# Patient Record
Sex: MALE | Race: WHITE | Employment: OTHER | URBAN - METROPOLITAN AREA
[De-identification: names, ages, dates, MRNs, and addresses within clinical notes are randomized per-mention and may not be internally consistent; named-entity substitution may affect disease eponyms.]

---

## 2017-01-01 ENCOUNTER — HOSPITAL ENCOUNTER (OUTPATIENT)
Dept: OTHER | Age: 77
Discharge: HOME | End: 2017-01-31
Attending: INTERNAL MEDICINE | Admitting: INTERNAL MEDICINE

## 2017-01-09 ENCOUNTER — ANTI-COAG VISIT (OUTPATIENT)
Dept: PHARMACY | Age: 77
End: 2017-01-09

## 2017-01-09 DIAGNOSIS — I48.20 CHRONIC ATRIAL FIBRILLATION (HCC): ICD-10-CM

## 2017-01-09 LAB — INR BLD: 2.4

## 2017-01-25 ENCOUNTER — OFFICE VISIT (OUTPATIENT)
Dept: FAMILY MEDICINE CLINIC | Age: 77
End: 2017-01-25

## 2017-01-25 VITALS
SYSTOLIC BLOOD PRESSURE: 140 MMHG | DIASTOLIC BLOOD PRESSURE: 70 MMHG | OXYGEN SATURATION: 96 % | WEIGHT: 155.8 LBS | BODY MASS INDEX: 23.01 KG/M2 | HEART RATE: 77 BPM

## 2017-01-25 DIAGNOSIS — M10.031 ACUTE IDIOPATHIC GOUT OF RIGHT WRIST: Primary | ICD-10-CM

## 2017-01-25 PROCEDURE — 99212 OFFICE O/P EST SF 10 MIN: CPT | Performed by: FAMILY MEDICINE

## 2017-01-25 PROCEDURE — 1036F TOBACCO NON-USER: CPT | Performed by: FAMILY MEDICINE

## 2017-01-25 PROCEDURE — G8427 DOCREV CUR MEDS BY ELIG CLIN: HCPCS | Performed by: FAMILY MEDICINE

## 2017-01-25 PROCEDURE — 1123F ACP DISCUSS/DSCN MKR DOCD: CPT | Performed by: FAMILY MEDICINE

## 2017-01-25 PROCEDURE — G8484 FLU IMMUNIZE NO ADMIN: HCPCS | Performed by: FAMILY MEDICINE

## 2017-01-25 PROCEDURE — 4040F PNEUMOC VAC/ADMIN/RCVD: CPT | Performed by: FAMILY MEDICINE

## 2017-01-25 PROCEDURE — G8420 CALC BMI NORM PARAMETERS: HCPCS | Performed by: FAMILY MEDICINE

## 2017-01-25 PROCEDURE — 3017F COLORECTAL CA SCREEN DOC REV: CPT | Performed by: FAMILY MEDICINE

## 2017-01-25 RX ORDER — PREDNISONE 20 MG/1
TABLET ORAL
Qty: 16 TABLET | Refills: 0 | Status: ON HOLD | OUTPATIENT
Start: 2017-01-25 | End: 2017-04-27 | Stop reason: HOSPADM

## 2017-01-25 RX ORDER — OXYCODONE HYDROCHLORIDE 5 MG/1
5 TABLET ORAL EVERY 6 HOURS PRN
Qty: 20 TABLET | Refills: 0 | Status: SHIPPED | OUTPATIENT
Start: 2017-01-25 | End: 2017-05-31 | Stop reason: SDUPTHER

## 2017-02-03 RX ORDER — DILTIAZEM HYDROCHLORIDE 120 MG/1
CAPSULE, COATED, EXTENDED RELEASE ORAL
Qty: 180 CAPSULE | Refills: 1 | Status: SHIPPED | OUTPATIENT
Start: 2017-02-03 | End: 2017-05-01

## 2017-02-09 ENCOUNTER — ANTI-COAG VISIT (OUTPATIENT)
Dept: PHARMACY | Age: 77
End: 2017-02-09

## 2017-02-09 DIAGNOSIS — I48.20 CHRONIC ATRIAL FIBRILLATION (HCC): ICD-10-CM

## 2017-02-09 LAB
INR BLD: 3.6
PROTIME: 43.6 SECONDS

## 2017-03-09 ENCOUNTER — ANTI-COAG VISIT (OUTPATIENT)
Dept: PHARMACY | Age: 77
End: 2017-03-09

## 2017-03-09 DIAGNOSIS — I48.20 CHRONIC ATRIAL FIBRILLATION (HCC): ICD-10-CM

## 2017-03-09 LAB — INR BLD: 2.1

## 2017-03-21 RX ORDER — DIGOXIN 250 MCG
TABLET ORAL
Qty: 90 TABLET | Refills: 3 | Status: SHIPPED | OUTPATIENT
Start: 2017-03-21 | End: 2017-07-03 | Stop reason: SDUPTHER

## 2017-04-06 ENCOUNTER — ANTI-COAG VISIT (OUTPATIENT)
Dept: PHARMACY | Age: 77
End: 2017-04-06

## 2017-04-06 DIAGNOSIS — I48.20 CHRONIC ATRIAL FIBRILLATION (HCC): ICD-10-CM

## 2017-04-06 LAB
INR BLD: 3
PROTIME: 35.6 SECONDS

## 2017-04-23 PROBLEM — J18.9 PNEUMONIA: Status: ACTIVE | Noted: 2017-04-23

## 2017-04-23 PROBLEM — R91.1 LUNG NODULE: Status: ACTIVE | Noted: 2017-04-23

## 2017-04-28 ENCOUNTER — CARE COORDINATION (OUTPATIENT)
Dept: FAMILY MEDICINE CLINIC | Age: 77
End: 2017-04-28

## 2017-04-28 ENCOUNTER — TELEPHONE (OUTPATIENT)
Dept: OTHER | Age: 77
End: 2017-04-28

## 2017-05-01 ENCOUNTER — OFFICE VISIT (OUTPATIENT)
Dept: FAMILY MEDICINE CLINIC | Age: 77
End: 2017-05-01

## 2017-05-01 VITALS
OXYGEN SATURATION: 93 % | DIASTOLIC BLOOD PRESSURE: 54 MMHG | SYSTOLIC BLOOD PRESSURE: 106 MMHG | BODY MASS INDEX: 23.11 KG/M2 | WEIGHT: 152 LBS | HEART RATE: 115 BPM

## 2017-05-01 DIAGNOSIS — R91.1 LUNG NODULE: ICD-10-CM

## 2017-05-01 DIAGNOSIS — J18.9 PNEUMONIA OF RIGHT MIDDLE LOBE DUE TO INFECTIOUS ORGANISM: Primary | ICD-10-CM

## 2017-05-01 DIAGNOSIS — I48.20 CHRONIC ATRIAL FIBRILLATION (HCC): ICD-10-CM

## 2017-05-01 DIAGNOSIS — I65.23 BILATERAL CAROTID ARTERY STENOSIS: ICD-10-CM

## 2017-05-01 DIAGNOSIS — M10.031 ACUTE IDIOPATHIC GOUT OF RIGHT WRIST: ICD-10-CM

## 2017-05-01 DIAGNOSIS — I70.0 ATHEROSCLEROSIS OF ABDOMINAL AORTA (HCC): ICD-10-CM

## 2017-05-01 PROBLEM — I65.22 STENOSIS OF LEFT CAROTID ARTERY: Status: ACTIVE | Noted: 2017-05-01

## 2017-05-01 RX ORDER — OXYCODONE HYDROCHLORIDE 5 MG/1
5 TABLET ORAL EVERY 6 HOURS PRN
Qty: 15 TABLET | Refills: 0 | Status: SHIPPED | OUTPATIENT
Start: 2017-05-01 | End: 2017-09-07 | Stop reason: CLARIF

## 2017-05-01 RX ORDER — PREDNISONE 20 MG/1
40 TABLET ORAL DAILY
Qty: 14 TABLET | Refills: 0 | Status: SHIPPED | OUTPATIENT
Start: 2017-05-01 | End: 2017-05-08

## 2017-05-04 ENCOUNTER — ANTI-COAG VISIT (OUTPATIENT)
Dept: PHARMACY | Age: 77
End: 2017-05-04

## 2017-05-04 ENCOUNTER — OFFICE VISIT (OUTPATIENT)
Dept: CARDIOLOGY CLINIC | Age: 77
End: 2017-05-04

## 2017-05-04 VITALS
DIASTOLIC BLOOD PRESSURE: 64 MMHG | WEIGHT: 155 LBS | BODY MASS INDEX: 23.49 KG/M2 | HEIGHT: 68 IN | HEART RATE: 80 BPM | SYSTOLIC BLOOD PRESSURE: 138 MMHG

## 2017-05-04 DIAGNOSIS — I35.1 NONRHEUMATIC AORTIC VALVE INSUFFICIENCY: Chronic | ICD-10-CM

## 2017-05-04 DIAGNOSIS — I70.0 ATHEROSCLEROSIS OF ABDOMINAL AORTA (HCC): ICD-10-CM

## 2017-05-04 DIAGNOSIS — J18.9 PNEUMONIA OF RIGHT MIDDLE LOBE DUE TO INFECTIOUS ORGANISM: Primary | ICD-10-CM

## 2017-05-04 DIAGNOSIS — I48.20 CHRONIC ATRIAL FIBRILLATION (HCC): ICD-10-CM

## 2017-05-04 DIAGNOSIS — I65.23 BILATERAL CAROTID ARTERY STENOSIS: ICD-10-CM

## 2017-05-04 DIAGNOSIS — R91.1 LUNG NODULE: ICD-10-CM

## 2017-05-04 DIAGNOSIS — E78.00 PURE HYPERCHOLESTEROLEMIA: Chronic | ICD-10-CM

## 2017-05-04 DIAGNOSIS — I10 ESSENTIAL HYPERTENSION, BENIGN: Chronic | ICD-10-CM

## 2017-05-04 LAB — INR BLD: 2.4

## 2017-05-04 PROCEDURE — 99214 OFFICE O/P EST MOD 30 MIN: CPT | Performed by: NURSE PRACTITIONER

## 2017-05-04 PROCEDURE — 1123F ACP DISCUSS/DSCN MKR DOCD: CPT | Performed by: NURSE PRACTITIONER

## 2017-05-04 PROCEDURE — G8420 CALC BMI NORM PARAMETERS: HCPCS | Performed by: NURSE PRACTITIONER

## 2017-05-04 PROCEDURE — G8427 DOCREV CUR MEDS BY ELIG CLIN: HCPCS | Performed by: NURSE PRACTITIONER

## 2017-05-04 PROCEDURE — G8598 ASA/ANTIPLAT THER USED: HCPCS | Performed by: NURSE PRACTITIONER

## 2017-05-04 PROCEDURE — 1036F TOBACCO NON-USER: CPT | Performed by: NURSE PRACTITIONER

## 2017-05-04 PROCEDURE — 4040F PNEUMOC VAC/ADMIN/RCVD: CPT | Performed by: NURSE PRACTITIONER

## 2017-05-04 PROCEDURE — 1111F DSCHRG MED/CURRENT MED MERGE: CPT | Performed by: NURSE PRACTITIONER

## 2017-05-04 PROCEDURE — 3017F COLORECTAL CA SCREEN DOC REV: CPT | Performed by: NURSE PRACTITIONER

## 2017-05-05 ENCOUNTER — HOSPITAL ENCOUNTER (OUTPATIENT)
Dept: OTHER | Age: 77
Discharge: HOME | End: 2017-05-05
Attending: NURSE PRACTITIONER | Admitting: NURSE PRACTITIONER

## 2017-05-05 DIAGNOSIS — J18.9 PNEUMONIA OF RIGHT MIDDLE LOBE DUE TO INFECTIOUS ORGANISM: ICD-10-CM

## 2017-05-08 ENCOUNTER — TELEPHONE (OUTPATIENT)
Dept: CARDIOLOGY CLINIC | Age: 77
End: 2017-05-08

## 2017-05-15 RX ORDER — TRIAMCINOLONE ACETONIDE 1 MG/G
CREAM TOPICAL
Qty: 240 G | Refills: 3 | Status: SHIPPED | OUTPATIENT
Start: 2017-05-15 | End: 2018-05-30

## 2017-05-16 ENCOUNTER — OFFICE VISIT (OUTPATIENT)
Dept: VASCULAR SURGERY | Age: 77
End: 2017-05-16

## 2017-05-16 VITALS
BODY MASS INDEX: 22.88 KG/M2 | WEIGHT: 151 LBS | DIASTOLIC BLOOD PRESSURE: 76 MMHG | SYSTOLIC BLOOD PRESSURE: 132 MMHG | HEIGHT: 68 IN

## 2017-05-16 DIAGNOSIS — I65.23 CAROTID ATHEROSCLEROSIS, BILATERAL: ICD-10-CM

## 2017-05-16 DIAGNOSIS — I70.0 AORTIC ATHEROSCLEROSIS (HCC): Primary | ICD-10-CM

## 2017-05-16 PROCEDURE — 1036F TOBACCO NON-USER: CPT | Performed by: SURGERY

## 2017-05-16 PROCEDURE — 99203 OFFICE O/P NEW LOW 30 MIN: CPT | Performed by: SURGERY

## 2017-05-16 PROCEDURE — G8419 CALC BMI OUT NRM PARAM NOF/U: HCPCS | Performed by: SURGERY

## 2017-05-16 PROCEDURE — G8427 DOCREV CUR MEDS BY ELIG CLIN: HCPCS | Performed by: SURGERY

## 2017-05-16 PROCEDURE — 1111F DSCHRG MED/CURRENT MED MERGE: CPT | Performed by: SURGERY

## 2017-05-16 PROCEDURE — 4040F PNEUMOC VAC/ADMIN/RCVD: CPT | Performed by: SURGERY

## 2017-05-16 PROCEDURE — G8598 ASA/ANTIPLAT THER USED: HCPCS | Performed by: SURGERY

## 2017-05-16 PROCEDURE — 1123F ACP DISCUSS/DSCN MKR DOCD: CPT | Performed by: SURGERY

## 2017-05-16 PROCEDURE — 3017F COLORECTAL CA SCREEN DOC REV: CPT | Performed by: SURGERY

## 2017-05-18 ENCOUNTER — ANTI-COAG VISIT (OUTPATIENT)
Dept: PHARMACY | Age: 77
End: 2017-05-18

## 2017-05-18 ENCOUNTER — TELEPHONE (OUTPATIENT)
Dept: FAMILY MEDICINE CLINIC | Age: 77
End: 2017-05-18

## 2017-05-18 DIAGNOSIS — I48.20 CHRONIC ATRIAL FIBRILLATION (HCC): ICD-10-CM

## 2017-05-18 LAB — INR BLD: 2.9

## 2017-05-26 PROCEDURE — 99496 TRANSJ CARE MGMT HIGH F2F 7D: CPT | Performed by: FAMILY MEDICINE

## 2017-05-31 ENCOUNTER — OFFICE VISIT (OUTPATIENT)
Dept: FAMILY MEDICINE CLINIC | Age: 77
End: 2017-05-31

## 2017-05-31 VITALS
DIASTOLIC BLOOD PRESSURE: 68 MMHG | SYSTOLIC BLOOD PRESSURE: 144 MMHG | BODY MASS INDEX: 23.42 KG/M2 | HEART RATE: 106 BPM | WEIGHT: 154 LBS | OXYGEN SATURATION: 98 %

## 2017-05-31 DIAGNOSIS — I50.42 CHRONIC COMBINED SYSTOLIC AND DIASTOLIC CHF (CONGESTIVE HEART FAILURE) (HCC): Primary | ICD-10-CM

## 2017-05-31 DIAGNOSIS — I10 ESSENTIAL HYPERTENSION, BENIGN: Chronic | ICD-10-CM

## 2017-05-31 DIAGNOSIS — R73.02 IGT (IMPAIRED GLUCOSE TOLERANCE): ICD-10-CM

## 2017-05-31 PROBLEM — J18.9 PNEUMONIA OF RIGHT MIDDLE LOBE DUE TO INFECTIOUS ORGANISM: Status: RESOLVED | Noted: 2017-04-23 | Resolved: 2017-05-31

## 2017-05-31 PROCEDURE — 99213 OFFICE O/P EST LOW 20 MIN: CPT | Performed by: FAMILY MEDICINE

## 2017-05-31 PROCEDURE — 4040F PNEUMOC VAC/ADMIN/RCVD: CPT | Performed by: FAMILY MEDICINE

## 2017-05-31 PROCEDURE — G8420 CALC BMI NORM PARAMETERS: HCPCS | Performed by: FAMILY MEDICINE

## 2017-05-31 PROCEDURE — G8598 ASA/ANTIPLAT THER USED: HCPCS | Performed by: FAMILY MEDICINE

## 2017-05-31 PROCEDURE — 1123F ACP DISCUSS/DSCN MKR DOCD: CPT | Performed by: FAMILY MEDICINE

## 2017-05-31 PROCEDURE — G8427 DOCREV CUR MEDS BY ELIG CLIN: HCPCS | Performed by: FAMILY MEDICINE

## 2017-05-31 PROCEDURE — 1036F TOBACCO NON-USER: CPT | Performed by: FAMILY MEDICINE

## 2017-05-31 PROCEDURE — 3017F COLORECTAL CA SCREEN DOC REV: CPT | Performed by: FAMILY MEDICINE

## 2017-05-31 RX ORDER — CARVEDILOL 6.25 MG/1
6.25 TABLET ORAL 2 TIMES DAILY WITH MEALS
Qty: 90 TABLET | Refills: 3 | Status: SHIPPED | OUTPATIENT
Start: 2017-05-31 | End: 2017-12-11 | Stop reason: SDUPTHER

## 2017-06-08 ENCOUNTER — OFFICE VISIT (OUTPATIENT)
Dept: CARDIOLOGY CLINIC | Age: 77
End: 2017-06-08

## 2017-06-08 VITALS
HEART RATE: 74 BPM | DIASTOLIC BLOOD PRESSURE: 62 MMHG | BODY MASS INDEX: 22.66 KG/M2 | OXYGEN SATURATION: 94 % | WEIGHT: 153 LBS | HEIGHT: 69 IN | SYSTOLIC BLOOD PRESSURE: 130 MMHG

## 2017-06-08 DIAGNOSIS — I70.0 ATHEROSCLEROSIS OF ABDOMINAL AORTA (HCC): ICD-10-CM

## 2017-06-08 DIAGNOSIS — I35.1 NONRHEUMATIC AORTIC VALVE INSUFFICIENCY: Primary | Chronic | ICD-10-CM

## 2017-06-08 DIAGNOSIS — E78.00 PURE HYPERCHOLESTEROLEMIA: Chronic | ICD-10-CM

## 2017-06-08 DIAGNOSIS — I48.20 CHRONIC ATRIAL FIBRILLATION (HCC): ICD-10-CM

## 2017-06-08 DIAGNOSIS — I10 ESSENTIAL HYPERTENSION, BENIGN: Chronic | ICD-10-CM

## 2017-06-08 DIAGNOSIS — I65.23 BILATERAL CAROTID ARTERY STENOSIS: ICD-10-CM

## 2017-06-08 DIAGNOSIS — R91.1 LUNG NODULE: ICD-10-CM

## 2017-06-08 PROCEDURE — G8427 DOCREV CUR MEDS BY ELIG CLIN: HCPCS | Performed by: NURSE PRACTITIONER

## 2017-06-08 PROCEDURE — 1036F TOBACCO NON-USER: CPT | Performed by: NURSE PRACTITIONER

## 2017-06-08 PROCEDURE — G8419 CALC BMI OUT NRM PARAM NOF/U: HCPCS | Performed by: NURSE PRACTITIONER

## 2017-06-08 PROCEDURE — 3017F COLORECTAL CA SCREEN DOC REV: CPT | Performed by: NURSE PRACTITIONER

## 2017-06-08 PROCEDURE — 1123F ACP DISCUSS/DSCN MKR DOCD: CPT | Performed by: NURSE PRACTITIONER

## 2017-06-08 PROCEDURE — G8598 ASA/ANTIPLAT THER USED: HCPCS | Performed by: NURSE PRACTITIONER

## 2017-06-08 PROCEDURE — 99214 OFFICE O/P EST MOD 30 MIN: CPT | Performed by: NURSE PRACTITIONER

## 2017-06-08 PROCEDURE — 4040F PNEUMOC VAC/ADMIN/RCVD: CPT | Performed by: NURSE PRACTITIONER

## 2017-06-15 ENCOUNTER — ANTI-COAG VISIT (OUTPATIENT)
Dept: PHARMACY | Age: 77
End: 2017-06-15

## 2017-06-15 DIAGNOSIS — I48.20 CHRONIC ATRIAL FIBRILLATION (HCC): ICD-10-CM

## 2017-06-15 LAB
INR BLD: 2.1
PROTIME: 25.5 SECONDS

## 2017-07-03 RX ORDER — DIGOXIN 250 MCG
TABLET ORAL
Qty: 90 TABLET | Refills: 3 | Status: SHIPPED | OUTPATIENT
Start: 2017-07-03 | End: 2018-10-01 | Stop reason: SDUPTHER

## 2017-07-06 ENCOUNTER — ANTI-COAG VISIT (OUTPATIENT)
Dept: PHARMACY | Age: 77
End: 2017-07-06

## 2017-07-06 DIAGNOSIS — I48.20 CHRONIC ATRIAL FIBRILLATION (HCC): ICD-10-CM

## 2017-07-06 LAB — INR BLD: 2.5

## 2017-08-02 RX ORDER — WARFARIN SODIUM 4 MG
TABLET ORAL
Qty: 90 TABLET | Refills: 0 | Status: SHIPPED | OUTPATIENT
Start: 2017-08-02 | End: 2017-10-16 | Stop reason: SDUPTHER

## 2017-08-07 ENCOUNTER — ANTI-COAG VISIT (OUTPATIENT)
Dept: PHARMACY | Age: 77
End: 2017-08-07

## 2017-08-07 DIAGNOSIS — I48.20 CHRONIC ATRIAL FIBRILLATION (HCC): ICD-10-CM

## 2017-08-07 LAB
INR BLD: 3.3
PROTIME: 40.1 SECONDS

## 2017-09-07 ENCOUNTER — OFFICE VISIT (OUTPATIENT)
Dept: CARDIOLOGY CLINIC | Age: 77
End: 2017-09-07

## 2017-09-07 ENCOUNTER — ANTI-COAG VISIT (OUTPATIENT)
Dept: PHARMACY | Age: 77
End: 2017-09-07

## 2017-09-07 VITALS
SYSTOLIC BLOOD PRESSURE: 128 MMHG | BODY MASS INDEX: 23.11 KG/M2 | HEIGHT: 69 IN | DIASTOLIC BLOOD PRESSURE: 74 MMHG | OXYGEN SATURATION: 98 % | WEIGHT: 156 LBS | HEART RATE: 54 BPM

## 2017-09-07 DIAGNOSIS — I10 ESSENTIAL HYPERTENSION, BENIGN: Primary | Chronic | ICD-10-CM

## 2017-09-07 DIAGNOSIS — I48.20 CHRONIC ATRIAL FIBRILLATION (HCC): ICD-10-CM

## 2017-09-07 DIAGNOSIS — I65.23 BILATERAL CAROTID ARTERY STENOSIS: ICD-10-CM

## 2017-09-07 DIAGNOSIS — I70.0 ATHEROSCLEROSIS OF ABDOMINAL AORTA (HCC): ICD-10-CM

## 2017-09-07 DIAGNOSIS — E78.00 PURE HYPERCHOLESTEROLEMIA: Chronic | ICD-10-CM

## 2017-09-07 DIAGNOSIS — I35.1 NONRHEUMATIC AORTIC VALVE INSUFFICIENCY: Chronic | ICD-10-CM

## 2017-09-07 LAB
INR BLD: 3.3
PROTIME: 39.2 SECONDS

## 2017-09-07 PROCEDURE — G8598 ASA/ANTIPLAT THER USED: HCPCS | Performed by: NURSE PRACTITIONER

## 2017-09-07 PROCEDURE — G8420 CALC BMI NORM PARAMETERS: HCPCS | Performed by: NURSE PRACTITIONER

## 2017-09-07 PROCEDURE — 1036F TOBACCO NON-USER: CPT | Performed by: NURSE PRACTITIONER

## 2017-09-07 PROCEDURE — 4040F PNEUMOC VAC/ADMIN/RCVD: CPT | Performed by: NURSE PRACTITIONER

## 2017-09-07 PROCEDURE — 1123F ACP DISCUSS/DSCN MKR DOCD: CPT | Performed by: NURSE PRACTITIONER

## 2017-09-07 PROCEDURE — 99213 OFFICE O/P EST LOW 20 MIN: CPT | Performed by: NURSE PRACTITIONER

## 2017-09-07 PROCEDURE — G8427 DOCREV CUR MEDS BY ELIG CLIN: HCPCS | Performed by: NURSE PRACTITIONER

## 2017-09-07 PROCEDURE — 3017F COLORECTAL CA SCREEN DOC REV: CPT | Performed by: NURSE PRACTITIONER

## 2017-10-05 ENCOUNTER — ANTI-COAG VISIT (OUTPATIENT)
Dept: PHARMACY | Age: 77
End: 2017-10-05

## 2017-10-05 DIAGNOSIS — I48.20 CHRONIC ATRIAL FIBRILLATION (HCC): ICD-10-CM

## 2017-10-05 LAB
INR BLD: 2.6
PROTIME: 30.9 SECONDS

## 2017-10-05 NOTE — PROGRESS NOTES
Mr. Jose Daniel Garcia is a 78 y.o. y/o male with history of Mitral Heart Valve about 1997 by Laurent Funes at [facility]   He presents today for anticoagulation monitoring and adjustment. Patient verifies current dosing regimen as listed above. Patient denies s/s bleeding/bruising/swelling/SOB. No blood in urine or stool. No dietary changes. No changes in medication/OTC agents/Herbals. No Procedures scheduled in the future at this time. Patient states he has not had any alcohol in 30 years. He uses brand name Coumadin.  RACHELL. Drinks 1 Ensure daily. Encouraged to maintain consistency. Denies missed doses. INR 2.6 again today  Continue same weekly dose of 6mg (1&1/2 tablets) on Sun & Thurs and 4mg (1 tablet) all other days. Recheck in 4 weeks. Consider resuming 6 week appointment if therapeutic at next visit. Referring cardiologist is Dr. Cong Benoit.    INR (no units)   Date Value   10/05/2017 2.6   09/07/2017 3.3   08/07/2017 3.3   07/06/2017 2.5

## 2017-10-05 NOTE — MR AVS SNAPSHOT
Atrial fibrillation    IGT (impaired glucose tolerance)    Chronic kidney disease, stage III (moderate)    Bilateral carotid artery stenosis    Atherosclerosis of abdominal aorta (HCC)    Meningioma (HCC)    Chronic combined systolic and diastolic CHF (congestive heart failure) (HCC)    Lung nodule    Hypertrophy of prostate without urinary obstruction and other lower urinary tract symptoms (LUTS)    Peptic ulcer    Gout    Generalized osteoarthrosis, involving multiple sites    Heart valve replaced by other means    Hyperlipidemia other unspecified. (Chronic)    Aortic regurgitation (Chronic)      Immunizations as of 10/5/2017     Name Date    Influenza Virus Vaccine 9/28/2012    Influenza, High Dose 9/22/2016, 10/5/2015, 10/9/2014, 9/17/2013    Pneumococcal 13-valent Conjugate (Txbovgv55) 11/25/2014    Pneumococcal Polysaccharide (Ieyanczvj63) 2/5/2003, 1/1/1997    Td 1/1/2001      Preventive Care        Date Due    Colonoscopy 9/2/1992    Tetanus Combination Vaccine (1 - Tdap) 1/2/2001    Zoster Vaccine 9/2/2002    Pneumococcal Vaccines (two) for all adults aged 72 and over (2 of 2 - PPSV23) 11/25/2015    Yearly Flu Vaccine (1) 9/1/2017    Cholesterol Screening 6/3/2021            [employer] Signup           [employer] allows you to send messages to your doctor, view your test results, renew your prescriptions, schedule appointments, view visit notes, and more. How Do I Sign Up? 1. In your Internet browser, go to [email or web address]  2. Click on the Sign Up Now link in the Sign In box. You will see the New Member Sign Up page. 3. Enter your [employer] Access Code exactly as it appears below. You will not need to use this code after youve completed the sign-up process. If you do not sign up before the expiration date, you must request a new code. [employer] Access Code: [email or web address]  Expires: 10/6/2017  9:27 AM    4.  Enter your Social Security Number ([id number]) and Date of Birth

## 2017-10-06 ENCOUNTER — NURSE ONLY (OUTPATIENT)
Dept: FAMILY MEDICINE CLINIC | Age: 77
End: 2017-10-06

## 2017-10-06 DIAGNOSIS — Z23 NEED FOR INFLUENZA VACCINATION: Primary | ICD-10-CM

## 2017-10-06 PROCEDURE — 99999 PR OFFICE/OUTPT VISIT,PROCEDURE ONLY: CPT | Performed by: FAMILY MEDICINE

## 2017-10-06 PROCEDURE — G0008 ADMIN INFLUENZA VIRUS VAC: HCPCS | Performed by: FAMILY MEDICINE

## 2017-10-06 PROCEDURE — 90662 IIV NO PRSV INCREASED AG IM: CPT | Performed by: FAMILY MEDICINE

## 2017-10-06 NOTE — PROGRESS NOTES
Vaccine Information Sheet, \"Influenza - Inactivated\"  given to Elen Cottrell, or parent/legal guardian of  Elen Cottrell and verbalized understanding. Patient responses:    Have you ever had a reaction to a flu vaccine? No  Are you able to eat eggs without adverse effects? Yes  Do you have any current illness? No  Have you ever had Guillian Terry Syndrome? No    Flu vaccine given per order. Please see immunization tab.

## 2017-10-16 RX ORDER — WARFARIN SODIUM 4 MG
TABLET ORAL
Qty: 90 TABLET | Refills: 0 | Status: SHIPPED | OUTPATIENT
Start: 2017-10-16 | End: 2017-12-29 | Stop reason: SDUPTHER

## 2017-11-01 ENCOUNTER — HOSPITAL ENCOUNTER (OUTPATIENT)
Dept: OTHER | Age: 77
Discharge: HOME | End: 2017-11-30
Attending: INTERNAL MEDICINE | Admitting: INTERNAL MEDICINE

## 2017-11-02 ENCOUNTER — TELEPHONE (OUTPATIENT)
Dept: PHARMACY | Age: 77
End: 2017-11-02

## 2017-11-06 ENCOUNTER — ANTI-COAG VISIT (OUTPATIENT)
Dept: PHARMACY | Age: 77
End: 2017-11-06

## 2017-11-06 DIAGNOSIS — I48.20 CHRONIC ATRIAL FIBRILLATION (HCC): ICD-10-CM

## 2017-11-06 LAB
INR BLD: 3.1
PROTIME: 37.2 SECONDS

## 2017-11-06 NOTE — PROGRESS NOTES
Mr. Angelo Holbrook is a 78 y.o. y/o male with history of Mitral Heart Valve about 1997 by Naty Shea at [facility]   He presents today for anticoagulation monitoring and adjustment. Patient verifies current dosing regimen as listed above. Patient denies s/s bleeding/bruising/swelling/SOB. No blood in urine or stool. No dietary changes. No changes in medication/OTC agents/Herbals. No Procedures scheduled in the future at this time. Patient states he has not had any alcohol in 30 years. He uses brand name Coumadin.  RACHELL. Reduced Ensure to about 3 times a weeky. Encouraged to maintain consistency. Denies missed doses. INR 3.1 today  Continue same weekly dose of 6mg (1&1/2 tablets) on Sun & Thurs and 4mg (1 tablet) all other days. Encouraged to maintain a consistency of vegetables/salads. Recheck INR in 6 weeks. Referring cardiologist is Dr. Taryn Gillette.    INR (no units)   Date Value   11/06/2017 3.1   10/05/2017 2.6   09/07/2017 3.3   08/07/2017 3.3

## 2017-11-14 ENCOUNTER — HOSPITAL ENCOUNTER (OUTPATIENT)
Dept: VASCULAR LAB | Age: 77
Discharge: HOME | End: 2017-11-14
Attending: SURGERY | Admitting: SURGERY

## 2017-11-14 DIAGNOSIS — I65.23 CAROTID ATHEROSCLEROSIS, BILATERAL: ICD-10-CM

## 2017-11-14 DIAGNOSIS — I65.23 OCCLUSION AND STENOSIS OF BILATERAL CAROTID ARTERIES: ICD-10-CM

## 2017-11-30 ENCOUNTER — OFFICE VISIT (OUTPATIENT)
Dept: FAMILY MEDICINE CLINIC | Age: 77
End: 2017-11-30

## 2017-11-30 VITALS
OXYGEN SATURATION: 98 % | HEART RATE: 53 BPM | BODY MASS INDEX: 23.04 KG/M2 | DIASTOLIC BLOOD PRESSURE: 70 MMHG | WEIGHT: 156 LBS | SYSTOLIC BLOOD PRESSURE: 130 MMHG

## 2017-11-30 DIAGNOSIS — I48.20 CHRONIC ATRIAL FIBRILLATION (HCC): ICD-10-CM

## 2017-11-30 DIAGNOSIS — I65.23 BILATERAL CAROTID ARTERY STENOSIS: ICD-10-CM

## 2017-11-30 DIAGNOSIS — I10 ESSENTIAL HYPERTENSION, BENIGN: Primary | Chronic | ICD-10-CM

## 2017-11-30 DIAGNOSIS — I50.42 CHRONIC COMBINED SYSTOLIC AND DIASTOLIC CHF (CONGESTIVE HEART FAILURE) (HCC): ICD-10-CM

## 2017-11-30 DIAGNOSIS — Z23 NEED FOR VACCINATION: ICD-10-CM

## 2017-11-30 PROCEDURE — G0009 ADMIN PNEUMOCOCCAL VACCINE: HCPCS | Performed by: FAMILY MEDICINE

## 2017-11-30 PROCEDURE — G8427 DOCREV CUR MEDS BY ELIG CLIN: HCPCS | Performed by: FAMILY MEDICINE

## 2017-11-30 PROCEDURE — 1123F ACP DISCUSS/DSCN MKR DOCD: CPT | Performed by: FAMILY MEDICINE

## 2017-11-30 PROCEDURE — 4040F PNEUMOC VAC/ADMIN/RCVD: CPT | Performed by: FAMILY MEDICINE

## 2017-11-30 PROCEDURE — 90732 PPSV23 VACC 2 YRS+ SUBQ/IM: CPT | Performed by: FAMILY MEDICINE

## 2017-11-30 PROCEDURE — 1036F TOBACCO NON-USER: CPT | Performed by: FAMILY MEDICINE

## 2017-11-30 PROCEDURE — 99213 OFFICE O/P EST LOW 20 MIN: CPT | Performed by: FAMILY MEDICINE

## 2017-11-30 PROCEDURE — G8598 ASA/ANTIPLAT THER USED: HCPCS | Performed by: FAMILY MEDICINE

## 2017-11-30 PROCEDURE — G8420 CALC BMI NORM PARAMETERS: HCPCS | Performed by: FAMILY MEDICINE

## 2017-11-30 PROCEDURE — 3017F COLORECTAL CA SCREEN DOC REV: CPT | Performed by: FAMILY MEDICINE

## 2017-11-30 PROCEDURE — G8484 FLU IMMUNIZE NO ADMIN: HCPCS | Performed by: FAMILY MEDICINE

## 2017-11-30 ASSESSMENT — PATIENT HEALTH QUESTIONNAIRE - PHQ9
1. LITTLE INTEREST OR PLEASURE IN DOING THINGS: 0
2. FEELING DOWN, DEPRESSED OR HOPELESS: 0
SUM OF ALL RESPONSES TO PHQ QUESTIONS 1-9: 0
SUM OF ALL RESPONSES TO PHQ9 QUESTIONS 1 & 2: 0

## 2017-11-30 NOTE — PROGRESS NOTES
Subjective:      Patient ID: Anthony Lim is a 78 y.o. male. HPI patient presents today for his 6 month check up. Patient is up to date on his flu vaccine. Patient states he had a carotid doppler by Dr. Peg Montero earlier this month and has never heard about his results. Patient is here today to follow up for their chronic conditions. Feeling well, taking all meds reg and no SE. Breathing has been good. No CP, no SOB, no palpitations. States no c/o, feels well. Review of Systems    Objective:   Physical Exam  Body mass index is 23.04 kg/m². Vitals:    11/30/17 1005   BP: 130/70   Site: Left Arm   Position: Sitting   Cuff Size: Large Adult   Pulse: 53   SpO2: 98%   Weight: 156 lb (70.8 kg)     Wt Readings from Last 3 Encounters:   11/30/17 156 lb (70.8 kg)   09/07/17 156 lb (70.8 kg)   06/08/17 153 lb (69.4 kg)       GENERAL:Alert and oriented x 4 NAD, normal appearing weight, well hydrated, well developed. NECK:supple and non tender without mass, no thyromegaly or thyroid nodules, no cervical lymphadenopathy  LUNG:clear to auscultation bilaterally with normal respiratory effort  CV: Irreg irreg with 2/6 WILLIAN  EXTREMETY: no loss of hair, no edema, normal pedal pulses bilaterally    PHQ score: PHQ-9 Total Score: 0 (11/30/2017 10:07 AM)      Assessment:      Radha Ladd was seen today for 6 month follow-up. Diagnoses and all orders for this visit:    Essential hypertension, benign  -Stable, continue current medications. Gave copies of lab order to get done  RTO 6 months    Bilateral carotid artery stenosis  Reviewed results with pt  Recheck 6 months    Chronic atrial fibrillation (HCC)  -Stable, continue current medications. Chronic combined systolic and diastolic CHF (congestive heart failure) ([facility] [address]  -     DIGOXIN LEVEL; Future  Stable, no sx  -Stable, continue current medications.     Need for vaccination  -     Pneumococcal polysaccharide vaccine 23-valent greater than or equal to 3yo subcutaneous/IM

## 2017-12-01 ENCOUNTER — HOSPITAL ENCOUNTER (OUTPATIENT)
Dept: OTHER | Age: 77
Discharge: HOME | End: 2017-12-31
Attending: INTERNAL MEDICINE | Admitting: INTERNAL MEDICINE

## 2017-12-08 ENCOUNTER — TELEPHONE (OUTPATIENT)
Dept: VASCULAR SURGERY | Age: 77
End: 2017-12-08

## 2017-12-08 DIAGNOSIS — I65.23 CAROTID ATHEROSCLEROSIS, BILATERAL: Primary | ICD-10-CM

## 2017-12-08 NOTE — TELEPHONE ENCOUNTER
Left message with patient's wife that the results of his carotid duplex scan were stable and unchanged and that we will repeat the study again in 6 months. She voiced understanding and will relay message to patient.     Electronically signed by John Ta CNP on 12/8/2017 at 12:31 PM

## 2017-12-12 RX ORDER — CARVEDILOL 6.25 MG/1
TABLET ORAL
Qty: 90 TABLET | Refills: 3 | Status: SHIPPED | OUTPATIENT
Start: 2017-12-12 | End: 2018-02-01 | Stop reason: SDUPTHER

## 2017-12-12 RX ORDER — DILTIAZEM HYDROCHLORIDE 120 MG/1
CAPSULE, COATED, EXTENDED RELEASE ORAL
Qty: 180 CAPSULE | Refills: 3 | Status: SHIPPED | OUTPATIENT
Start: 2017-12-12 | End: 2018-05-30

## 2017-12-18 ENCOUNTER — ANTI-COAG VISIT (OUTPATIENT)
Dept: PHARMACY | Age: 77
End: 2017-12-18

## 2017-12-18 DIAGNOSIS — I48.20 CHRONIC ATRIAL FIBRILLATION (HCC): ICD-10-CM

## 2017-12-18 LAB
INR BLD: 4.4
PROTIME: 53.1 SECONDS

## 2017-12-29 RX ORDER — WARFARIN SODIUM 4 MG
TABLET ORAL
Qty: 90 TABLET | Refills: 0 | Status: SHIPPED | OUTPATIENT
Start: 2017-12-29 | End: 2018-09-04 | Stop reason: SDUPTHER

## 2018-01-01 ENCOUNTER — HOSPITAL ENCOUNTER (OUTPATIENT)
Dept: OTHER | Age: 78
Discharge: HOME | End: 2018-01-31
Attending: INTERNAL MEDICINE | Admitting: INTERNAL MEDICINE

## 2018-01-08 RX ORDER — ATORVASTATIN CALCIUM 40 MG/1
TABLET, FILM COATED ORAL
Qty: 90 TABLET | Refills: 3 | Status: SHIPPED | OUTPATIENT
Start: 2018-01-08 | End: 2018-12-22 | Stop reason: SDUPTHER

## 2018-01-15 ENCOUNTER — TELEPHONE (OUTPATIENT)
Dept: PHARMACY | Age: 78
End: 2018-01-15

## 2018-01-22 ENCOUNTER — ANTI-COAG VISIT (OUTPATIENT)
Dept: PHARMACY | Age: 78
End: 2018-01-22

## 2018-01-22 DIAGNOSIS — I48.20 CHRONIC ATRIAL FIBRILLATION (HCC): ICD-10-CM

## 2018-01-22 LAB
INR BLD: 4.8
PROTIME: 57.2 SECONDS

## 2018-01-22 NOTE — PROGRESS NOTES
Mr. Maeve Eastman is a 78 y.o. y/o male with history of Heart Valve Replacement about 1997 by Senora Homans at [facility] who presents today for anticoagulation monitoring and adjustment. Pertinent PMH:    Patient Reported Findings:  Yes     No  [x]   []       Patient verifies current dosing regimen as listed  []   [x]       S/S bleeding/bruising/swelling/SOB  []   [x]       Blood in urine or stool  []   [x]       Procedures scheduled in the future at this time  []   [x]       Missed Dose  []   [x]       Extra Dose  [x]   []       Change in medications- began taking Centrum silver MVI daily. Does contain vit k. Has not been taking MVI but will start again   [x]   []       Change in health/diet/appetite- drinks ensure 3x/week- states not drinking ensure at all  []   [x]       Change in alcohol use does not drink  []   [x]       Change in activity  []   [x]       Hospital admission  []   [x]       Emergency department visit  []   [x]       Other complaints    Clinical Outcomes:  Yes     No  []   [x]       Major bleeding event  []   [x]       Thromboembolic event    Duration of warfarin Therapy: indefinite  INR Range:  2.5-3.5    INR 4.8 today d/t no vegetables or ensure. Patient states that he would rather leave the dose alone, but explained that since so far from therapeutic range, will need to dec weekly dose. Hold dose today then decrease weekly dose to 6mg (1&1/2 tablets) on Sun and 4mg (1 tablet) all other days. (6.3% dec)  Will need to assess vit k and ensure intake at next appt   Recheck INR in 3 weeks, 2/12  Return to 6 week appt when appropriate.     Referring cardiologist is Dr. Lexie Kruger.    INR (no units)   Date Value   01/22/2018 4.8   12/18/2017 4.4   11/06/2017 3.1   10/05/2017 2.6

## 2018-02-01 ENCOUNTER — TELEPHONE (OUTPATIENT)
Dept: FAMILY MEDICINE CLINIC | Age: 78
End: 2018-02-01

## 2018-02-01 ENCOUNTER — HOSPITAL ENCOUNTER (OUTPATIENT)
Dept: OTHER | Age: 78
Discharge: HOME | End: 2018-02-28
Attending: INTERNAL MEDICINE | Admitting: INTERNAL MEDICINE

## 2018-02-01 RX ORDER — CARVEDILOL 6.25 MG/1
TABLET ORAL
Qty: 90 TABLET | Refills: 3 | Status: SHIPPED | OUTPATIENT
Start: 2018-02-01 | End: 2018-08-03 | Stop reason: SDUPTHER

## 2018-02-01 NOTE — TELEPHONE ENCOUNTER
Left message for patient to call back. There is no documentation of a change or discontinuation. This can be re-sent to the pharmacy if this is what they are needing.

## 2018-02-01 NOTE — TELEPHONE ENCOUNTER
Per pt's spouse - she called the pharmacy (mail order) to get the following refilled:    carvedilol (COREG) 6.25 MG tablet 90 tablet 3 12/12/2017     Sig: TAKE 1 TABLET TWICE DAILY  WITH MEALS      And was advised that it was discontinued by the doctor    Please call and advise if this is correct as she is not aware of a substitute that was prescribed for it.

## 2018-02-06 RX ORDER — ALLOPURINOL 300 MG/1
TABLET ORAL
Qty: 90 TABLET | Refills: 1 | Status: SHIPPED | OUTPATIENT
Start: 2018-02-06 | End: 2018-08-03 | Stop reason: SDUPTHER

## 2018-02-12 ENCOUNTER — ANTI-COAG VISIT (OUTPATIENT)
Dept: PHARMACY | Age: 78
End: 2018-02-12

## 2018-02-12 DIAGNOSIS — I48.20 CHRONIC ATRIAL FIBRILLATION (HCC): ICD-10-CM

## 2018-02-12 LAB
INR BLD: 3.4
PROTIME: 40.7 SECONDS

## 2018-02-16 RX ORDER — LISINOPRIL 20 MG/1
TABLET ORAL
Qty: 90 TABLET | Refills: 3 | Status: SHIPPED | OUTPATIENT
Start: 2018-02-16 | End: 2019-02-16 | Stop reason: SDUPTHER

## 2018-03-01 ENCOUNTER — HOSPITAL ENCOUNTER (OUTPATIENT)
Dept: OTHER | Age: 78
Discharge: HOME | End: 2018-03-31
Attending: INTERNAL MEDICINE | Admitting: INTERNAL MEDICINE

## 2018-03-01 RX ORDER — FUROSEMIDE 40 MG/1
TABLET ORAL
Qty: 135 TABLET | Refills: 3 | Status: SHIPPED | OUTPATIENT
Start: 2018-03-01 | End: 2019-04-16 | Stop reason: SDUPTHER

## 2018-03-06 ENCOUNTER — OFFICE VISIT (OUTPATIENT)
Dept: CARDIOLOGY CLINIC | Age: 78
End: 2018-03-06

## 2018-03-06 VITALS
HEART RATE: 60 BPM | WEIGHT: 155 LBS | DIASTOLIC BLOOD PRESSURE: 72 MMHG | OXYGEN SATURATION: 97 % | BODY MASS INDEX: 22.89 KG/M2 | SYSTOLIC BLOOD PRESSURE: 138 MMHG

## 2018-03-06 DIAGNOSIS — I35.1 NONRHEUMATIC AORTIC VALVE INSUFFICIENCY: Chronic | ICD-10-CM

## 2018-03-06 DIAGNOSIS — I65.23 BILATERAL CAROTID ARTERY STENOSIS: ICD-10-CM

## 2018-03-06 DIAGNOSIS — E78.00 PURE HYPERCHOLESTEROLEMIA: Chronic | ICD-10-CM

## 2018-03-06 DIAGNOSIS — I48.20 CHRONIC ATRIAL FIBRILLATION (HCC): ICD-10-CM

## 2018-03-06 DIAGNOSIS — I10 ESSENTIAL HYPERTENSION, BENIGN: Primary | Chronic | ICD-10-CM

## 2018-03-06 PROCEDURE — 4040F PNEUMOC VAC/ADMIN/RCVD: CPT | Performed by: NURSE PRACTITIONER

## 2018-03-06 PROCEDURE — G8484 FLU IMMUNIZE NO ADMIN: HCPCS | Performed by: NURSE PRACTITIONER

## 2018-03-06 PROCEDURE — 1123F ACP DISCUSS/DSCN MKR DOCD: CPT | Performed by: NURSE PRACTITIONER

## 2018-03-06 PROCEDURE — G8598 ASA/ANTIPLAT THER USED: HCPCS | Performed by: NURSE PRACTITIONER

## 2018-03-06 PROCEDURE — 1036F TOBACCO NON-USER: CPT | Performed by: NURSE PRACTITIONER

## 2018-03-06 PROCEDURE — G8427 DOCREV CUR MEDS BY ELIG CLIN: HCPCS | Performed by: NURSE PRACTITIONER

## 2018-03-06 PROCEDURE — 99214 OFFICE O/P EST MOD 30 MIN: CPT | Performed by: NURSE PRACTITIONER

## 2018-03-06 PROCEDURE — G8420 CALC BMI NORM PARAMETERS: HCPCS | Performed by: NURSE PRACTITIONER

## 2018-03-06 PROCEDURE — 3017F COLORECTAL CA SCREEN DOC REV: CPT | Performed by: NURSE PRACTITIONER

## 2018-03-06 NOTE — PROGRESS NOTES
chronic abdominal aortic dissection with moderate   atherosclerotic vascular disease.  No aneurysm or rupture. 3. Scattered hepatic subcentimeter hypodensities and 13 mm indeterminate   hypodensity in the right hepatic lobe.  These likely represent benign cysts   or hemangiomas. Carotid ultrasound: 4/2017  Summary       Imtiaz Dura is 16-49% stenosis of the right internal carotid artery.    There is 50-79% stenosis of the left internal carotid artery.    Vertebral flow are antegrade bilaterally.         CT scan: chest 4/2017    Right middle lobe and right lower lobe pneumonia.       Mild right lower lobe pulmonary edema with trace right pleural effusion.       Indeterminate 7 mm left upper lobe pulmonary nodule.  Follow-up chest CT   recommended as below per Fleischner society guidelines.  Chest CT recommended   in 6-12 months.       Ascending aortic aneurysm measuring 4.3 cm. Assessment/Plan:    1. Heart valve replaced by other means/[facility] AVR: Stable. ECHO: 4/17 EF 40-45%, well seated aortic valve, mod MR/  Mild TR   ECHO 6/28/16: EF 35%, well seated aortic valve, mod MR/TR. ECHO  9/9/15> EF 35% (down from previous), well-seated aortic valve, mod. MR/TR. Echo 6/2013> stable aortic valve function. EF 45-50%. Echo 2012> EF 50-55%, bi-atrial enlargement, mild MR, normal functioning st tito aortic valve with trace AI, moderate TR with RVSP 35 mmHg. 2. Atrial fibrillation: HR irregular & controlled. On Coumadin with INRs managed through [facility]   3. Essential hypertension, benign: controlled on medications. B/P today:138/72  Mild cLVH by ECHO. 4. Hyperlipidemia: On Lipitor 40 mg daily . 6/16 Total: 121, HDL: 40, Tri: 116, LDL: 58. Followed per PCP. 5.  Bilateral carotid artery stenosis   There is 16-49% stenosis of the right internal carotid artery.    There is 50-79% stenosis of the left internal carotid artery. Followed per Carlos Chin MD     6.  Atherosclerosis of abdominal aorta ([address]  Cat scan: 4/17: Ascending aortic aneurysm measuring 4.3 cm. Followed per- Kelsey Camp MD  Plan: Risk factor reduction - pt on statin, bp, coumadin     7. Lung nodule  - CT Chest WO Contrast; Future  Recheck CT in 3 months   Followed per PCP     PLAN:    1. Continue current medication regimen. 2. Labs followed per PCP.   3. Follow up in six months    I appreciate the opportunity of cooperating in the care of this individual.    Navid Braun CNP

## 2018-03-12 ENCOUNTER — ANTI-COAG VISIT (OUTPATIENT)
Dept: PHARMACY | Age: 78
End: 2018-03-12

## 2018-03-12 LAB
INR BLD: 2.8
PROTIME: 33.8 SECONDS

## 2018-03-19 RX ORDER — CELECOXIB 200 MG/1
CAPSULE ORAL
Qty: 90 CAPSULE | Refills: 3 | Status: SHIPPED | OUTPATIENT
Start: 2018-03-19 | End: 2019-03-22 | Stop reason: SDUPTHER

## 2018-03-30 RX ORDER — WARFARIN SODIUM 4 MG
TABLET ORAL
Qty: 90 TABLET | Refills: 0 | Status: SHIPPED | OUTPATIENT
Start: 2018-03-30 | End: 2018-05-30

## 2018-03-30 RX ORDER — DIGOXIN 250 MCG
TABLET ORAL
Qty: 90 TABLET | Refills: 3 | Status: SHIPPED | OUTPATIENT
Start: 2018-03-30 | End: 2018-05-30

## 2018-04-01 ENCOUNTER — HOSPITAL ENCOUNTER (OUTPATIENT)
Dept: OTHER | Age: 78
Discharge: HOME | End: 2018-04-30
Attending: INTERNAL MEDICINE | Admitting: INTERNAL MEDICINE

## 2018-04-30 ENCOUNTER — ANTI-COAG VISIT (OUTPATIENT)
Dept: PHARMACY | Age: 78
End: 2018-04-30

## 2018-04-30 LAB
INR BLD: 4.2
PROTIME: 50.2 SECONDS

## 2018-05-01 ENCOUNTER — HOSPITAL ENCOUNTER (OUTPATIENT)
Dept: OTHER | Age: 78
Discharge: HOME | End: 2018-05-31
Attending: INTERNAL MEDICINE | Admitting: INTERNAL MEDICINE

## 2018-05-07 ENCOUNTER — TELEPHONE (OUTPATIENT)
Dept: PHARMACY | Facility: CLINIC | Age: 78
End: 2018-05-07

## 2018-05-29 ENCOUNTER — TELEPHONE (OUTPATIENT)
Dept: FAMILY MEDICINE CLINIC | Age: 78
End: 2018-05-29

## 2018-05-30 ENCOUNTER — OFFICE VISIT (OUTPATIENT)
Dept: FAMILY MEDICINE CLINIC | Age: 78
End: 2018-05-30

## 2018-05-30 VITALS
HEART RATE: 69 BPM | WEIGHT: 153 LBS | SYSTOLIC BLOOD PRESSURE: 144 MMHG | BODY MASS INDEX: 22.59 KG/M2 | OXYGEN SATURATION: 98 % | DIASTOLIC BLOOD PRESSURE: 68 MMHG

## 2018-05-30 DIAGNOSIS — I50.42 CHRONIC COMBINED SYSTOLIC AND DIASTOLIC CHF (CONGESTIVE HEART FAILURE) (HCC): ICD-10-CM

## 2018-05-30 DIAGNOSIS — M10.031 ACUTE IDIOPATHIC GOUT OF RIGHT WRIST: ICD-10-CM

## 2018-05-30 DIAGNOSIS — D32.9 MENINGIOMA (HCC): ICD-10-CM

## 2018-05-30 DIAGNOSIS — I48.21 PERMANENT ATRIAL FIBRILLATION (HCC): ICD-10-CM

## 2018-05-30 DIAGNOSIS — R73.02 IGT (IMPAIRED GLUCOSE TOLERANCE): ICD-10-CM

## 2018-05-30 DIAGNOSIS — I10 ESSENTIAL HYPERTENSION, BENIGN: Primary | Chronic | ICD-10-CM

## 2018-05-30 DIAGNOSIS — R42 DIZZINESS: ICD-10-CM

## 2018-05-30 DIAGNOSIS — R26.89 BALANCE PROBLEMS: ICD-10-CM

## 2018-05-30 DIAGNOSIS — K59.00 CONSTIPATION, UNSPECIFIED CONSTIPATION TYPE: ICD-10-CM

## 2018-05-30 PROCEDURE — 1036F TOBACCO NON-USER: CPT | Performed by: FAMILY MEDICINE

## 2018-05-30 PROCEDURE — G8598 ASA/ANTIPLAT THER USED: HCPCS | Performed by: FAMILY MEDICINE

## 2018-05-30 PROCEDURE — 99214 OFFICE O/P EST MOD 30 MIN: CPT | Performed by: FAMILY MEDICINE

## 2018-05-30 PROCEDURE — 3017F COLORECTAL CA SCREEN DOC REV: CPT | Performed by: FAMILY MEDICINE

## 2018-05-30 PROCEDURE — 1123F ACP DISCUSS/DSCN MKR DOCD: CPT | Performed by: FAMILY MEDICINE

## 2018-05-30 PROCEDURE — G8427 DOCREV CUR MEDS BY ELIG CLIN: HCPCS | Performed by: FAMILY MEDICINE

## 2018-05-30 PROCEDURE — 4040F PNEUMOC VAC/ADMIN/RCVD: CPT | Performed by: FAMILY MEDICINE

## 2018-05-30 PROCEDURE — G8420 CALC BMI NORM PARAMETERS: HCPCS | Performed by: FAMILY MEDICINE

## 2018-05-30 RX ORDER — POLYETHYLENE GLYCOL 3350 17 G/17G
17 POWDER, FOR SOLUTION ORAL DAILY
Qty: 850 G | Refills: 3 | Status: SHIPPED | OUTPATIENT
Start: 2018-05-30 | End: 2019-03-11 | Stop reason: SDUPTHER

## 2018-06-01 ENCOUNTER — HOSPITAL ENCOUNTER (OUTPATIENT)
Dept: OTHER | Age: 78
Discharge: HOME | End: 2018-06-30
Attending: INTERNAL MEDICINE | Admitting: INTERNAL MEDICINE

## 2018-06-04 ENCOUNTER — ANTI-COAG VISIT (OUTPATIENT)
Dept: PHARMACY | Age: 78
End: 2018-06-04

## 2018-06-04 DIAGNOSIS — I48.21 PERMANENT ATRIAL FIBRILLATION (HCC): ICD-10-CM

## 2018-06-04 LAB
INR BLD: 5
PROTIME: 59.8 SECONDS

## 2018-06-05 ENCOUNTER — HOSPITAL ENCOUNTER (OUTPATIENT)
Dept: MRI IMAGING | Age: 78
Discharge: HOME | End: 2018-06-05
Attending: FAMILY MEDICINE | Admitting: FAMILY MEDICINE

## 2018-06-05 DIAGNOSIS — D32.9 MENINGIOMA (HCC): ICD-10-CM

## 2018-06-05 DIAGNOSIS — R42 DIZZINESS AND GIDDINESS: ICD-10-CM

## 2018-06-05 DIAGNOSIS — R42 DIZZINESS: ICD-10-CM

## 2018-06-15 ENCOUNTER — ANTI-COAG VISIT (OUTPATIENT)
Dept: PHARMACY | Age: 78
End: 2018-06-15

## 2018-06-15 ENCOUNTER — OFFICE VISIT (OUTPATIENT)
Dept: VASCULAR SURGERY | Age: 78
End: 2018-06-15

## 2018-06-15 ENCOUNTER — HOSPITAL ENCOUNTER (OUTPATIENT)
Dept: VASCULAR LAB | Age: 78
Discharge: HOME | End: 2018-06-15
Attending: SURGERY | Admitting: SURGERY

## 2018-06-15 VITALS
BODY MASS INDEX: 22.81 KG/M2 | DIASTOLIC BLOOD PRESSURE: 70 MMHG | HEIGHT: 69 IN | SYSTOLIC BLOOD PRESSURE: 130 MMHG | WEIGHT: 154 LBS

## 2018-06-15 DIAGNOSIS — I48.21 PERMANENT ATRIAL FIBRILLATION (HCC): ICD-10-CM

## 2018-06-15 DIAGNOSIS — I65.23 OCCLUSION AND STENOSIS OF BILATERAL CAROTID ARTERIES: ICD-10-CM

## 2018-06-15 DIAGNOSIS — I65.23 CAROTID ATHEROSCLEROSIS, BILATERAL: ICD-10-CM

## 2018-06-15 DIAGNOSIS — I65.23 CAROTID ATHEROSCLEROSIS, BILATERAL: Primary | ICD-10-CM

## 2018-06-15 LAB
INR BLD: 2.5
PROTIME: 30.2 SECONDS

## 2018-06-15 PROCEDURE — G8420 CALC BMI NORM PARAMETERS: HCPCS | Performed by: SURGERY

## 2018-06-15 PROCEDURE — G8427 DOCREV CUR MEDS BY ELIG CLIN: HCPCS | Performed by: SURGERY

## 2018-06-15 PROCEDURE — 4040F PNEUMOC VAC/ADMIN/RCVD: CPT | Performed by: SURGERY

## 2018-06-15 PROCEDURE — 1123F ACP DISCUSS/DSCN MKR DOCD: CPT | Performed by: SURGERY

## 2018-06-15 PROCEDURE — 99213 OFFICE O/P EST LOW 20 MIN: CPT | Performed by: SURGERY

## 2018-06-15 PROCEDURE — 1036F TOBACCO NON-USER: CPT | Performed by: SURGERY

## 2018-06-15 PROCEDURE — G8598 ASA/ANTIPLAT THER USED: HCPCS | Performed by: SURGERY

## 2018-06-15 PROCEDURE — 3017F COLORECTAL CA SCREEN DOC REV: CPT | Performed by: SURGERY

## 2018-06-22 ENCOUNTER — TELEPHONE (OUTPATIENT)
Dept: FAMILY MEDICINE CLINIC | Age: 78
End: 2018-06-22

## 2018-06-22 DIAGNOSIS — R42 DIZZINESS: Primary | ICD-10-CM

## 2018-06-29 ENCOUNTER — TELEPHONE (OUTPATIENT)
Dept: FAMILY MEDICINE CLINIC | Age: 78
End: 2018-06-29

## 2018-06-29 ENCOUNTER — OFFICE VISIT (OUTPATIENT)
Dept: ENT CLINIC | Age: 78
End: 2018-06-29

## 2018-06-29 VITALS — SYSTOLIC BLOOD PRESSURE: 130 MMHG | DIASTOLIC BLOOD PRESSURE: 80 MMHG

## 2018-06-29 DIAGNOSIS — H81.09 LABYRINTHINE VERTIGO, UNSPECIFIED LATERALITY: Primary | ICD-10-CM

## 2018-06-29 PROCEDURE — G8427 DOCREV CUR MEDS BY ELIG CLIN: HCPCS | Performed by: OTOLARYNGOLOGY

## 2018-06-29 PROCEDURE — 99204 OFFICE O/P NEW MOD 45 MIN: CPT | Performed by: OTOLARYNGOLOGY

## 2018-06-29 PROCEDURE — 92585 PR AUDITORY EVOKED POTENTIAL: CPT | Performed by: OTOLARYNGOLOGY

## 2018-06-29 PROCEDURE — 1123F ACP DISCUSS/DSCN MKR DOCD: CPT | Performed by: OTOLARYNGOLOGY

## 2018-06-29 PROCEDURE — G8598 ASA/ANTIPLAT THER USED: HCPCS | Performed by: OTOLARYNGOLOGY

## 2018-06-29 PROCEDURE — 3017F COLORECTAL CA SCREEN DOC REV: CPT | Performed by: OTOLARYNGOLOGY

## 2018-06-29 PROCEDURE — 1036F TOBACCO NON-USER: CPT | Performed by: OTOLARYNGOLOGY

## 2018-06-29 PROCEDURE — 4040F PNEUMOC VAC/ADMIN/RCVD: CPT | Performed by: OTOLARYNGOLOGY

## 2018-06-29 PROCEDURE — G8420 CALC BMI NORM PARAMETERS: HCPCS | Performed by: OTOLARYNGOLOGY

## 2018-06-29 ASSESSMENT — ENCOUNTER SYMPTOMS
EYES NEGATIVE: 1
RESPIRATORY NEGATIVE: 1
ALLERGIC/IMMUNOLOGIC NEGATIVE: 1

## 2018-07-01 ENCOUNTER — HOSPITAL ENCOUNTER (OUTPATIENT)
Dept: OTHER | Age: 78
Discharge: HOME | End: 2018-07-31
Attending: INTERNAL MEDICINE | Admitting: INTERNAL MEDICINE

## 2018-07-02 RX ORDER — WARFARIN SODIUM 4 MG
TABLET ORAL
Qty: 90 TABLET | Refills: 3 | Status: SHIPPED | OUTPATIENT
Start: 2018-07-02 | End: 2020-01-01 | Stop reason: SDUPTHER

## 2018-07-06 ENCOUNTER — ANTI-COAG VISIT (OUTPATIENT)
Dept: PHARMACY | Age: 78
End: 2018-07-06

## 2018-07-06 DIAGNOSIS — I48.21 PERMANENT ATRIAL FIBRILLATION (HCC): ICD-10-CM

## 2018-07-06 LAB
INR BLD: 2.6
PROTIME: 31 SECONDS

## 2018-07-06 NOTE — PROGRESS NOTES
Mr. Winifred Guaman is a 78 y.o. y/o male with history of Heart Valve Replacement about 1997 by Anya Langley at [facility]   He presents today for anticoagulation monitoring and adjustment. Pertinent AFib, PMH: HTN, CHF, Chronic Kidney disease(moderate)    Patient Reported Findings:  Yes     No  [x]   []       Patient verifies current dosing regimen as listed  []   [x]       S/S bleeding/bruising/swelling/SOB  []   [x]       Blood in urine or stool  []   [x]       Procedures scheduled in the future at this time  []   [x]       Missed Dose  []   [x]       Extra Dose  []   [x]       Change in medications  [x]   []       Change in health/diet/appetite- started drinking Ensure again (drinking one/day). []   [x]       Change in alcohol use does not drink  []   [x]       Change in activity  []   [x]       Hospital admission  []   [x]       Emergency department visit  []   [x]       Other complaints    Clinical Outcomes:  Yes     No  []   [x]       Major bleeding event  []   [x]       Thromboembolic event  Patient states that he gets brand Coumadin from mail order. States \"warfarin just doesn't work for [employer] Duration of warfarin Therapy: indefinite  INR Range:  2.5-3.5    INR 2.6 today   Continue same dose of 4mg daily. Encouraged to maintain a consistency of vegetables/salads. Recheck INR in 4 weeks, 8/3     Referring cardiologist is Dr. Annemarie Manning.    INR (no units)   Date Value   07/06/2018 2.6   06/15/2018 2.5   06/04/2018 5   04/30/2018 4.2

## 2018-08-01 ENCOUNTER — HOSPITAL ENCOUNTER (OUTPATIENT)
Dept: OTHER | Age: 78
Discharge: HOME | End: 2018-08-31
Attending: INTERNAL MEDICINE | Admitting: INTERNAL MEDICINE

## 2018-08-03 ENCOUNTER — ANTI-COAG VISIT (OUTPATIENT)
Dept: PHARMACY | Age: 78
End: 2018-08-03

## 2018-08-03 DIAGNOSIS — I48.21 PERMANENT ATRIAL FIBRILLATION (HCC): ICD-10-CM

## 2018-08-03 LAB
INR BLD: 3.1
PROTIME: 37.3 SECONDS

## 2018-08-03 RX ORDER — CARVEDILOL 6.25 MG/1
TABLET ORAL
Qty: 90 TABLET | Refills: 3 | Status: SHIPPED | OUTPATIENT
Start: 2018-08-03 | End: 2019-02-04 | Stop reason: SDUPTHER

## 2018-08-03 RX ORDER — ALLOPURINOL 300 MG/1
TABLET ORAL
Qty: 90 TABLET | Refills: 1 | Status: SHIPPED | OUTPATIENT
Start: 2018-08-03 | End: 2019-02-04 | Stop reason: SDUPTHER

## 2018-08-03 NOTE — PROGRESS NOTES
Mr. Esquivel Client is a 78 y.o. y/o male with history of Heart Valve Replacement about 1997 by Judi Avelar at [facility]   He presents today for anticoagulation monitoring and adjustment. Pertinent AFib, PMH: HTN, CHF, Chronic Kidney disease(moderate)    Patient Reported Findings:  Yes     No  [x]   []       Patient verifies current dosing regimen as listed  []   [x]       S/S bleeding/bruising/swelling/SOB  []   [x]       Blood in urine or stool  []   [x]       Procedures scheduled in the future at this time  []   [x]       Missed Dose  []   [x]       Extra Dose  []   [x]       Change in medications  []   [x]       Change in health/diet/appetite- continues drinking Ensure (drinking one/day). []   [x]       Change in alcohol use does not drink  []   [x]       Change in activity  []   [x]       Hospital admission  []   [x]       Emergency department visit  []   [x]       Other complaints    Clinical Outcomes:  Yes     No  []   [x]       Major bleeding event  []   [x]       Thromboembolic event  Patient states that he gets brand Coumadin from mail order. States \"warfarin just doesn't work for [employer] Duration of warfarin Therapy: indefinite  INR Range:  2.5-3.5    INR 3.1 today   Continue same dose of 4mg daily. Encouraged to maintain a consistency of vegetables/salads. Recheck INR in 4 weeks     Referring cardiologist is Dr. Kristy Mullins.    INR (no units)   Date Value   08/03/2018 3.1   07/06/2018 2.6   06/15/2018 2.5   06/04/2018 5

## 2018-08-24 RX ORDER — TRIAMCINOLONE ACETONIDE 1 MG/G
CREAM TOPICAL
Qty: 240 G | Refills: 3 | Status: SHIPPED | OUTPATIENT
Start: 2018-08-24 | End: 2020-01-01 | Stop reason: ALTCHOICE

## 2018-08-24 NOTE — TELEPHONE ENCOUNTER
triamcinolone (KENALOG) 0.1 % cream (Discontinued) 240 g 3 5/15/2017 5/30/2018    Sig: APPLY TOPICALLY TWICE DAILY      [facility] in chart

## 2018-08-31 ENCOUNTER — ANTI-COAG VISIT (OUTPATIENT)
Dept: PHARMACY | Age: 78
End: 2018-08-31

## 2018-08-31 DIAGNOSIS — I48.21 PERMANENT ATRIAL FIBRILLATION (HCC): ICD-10-CM

## 2018-08-31 LAB
INR BLD: 4
PROTIME: 48.3 SECONDS

## 2018-08-31 NOTE — PROGRESS NOTES
Mr. Shannan Shah is a 78 y.o. y/o male with history of Heart Valve Replacement about 1997 by Ly Morejon at [facility]   He presents today for anticoagulation monitoring and adjustment. Pertinent AFib, PMH: HTN, CHF, Chronic Kidney disease(moderate)    Patient Reported Findings:  Yes     No  [x]   []       Patient verifies current dosing regimen as listed  []   [x]       S/S bleeding/bruising/swelling/SOB  []   [x]       Blood in urine or stool  []   [x]       Procedures scheduled in the future at this time  []   [x]       Missed Dose  []   [x]       Extra Dose  []   [x]       Change in medications  [x]   []       Change in health/diet/appetite- continues drinking Ensure (drinking one/day). Has been light to no vegetables recently. Will  Normally have veggies 5 out 7 days  []   [x]       Change in alcohol use does not drink  []   [x]       Change in activity  []   [x]       Hospital admission  []   [x]       Emergency department visit  []   [x]       Other complaints    Clinical Outcomes:  Yes     No  []   [x]       Major bleeding event  []   [x]       Thromboembolic event  Patient states that he gets brand Coumadin from mail order. States \"warfarin just doesn't work for [employer] Duration of warfarin Therapy: indefinite  INR Range:  2.5-3.5    INR 4.0 today   Continue same dose of 4mg daily. Encouraged to maintain a consistency of vegetables/salads. Recheck INR in 4 weeks      Referring cardiologist is Dr. Constance Cardoso.    INR (no units)   Date Value   08/31/2018 4   08/03/2018 3.1   07/06/2018 2.6   06/15/2018 2.5

## 2018-09-01 ENCOUNTER — HOSPITAL ENCOUNTER (OUTPATIENT)
Dept: OTHER | Age: 78
Discharge: HOME | End: 2018-09-01
Attending: INTERNAL MEDICINE | Admitting: INTERNAL MEDICINE

## 2018-09-03 NOTE — PROGRESS NOTES
Reviewed in HPI  · Respiratory: No cough or wheezing, no sputum production. No hematemesis. · Gastrointestinal: No abdominal pain, appetite loss, blood in stools. No change in bowel or bladder habits. · Genitourinary: No dysuria, trouble voiding, or hematuria. · Musculoskeletal:  No gait disturbance, weakness or joint complaints. · Integumentary: No rash or pruritis. · Neurological: No headache, diplopia, change in muscle strength, numbness or tingling. No change in gait, balance, coordination, mood, affect, memory, mentation, behavior. · Psychiatric: No anxiety, no depression. · Endocrine: No malaise, fatigue or temperature intolerance. No excessive thirst, fluid intake, or urination. No tremor. · Hematologic/Lymphatic: No abnormal bruising or bleeding, blood clots or swollen lymph nodes. · Allergic/Immunologic: No nasal congestion or hives. Physical Examination:    Vitals:    09/04/18 0956   BP: 126/70   Pulse: 53   SpO2: 97%            Constitutional and General Appearance:   Respiratory:  · Normal excursion and expansion without use of accessory muscles  · Resp Auscultation: Normal breath sounds without dullness  Cardiovascular:  · The apical impulses not displaced  · Normal prosthetic heart sounds, irregular heart rhythm. No murmur  · Cervical veins are not engorged  · The carotid upstroke is normal in amplitude and contour without delay or bruit  · Peripheral pulses are symmetrical and full  · There is no clubbing, cyanosis of the extremities. · No edema  · Femoral Arteries: 2+ and equal  · Pedal Pulses: 2+ and equal   Abdomen:  · No masses or tenderness  · Liver/Spleen: No Abnormalities Noted  Neurological/Psychiatric:  · Alert and oriented in all spheres  · Moves all extremities well  · Exhibits normal gait balance and coordination  · No abnormalities of mood, affect, memory, mentation, or behavior are noted  ·   CT scan: abdomen 4/17  No acute abnormality in the abdomen or pelvis.    2.

## 2018-09-04 ENCOUNTER — OFFICE VISIT (OUTPATIENT)
Dept: CARDIOLOGY CLINIC | Age: 78
End: 2018-09-04

## 2018-09-04 ENCOUNTER — NURSE ONLY (OUTPATIENT)
Dept: FAMILY MEDICINE CLINIC | Age: 78
End: 2018-09-04

## 2018-09-04 VITALS
SYSTOLIC BLOOD PRESSURE: 126 MMHG | WEIGHT: 154.8 LBS | HEART RATE: 53 BPM | BODY MASS INDEX: 22.93 KG/M2 | HEIGHT: 69 IN | DIASTOLIC BLOOD PRESSURE: 70 MMHG | OXYGEN SATURATION: 97 %

## 2018-09-04 DIAGNOSIS — E78.2 MIXED HYPERLIPIDEMIA: Chronic | ICD-10-CM

## 2018-09-04 DIAGNOSIS — Z23 NEED FOR PROPHYLACTIC VACCINATION AND INOCULATION AGAINST INFLUENZA: Primary | ICD-10-CM

## 2018-09-04 DIAGNOSIS — I48.20 CHRONIC ATRIAL FIBRILLATION (HCC): ICD-10-CM

## 2018-09-04 DIAGNOSIS — I65.23 BILATERAL CAROTID ARTERY STENOSIS: ICD-10-CM

## 2018-09-04 DIAGNOSIS — I35.1 NONRHEUMATIC AORTIC VALVE INSUFFICIENCY: Chronic | ICD-10-CM

## 2018-09-04 DIAGNOSIS — I10 ESSENTIAL HYPERTENSION, BENIGN: Primary | Chronic | ICD-10-CM

## 2018-09-04 PROCEDURE — 1101F PT FALLS ASSESS-DOCD LE1/YR: CPT | Performed by: NURSE PRACTITIONER

## 2018-09-04 PROCEDURE — G8598 ASA/ANTIPLAT THER USED: HCPCS | Performed by: NURSE PRACTITIONER

## 2018-09-04 PROCEDURE — G8427 DOCREV CUR MEDS BY ELIG CLIN: HCPCS | Performed by: NURSE PRACTITIONER

## 2018-09-04 PROCEDURE — G0008 ADMIN INFLUENZA VIRUS VAC: HCPCS | Performed by: FAMILY MEDICINE

## 2018-09-04 PROCEDURE — G8420 CALC BMI NORM PARAMETERS: HCPCS | Performed by: NURSE PRACTITIONER

## 2018-09-04 PROCEDURE — 90662 IIV NO PRSV INCREASED AG IM: CPT | Performed by: FAMILY MEDICINE

## 2018-09-04 PROCEDURE — 1036F TOBACCO NON-USER: CPT | Performed by: NURSE PRACTITIONER

## 2018-09-04 PROCEDURE — 1123F ACP DISCUSS/DSCN MKR DOCD: CPT | Performed by: NURSE PRACTITIONER

## 2018-09-04 PROCEDURE — 99214 OFFICE O/P EST MOD 30 MIN: CPT | Performed by: NURSE PRACTITIONER

## 2018-09-04 PROCEDURE — 4040F PNEUMOC VAC/ADMIN/RCVD: CPT | Performed by: NURSE PRACTITIONER

## 2018-10-01 ENCOUNTER — ANTI-COAG VISIT (OUTPATIENT)
Dept: PHARMACY | Age: 78
End: 2018-10-01
Payer: MEDICARE

## 2018-10-01 DIAGNOSIS — I48.20 CHRONIC ATRIAL FIBRILLATION (HCC): ICD-10-CM

## 2018-10-01 LAB — INTERNATIONAL NORMALIZATION RATIO, POC: 5

## 2018-10-01 PROCEDURE — 85610 PROTHROMBIN TIME: CPT

## 2018-10-01 PROCEDURE — 99212 OFFICE O/P EST SF 10 MIN: CPT

## 2018-10-01 RX ORDER — DIGOXIN 250 MCG
TABLET ORAL
Qty: 90 TABLET | Refills: 1 | Status: SHIPPED | OUTPATIENT
Start: 2018-10-01 | End: 2019-07-25 | Stop reason: DRUGHIGH

## 2018-10-01 NOTE — TELEPHONE ENCOUNTER
Pt's wife is calling about PA for digoxin (LANOXIN) 250 MCG tablet     Wife states pt is getting low on medication        Chitra Camp, [address] [facility] [phone] - f [phone]  [facility] [address]  Phone: [phone] Fax: [phone]

## 2018-10-02 NOTE — TELEPHONE ENCOUNTER
Received PA for Digoxin 250MCG OR TABS Key: [id number] - PA Case ID: [id number]. Medication is APPROVED, until 10/01/2019. Please advise patient. Thank you.

## 2018-10-15 ENCOUNTER — ANTI-COAG VISIT (OUTPATIENT)
Dept: PHARMACY | Age: 78
End: 2018-10-15
Payer: MEDICARE

## 2018-10-15 DIAGNOSIS — I48.20 CHRONIC ATRIAL FIBRILLATION (HCC): ICD-10-CM

## 2018-10-15 LAB — INTERNATIONAL NORMALIZATION RATIO, POC: 1.5

## 2018-10-15 PROCEDURE — 99212 OFFICE O/P EST SF 10 MIN: CPT

## 2018-10-15 PROCEDURE — 85610 PROTHROMBIN TIME: CPT

## 2018-10-29 ENCOUNTER — ANTI-COAG VISIT (OUTPATIENT)
Dept: PHARMACY | Age: 78
End: 2018-10-29
Payer: MEDICARE

## 2018-10-29 DIAGNOSIS — I48.20 CHRONIC ATRIAL FIBRILLATION (HCC): ICD-10-CM

## 2018-10-29 LAB — INTERNATIONAL NORMALIZATION RATIO, POC: 2.8

## 2018-10-29 PROCEDURE — 99211 OFF/OP EST MAY X REQ PHY/QHP: CPT

## 2018-10-29 PROCEDURE — 85610 PROTHROMBIN TIME: CPT

## 2018-11-19 ENCOUNTER — ANTI-COAG VISIT (OUTPATIENT)
Dept: PHARMACY | Age: 78
End: 2018-11-19
Payer: MEDICARE

## 2018-11-19 DIAGNOSIS — I48.20 CHRONIC ATRIAL FIBRILLATION (HCC): ICD-10-CM

## 2018-11-19 LAB — INTERNATIONAL NORMALIZATION RATIO, POC: 2.8

## 2018-11-19 PROCEDURE — 99211 OFF/OP EST MAY X REQ PHY/QHP: CPT

## 2018-11-19 PROCEDURE — 85610 PROTHROMBIN TIME: CPT

## 2018-11-28 RX ORDER — DILTIAZEM HYDROCHLORIDE 120 MG/1
CAPSULE, COATED, EXTENDED RELEASE ORAL
Qty: 180 CAPSULE | Refills: 3 | Status: SHIPPED | OUTPATIENT
Start: 2018-11-28 | End: 2018-11-30

## 2018-11-30 ENCOUNTER — OFFICE VISIT (OUTPATIENT)
Dept: FAMILY MEDICINE CLINIC | Age: 78
End: 2018-11-30
Payer: MEDICARE

## 2018-11-30 VITALS
DIASTOLIC BLOOD PRESSURE: 64 MMHG | WEIGHT: 158 LBS | SYSTOLIC BLOOD PRESSURE: 142 MMHG | BODY MASS INDEX: 23.33 KG/M2 | HEART RATE: 57 BPM | OXYGEN SATURATION: 98 %

## 2018-11-30 DIAGNOSIS — I70.0 ATHEROSCLEROSIS OF ABDOMINAL AORTA (HCC): ICD-10-CM

## 2018-11-30 DIAGNOSIS — I50.42 CHRONIC COMBINED SYSTOLIC AND DIASTOLIC CHF (CONGESTIVE HEART FAILURE) (HCC): ICD-10-CM

## 2018-11-30 DIAGNOSIS — M10.031 ACUTE IDIOPATHIC GOUT OF RIGHT WRIST: ICD-10-CM

## 2018-11-30 DIAGNOSIS — N18.30 CHRONIC KIDNEY DISEASE, STAGE III (MODERATE) (HCC): ICD-10-CM

## 2018-11-30 DIAGNOSIS — I10 ESSENTIAL HYPERTENSION, BENIGN: Primary | Chronic | ICD-10-CM

## 2018-11-30 DIAGNOSIS — R73.02 IGT (IMPAIRED GLUCOSE TOLERANCE): ICD-10-CM

## 2018-11-30 PROCEDURE — G8598 ASA/ANTIPLAT THER USED: HCPCS | Performed by: FAMILY MEDICINE

## 2018-11-30 PROCEDURE — 1123F ACP DISCUSS/DSCN MKR DOCD: CPT | Performed by: FAMILY MEDICINE

## 2018-11-30 PROCEDURE — 99213 OFFICE O/P EST LOW 20 MIN: CPT | Performed by: FAMILY MEDICINE

## 2018-11-30 PROCEDURE — G8420 CALC BMI NORM PARAMETERS: HCPCS | Performed by: FAMILY MEDICINE

## 2018-11-30 PROCEDURE — G8427 DOCREV CUR MEDS BY ELIG CLIN: HCPCS | Performed by: FAMILY MEDICINE

## 2018-11-30 PROCEDURE — G8482 FLU IMMUNIZE ORDER/ADMIN: HCPCS | Performed by: FAMILY MEDICINE

## 2018-11-30 PROCEDURE — 1101F PT FALLS ASSESS-DOCD LE1/YR: CPT | Performed by: FAMILY MEDICINE

## 2018-11-30 PROCEDURE — 4040F PNEUMOC VAC/ADMIN/RCVD: CPT | Performed by: FAMILY MEDICINE

## 2018-11-30 PROCEDURE — 1036F TOBACCO NON-USER: CPT | Performed by: FAMILY MEDICINE

## 2018-11-30 ASSESSMENT — PATIENT HEALTH QUESTIONNAIRE - PHQ9
2. FEELING DOWN, DEPRESSED OR HOPELESS: 0
SUM OF ALL RESPONSES TO PHQ QUESTIONS 1-9: 0
SUM OF ALL RESPONSES TO PHQ9 QUESTIONS 1 & 2: 0
1. LITTLE INTEREST OR PLEASURE IN DOING THINGS: 0
SUM OF ALL RESPONSES TO PHQ QUESTIONS 1-9: 0

## 2018-12-17 ENCOUNTER — ANTI-COAG VISIT (OUTPATIENT)
Dept: PHARMACY | Age: 78
End: 2018-12-17
Payer: MEDICARE

## 2018-12-17 DIAGNOSIS — I48.20 CHRONIC ATRIAL FIBRILLATION (HCC): ICD-10-CM

## 2018-12-17 LAB — INTERNATIONAL NORMALIZATION RATIO, POC: 2.3

## 2018-12-17 PROCEDURE — 99211 OFF/OP EST MAY X REQ PHY/QHP: CPT

## 2018-12-17 PROCEDURE — 85610 PROTHROMBIN TIME: CPT

## 2018-12-17 NOTE — PROGRESS NOTES
Mr. Saeid Liu is a 70 y.o. y/o male with history of Heart Valve Replacement about 1997 by Teresa Braun at [facility]   He presents today for anticoagulation monitoring and adjustment. Pertinent AFib, PMH: HTN, CHF, Chronic Kidney disease(moderate)    Patient Reported Findings:  Yes     No  [x]   []       Patient verifies current dosing regimen as listed  []   [x]       S/S bleeding/bruising/swelling/SOB  []   [x]       Blood in urine or stool  []   [x]       Procedures scheduled in the future at this time  []   [x]       Missed Dose  []   [x]       Extra Dose  []   [x]       Change in medications continues taking MVI --> no change in INR. []   [x]       Change in health/diet/appetite- Overall his appetite is poor but this is not a change. He states that he continues to drinking ensure every day . His weight has maintained at 153-154lbs. []   [x]       Change in alcohol use does not drink  []   [x]       Change in activity  []   [x]       Hospital admission  []   [x]       Emergency department visit  []   [x]       Other complaints    Clinical Outcomes:  Yes     No  []   [x]       Major bleeding event  []   [x]       Thromboembolic event  Patient states that he gets brand Coumadin from mail order. States \"warfarin just doesn't work for [employer] Duration of warfarin Therapy: indefinite  INR Range:  2.5-3.5    INR 2.3 today   Take 6mg(1 1/2 tablets) today only then increase weekly dose to 6mg on Fridays and 4mg all other days. (7.1% increase  Encouraged to maintain a consistency of vegetables/salads. Recheck INR in 2 1/2 weeks on 1/4/19     Referring cardiologist is Dr. Herber Mays.    INR (no units)   Date Value   12/17/2018 2.3   11/19/2018 2.8   10/29/2018 2.8   10/15/2018 1.5   08/31/2018 4   08/03/2018 3.1   07/06/2018 2.6   06/15/2018 2.5

## 2018-12-18 ENCOUNTER — OFFICE VISIT (OUTPATIENT)
Dept: VASCULAR SURGERY | Age: 78
End: 2018-12-18
Payer: MEDICARE

## 2018-12-18 ENCOUNTER — HOSPITAL ENCOUNTER (OUTPATIENT)
Dept: VASCULAR LAB | Age: 78
Discharge: HOME | End: 2018-12-18
Payer: MEDICARE

## 2018-12-18 VITALS
DIASTOLIC BLOOD PRESSURE: 66 MMHG | WEIGHT: 158 LBS | SYSTOLIC BLOOD PRESSURE: 126 MMHG | HEIGHT: 69 IN | BODY MASS INDEX: 23.4 KG/M2

## 2018-12-18 DIAGNOSIS — I65.23 CAROTID ATHEROSCLEROSIS, BILATERAL: ICD-10-CM

## 2018-12-18 DIAGNOSIS — I65.23 CAROTID ATHEROSCLEROSIS, BILATERAL: Primary | ICD-10-CM

## 2018-12-18 PROCEDURE — G8427 DOCREV CUR MEDS BY ELIG CLIN: HCPCS | Performed by: SURGERY

## 2018-12-18 PROCEDURE — 4040F PNEUMOC VAC/ADMIN/RCVD: CPT | Performed by: SURGERY

## 2018-12-18 PROCEDURE — 99213 OFFICE O/P EST LOW 20 MIN: CPT | Performed by: SURGERY

## 2018-12-18 PROCEDURE — G8482 FLU IMMUNIZE ORDER/ADMIN: HCPCS | Performed by: SURGERY

## 2018-12-18 PROCEDURE — 1036F TOBACCO NON-USER: CPT | Performed by: SURGERY

## 2018-12-18 PROCEDURE — 1123F ACP DISCUSS/DSCN MKR DOCD: CPT | Performed by: SURGERY

## 2018-12-18 PROCEDURE — G8598 ASA/ANTIPLAT THER USED: HCPCS | Performed by: SURGERY

## 2018-12-18 PROCEDURE — G8420 CALC BMI NORM PARAMETERS: HCPCS | Performed by: SURGERY

## 2018-12-18 PROCEDURE — 93880 EXTRACRANIAL BILAT STUDY: CPT

## 2018-12-18 PROCEDURE — 1101F PT FALLS ASSESS-DOCD LE1/YR: CPT | Performed by: SURGERY

## 2018-12-24 RX ORDER — ATORVASTATIN CALCIUM 40 MG/1
TABLET, FILM COATED ORAL
Qty: 90 TABLET | Refills: 3 | Status: SHIPPED | OUTPATIENT
Start: 2018-12-24 | End: 2019-12-09 | Stop reason: SDUPTHER

## 2018-12-30 LAB
BUN / CREAT RATIO: ABNORMAL (CALC) (ref 6–22)
BUN BLDV-MCNC: 16 MG/DL (ref 7–25)
CALCIUM SERPL-MCNC: 8.3 MG/DL (ref 8.6–10.3)
CHLORIDE BLD-SCNC: 102 MMOL/L (ref 98–110)
CO2: 30 MMOL/L (ref 20–32)
CREAT SERPL-MCNC: 1.16 MG/DL (ref 0.7–1.18)
GFR AFRICAN AMERICAN: 71 ML/MIN/1.73M2
GFR SERPL CREATININE-BSD FRML MDRD: 61 ML/MIN/1.73M2
GLUCOSE BLD-MCNC: 152 MG/DL (ref 65–139)
POTASSIUM SERPL-SCNC: 4.1 MMOL/L (ref 3.5–5.3)
SODIUM BLD-SCNC: 138 MMOL/L (ref 135–146)

## 2019-01-04 ENCOUNTER — ANTI-COAG VISIT (OUTPATIENT)
Dept: PHARMACY | Age: 79
End: 2019-01-04
Payer: MEDICARE

## 2019-01-04 DIAGNOSIS — I48.20 CHRONIC ATRIAL FIBRILLATION (HCC): ICD-10-CM

## 2019-01-04 LAB — INTERNATIONAL NORMALIZATION RATIO, POC: 3.5

## 2019-01-04 PROCEDURE — 85610 PROTHROMBIN TIME: CPT

## 2019-01-04 PROCEDURE — 99211 OFF/OP EST MAY X REQ PHY/QHP: CPT

## 2019-01-25 ENCOUNTER — ANTI-COAG VISIT (OUTPATIENT)
Dept: PHARMACY | Age: 79
End: 2019-01-25
Payer: MEDICARE

## 2019-01-25 DIAGNOSIS — I48.20 CHRONIC ATRIAL FIBRILLATION (HCC): ICD-10-CM

## 2019-01-25 LAB — INTERNATIONAL NORMALIZATION RATIO, POC: 3

## 2019-01-25 PROCEDURE — 85610 PROTHROMBIN TIME: CPT

## 2019-01-25 PROCEDURE — 99211 OFF/OP EST MAY X REQ PHY/QHP: CPT

## 2019-02-04 RX ORDER — CARVEDILOL 6.25 MG/1
TABLET ORAL
Qty: 90 TABLET | Refills: 3 | Status: SHIPPED | OUTPATIENT
Start: 2019-02-04 | End: 2019-08-13 | Stop reason: SDUPTHER

## 2019-02-04 RX ORDER — ALLOPURINOL 300 MG/1
TABLET ORAL
Qty: 90 TABLET | Refills: 1 | Status: SHIPPED | OUTPATIENT
Start: 2019-02-04 | End: 2019-08-05 | Stop reason: SDUPTHER

## 2019-02-19 RX ORDER — LISINOPRIL 20 MG/1
TABLET ORAL
Qty: 90 TABLET | Refills: 3 | Status: SHIPPED | OUTPATIENT
Start: 2019-02-19 | End: 2020-01-01

## 2019-02-22 ENCOUNTER — ANTI-COAG VISIT (OUTPATIENT)
Dept: PHARMACY | Age: 79
End: 2019-02-22
Payer: MEDICARE

## 2019-02-22 DIAGNOSIS — I48.20 CHRONIC ATRIAL FIBRILLATION (HCC): ICD-10-CM

## 2019-02-22 LAB — INTERNATIONAL NORMALIZATION RATIO, POC: 3.3

## 2019-02-22 PROCEDURE — 85610 PROTHROMBIN TIME: CPT

## 2019-02-22 PROCEDURE — 99211 OFF/OP EST MAY X REQ PHY/QHP: CPT

## 2019-03-11 RX ORDER — POLYETHYLENE GLYCOL 3350 17 G/17G
17 POWDER, FOR SOLUTION ORAL DAILY
Qty: 850 G | Refills: 3 | Status: SHIPPED | OUTPATIENT
Start: 2019-03-11 | End: 2019-04-10

## 2019-03-11 RX ORDER — POLYETHYLENE GLYCOL 3350 17 G/17G
17 POWDER, FOR SOLUTION ORAL DAILY
Qty: 850 G | Refills: 3 | Status: CANCELLED | OUTPATIENT
Start: 2019-03-11 | End: 2019-04-10

## 2019-03-12 ENCOUNTER — OFFICE VISIT (OUTPATIENT)
Dept: CARDIOLOGY CLINIC | Age: 79
End: 2019-03-12
Payer: MEDICARE

## 2019-03-12 VITALS
DIASTOLIC BLOOD PRESSURE: 64 MMHG | SYSTOLIC BLOOD PRESSURE: 138 MMHG | OXYGEN SATURATION: 97 % | BODY MASS INDEX: 22.81 KG/M2 | HEART RATE: 51 BPM | WEIGHT: 154 LBS | HEIGHT: 69 IN

## 2019-03-12 DIAGNOSIS — I35.1 AORTIC VALVE INSUFFICIENCY, ETIOLOGY OF CARDIAC VALVE DISEASE UNSPECIFIED: Primary | Chronic | ICD-10-CM

## 2019-03-12 DIAGNOSIS — I70.0 ATHEROSCLEROSIS OF ABDOMINAL AORTA (HCC): ICD-10-CM

## 2019-03-12 DIAGNOSIS — I65.23 BILATERAL CAROTID ARTERY STENOSIS: ICD-10-CM

## 2019-03-12 DIAGNOSIS — E78.2 MIXED HYPERLIPIDEMIA: Chronic | ICD-10-CM

## 2019-03-12 DIAGNOSIS — I48.20 CHRONIC ATRIAL FIBRILLATION (HCC): ICD-10-CM

## 2019-03-12 PROCEDURE — G8427 DOCREV CUR MEDS BY ELIG CLIN: HCPCS | Performed by: NURSE PRACTITIONER

## 2019-03-12 PROCEDURE — 4040F PNEUMOC VAC/ADMIN/RCVD: CPT | Performed by: NURSE PRACTITIONER

## 2019-03-12 PROCEDURE — G8482 FLU IMMUNIZE ORDER/ADMIN: HCPCS | Performed by: NURSE PRACTITIONER

## 2019-03-12 PROCEDURE — 1036F TOBACCO NON-USER: CPT | Performed by: NURSE PRACTITIONER

## 2019-03-12 PROCEDURE — 1101F PT FALLS ASSESS-DOCD LE1/YR: CPT | Performed by: NURSE PRACTITIONER

## 2019-03-12 PROCEDURE — 1123F ACP DISCUSS/DSCN MKR DOCD: CPT | Performed by: NURSE PRACTITIONER

## 2019-03-12 PROCEDURE — G8598 ASA/ANTIPLAT THER USED: HCPCS | Performed by: NURSE PRACTITIONER

## 2019-03-12 PROCEDURE — G8420 CALC BMI NORM PARAMETERS: HCPCS | Performed by: NURSE PRACTITIONER

## 2019-03-12 PROCEDURE — 99214 OFFICE O/P EST MOD 30 MIN: CPT | Performed by: NURSE PRACTITIONER

## 2019-03-22 ENCOUNTER — APPOINTMENT (OUTPATIENT)
Dept: PHARMACY | Age: 79
End: 2019-03-22
Payer: MEDICARE

## 2019-03-22 RX ORDER — CELECOXIB 200 MG/1
CAPSULE ORAL
Qty: 90 CAPSULE | Refills: 3 | Status: SHIPPED | OUTPATIENT
Start: 2019-03-22 | End: 2020-01-01

## 2019-03-26 ENCOUNTER — ANTI-COAG VISIT (OUTPATIENT)
Dept: PHARMACY | Age: 79
End: 2019-03-26
Payer: MEDICARE

## 2019-03-26 DIAGNOSIS — I48.20 CHRONIC ATRIAL FIBRILLATION (HCC): ICD-10-CM

## 2019-03-26 LAB — INTERNATIONAL NORMALIZATION RATIO, POC: 3.7

## 2019-03-26 PROCEDURE — 99211 OFF/OP EST MAY X REQ PHY/QHP: CPT

## 2019-03-26 PROCEDURE — 85610 PROTHROMBIN TIME: CPT

## 2019-04-09 RX ORDER — DIGOXIN 250 MCG
TABLET ORAL
Qty: 90 TABLET | Refills: 3 | Status: SHIPPED | OUTPATIENT
Start: 2019-04-09 | End: 2019-06-07

## 2019-04-17 RX ORDER — FUROSEMIDE 40 MG/1
TABLET ORAL
Qty: 135 TABLET | Refills: 3 | Status: SHIPPED | OUTPATIENT
Start: 2019-04-17 | End: 2020-01-01

## 2019-04-23 ENCOUNTER — APPOINTMENT (OUTPATIENT)
Dept: PHARMACY | Age: 79
End: 2019-04-23
Payer: MEDICARE

## 2019-04-23 ENCOUNTER — TELEPHONE (OUTPATIENT)
Dept: PHARMACY | Age: 79
End: 2019-04-23

## 2019-04-24 ENCOUNTER — ANTI-COAG VISIT (OUTPATIENT)
Dept: PHARMACY | Age: 79
End: 2019-04-24
Payer: MEDICARE

## 2019-04-24 DIAGNOSIS — I48.20 CHRONIC ATRIAL FIBRILLATION (HCC): ICD-10-CM

## 2019-04-24 LAB — INTERNATIONAL NORMALIZATION RATIO, POC: 5.7

## 2019-04-24 PROCEDURE — 99212 OFFICE O/P EST SF 10 MIN: CPT

## 2019-04-24 PROCEDURE — 85610 PROTHROMBIN TIME: CPT

## 2019-04-24 NOTE — PROGRESS NOTES
something higher in vitamin K tonight as well. \"  Recheck INR in 10 days on 5/3     Referring cardiologist is Dr. Junior Colbert.    INR (no units)   Date Value   04/24/2019 5.7   03/26/2019 3.7   02/22/2019 3.3   01/25/2019 3   08/31/2018 4   08/03/2018 3.1   07/06/2018 2.6   06/15/2018 2.5

## 2019-05-06 ENCOUNTER — ANTI-COAG VISIT (OUTPATIENT)
Dept: PHARMACY | Age: 79
End: 2019-05-06
Payer: MEDICARE

## 2019-05-06 DIAGNOSIS — I48.20 CHRONIC ATRIAL FIBRILLATION (HCC): ICD-10-CM

## 2019-05-06 LAB — INTERNATIONAL NORMALIZATION RATIO, POC: 3.1

## 2019-05-06 PROCEDURE — 99211 OFF/OP EST MAY X REQ PHY/QHP: CPT

## 2019-05-06 PROCEDURE — 85610 PROTHROMBIN TIME: CPT

## 2019-05-06 NOTE — PROGRESS NOTES
Mr. Kathryn Gramajo is a 70 y.o. y/o male with history of Heart Valve Replacement about 1997 by Valdemar Stringer at [facility]   He presents today for anticoagulation monitoring and adjustment. Pertinent AFib, PMH: HTN, CHF, Chronic Kidney disease(moderate)    Patient Reported Findings:  Yes     No  [x]   []       Patient verifies current dosing regimen as listed  []   [x]       S/S bleeding/bruising/swelling/SOB  []   [x]       Blood in urine or stool  []   [x]       Procedures scheduled in the future at this time  []   [x]       Missed Dose  []   [x]       Extra Dose  []   [x]       Change in medications continues taking MVI  []   [x]       Change in health/diet/appetite- Overall his appetite is poor but this is not a change. He states that he continues to drink Ensure every day . His weight has maintained at 153-154lbs. []   [x]       Change in alcohol use does not drink  []   [x]       Change in activity  []   [x]       Hospital admission  []   [x]       Emergency department visit  []   [x]       Other complaints    Clinical Outcomes:  Yes     No  []   [x]       Major bleeding event  []   [x]       Thromboembolic event  Patient states that he gets brand Coumadin from mail order. States \"warfarin just doesn't work for [employer] Duration of warfarin Therapy: indefinite  INR Range:  2.5-3.5    INR 3.1 today   Continue weekly dose of 2 mg on Mondays only and 4 mg all other days  Encouraged to maintain a consistency of vegetables/salads. Recheck INR in 2 weeks, 5/20     Referring cardiologist is Dr. Nicanor Valverde.    INR (no units)   Date Value   05/06/2019 3.1   04/24/2019 5.7   03/26/2019 3.7   02/22/2019 3.3   08/31/2018 4   08/03/2018 3.1   07/06/2018 2.6   06/15/2018 2.5

## 2019-05-20 ENCOUNTER — ANTI-COAG VISIT (OUTPATIENT)
Dept: PHARMACY | Age: 79
End: 2019-05-20
Payer: MEDICARE

## 2019-05-20 DIAGNOSIS — I48.20 CHRONIC ATRIAL FIBRILLATION (HCC): ICD-10-CM

## 2019-05-20 LAB — INTERNATIONAL NORMALIZATION RATIO, POC: 4.1

## 2019-05-20 PROCEDURE — 85610 PROTHROMBIN TIME: CPT

## 2019-05-20 PROCEDURE — 99212 OFFICE O/P EST SF 10 MIN: CPT

## 2019-05-20 NOTE — PROGRESS NOTES
Mr. Brenna Haile is a 70 y.o. y/o male with history of Heart Valve Replacement about 1997 by Aimee Newman at [facility]   He presents today for anticoagulation monitoring and adjustment. Pertinent AFib, PMH: HTN, CHF, Chronic Kidney disease(moderate)    Patient Reported Findings:  Yes     No  [x]   []       Patient verifies current dosing regimen as listed  []   [x]       S/S bleeding/bruising/swelling/SOB  []   [x]       Blood in urine or stool  []   [x]       Procedures scheduled in the future at this time  [x]   []       Missed Dose 5/19  []   [x]       Extra Dose  []   [x]       Change in medications continues taking MVI  []   [x]       Change in health/diet/appetite- Overall his appetite is poor but this is not a change. He states that he continues to drink Ensure every day . His weight has maintained at 153-154lbs.---> He did not have Ensure yesterday   []   [x]       Change in alcohol use does not drink  []   [x]       Change in activity  []   [x]       Hospital admission  []   [x]       Emergency department visit  []   [x]       Other complaints    Clinical Outcomes:  Yes     No  []   [x]       Major bleeding event  []   [x]       Thromboembolic event  Patient states that he gets brand Coumadin from mail order. States \"warfarin just doesn't work for [employer] Duration of warfarin Therapy: indefinite  INR Range:  2.5-3.5    INR 4.1 today   Since he missed a dose and poor appetite will adjust dose slightly. Decrease weekly dose to 2 mg on Mon & Fri and 4 mg all other days( 7.7% decrease)  Discussed current appetite and the importance of consistency with vitamin K sources such as Ensure and vegetables. Encouraged to maintain a consistency of vegetables/salads. Recheck INR in 2 weeks on 6/3     Referring cardiologist is Dr. Ann Norris.    INR (no units)   Date Value   05/20/2019 4.1   05/06/2019 3.1   04/24/2019 5.7   03/26/2019 3.7   08/31/2018 4   08/03/2018 3.1   07/06/2018 2.6   06/15/2018 2.5

## 2019-06-03 ENCOUNTER — ANTI-COAG VISIT (OUTPATIENT)
Dept: PHARMACY | Age: 79
End: 2019-06-03
Payer: MEDICARE

## 2019-06-03 DIAGNOSIS — I48.20 CHRONIC ATRIAL FIBRILLATION (HCC): ICD-10-CM

## 2019-06-03 LAB — INTERNATIONAL NORMALIZATION RATIO, POC: 3.7

## 2019-06-03 PROCEDURE — 99211 OFF/OP EST MAY X REQ PHY/QHP: CPT

## 2019-06-03 PROCEDURE — 85610 PROTHROMBIN TIME: CPT

## 2019-06-03 NOTE — PROGRESS NOTES
Mr. Ok Marie is a 70 y.o. y/o male with history of Heart Valve Replacement, aortic valve, about 1997 by Eli Luevano at [facility]   He presents today for anticoagulation monitoring and adjustment. Pertinent AFib, PMH: HTN, CHF, Chronic Kidney disease(moderate)    Patient Reported Findings:  Yes     No  [x]   []       Patient verifies current dosing regimen as listed  []   [x]       S/S bleeding/bruising/swelling/SOB  []   [x]       Blood in urine or stool  []   [x]       Procedures scheduled in the future at this time  []   [x]       Missed Dose   []   [x]       Extra Dose  []   [x]       Change in medications   []   [x]       Change in health/diet/appetite- Overall his appetite is poor, he states that he \"hasn't been eating much of anything lately\". He states that he continues to drink Ensure every day. His weight has dropped to 151 from 153-154lbs. []   [x]       Change in alcohol use does not drink  []   [x]       Change in activity  []   [x]       Hospital admission  []   [x]       Emergency department visit  []   [x]       Other complaints    Clinical Outcomes:  Yes     No  []   [x]       Major bleeding event  []   [x]       Thromboembolic event  Patient states that he gets brand Coumadin from mail order. States \"warfarin just doesn't work for [employer] Duration of warfarin Therapy: indefinite  INR Range:  2.5-3.5    INR 3.7 today   Will adjust dose slightly to correspond to the reduced appetite. Hold dose today only then decrease weekly dose to 2 mg on Mon, Wed & Fri and 4 mg all other days. (8.3% decrease)  Encouraged to maintain a consistency of vegetables/salads. Recheck INR in 2 weeks on 6/17     Referring cardiologist is Dr. Edwina Fields.    INR (no units)   Date Value   06/03/2019 3.7   05/20/2019 4.1   05/06/2019 3.1   04/24/2019 5.7   08/31/2018 4   08/03/2018 3.1   07/06/2018 2.6   06/15/2018 2.5

## 2019-06-07 ENCOUNTER — OFFICE VISIT (OUTPATIENT)
Dept: FAMILY MEDICINE CLINIC | Age: 79
End: 2019-06-07
Payer: MEDICARE

## 2019-06-07 VITALS
OXYGEN SATURATION: 96 % | SYSTOLIC BLOOD PRESSURE: 138 MMHG | HEART RATE: 77 BPM | DIASTOLIC BLOOD PRESSURE: 64 MMHG | BODY MASS INDEX: 22.59 KG/M2 | WEIGHT: 153 LBS

## 2019-06-07 DIAGNOSIS — M10.031 ACUTE IDIOPATHIC GOUT OF RIGHT WRIST: ICD-10-CM

## 2019-06-07 DIAGNOSIS — I10 ESSENTIAL HYPERTENSION, BENIGN: Primary | ICD-10-CM

## 2019-06-07 DIAGNOSIS — I48.20 CHRONIC ATRIAL FIBRILLATION (HCC): ICD-10-CM

## 2019-06-07 DIAGNOSIS — I50.42 CHRONIC COMBINED SYSTOLIC AND DIASTOLIC CHF (CONGESTIVE HEART FAILURE) (HCC): ICD-10-CM

## 2019-06-07 PROCEDURE — 99214 OFFICE O/P EST MOD 30 MIN: CPT | Performed by: FAMILY MEDICINE

## 2019-06-07 PROCEDURE — 4040F PNEUMOC VAC/ADMIN/RCVD: CPT | Performed by: FAMILY MEDICINE

## 2019-06-07 PROCEDURE — G8598 ASA/ANTIPLAT THER USED: HCPCS | Performed by: FAMILY MEDICINE

## 2019-06-07 PROCEDURE — G8427 DOCREV CUR MEDS BY ELIG CLIN: HCPCS | Performed by: FAMILY MEDICINE

## 2019-06-07 PROCEDURE — 1123F ACP DISCUSS/DSCN MKR DOCD: CPT | Performed by: FAMILY MEDICINE

## 2019-06-07 PROCEDURE — 1036F TOBACCO NON-USER: CPT | Performed by: FAMILY MEDICINE

## 2019-06-07 PROCEDURE — G8420 CALC BMI NORM PARAMETERS: HCPCS | Performed by: FAMILY MEDICINE

## 2019-06-07 ASSESSMENT — PATIENT HEALTH QUESTIONNAIRE - PHQ9
SUM OF ALL RESPONSES TO PHQ QUESTIONS 1-9: 0
2. FEELING DOWN, DEPRESSED OR HOPELESS: 0
1. LITTLE INTEREST OR PLEASURE IN DOING THINGS: 0
SUM OF ALL RESPONSES TO PHQ9 QUESTIONS 1 & 2: 0
SUM OF ALL RESPONSES TO PHQ QUESTIONS 1-9: 0

## 2019-06-07 NOTE — PROGRESS NOTES
([facility] [address]  -Stable, continue current medications. Acute idiopathic gout of right wrist  Stable, no sx  -Stable, continue current medications. Chronic combined systolic and diastolic CHF (congestive heart failure) (HCC)  No sx  -Stable, continue current medications. F/u with cards as scheduled          Return in about 6 months (around 12/7/2019) for HTN - 15 minutes.          Scribe attestation: Christo OCONNELL, am scribing for and in the presence of Amy Pride MD. Electronically signed by ANISH Squires on 6/7/19 at 3:06 PM      Note per ANISH Squires and Scribe with corrections and edits per Amy Pride MD.  I agree with entirety of note and was present and performed history and physical.  I also confirm that the note above accurately reflects all work, treatment, procedures, and medical decision making performed by Amy martinez MD

## 2019-06-17 ENCOUNTER — TELEPHONE (OUTPATIENT)
Dept: VASCULAR SURGERY | Age: 79
End: 2019-06-17

## 2019-06-17 ENCOUNTER — ANTI-COAG VISIT (OUTPATIENT)
Dept: PHARMACY | Age: 79
End: 2019-06-17
Payer: MEDICARE

## 2019-06-17 DIAGNOSIS — I48.20 CHRONIC ATRIAL FIBRILLATION (HCC): ICD-10-CM

## 2019-06-17 LAB — INTERNATIONAL NORMALIZATION RATIO, POC: 2.5

## 2019-06-17 PROCEDURE — 85610 PROTHROMBIN TIME: CPT

## 2019-06-17 PROCEDURE — 99211 OFF/OP EST MAY X REQ PHY/QHP: CPT

## 2019-06-17 NOTE — PROGRESS NOTES
Mr. Dinh Fay is a 70 y.o. y/o male with history of Heart Valve Replacement, aortic valve, about 1997 by Felisha Solitario at [facility]   He presents today for anticoagulation monitoring and adjustment. Pertinent AFib, PMH: HTN, CHF, Chronic Kidney disease(moderate)    Patient Reported Findings:  Yes     No  [x]   []       Patient verifies current dosing regimen as listed  []   [x]       S/S bleeding/bruising/swelling/SOB  []   [x]       Blood in urine or stool  []   [x]       Procedures scheduled in the future at this time  []   [x]       Missed Dose   []   [x]       Extra Dose  []   [x]       Change in medications   []   [x]       Change in health/diet/appetite- Overall his appetite is poor, he states that he \"hasn't been eating much of anything lately\". He states that he continues to drink Ensure every day. []   [x]       Change in alcohol use does not drink  []   [x]       Change in activity  []   [x]       Hospital admission  []   [x]       Emergency department visit  []   [x]       Other complaints    Clinical Outcomes:  Yes     No  []   [x]       Major bleeding event  []   [x]       Thromboembolic event  Patient states that he gets brand Coumadin from mail order. States \"warfarin just doesn't work for [employer] Duration of warfarin Therapy: indefinite  INR Range:  2.5-3.5    INR 2.5 today   Continue weekly dose of 2 mg on Mon, Wed & Fri and 4 mg all other days  Encouraged to maintain a consistency of vegetables/salads. Recheck INR in 3 weeks on 7/8     Referring cardiologist is Dr. Patricia Owens.    INR (no units)   Date Value   06/17/2019 2.5   06/03/2019 3.7   05/20/2019 4.1   05/06/2019 3.1   08/31/2018 4   08/03/2018 3.1   07/06/2018 2.6   06/15/2018 2.5

## 2019-06-28 RX ORDER — WARFARIN SODIUM 4 MG
TABLET ORAL
Qty: 90 TABLET | Refills: 1 | Status: SHIPPED | OUTPATIENT
Start: 2019-06-28 | End: 2019-09-13 | Stop reason: SDUPTHER

## 2019-07-08 ENCOUNTER — TELEPHONE (OUTPATIENT)
Dept: PHARMACY | Age: 79
End: 2019-07-08

## 2019-07-15 ENCOUNTER — ANTI-COAG VISIT (OUTPATIENT)
Dept: PHARMACY | Age: 79
End: 2019-07-15
Payer: MEDICARE

## 2019-07-15 DIAGNOSIS — I48.91 ATRIAL FIBRILLATION, UNSPECIFIED TYPE (HCC): ICD-10-CM

## 2019-07-15 LAB — INTERNATIONAL NORMALIZATION RATIO, POC: 1.9

## 2019-07-15 PROCEDURE — 85610 PROTHROMBIN TIME: CPT

## 2019-07-15 PROCEDURE — 99211 OFF/OP EST MAY X REQ PHY/QHP: CPT

## 2019-07-15 NOTE — PROGRESS NOTES
Mr. Paola Pearson is a 70 y.o. y/o male with history of Heart Valve Replacement, aortic valve, about 1997 by Johnnie Goldsmith at [facility]   He presents today for anticoagulation monitoring and adjustment. Pertinent AFib, PMH: HTN, CHF, Chronic Kidney disease(moderate)    Patient Reported Findings:  Yes     No  [x]   []       Patient verifies current dosing regimen as listed  []   [x]       S/S bleeding/bruising/swelling/SOB  []   [x]       Blood in urine or stool  []   [x]       Procedures scheduled in the future at this time  []   [x]       Missed Dose -patient thinks he missed a dose but not sure what day. Not over the weekend, thinks Thursday or Friday. []   [x]       Extra Dose  []   [x]       Change in medications   []   [x]       Change in health/diet/appetite- Overall his appetite is poor, he states that he \"hasn't been eating much of anything lately\". He states that he continues to drink Ensure every day. Has not been eating much/missed a few ensures. []   [x]       Change in alcohol use does not drink  []   [x]       Change in activity  []   [x]       Hospital admission  []   [x]       Emergency department visit  []   [x]       Other complaints    Clinical Outcomes:  Yes     No  []   [x]       Major bleeding event  []   [x]       Thromboembolic event  Patient states that he gets brand Coumadin from mail order. States \"warfarin just doesn't work for [employer] Duration of warfarin Therapy: indefinite  INR Range:  2.5-3.5    INR 1.9 today   Patient thinks he missed a dose Thursday. Or took a half instead of a whole. He is not sure but he says \"anything is possible, could be both\". He says no changes in meds/diet. Will continue with dose and if low at next visit will need to return to previous dose from June (although INR was all over the place with that dose, mostly elevated).    Take 6mg tonight then continue weekly dose of 2 mg on Mon, Wed & Fri and 4 mg all other days  Encouraged to maintain a

## 2019-07-25 DIAGNOSIS — I50.42 CHRONIC COMBINED SYSTOLIC AND DIASTOLIC CHF (CONGESTIVE HEART FAILURE) (HCC): ICD-10-CM

## 2019-07-25 DIAGNOSIS — N28.9 ABNORMAL KIDNEY FUNCTION: Primary | ICD-10-CM

## 2019-07-25 LAB
A/G RATIO: 0.8 (ref 1–2)
ALBUMIN SERPL-MCNC: 7.5 G/DL (ref 6.4–8.2)
ALBUMIN SERUM: 3.4 G/DL (ref 3.4–5)
ALP BLD-CCNC: 135 U/L (ref 45–117)
ALT SERPL-CCNC: 22 U/L (ref 12–78)
ANION GAP SERPL CALCULATED.3IONS-SCNC: 6 MMOL/L (ref 7–16)
AST SERPL-CCNC: 20 U/L (ref 15–37)
BILIRUBIN: 0.9 MG/DL (ref 0.2–1)
BUN BLDV-MCNC: 17 MG/DL (ref 7–18)
CALCIUM SERPL-MCNC: 8.5 MG/DL (ref 8.5–10.1)
CHLORIDE BLD-SCNC: 99 MMOL/L (ref 98–107)
CHOLESTEROL, STONE: 91 MG/DL
CO2: 32 MMOL/L (ref 21–32)
CREATININE + EGFR PANEL: 1.4 MG/DL (ref 0.6–1.3)
DIGOXIN LEVEL: 2.5 NG/ML (ref 0.9–2)
GFR AFRICAN AMERICAN: 60 ML/MIN/1.73M2
GFR NON-AFRICAN AMERICAN: 49 ML/MIN/1.73M2
GLOBULIN: 4.1 G/DL (ref 2.6–4.2)
GLUCOSE: 93 MG/DL (ref 74–106)
HBA1C MFR BLD: 6.3 % (ref 4–6)
HDLC SERPL-MCNC: 45 MG/DL (ref 40–60)
LDL CHOLESTEROL CALCULATED: 29 MG/DL (ref 0–99)
MEAN PLASMA GLUCOSE: 134 MG/DL (ref 68–126)
POTASSIUM SERPL-SCNC: 4.4 MMOL/L (ref 3.5–5.1)
SODIUM BLD-SCNC: 137 MMOL/L (ref 136–145)
TRIGL SERPL-MCNC: 84 MG/DL (ref 0–149)
URIC ACID, SERUM: 4.7 MG/DL (ref 3.5–7.2)
VLDLC SERPL CALC-MCNC: 17 MG/DL (ref 0–40)

## 2019-07-25 RX ORDER — DIGOXIN 125 MCG
125 TABLET ORAL DAILY
Qty: 90 TABLET | Refills: 1 | Status: SHIPPED | OUTPATIENT
Start: 2019-07-25 | End: 2019-12-04 | Stop reason: ALTCHOICE

## 2019-07-25 NOTE — TELEPHONE ENCOUNTER
----- Message from Lang Jeans, MD sent at 7/25/2019  4:29 PM EDT -----  High  Have him hold digoxin for 3 days and then start 125mcg daily  Recheck level in 2 weeks  Also would ask him to discuss with cardiology next time they see him if he still needs to take this medication

## 2019-08-05 RX ORDER — ALLOPURINOL 300 MG/1
TABLET ORAL
Qty: 90 TABLET | Refills: 3 | Status: SHIPPED | OUTPATIENT
Start: 2019-08-05 | End: 2019-08-27 | Stop reason: DRUGHIGH

## 2019-08-08 ENCOUNTER — ANTI-COAG VISIT (OUTPATIENT)
Dept: PHARMACY | Age: 79
End: 2019-08-08
Payer: MEDICARE

## 2019-08-08 DIAGNOSIS — I48.91 ATRIAL FIBRILLATION, UNSPECIFIED TYPE (HCC): ICD-10-CM

## 2019-08-08 LAB — INTERNATIONAL NORMALIZATION RATIO, POC: 1.9

## 2019-08-08 PROCEDURE — 99211 OFF/OP EST MAY X REQ PHY/QHP: CPT

## 2019-08-08 PROCEDURE — 85610 PROTHROMBIN TIME: CPT

## 2019-08-13 RX ORDER — CARVEDILOL 6.25 MG/1
TABLET ORAL
Qty: 180 TABLET | Refills: 3 | Status: SHIPPED | OUTPATIENT
Start: 2019-08-13 | End: 2020-01-01

## 2019-08-26 LAB
ANION GAP SERPL CALCULATED.3IONS-SCNC: 4 MMOL/L (ref 7–16)
BUN BLDV-MCNC: 27 MG/DL (ref 7–18)
CALCIUM SERPL-MCNC: 8.4 MG/DL (ref 8.5–10.1)
CHLORIDE BLD-SCNC: 102 MMOL/L (ref 98–107)
CO2: 33 MMOL/L (ref 21–32)
CREATININE + EGFR PANEL: 1.4 MG/DL (ref 0.6–1.3)
GFR AFRICAN AMERICAN: 60 ML/MIN/1.73M2
GFR NON-AFRICAN AMERICAN: 49 ML/MIN/1.73M2
GLUCOSE: 104 MG/DL (ref 74–106)
POTASSIUM SERPL-SCNC: 4.7 MMOL/L (ref 3.5–5.1)
SODIUM BLD-SCNC: 139 MMOL/L (ref 136–145)

## 2019-08-27 DIAGNOSIS — N18.30 CHRONIC KIDNEY DISEASE, STAGE III (MODERATE) (HCC): Primary | ICD-10-CM

## 2019-08-27 RX ORDER — ALLOPURINOL 100 MG/1
TABLET ORAL
Qty: 90 TABLET | Refills: 0 | Status: SHIPPED | OUTPATIENT
Start: 2019-08-27 | End: 2019-11-29 | Stop reason: SDUPTHER

## 2019-08-27 NOTE — TELEPHONE ENCOUNTER
Renal stable but still little more stressed than was  Decrease allopurinol to 100mg daily - will need new script

## 2019-08-29 ENCOUNTER — ANTI-COAG VISIT (OUTPATIENT)
Dept: PHARMACY | Age: 79
End: 2019-08-29
Payer: MEDICARE

## 2019-08-29 DIAGNOSIS — I48.91 ATRIAL FIBRILLATION, UNSPECIFIED TYPE (HCC): ICD-10-CM

## 2019-08-29 LAB — INTERNATIONAL NORMALIZATION RATIO, POC: 2.2

## 2019-08-29 PROCEDURE — 99211 OFF/OP EST MAY X REQ PHY/QHP: CPT

## 2019-08-29 PROCEDURE — 85610 PROTHROMBIN TIME: CPT

## 2019-08-29 NOTE — PROGRESS NOTES
Mr. James Ace is a 70 y.o. y/o male with history of Heart Valve Replacement, aortic valve, about 1997 by Aris Lawler at [facility]   He presents today for anticoagulation monitoring and adjustment. Pertinent AFib, PMH: HTN, CHF, Chronic Kidney disease(moderate)    Patient Reported Findings:  Yes     No  [x]   []       Patient verifies current dosing regimen as listed  []   [x]       S/S bleeding/bruising/swelling/SOB  []   [x]       Blood in urine or stool  []   [x]       Procedures scheduled in the future at this time  []   [x]       Missed Dose -denies  []   [x]       Extra Dose  []   [x]       Change in medications -denies, says they may be decreasing his allopurinol dose to 100 mg daily instead of 200 mg daily but he hasn't done that yet   []   [x]       Change in health/diet/appetite- Overall his appetite is poor, he states that he \"hasn't been eating much of anything lately\". He states that he continues to drink Ensure every day. Has not been eating much/missed a few ensures. -->missed on Ensure but otherwise the same --->says he has not been eating much greens   []   [x]       Change in alcohol use does not drink  []   [x]       Change in activity  []   [x]       Hospital admission  []   [x]       Emergency department visit  []   [x]       Other complaints    Clinical Outcomes:  Yes     No  []   [x]       Major bleeding event  []   [x]       Thromboembolic event  Patient states that he gets brand Coumadin from mail order. States \"warfarin just doesn't work for [employer] Duration of warfarin Therapy: indefinite  INR Range:  2.5-3.5    INR 2.2 today   Patient denies missed doses, did the dose increase, denies changes in meds/diet- says that he has not been eating greens, states that his allopurinol dose is going to decrease in the next few weeks but he doesn't know when- will boost and increase again since.   Take 6mg tonight then increase dose to 2 mg on Mon and 4 mg all other days (8.3%)  Encouraged

## 2019-09-04 LAB — DIGOXIN LEVEL: 2.3 NG/ML (ref 0.9–2)

## 2019-09-06 ENCOUNTER — TELEPHONE (OUTPATIENT)
Dept: INTERNAL MEDICINE CLINIC | Age: 79
End: 2019-09-06

## 2019-09-09 ENCOUNTER — TELEPHONE (OUTPATIENT)
Dept: CARDIOLOGY CLINIC | Age: 79
End: 2019-09-09

## 2019-09-13 ENCOUNTER — ANTI-COAG VISIT (OUTPATIENT)
Dept: PHARMACY | Age: 79
End: 2019-09-13
Payer: MEDICARE

## 2019-09-13 ENCOUNTER — OFFICE VISIT (OUTPATIENT)
Dept: CARDIOLOGY CLINIC | Age: 79
End: 2019-09-13
Payer: MEDICARE

## 2019-09-13 VITALS
SYSTOLIC BLOOD PRESSURE: 132 MMHG | HEART RATE: 57 BPM | HEIGHT: 68 IN | WEIGHT: 146 LBS | BODY MASS INDEX: 22.13 KG/M2 | DIASTOLIC BLOOD PRESSURE: 60 MMHG | OXYGEN SATURATION: 98 %

## 2019-09-13 DIAGNOSIS — I35.1 NONRHEUMATIC AORTIC VALVE INSUFFICIENCY: ICD-10-CM

## 2019-09-13 DIAGNOSIS — I10 ESSENTIAL HYPERTENSION, BENIGN: ICD-10-CM

## 2019-09-13 DIAGNOSIS — I48.20 CHRONIC ATRIAL FIBRILLATION (HCC): Primary | ICD-10-CM

## 2019-09-13 DIAGNOSIS — I48.91 ATRIAL FIBRILLATION, UNSPECIFIED TYPE (HCC): ICD-10-CM

## 2019-09-13 DIAGNOSIS — E78.2 MIXED HYPERLIPIDEMIA: ICD-10-CM

## 2019-09-13 LAB — INTERNATIONAL NORMALIZATION RATIO, POC: 3.3

## 2019-09-13 PROCEDURE — 99211 OFF/OP EST MAY X REQ PHY/QHP: CPT

## 2019-09-13 PROCEDURE — 99214 OFFICE O/P EST MOD 30 MIN: CPT | Performed by: NURSE PRACTITIONER

## 2019-09-13 PROCEDURE — 1123F ACP DISCUSS/DSCN MKR DOCD: CPT | Performed by: NURSE PRACTITIONER

## 2019-09-13 PROCEDURE — 1036F TOBACCO NON-USER: CPT | Performed by: NURSE PRACTITIONER

## 2019-09-13 PROCEDURE — 93000 ELECTROCARDIOGRAM COMPLETE: CPT | Performed by: NURSE PRACTITIONER

## 2019-09-13 PROCEDURE — G8427 DOCREV CUR MEDS BY ELIG CLIN: HCPCS | Performed by: NURSE PRACTITIONER

## 2019-09-13 PROCEDURE — 85610 PROTHROMBIN TIME: CPT

## 2019-09-13 PROCEDURE — G8420 CALC BMI NORM PARAMETERS: HCPCS | Performed by: NURSE PRACTITIONER

## 2019-09-13 PROCEDURE — G8598 ASA/ANTIPLAT THER USED: HCPCS | Performed by: NURSE PRACTITIONER

## 2019-09-13 PROCEDURE — 4040F PNEUMOC VAC/ADMIN/RCVD: CPT | Performed by: NURSE PRACTITIONER

## 2019-09-13 RX ORDER — RANITIDINE 150 MG/1
75 TABLET ORAL NIGHTLY
COMMUNITY
End: 2020-01-01 | Stop reason: ALTCHOICE

## 2019-09-13 NOTE — PROGRESS NOTES
[facility]     Outpatient Follow Up Note    Lucy Alcantara is 70 y.o. male who presents today with a history of AS s/p AVR [place], HTN, hyperlipidemia and AF s/p DCCV '09. His other hx includes: carotid stenosis and ascending aortic aneurysm followed by Dr. Amado Bond / HPI:  Follow Up secondary to AS    Subjective:   he reports significant chest pain. There is no SOB/GALICIA. The patient denies orthopnea/PND. The patient does not have swelling. The patients weight is stable . The patient is not experiencing palpitations or dizziness. He describes his heart beat as being like an engine, hit and miss. These symptoms show no change since the last OV. With regard to medication therapy the patient has been compliant with prescribed regimen. They have tolerated therapy to date.      Past Medical History:   Diagnosis Date    Arrhythmia     Hyperlipidemia     Hypertension     Valvular disease      Social History:    Social History     Tobacco Use   Smoking Status Former Smoker    Packs/day: 1.00    Years: 25.00    Pack years: 25.00    Types: Cigarettes    Last attempt to quit: 1993    Years since quittin.7   Smokeless Tobacco Former User    Types: Akdemia Stamp Quit date: 1982   Tobacco Comment    quit 25 yrs ago     Current Medications:  Current Outpatient Medications   Medication Sig Dispense Refill    ranitidine (ZANTAC) 150 MG tablet Take 75 mg by mouth nightly      Nutritional Supplements (ENSURE PLUS PO) Take 1 Can by mouth daily      allopurinol (ZYLOPRIM) 100 MG tablet TAKE 1 TABLET DAILY 90 tablet 0    carvedilol (COREG) 6.25 MG tablet TAKE 1 TABLET TWICE DAILY  WITH MEALS 180 tablet 3    digoxin (LANOXIN) 125 MCG tablet Take 1 tablet by mouth daily 90 tablet 1    furosemide (LASIX) 40 MG tablet TAKE 1 AND 1/2 TABLETS     DAILY 135 tablet 3    lisinopril (PRINIVIL;ZESTRIL) 20 MG tablet TAKE 1 TABLET DAILY 90 tablet 3    atorvastatin (LIPITOR) 40 MG tablet TAKE

## 2019-09-26 ENCOUNTER — ANTI-COAG VISIT (OUTPATIENT)
Dept: PHARMACY | Age: 79
End: 2019-09-26
Payer: MEDICARE

## 2019-09-26 DIAGNOSIS — I48.91 ATRIAL FIBRILLATION, UNSPECIFIED TYPE (HCC): ICD-10-CM

## 2019-09-26 LAB — INTERNATIONAL NORMALIZATION RATIO, POC: 2.9

## 2019-09-26 PROCEDURE — 85610 PROTHROMBIN TIME: CPT

## 2019-09-26 PROCEDURE — 99211 OFF/OP EST MAY X REQ PHY/QHP: CPT

## 2019-09-26 NOTE — PROGRESS NOTES
Mr. Racquel Hawkins is a 70 y.o. y/o male with history of Heart Valve Replacement, aortic valve, about 1997 by Dutch Haywood at [facility]   He presents today for anticoagulation monitoring and adjustment. Pertinent AFib, PMH: HTN, CHF, Chronic Kidney disease(moderate)    Patient Reported Findings:  Yes     No  [x]   []       Patient verifies current dosing regimen as listed- follows AVS  []   [x]       S/S bleeding/bruising/swelling/SOB  []   [x]       Blood in urine or stool  []   [x]       Procedures scheduled in the future at this time  []   [x]       Missed Dose   []   [x]       Extra Dose  []   [x]       Change in medications   []   [x]       Change in health/diet/appetite- Overall his appetite is poor, he states that he \"hasn't been eating much of anything lately\". He states that he continues to drink Ensure every day. Has not been eating much/missed a few ensures. -->missed on Ensure but otherwise the same --->says he has not been eating much greens ---> a few not a lot   []   [x]       Change in alcohol use does not drink  []   [x]       Change in activity  []   [x]       Hospital admission  []   [x]       Emergency department visit  []   [x]       Other complaints    Clinical Outcomes:  Yes     No  []   [x]       Major bleeding event  []   [x]       Thromboembolic event  Patient states that he gets brand Coumadin from mail order. States \"warfarin just doesn't work for [employer] Duration of warfarin Therapy: indefinite  INR Range:  2.5-3.5    INR 2.9 today   Continue taking dose of 2 mg on Mon and 4 mg all other days   Encouraged to maintain a consistency of vegetables/salads. Recheck INR in 4 weeks on 10/24    Referring cardiologist is Dr. Melva Aparicio.    INR (no units)   Date Value   09/26/2019 2.9   09/13/2019 3.3   08/29/2019 2.2   08/08/2019 1.9   08/31/2018 4   08/03/2018 3.1   07/06/2018 2.6   06/15/2018 2.5

## 2019-10-10 ENCOUNTER — NURSE ONLY (OUTPATIENT)
Dept: FAMILY MEDICINE CLINIC | Age: 79
End: 2019-10-10
Payer: MEDICARE

## 2019-10-10 DIAGNOSIS — Z23 NEED FOR INFLUENZA VACCINATION: Primary | ICD-10-CM

## 2019-10-10 PROCEDURE — G0008 ADMIN INFLUENZA VIRUS VAC: HCPCS | Performed by: FAMILY MEDICINE

## 2019-10-10 PROCEDURE — 90653 IIV ADJUVANT VACCINE IM: CPT | Performed by: FAMILY MEDICINE

## 2019-10-24 ENCOUNTER — TELEPHONE (OUTPATIENT)
Dept: PHARMACY | Age: 79
End: 2019-10-24

## 2019-10-25 ENCOUNTER — OFFICE VISIT (OUTPATIENT)
Dept: FAMILY MEDICINE CLINIC | Age: 79
End: 2019-10-25
Payer: MEDICARE

## 2019-10-25 VITALS
TEMPERATURE: 97.6 F | SYSTOLIC BLOOD PRESSURE: 116 MMHG | OXYGEN SATURATION: 96 % | HEART RATE: 80 BPM | WEIGHT: 155 LBS | BODY MASS INDEX: 23.57 KG/M2 | DIASTOLIC BLOOD PRESSURE: 58 MMHG

## 2019-10-25 DIAGNOSIS — R10.9 BILATERAL FLANK PAIN: Primary | ICD-10-CM

## 2019-10-25 DIAGNOSIS — R82.71 BACTERIURIA: ICD-10-CM

## 2019-10-25 DIAGNOSIS — R31.29 MICROSCOPIC HEMATURIA: ICD-10-CM

## 2019-10-25 LAB
BACTERIA URINE, POC: ABNORMAL
BILIRUBIN URINE: 1 MG/DL
BLOOD, URINE: POSITIVE
CASTS URINE, POC: 0
CLARITY: CLEAR
COLOR: YELLOW
CRYSTALS URINE, POC: ABNORMAL
EPI CELLS URINE, POC: 0
GLUCOSE URINE: NEGATIVE
KETONES, URINE: NEGATIVE
LEUKOCYTE EST, POC: ABNORMAL
NITRITE, URINE: NEGATIVE
PH UA: 6.5 (ref 4.5–8)
PROTEIN UA: POSITIVE
RBC URINE, POC: ABNORMAL
SPECIFIC GRAVITY UA: 1.01 (ref 1–1.03)
UROBILINOGEN, URINE: NORMAL
WBC URINE, POC: 0
YEAST URINE, POC: 0

## 2019-10-25 PROCEDURE — G8427 DOCREV CUR MEDS BY ELIG CLIN: HCPCS | Performed by: FAMILY MEDICINE

## 2019-10-25 PROCEDURE — G8598 ASA/ANTIPLAT THER USED: HCPCS | Performed by: FAMILY MEDICINE

## 2019-10-25 PROCEDURE — 4040F PNEUMOC VAC/ADMIN/RCVD: CPT | Performed by: FAMILY MEDICINE

## 2019-10-25 PROCEDURE — 1123F ACP DISCUSS/DSCN MKR DOCD: CPT | Performed by: FAMILY MEDICINE

## 2019-10-25 PROCEDURE — G8420 CALC BMI NORM PARAMETERS: HCPCS | Performed by: FAMILY MEDICINE

## 2019-10-25 PROCEDURE — 1036F TOBACCO NON-USER: CPT | Performed by: FAMILY MEDICINE

## 2019-10-25 PROCEDURE — G8482 FLU IMMUNIZE ORDER/ADMIN: HCPCS | Performed by: FAMILY MEDICINE

## 2019-10-25 PROCEDURE — 81000 URINALYSIS NONAUTO W/SCOPE: CPT | Performed by: FAMILY MEDICINE

## 2019-10-25 PROCEDURE — 99213 OFFICE O/P EST LOW 20 MIN: CPT | Performed by: FAMILY MEDICINE

## 2019-10-27 LAB — URINE CULTURE, ROUTINE: NORMAL

## 2019-10-28 ENCOUNTER — ANTI-COAG VISIT (OUTPATIENT)
Dept: PHARMACY | Age: 79
End: 2019-10-28
Payer: MEDICARE

## 2019-10-28 ENCOUNTER — TELEPHONE (OUTPATIENT)
Dept: FAMILY MEDICINE CLINIC | Age: 79
End: 2019-10-28

## 2019-10-28 DIAGNOSIS — I48.91 ATRIAL FIBRILLATION, UNSPECIFIED TYPE (HCC): ICD-10-CM

## 2019-10-28 LAB — INTERNATIONAL NORMALIZATION RATIO, POC: 2.7

## 2019-10-28 PROCEDURE — 99211 OFF/OP EST MAY X REQ PHY/QHP: CPT

## 2019-10-28 PROCEDURE — 85610 PROTHROMBIN TIME: CPT

## 2019-10-29 DIAGNOSIS — R31.9 HEMATURIA, UNSPECIFIED TYPE: Primary | ICD-10-CM

## 2019-11-05 DIAGNOSIS — N28.1 BILATERAL RENAL CYSTS: Primary | ICD-10-CM

## 2019-11-11 ENCOUNTER — TELEPHONE (OUTPATIENT)
Dept: RHEUMATOLOGY | Age: 79
End: 2019-11-11

## 2019-11-30 RX ORDER — ALLOPURINOL 100 MG/1
TABLET ORAL
Qty: 90 TABLET | Refills: 0 | Status: SHIPPED | OUTPATIENT
Start: 2019-11-30 | End: 2020-01-01

## 2019-12-04 ENCOUNTER — ANTI-COAG VISIT (OUTPATIENT)
Dept: PHARMACY | Age: 79
End: 2019-12-04
Payer: MEDICARE

## 2019-12-04 DIAGNOSIS — Z95.2 HEART VALVE REPLACED: Primary | ICD-10-CM

## 2019-12-04 DIAGNOSIS — I48.91 ATRIAL FIBRILLATION, UNSPECIFIED TYPE (HCC): ICD-10-CM

## 2019-12-04 LAB — INTERNATIONAL NORMALIZATION RATIO, POC: 2.5

## 2019-12-04 PROCEDURE — 85610 PROTHROMBIN TIME: CPT

## 2019-12-04 PROCEDURE — 99211 OFF/OP EST MAY X REQ PHY/QHP: CPT

## 2019-12-05 RX ORDER — DILTIAZEM HYDROCHLORIDE 120 MG/1
CAPSULE, COATED, EXTENDED RELEASE ORAL
Qty: 180 CAPSULE | Refills: 0 | Status: SHIPPED | OUTPATIENT
Start: 2019-12-05 | End: 2019-12-09 | Stop reason: CLARIF

## 2019-12-09 ENCOUNTER — OFFICE VISIT (OUTPATIENT)
Dept: FAMILY MEDICINE CLINIC | Age: 79
End: 2019-12-09
Payer: MEDICARE

## 2019-12-09 VITALS
DIASTOLIC BLOOD PRESSURE: 80 MMHG | HEART RATE: 97 BPM | WEIGHT: 154 LBS | OXYGEN SATURATION: 98 % | SYSTOLIC BLOOD PRESSURE: 132 MMHG | BODY MASS INDEX: 23.42 KG/M2

## 2019-12-09 DIAGNOSIS — I48.20 CHRONIC ATRIAL FIBRILLATION (HCC): ICD-10-CM

## 2019-12-09 DIAGNOSIS — I10 ESSENTIAL HYPERTENSION, BENIGN: Primary | ICD-10-CM

## 2019-12-09 DIAGNOSIS — N28.1 RENAL CYST: ICD-10-CM

## 2019-12-09 DIAGNOSIS — R73.02 IGT (IMPAIRED GLUCOSE TOLERANCE): ICD-10-CM

## 2019-12-09 DIAGNOSIS — R31.9 HEMATURIA, UNSPECIFIED TYPE: ICD-10-CM

## 2019-12-09 DIAGNOSIS — N18.30 CHRONIC KIDNEY DISEASE, STAGE III (MODERATE) (HCC): ICD-10-CM

## 2019-12-09 DIAGNOSIS — D32.9 MENINGIOMA (HCC): ICD-10-CM

## 2019-12-09 LAB
BACTERIA URINE, POC: 0
BILIRUBIN URINE: 0 MG/DL
BLOOD, URINE: POSITIVE
CASTS URINE, POC: 0
CLARITY: CLEAR
COLOR: YELLOW
CRYSTALS URINE, POC: 0
EPI CELLS URINE, POC: 0
GLUCOSE URINE: NEGATIVE
KETONES, URINE: NEGATIVE
LEUKOCYTE EST, POC: NEGATIVE
NITRITE, URINE: NEGATIVE
PH UA: 7.5 (ref 4.5–8)
PROTEIN UA: NEGATIVE
RBC URINE, POC: 0
SPECIFIC GRAVITY UA: 1.01 (ref 1–1.03)
UROBILINOGEN, URINE: NORMAL
WBC URINE, POC: 0
YEAST URINE, POC: 0

## 2019-12-09 PROCEDURE — G8427 DOCREV CUR MEDS BY ELIG CLIN: HCPCS | Performed by: FAMILY MEDICINE

## 2019-12-09 PROCEDURE — 1123F ACP DISCUSS/DSCN MKR DOCD: CPT | Performed by: FAMILY MEDICINE

## 2019-12-09 PROCEDURE — 81000 URINALYSIS NONAUTO W/SCOPE: CPT | Performed by: FAMILY MEDICINE

## 2019-12-09 PROCEDURE — 99214 OFFICE O/P EST MOD 30 MIN: CPT | Performed by: FAMILY MEDICINE

## 2019-12-09 PROCEDURE — G8598 ASA/ANTIPLAT THER USED: HCPCS | Performed by: FAMILY MEDICINE

## 2019-12-09 PROCEDURE — 4040F PNEUMOC VAC/ADMIN/RCVD: CPT | Performed by: FAMILY MEDICINE

## 2019-12-09 PROCEDURE — G8482 FLU IMMUNIZE ORDER/ADMIN: HCPCS | Performed by: FAMILY MEDICINE

## 2019-12-09 PROCEDURE — 1036F TOBACCO NON-USER: CPT | Performed by: FAMILY MEDICINE

## 2019-12-09 PROCEDURE — G8420 CALC BMI NORM PARAMETERS: HCPCS | Performed by: FAMILY MEDICINE

## 2019-12-09 RX ORDER — ATORVASTATIN CALCIUM 40 MG/1
TABLET, FILM COATED ORAL
Qty: 90 TABLET | Refills: 3 | Status: SHIPPED | OUTPATIENT
Start: 2019-12-09 | End: 2020-01-01

## 2020-01-01 ENCOUNTER — TELEPHONE (OUTPATIENT)
Dept: PHARMACY | Age: 80
End: 2020-01-01

## 2020-01-01 ENCOUNTER — TELEPHONE (OUTPATIENT)
Dept: CARDIOLOGY CLINIC | Age: 80
End: 2020-01-01

## 2020-01-01 ENCOUNTER — HOSPITAL ENCOUNTER (OUTPATIENT)
Dept: WOUND CARE | Age: 80
Discharge: HOME | End: 2020-09-25
Payer: MEDICARE

## 2020-01-01 ENCOUNTER — HOSPITAL ENCOUNTER (OUTPATIENT)
Dept: WOUND CARE | Age: 80
Discharge: HOME | End: 2020-08-13
Payer: MEDICARE

## 2020-01-01 ENCOUNTER — TELEPHONE (OUTPATIENT)
Dept: OTHER | Age: 80
End: 2020-01-01

## 2020-01-01 ENCOUNTER — TELEPHONE (OUTPATIENT)
Dept: FAMILY MEDICINE CLINIC | Age: 80
End: 2020-01-01

## 2020-01-01 ENCOUNTER — ANTI-COAG VISIT (OUTPATIENT)
Dept: PHARMACY | Age: 80
End: 2020-01-01
Payer: MEDICARE

## 2020-01-01 ENCOUNTER — HOSPITAL ENCOUNTER (OUTPATIENT)
Dept: GENERAL RADIOLOGY | Age: 80
Discharge: HOME | End: 2020-02-10
Payer: MEDICARE

## 2020-01-01 ENCOUNTER — CARE COORDINATION (OUTPATIENT)
Dept: CASE MANAGEMENT | Age: 80
End: 2020-01-01

## 2020-01-01 ENCOUNTER — HOSPITAL ENCOUNTER (OUTPATIENT)
Age: 80
Discharge: HOME | End: 2020-10-22
Payer: MEDICARE

## 2020-01-01 ENCOUNTER — HOSPITAL ENCOUNTER (OUTPATIENT)
Age: 80
Discharge: HOME | End: 2020-08-20
Payer: MEDICARE

## 2020-01-01 ENCOUNTER — OFFICE VISIT (OUTPATIENT)
Dept: CARDIOLOGY CLINIC | Age: 80
End: 2020-01-01
Payer: MEDICARE

## 2020-01-01 ENCOUNTER — OFFICE VISIT (OUTPATIENT)
Dept: VASCULAR SURGERY | Age: 80
End: 2020-01-01
Payer: COMMERCIAL

## 2020-01-01 ENCOUNTER — PROCEDURE VISIT (OUTPATIENT)
Dept: CARDIOLOGY CLINIC | Age: 80
End: 2020-01-01
Payer: MEDICARE

## 2020-01-01 ENCOUNTER — APPOINTMENT (OUTPATIENT)
Dept: PHARMACY | Age: 80
End: 2020-01-01
Payer: MEDICARE

## 2020-01-01 ENCOUNTER — HOSPITAL ENCOUNTER (OUTPATIENT)
Age: 80
Discharge: HOME | End: 2020-12-02
Payer: MEDICARE

## 2020-01-01 ENCOUNTER — OFFICE VISIT (OUTPATIENT)
Dept: FAMILY MEDICINE CLINIC | Age: 80
End: 2020-01-01
Payer: MEDICARE

## 2020-01-01 ENCOUNTER — HOSPITAL ENCOUNTER (OUTPATIENT)
Age: 80
Discharge: HOME | End: 2020-08-11
Payer: MEDICARE

## 2020-01-01 ENCOUNTER — HOSPITAL ENCOUNTER (OUTPATIENT)
Dept: WOUND CARE | Age: 80
Discharge: HOME | End: 2020-10-22
Payer: MEDICARE

## 2020-01-01 ENCOUNTER — HOSPITAL ENCOUNTER (OUTPATIENT)
Dept: WOUND CARE | Age: 80
Discharge: HOME | End: 2020-10-28
Payer: MEDICARE

## 2020-01-01 ENCOUNTER — HOSPITAL ENCOUNTER (OUTPATIENT)
Dept: WOUND CARE | Age: 80
Discharge: HOME | End: 2020-09-09
Payer: MEDICARE

## 2020-01-01 ENCOUNTER — HOSPITAL ENCOUNTER (OUTPATIENT)
Dept: WOUND CARE | Age: 80
Discharge: HOME | End: 2020-09-30
Payer: MEDICARE

## 2020-01-01 ENCOUNTER — HOSPITAL ENCOUNTER (OUTPATIENT)
Dept: WOUND CARE | Age: 80
Discharge: HOME | End: 2020-11-24
Payer: MEDICARE

## 2020-01-01 ENCOUNTER — HOSPITAL ENCOUNTER (OUTPATIENT)
Age: 80
Discharge: HOME | End: 2020-07-15
Payer: MEDICARE

## 2020-01-01 ENCOUNTER — APPOINTMENT (OUTPATIENT)
Dept: GENERAL RADIOLOGY | Age: 80
DRG: 308 | End: 2020-01-01
Payer: MEDICARE

## 2020-01-01 ENCOUNTER — HOSPITAL ENCOUNTER (OUTPATIENT)
Dept: WOUND CARE | Age: 80
Discharge: HOME | End: 2020-12-07
Payer: MEDICARE

## 2020-01-01 ENCOUNTER — HOSPITAL ENCOUNTER (OUTPATIENT)
Age: 80
Discharge: HOME | End: 2020-11-05
Payer: MEDICARE

## 2020-01-01 ENCOUNTER — HOSPITAL ENCOUNTER (OUTPATIENT)
Dept: WOUND CARE | Age: 80
Discharge: HOME | End: 2020-08-20
Payer: MEDICARE

## 2020-01-01 ENCOUNTER — HOSPITAL ENCOUNTER (OUTPATIENT)
Dept: WOUND CARE | Age: 80
Discharge: HOME | End: 2020-09-02
Payer: MEDICARE

## 2020-01-01 ENCOUNTER — HOSPITAL ENCOUNTER (OUTPATIENT)
Dept: WOUND CARE | Age: 80
Discharge: HOME | End: 2020-11-16
Payer: MEDICARE

## 2020-01-01 ENCOUNTER — HOSPITAL ENCOUNTER (OUTPATIENT)
Dept: WOUND CARE | Age: 80
Discharge: HOME | End: 2020-11-23
Payer: MEDICARE

## 2020-01-01 ENCOUNTER — HOSPITAL ENCOUNTER (OUTPATIENT)
Dept: WOUND CARE | Age: 80
Discharge: HOME | End: 2020-12-28
Payer: MEDICARE

## 2020-01-01 ENCOUNTER — HOSPITAL ENCOUNTER (OUTPATIENT)
Dept: WOUND CARE | Age: 80
Discharge: HOME | End: 2020-08-26
Payer: MEDICARE

## 2020-01-01 ENCOUNTER — HOSPITAL ENCOUNTER (OUTPATIENT)
Dept: WOUND CARE | Age: 80
Discharge: HOME | End: 2020-11-05
Payer: MEDICARE

## 2020-01-01 ENCOUNTER — HOSPITAL ENCOUNTER (OUTPATIENT)
Dept: WOUND CARE | Age: 80
Discharge: HOME | End: 2020-12-03
Payer: MEDICARE

## 2020-01-01 ENCOUNTER — HOSPITAL ENCOUNTER (OUTPATIENT)
Dept: WOUND CARE | Age: 80
Discharge: HOME | End: 2020-11-10

## 2020-01-01 ENCOUNTER — HOSPITAL ENCOUNTER (OUTPATIENT)
Age: 80
Discharge: HOME | End: 2020-02-10
Payer: MEDICARE

## 2020-01-01 ENCOUNTER — HOSPITAL ENCOUNTER (OUTPATIENT)
Dept: VASCULAR LAB | Age: 80
Discharge: HOME | End: 2020-11-16
Payer: MEDICARE

## 2020-01-01 ENCOUNTER — HOSPITAL ENCOUNTER (INPATIENT)
Age: 80
LOS: 7 days | Discharge: HOME | DRG: 308 | End: 2020-08-05
Attending: EMERGENCY MEDICINE | Admitting: INTERNAL MEDICINE
Payer: MEDICARE

## 2020-01-01 ENCOUNTER — HOSPITAL ENCOUNTER (OUTPATIENT)
Dept: WOUND CARE | Age: 80
Discharge: HOME | End: 2020-12-21
Payer: MEDICARE

## 2020-01-01 ENCOUNTER — HOSPITAL ENCOUNTER (OUTPATIENT)
Dept: WOUND CARE | Age: 80
Discharge: HOME | End: 2020-10-08
Payer: MEDICARE

## 2020-01-01 ENCOUNTER — HOSPITAL ENCOUNTER (OUTPATIENT)
Dept: WOUND CARE | Age: 80
Discharge: HOME | End: 2020-10-14
Payer: MEDICARE

## 2020-01-01 ENCOUNTER — HOSPITAL ENCOUNTER (OUTPATIENT)
Dept: WOUND CARE | Age: 80
Discharge: HOME | End: 2020-11-09
Payer: MEDICARE

## 2020-01-01 VITALS
SYSTOLIC BLOOD PRESSURE: 100 MMHG | DIASTOLIC BLOOD PRESSURE: 46 MMHG | HEART RATE: 83 BPM | OXYGEN SATURATION: 97 % | HEIGHT: 70 IN | BODY MASS INDEX: 24.05 KG/M2 | WEIGHT: 168 LBS

## 2020-01-01 VITALS — TEMPERATURE: 97.5 F

## 2020-01-01 VITALS — SYSTOLIC BLOOD PRESSURE: 103 MMHG | HEART RATE: 93 BPM | RESPIRATION RATE: 16 BRPM | DIASTOLIC BLOOD PRESSURE: 62 MMHG

## 2020-01-01 VITALS
SYSTOLIC BLOOD PRESSURE: 124 MMHG | OXYGEN SATURATION: 98 % | HEART RATE: 86 BPM | HEIGHT: 68 IN | WEIGHT: 159 LBS | TEMPERATURE: 97.9 F | BODY MASS INDEX: 24.1 KG/M2 | RESPIRATION RATE: 16 BRPM | DIASTOLIC BLOOD PRESSURE: 62 MMHG

## 2020-01-01 VITALS
OXYGEN SATURATION: 94 % | SYSTOLIC BLOOD PRESSURE: 140 MMHG | WEIGHT: 155 LBS | HEART RATE: 84 BPM | TEMPERATURE: 97 F | BODY MASS INDEX: 23.57 KG/M2 | DIASTOLIC BLOOD PRESSURE: 82 MMHG

## 2020-01-01 VITALS
DIASTOLIC BLOOD PRESSURE: 60 MMHG | OXYGEN SATURATION: 95 % | SYSTOLIC BLOOD PRESSURE: 120 MMHG | TEMPERATURE: 98.8 F | BODY MASS INDEX: 23.83 KG/M2 | HEART RATE: 78 BPM | WEIGHT: 161.4 LBS

## 2020-01-01 VITALS — RESPIRATION RATE: 16 BRPM | SYSTOLIC BLOOD PRESSURE: 107 MMHG | HEART RATE: 99 BPM | DIASTOLIC BLOOD PRESSURE: 66 MMHG

## 2020-01-01 VITALS
HEART RATE: 104 BPM | BODY MASS INDEX: 23.78 KG/M2 | SYSTOLIC BLOOD PRESSURE: 109 MMHG | DIASTOLIC BLOOD PRESSURE: 60 MMHG | TEMPERATURE: 97.3 F | WEIGHT: 161 LBS

## 2020-01-01 VITALS
TEMPERATURE: 97.9 F | RESPIRATION RATE: 16 BRPM | HEART RATE: 88 BPM | DIASTOLIC BLOOD PRESSURE: 66 MMHG | SYSTOLIC BLOOD PRESSURE: 111 MMHG

## 2020-01-01 VITALS
HEART RATE: 86 BPM | SYSTOLIC BLOOD PRESSURE: 119 MMHG | BODY MASS INDEX: 24.25 KG/M2 | TEMPERATURE: 97.2 F | WEIGHT: 169 LBS | DIASTOLIC BLOOD PRESSURE: 70 MMHG

## 2020-01-01 VITALS
SYSTOLIC BLOOD PRESSURE: 117 MMHG | DIASTOLIC BLOOD PRESSURE: 70 MMHG | TEMPERATURE: 97.6 F | HEART RATE: 97 BPM | RESPIRATION RATE: 16 BRPM

## 2020-01-01 VITALS
SYSTOLIC BLOOD PRESSURE: 124 MMHG | SYSTOLIC BLOOD PRESSURE: 133 MMHG | DIASTOLIC BLOOD PRESSURE: 81 MMHG | BODY MASS INDEX: 24.14 KG/M2 | HEART RATE: 87 BPM | HEIGHT: 69 IN | RESPIRATION RATE: 18 BRPM | OXYGEN SATURATION: 97 % | TEMPERATURE: 97 F | HEART RATE: 94 BPM | WEIGHT: 163 LBS | TEMPERATURE: 97.3 F | WEIGHT: 163 LBS | DIASTOLIC BLOOD PRESSURE: 72 MMHG | BODY MASS INDEX: 24.07 KG/M2

## 2020-01-01 VITALS
WEIGHT: 164.5 LBS | HEART RATE: 88 BPM | SYSTOLIC BLOOD PRESSURE: 98 MMHG | BODY MASS INDEX: 23.03 KG/M2 | OXYGEN SATURATION: 100 % | DIASTOLIC BLOOD PRESSURE: 64 MMHG | HEIGHT: 71 IN

## 2020-01-01 VITALS
HEART RATE: 111 BPM | OXYGEN SATURATION: 96 % | TEMPERATURE: 97.9 F | HEIGHT: 69 IN | DIASTOLIC BLOOD PRESSURE: 81 MMHG | WEIGHT: 156.4 LBS | SYSTOLIC BLOOD PRESSURE: 123 MMHG | BODY MASS INDEX: 23.16 KG/M2 | RESPIRATION RATE: 16 BRPM

## 2020-01-01 VITALS
WEIGHT: 163 LBS | DIASTOLIC BLOOD PRESSURE: 84 MMHG | SYSTOLIC BLOOD PRESSURE: 137 MMHG | BODY MASS INDEX: 23.39 KG/M2 | HEART RATE: 94 BPM | TEMPERATURE: 98.6 F

## 2020-01-01 VITALS
DIASTOLIC BLOOD PRESSURE: 80 MMHG | WEIGHT: 169 LBS | SYSTOLIC BLOOD PRESSURE: 140 MMHG | BODY MASS INDEX: 24.2 KG/M2 | TEMPERATURE: 97.4 F | HEIGHT: 70 IN

## 2020-01-01 VITALS
SYSTOLIC BLOOD PRESSURE: 128 MMHG | RESPIRATION RATE: 20 BRPM | HEART RATE: 80 BPM | HEIGHT: 69 IN | WEIGHT: 165 LBS | OXYGEN SATURATION: 99 % | DIASTOLIC BLOOD PRESSURE: 66 MMHG | BODY MASS INDEX: 24.44 KG/M2

## 2020-01-01 VITALS
HEART RATE: 100 BPM | BODY MASS INDEX: 23.39 KG/M2 | WEIGHT: 163 LBS | DIASTOLIC BLOOD PRESSURE: 81 MMHG | TEMPERATURE: 98.9 F | SYSTOLIC BLOOD PRESSURE: 126 MMHG

## 2020-01-01 VITALS
SYSTOLIC BLOOD PRESSURE: 103 MMHG | RESPIRATION RATE: 16 BRPM | RESPIRATION RATE: 18 BRPM | DIASTOLIC BLOOD PRESSURE: 65 MMHG | DIASTOLIC BLOOD PRESSURE: 74 MMHG | TEMPERATURE: 97.3 F | HEART RATE: 86 BPM | TEMPERATURE: 97 F | HEART RATE: 89 BPM | SYSTOLIC BLOOD PRESSURE: 114 MMHG

## 2020-01-01 VITALS
HEART RATE: 101 BPM | TEMPERATURE: 97.5 F | RESPIRATION RATE: 16 BRPM | DIASTOLIC BLOOD PRESSURE: 65 MMHG | SYSTOLIC BLOOD PRESSURE: 102 MMHG

## 2020-01-01 VITALS
HEART RATE: 95 BPM | TEMPERATURE: 97.5 F | DIASTOLIC BLOOD PRESSURE: 83 MMHG | SYSTOLIC BLOOD PRESSURE: 127 MMHG | RESPIRATION RATE: 16 BRPM

## 2020-01-01 VITALS
SYSTOLIC BLOOD PRESSURE: 118 MMHG | RESPIRATION RATE: 16 BRPM | HEART RATE: 97 BPM | DIASTOLIC BLOOD PRESSURE: 73 MMHG | TEMPERATURE: 97.1 F

## 2020-01-01 VITALS
DIASTOLIC BLOOD PRESSURE: 56 MMHG | HEART RATE: 84 BPM | HEIGHT: 69 IN | SYSTOLIC BLOOD PRESSURE: 110 MMHG | OXYGEN SATURATION: 96 % | WEIGHT: 165 LBS | BODY MASS INDEX: 24.44 KG/M2

## 2020-01-01 VITALS
DIASTOLIC BLOOD PRESSURE: 66 MMHG | BODY MASS INDEX: 23.78 KG/M2 | SYSTOLIC BLOOD PRESSURE: 109 MMHG | WEIGHT: 161 LBS | TEMPERATURE: 99.2 F | HEART RATE: 91 BPM

## 2020-01-01 VITALS
HEIGHT: 70 IN | SYSTOLIC BLOOD PRESSURE: 112 MMHG | HEART RATE: 88 BPM | BODY MASS INDEX: 23.05 KG/M2 | OXYGEN SATURATION: 95 % | WEIGHT: 161 LBS | DIASTOLIC BLOOD PRESSURE: 64 MMHG

## 2020-01-01 VITALS — TEMPERATURE: 97.3 F

## 2020-01-01 VITALS — DIASTOLIC BLOOD PRESSURE: 68 MMHG | RESPIRATION RATE: 16 BRPM | HEART RATE: 93 BPM | SYSTOLIC BLOOD PRESSURE: 115 MMHG

## 2020-01-01 VITALS — TEMPERATURE: 97.1 F | TEMPERATURE: 97.3 F

## 2020-01-01 VITALS — TEMPERATURE: 98 F

## 2020-01-01 VITALS — TEMPERATURE: 97.8 F

## 2020-01-01 VITALS — TEMPERATURE: 97.1 F

## 2020-01-01 VITALS — TEMPERATURE: 97.7 F

## 2020-01-01 LAB
ALBUMIN SERPL-MCNC: 3.3 G/DL (ref 3.4–5)
ALBUMIN SERPL-MCNC: 3.4 G/DL (ref 3.4–5)
ALBUMIN SERPL-MCNC: 3.4 G/DL (ref 3.4–5)
ALBUMIN SERPL-MCNC: 3.5 G/DL (ref 3.4–5)
ALBUMIN SERPL-MCNC: 3.6 G/DL (ref 3.4–5)
ALP BLD-CCNC: 97 U/L (ref 40–129)
ALT SERPL-CCNC: 31 U/L (ref 10–40)
ANCA IFA: NORMAL
ANION GAP SERPL CALCULATED.3IONS-SCNC: 10 MMOL/L (ref 3–16)
ANION GAP SERPL CALCULATED.3IONS-SCNC: 11 MMOL/L (ref 3–16)
ANION GAP SERPL CALCULATED.3IONS-SCNC: 12 MMOL/L (ref 3–16)
ANION GAP SERPL CALCULATED.3IONS-SCNC: 12 MMOL/L (ref 3–16)
ANION GAP SERPL CALCULATED.3IONS-SCNC: 13 MMOL/L (ref 3–16)
ANION GAP SERPL CALCULATED.3IONS-SCNC: 14 MMOL/L (ref 3–16)
ANION GAP SERPL CALCULATED.3IONS-SCNC: 14 MMOL/L (ref 3–16)
ANION GAP SERPL CALCULATED.3IONS-SCNC: 16 MMOL/L (ref 3–16)
ANION GAP SERPL CALCULATED.3IONS-SCNC: 16 MMOL/L (ref 3–16)
ANION GAP SERPL CALCULATED.3IONS-SCNC: 3 MMOL/L (ref 7–16)
ANION GAP SERPL CALCULATED.3IONS-SCNC: 6 MMOL/L (ref 3–16)
ANION GAP SERPL CALCULATED.3IONS-SCNC: 8 MMOL/L (ref 3–16)
ANION GAP SERPL CALCULATED.3IONS-SCNC: 9 MMOL/L (ref 3–16)
ANION GAP SERPL CALCULATED.3IONS-SCNC: 9 MMOL/L (ref 3–16)
ANTI-NUCLEAR ANTIBODY (ANA): NEGATIVE
AST SERPL-CCNC: 33 U/L (ref 15–37)
BACTERIA: ABNORMAL /HPF
BASOPHILS ABSOLUTE: 0 K/UL (ref 0–0.2)
BASOPHILS ABSOLUTE: 0.1 K/UL (ref 0–0.2)
BASOPHILS RELATIVE PERCENT: 0.1 %
BASOPHILS RELATIVE PERCENT: 0.2 %
BASOPHILS RELATIVE PERCENT: 0.3 %
BASOPHILS RELATIVE PERCENT: 0.4 %
BASOPHILS RELATIVE PERCENT: 0.5 %
BASOPHILS RELATIVE PERCENT: 0.5 %
BASOPHILS RELATIVE PERCENT: 0.6 %
BASOPHILS RELATIVE PERCENT: 0.7 %
BILIRUB SERPL-MCNC: 1.6 MG/DL (ref 0–1)
BILIRUBIN DIRECT: 0.6 MG/DL (ref 0–0.3)
BILIRUBIN URINE: NEGATIVE
BILIRUBIN, INDIRECT: 1 MG/DL (ref 0–1)
BLOOD CULTURE, ROUTINE: NORMAL
BLOOD, URINE: ABNORMAL
BLOOD, URINE: ABNORMAL
BLOOD, URINE: NEGATIVE
BUN BLDV-MCNC: 21 MG/DL (ref 7–20)
BUN BLDV-MCNC: 23 MG/DL (ref 7–18)
BUN BLDV-MCNC: 28 MG/DL (ref 7–20)
BUN BLDV-MCNC: 31 MG/DL (ref 7–20)
BUN BLDV-MCNC: 36 MG/DL (ref 7–20)
BUN BLDV-MCNC: 42 MG/DL (ref 7–20)
BUN BLDV-MCNC: 45 MG/DL (ref 7–20)
BUN BLDV-MCNC: 60 MG/DL (ref 7–20)
BUN BLDV-MCNC: 63 MG/DL (ref 7–20)
BUN BLDV-MCNC: 68 MG/DL (ref 7–20)
BUN BLDV-MCNC: 69 MG/DL (ref 7–20)
BUN BLDV-MCNC: 83 MG/DL (ref 7–20)
BUN BLDV-MCNC: 86 MG/DL (ref 7–20)
BUN BLDV-MCNC: 88 MG/DL (ref 7–20)
BUN BLDV-MCNC: 91 MG/DL (ref 7–20)
BUN BLDV-MCNC: 91 MG/DL (ref 7–20)
C3 COMPLEMENT: 108 MG/DL (ref 90–180)
C4 COMPLEMENT: 23.1 MG/DL (ref 10–40)
CALCIUM SERPL-MCNC: 8.4 MG/DL (ref 8.3–10.6)
CALCIUM SERPL-MCNC: 8.5 MG/DL (ref 8.3–10.6)
CALCIUM SERPL-MCNC: 8.5 MG/DL (ref 8.3–10.6)
CALCIUM SERPL-MCNC: 8.7 MG/DL (ref 8.3–10.6)
CALCIUM SERPL-MCNC: 8.8 MG/DL (ref 8.3–10.6)
CALCIUM SERPL-MCNC: 8.8 MG/DL (ref 8.3–10.6)
CALCIUM SERPL-MCNC: 8.9 MG/DL (ref 8.5–10.1)
CALCIUM SERPL-MCNC: 9.1 MG/DL (ref 8.3–10.6)
CALCIUM SERPL-MCNC: 9.1 MG/DL (ref 8.3–10.6)
CALCIUM SERPL-MCNC: 9.2 MG/DL (ref 8.3–10.6)
CALCIUM SERPL-MCNC: 9.3 MG/DL (ref 8.3–10.6)
CALCIUM SERPL-MCNC: 9.4 MG/DL (ref 8.3–10.6)
CHLORIDE BLD-SCNC: 100 MMOL/L (ref 99–110)
CHLORIDE BLD-SCNC: 100 MMOL/L (ref 99–110)
CHLORIDE BLD-SCNC: 101 MMOL/L (ref 99–110)
CHLORIDE BLD-SCNC: 102 MMOL/L (ref 99–110)
CHLORIDE BLD-SCNC: 103 MMOL/L (ref 98–107)
CHLORIDE BLD-SCNC: 103 MMOL/L (ref 99–110)
CHLORIDE BLD-SCNC: 104 MMOL/L (ref 99–110)
CHLORIDE BLD-SCNC: 104 MMOL/L (ref 99–110)
CHLORIDE BLD-SCNC: 94 MMOL/L (ref 99–110)
CHLORIDE BLD-SCNC: 94 MMOL/L (ref 99–110)
CHLORIDE BLD-SCNC: 96 MMOL/L (ref 99–110)
CHLORIDE BLD-SCNC: 97 MMOL/L (ref 99–110)
CHLORIDE BLD-SCNC: 97 MMOL/L (ref 99–110)
CHLORIDE BLD-SCNC: 98 MMOL/L (ref 99–110)
CHLORIDE BLD-SCNC: 98 MMOL/L (ref 99–110)
CHLORIDE BLD-SCNC: 99 MMOL/L (ref 99–110)
CLARITY: ABNORMAL
CLARITY: CLEAR
CLARITY: CLEAR
CO2: 22 MMOL/L (ref 21–32)
CO2: 22 MMOL/L (ref 21–32)
CO2: 23 MMOL/L (ref 21–32)
CO2: 25 MMOL/L (ref 21–32)
CO2: 26 MMOL/L (ref 21–32)
CO2: 26 MMOL/L (ref 21–32)
CO2: 28 MMOL/L (ref 21–32)
CO2: 31 MMOL/L (ref 21–32)
CO2: 34 MMOL/L (ref 21–32)
CO2: 35 MMOL/L (ref 21–32)
CO2: 36 MMOL/L (ref 21–32)
COLOR: YELLOW
CREAT SERPL-MCNC: 1.6 MG/DL (ref 0.8–1.3)
CREAT SERPL-MCNC: 1.6 MG/DL (ref 0.8–1.3)
CREAT SERPL-MCNC: 1.7 MG/DL (ref 0.8–1.3)
CREAT SERPL-MCNC: 2 MG/DL (ref 0.8–1.3)
CREAT SERPL-MCNC: 2.1 MG/DL (ref 0.8–1.3)
CREAT SERPL-MCNC: 2.2 MG/DL (ref 0.8–1.3)
CREAT SERPL-MCNC: 2.2 MG/DL (ref 0.8–1.3)
CREAT SERPL-MCNC: 2.4 MG/DL (ref 0.8–1.3)
CREAT SERPL-MCNC: 2.4 MG/DL (ref 0.8–1.3)
CREAT SERPL-MCNC: 2.5 MG/DL (ref 0.8–1.3)
CREAT SERPL-MCNC: 2.8 MG/DL (ref 0.8–1.3)
CREAT SERPL-MCNC: 2.9 MG/DL (ref 0.8–1.3)
CREAT SERPL-MCNC: 3.1 MG/DL (ref 0.8–1.3)
CREAT SERPL-MCNC: 3.4 MG/DL (ref 0.8–1.3)
CREAT SERPL-MCNC: 3.6 MG/DL (ref 0.8–1.3)
CREATININE + EGFR PANEL: 1.8 MG/DL (ref 0.6–1.3)
CREATININE URINE: 46.7 MG/DL (ref 39–259)
CREATININE URINE: 74.1 MG/DL (ref 39–259)
EKG ATRIAL RATE: 141 BPM
EKG DIAGNOSIS: NORMAL
EKG Q-T INTERVAL: 320 MS
EKG QRS DURATION: 100 MS
EKG QTC CALCULATION (BAZETT): 481 MS
EKG R AXIS: 42 DEGREES
EKG T AXIS: 163 DEGREES
EKG VENTRICULAR RATE: 136 BPM
EOSINOPHILS ABSOLUTE: 0 K/UL (ref 0–0.6)
EOSINOPHILS ABSOLUTE: 0.1 K/UL (ref 0–0.6)
EOSINOPHILS ABSOLUTE: 0.1 K/UL (ref 0–0.6)
EOSINOPHILS RELATIVE PERCENT: 0 %
EOSINOPHILS RELATIVE PERCENT: 0 %
EOSINOPHILS RELATIVE PERCENT: 0.1 %
EOSINOPHILS RELATIVE PERCENT: 0.4 %
EOSINOPHILS RELATIVE PERCENT: 0.5 %
EOSINOPHILS RELATIVE PERCENT: 0.8 %
EOSINOPHILS RELATIVE PERCENT: 0.9 %
EOSINOPHILS RELATIVE PERCENT: 1 %
EPITHELIAL CELLS, UA: 0 /HPF (ref 0–5)
EPITHELIAL CELLS, UA: 0 /HPF (ref 0–5)
EPITHELIAL CELLS, UA: ABNORMAL /HPF (ref 0–5)
FERRITIN: 67.1 NG/ML (ref 30–400)
FINE CASTS, UA: ABNORMAL /LPF (ref 0–2)
GFR AFRICAN AMERICAN: 20
GFR AFRICAN AMERICAN: 21
GFR AFRICAN AMERICAN: 24
GFR AFRICAN AMERICAN: 26
GFR AFRICAN AMERICAN: 27
GFR AFRICAN AMERICAN: 30
GFR AFRICAN AMERICAN: 32
GFR AFRICAN AMERICAN: 32
GFR AFRICAN AMERICAN: 35
GFR AFRICAN AMERICAN: 35
GFR AFRICAN AMERICAN: 37
GFR AFRICAN AMERICAN: 39
GFR AFRICAN AMERICAN: 45 ML/MIN/1.73M2
GFR AFRICAN AMERICAN: 47
GFR AFRICAN AMERICAN: 51
GFR AFRICAN AMERICAN: 51
GFR NON-AFRICAN AMERICAN: 16
GFR NON-AFRICAN AMERICAN: 18
GFR NON-AFRICAN AMERICAN: 20
GFR NON-AFRICAN AMERICAN: 21
GFR NON-AFRICAN AMERICAN: 22
GFR NON-AFRICAN AMERICAN: 25
GFR NON-AFRICAN AMERICAN: 26
GFR NON-AFRICAN AMERICAN: 26
GFR NON-AFRICAN AMERICAN: 29
GFR NON-AFRICAN AMERICAN: 29
GFR NON-AFRICAN AMERICAN: 31
GFR NON-AFRICAN AMERICAN: 32
GFR NON-AFRICAN AMERICAN: 37 ML/MIN/1.73M2
GFR NON-AFRICAN AMERICAN: 39
GFR NON-AFRICAN AMERICAN: 42
GFR NON-AFRICAN AMERICAN: 42
GLUCOSE BLD-MCNC: 100 MG/DL (ref 70–99)
GLUCOSE BLD-MCNC: 100 MG/DL (ref 70–99)
GLUCOSE BLD-MCNC: 110 MG/DL (ref 70–99)
GLUCOSE BLD-MCNC: 113 MG/DL (ref 70–99)
GLUCOSE BLD-MCNC: 114 MG/DL (ref 70–99)
GLUCOSE BLD-MCNC: 119 MG/DL (ref 70–99)
GLUCOSE BLD-MCNC: 124 MG/DL (ref 70–99)
GLUCOSE BLD-MCNC: 129 MG/DL (ref 70–99)
GLUCOSE BLD-MCNC: 129 MG/DL (ref 70–99)
GLUCOSE BLD-MCNC: 133 MG/DL (ref 70–99)
GLUCOSE BLD-MCNC: 135 MG/DL (ref 70–99)
GLUCOSE BLD-MCNC: 136 MG/DL (ref 70–99)
GLUCOSE BLD-MCNC: 137 MG/DL (ref 70–99)
GLUCOSE BLD-MCNC: 153 MG/DL (ref 70–99)
GLUCOSE BLD-MCNC: 90 MG/DL (ref 70–99)
GLUCOSE URINE: NEGATIVE MG/DL
GLUCOSE: 148 MG/DL (ref 74–106)
HCT VFR BLD CALC: 26.5 % (ref 40.5–52.5)
HCT VFR BLD CALC: 27.9 % (ref 40.5–52.5)
HCT VFR BLD CALC: 27.9 % (ref 40.5–52.5)
HCT VFR BLD CALC: 29 % (ref 40.5–52.5)
HCT VFR BLD CALC: 29.6 % (ref 40.5–52.5)
HCT VFR BLD CALC: 29.9 % (ref 40.5–52.5)
HCT VFR BLD CALC: 29.9 % (ref 40.5–52.5)
HCT VFR BLD CALC: 30.1 % (ref 40.5–52.5)
HCT VFR BLD CALC: 30.7 % (ref 40.5–52.5)
HCT VFR BLD CALC: 31.3 % (ref 40.5–52.5)
HCT VFR BLD CALC: 31.4 % (ref 40.5–52.5)
HEMOGLOBIN: 10.2 G/DL (ref 13.5–17.5)
HEMOGLOBIN: 10.4 G/DL (ref 13.5–17.5)
HEMOGLOBIN: 8.6 G/DL (ref 13.5–17.5)
HEMOGLOBIN: 9.1 G/DL (ref 13.5–17.5)
HEMOGLOBIN: 9.2 G/DL (ref 13.5–17.5)
HEMOGLOBIN: 9.5 G/DL (ref 13.5–17.5)
HEMOGLOBIN: 9.6 G/DL (ref 13.5–17.5)
HEMOGLOBIN: 9.7 G/DL (ref 13.5–17.5)
HEMOGLOBIN: 9.9 G/DL (ref 13.5–17.5)
HYALINE CASTS: 0 /LPF (ref 0–8)
HYALINE CASTS: 1 /LPF (ref 0–8)
HYALINE CASTS: ABNORMAL /LPF (ref 0–2)
INR BLD: 1.66 (ref 0.86–1.14)
INR BLD: 1.79 (ref 0.86–1.14)
INR BLD: 1.98 (ref 0.86–1.14)
INR BLD: 2.12 (ref 0.86–1.14)
INR BLD: 2.76 (ref 0.86–1.14)
INR BLD: 3.3
INR BLD: 3.45 (ref 0.86–1.14)
INR BLD: 3.45 (ref 0.86–1.14)
INR BLD: 3.84 (ref 0.86–1.14)
INTERNATIONAL NORMALIZATION RATIO, POC: 2
INTERNATIONAL NORMALIZATION RATIO, POC: 2
INTERNATIONAL NORMALIZATION RATIO, POC: 2.1
INTERNATIONAL NORMALIZATION RATIO, POC: 2.4
INTERNATIONAL NORMALIZATION RATIO, POC: 2.7
INTERNATIONAL NORMALIZATION RATIO, POC: 2.8
INTERNATIONAL NORMALIZATION RATIO, POC: 2.9
INTERNATIONAL NORMALIZATION RATIO, POC: 3.1
INTERNATIONAL NORMALIZATION RATIO, POC: 3.3
INTERNATIONAL NORMALIZATION RATIO, POC: 3.6
INTERNATIONAL NORMALIZATION RATIO, POC: 4
INTERNATIONAL NORMALIZATION RATIO, POC: 4.3
IRON SATURATION: 7 % (ref 20–50)
IRON: 21 UG/DL (ref 59–158)
KETONES, URINE: NEGATIVE MG/DL
LEUKOCYTE ESTERASE, URINE: NEGATIVE
LV EF: 30 %
LVEF MODALITY: NORMAL
LYMPHOCYTES ABSOLUTE: 1 K/UL (ref 1–5.1)
LYMPHOCYTES ABSOLUTE: 1.1 K/UL (ref 1–5.1)
LYMPHOCYTES ABSOLUTE: 1.2 K/UL (ref 1–5.1)
LYMPHOCYTES RELATIVE PERCENT: 11.7 %
LYMPHOCYTES RELATIVE PERCENT: 14.6 %
LYMPHOCYTES RELATIVE PERCENT: 14.9 %
LYMPHOCYTES RELATIVE PERCENT: 15 %
LYMPHOCYTES RELATIVE PERCENT: 16 %
LYMPHOCYTES RELATIVE PERCENT: 16.6 %
LYMPHOCYTES RELATIVE PERCENT: 5.7 %
LYMPHOCYTES RELATIVE PERCENT: 5.9 %
MCH RBC QN AUTO: 29.5 PG (ref 26–34)
MCH RBC QN AUTO: 30 PG (ref 26–34)
MCH RBC QN AUTO: 30.9 PG (ref 26–34)
MCH RBC QN AUTO: 31 PG (ref 26–34)
MCH RBC QN AUTO: 31.1 PG (ref 26–34)
MCH RBC QN AUTO: 31.3 PG (ref 26–34)
MCH RBC QN AUTO: 31.5 PG (ref 26–34)
MCH RBC QN AUTO: 32 PG (ref 26–34)
MCHC RBC AUTO-ENTMCNC: 32.4 G/DL (ref 31–36)
MCHC RBC AUTO-ENTMCNC: 32.4 G/DL (ref 31–36)
MCHC RBC AUTO-ENTMCNC: 32.5 G/DL (ref 31–36)
MCHC RBC AUTO-ENTMCNC: 32.7 G/DL (ref 31–36)
MCHC RBC AUTO-ENTMCNC: 32.8 G/DL (ref 31–36)
MCHC RBC AUTO-ENTMCNC: 33 G/DL (ref 31–36)
MCHC RBC AUTO-ENTMCNC: 33 G/DL (ref 31–36)
MCHC RBC AUTO-ENTMCNC: 33.2 G/DL (ref 31–36)
MCV RBC AUTO: 91.1 FL (ref 80–100)
MCV RBC AUTO: 92.3 FL (ref 80–100)
MCV RBC AUTO: 94.1 FL (ref 80–100)
MCV RBC AUTO: 94.8 FL (ref 80–100)
MCV RBC AUTO: 95.1 FL (ref 80–100)
MCV RBC AUTO: 95.1 FL (ref 80–100)
MCV RBC AUTO: 95.3 FL (ref 80–100)
MCV RBC AUTO: 95.6 FL (ref 80–100)
MCV RBC AUTO: 95.6 FL (ref 80–100)
MCV RBC AUTO: 95.7 FL (ref 80–100)
MCV RBC AUTO: 96.4 FL (ref 80–100)
MICROSCOPIC EXAMINATION: YES
MONOCYTES ABSOLUTE: 0.6 K/UL (ref 0–1.3)
MONOCYTES ABSOLUTE: 0.6 K/UL (ref 0–1.3)
MONOCYTES ABSOLUTE: 0.7 K/UL (ref 0–1.3)
MONOCYTES ABSOLUTE: 0.8 K/UL (ref 0–1.3)
MONOCYTES ABSOLUTE: 0.8 K/UL (ref 0–1.3)
MONOCYTES ABSOLUTE: 0.9 K/UL (ref 0–1.3)
MONOCYTES ABSOLUTE: 1.2 K/UL (ref 0–1.3)
MONOCYTES ABSOLUTE: 1.2 K/UL (ref 0–1.3)
MONOCYTES RELATIVE PERCENT: 10.5 %
MONOCYTES RELATIVE PERCENT: 12 %
MONOCYTES RELATIVE PERCENT: 6.4 %
MONOCYTES RELATIVE PERCENT: 6.5 %
MONOCYTES RELATIVE PERCENT: 7.2 %
MONOCYTES RELATIVE PERCENT: 7.6 %
MONOCYTES RELATIVE PERCENT: 9.5 %
MONOCYTES RELATIVE PERCENT: 9.6 %
NEUTROPHILS ABSOLUTE: 15.9 K/UL (ref 1.7–7.7)
NEUTROPHILS ABSOLUTE: 16.7 K/UL (ref 1.7–7.7)
NEUTROPHILS ABSOLUTE: 4.8 K/UL (ref 1.7–7.7)
NEUTROPHILS ABSOLUTE: 5 K/UL (ref 1.7–7.7)
NEUTROPHILS ABSOLUTE: 5.4 K/UL (ref 1.7–7.7)
NEUTROPHILS ABSOLUTE: 5.8 K/UL (ref 1.7–7.7)
NEUTROPHILS ABSOLUTE: 6.2 K/UL (ref 1.7–7.7)
NEUTROPHILS ABSOLUTE: 8.4 K/UL (ref 1.7–7.7)
NEUTROPHILS RELATIVE PERCENT: 70.6 %
NEUTROPHILS RELATIVE PERCENT: 73 %
NEUTROPHILS RELATIVE PERCENT: 73.2 %
NEUTROPHILS RELATIVE PERCENT: 74.5 %
NEUTROPHILS RELATIVE PERCENT: 76.7 %
NEUTROPHILS RELATIVE PERCENT: 80.1 %
NEUTROPHILS RELATIVE PERCENT: 87.4 %
NEUTROPHILS RELATIVE PERCENT: 87.8 %
NITRITE, URINE: NEGATIVE
PARATHYROID HORMONE INTACT: 110.3 PG/ML (ref 14–72)
PARATHYROID HORMONE INTACT: 85.3 PG/ML (ref 14–72)
PDW BLD-RTO: 16.1 % (ref 12.4–15.4)
PDW BLD-RTO: 16.2 % (ref 12.4–15.4)
PDW BLD-RTO: 16.2 % (ref 12.4–15.4)
PDW BLD-RTO: 16.3 % (ref 12.4–15.4)
PDW BLD-RTO: 16.4 % (ref 12.4–15.4)
PDW BLD-RTO: 16.6 % (ref 12.4–15.4)
PDW BLD-RTO: 16.7 % (ref 12.4–15.4)
PDW BLD-RTO: 16.7 % (ref 12.4–15.4)
PDW BLD-RTO: 17.8 % (ref 12.4–15.4)
PDW BLD-RTO: 18.5 % (ref 12.4–15.4)
PDW BLD-RTO: 18.9 % (ref 12.4–15.4)
PH UA: 5 (ref 5–8)
PH UA: 7 (ref 5–8)
PH UA: 7 (ref 5–8)
PHOSPHORUS: 2.9 MG/DL (ref 2.5–4.9)
PHOSPHORUS: 3.2 MG/DL (ref 2.5–4.9)
PHOSPHORUS: 3.2 MG/DL (ref 2.5–4.9)
PHOSPHORUS: 4.5 MG/DL (ref 2.5–4.9)
PLATELET # BLD: 116 K/UL (ref 135–450)
PLATELET # BLD: 125 K/UL (ref 135–450)
PLATELET # BLD: 126 K/UL (ref 135–450)
PLATELET # BLD: 128 K/UL (ref 135–450)
PLATELET # BLD: 132 K/UL (ref 135–450)
PLATELET # BLD: 138 K/UL (ref 135–450)
PLATELET # BLD: 141 K/UL (ref 135–450)
PLATELET # BLD: 150 K/UL (ref 135–450)
PLATELET # BLD: 157 K/UL (ref 135–450)
PLATELET # BLD: 220 K/UL (ref 135–450)
PLATELET # BLD: 264 K/UL (ref 135–450)
PMV BLD AUTO: 8.2 FL (ref 5–10.5)
PMV BLD AUTO: 8.6 FL (ref 5–10.5)
PMV BLD AUTO: 8.8 FL (ref 5–10.5)
PMV BLD AUTO: 9 FL (ref 5–10.5)
PMV BLD AUTO: 9.1 FL (ref 5–10.5)
PMV BLD AUTO: 9.2 FL (ref 5–10.5)
PMV BLD AUTO: 9.3 FL (ref 5–10.5)
PMV BLD AUTO: 9.3 FL (ref 5–10.5)
PMV BLD AUTO: 9.5 FL (ref 5–10.5)
PMV BLD AUTO: 9.6 FL (ref 5–10.5)
PMV BLD AUTO: 9.6 FL (ref 5–10.5)
POTASSIUM REFLEX MAGNESIUM: 3.9 MMOL/L (ref 3.5–5.1)
POTASSIUM REFLEX MAGNESIUM: 4.2 MMOL/L (ref 3.5–5.1)
POTASSIUM REFLEX MAGNESIUM: 4.3 MMOL/L (ref 3.5–5.1)
POTASSIUM REFLEX MAGNESIUM: 4.4 MMOL/L (ref 3.5–5.1)
POTASSIUM SERPL-SCNC: 3.2 MMOL/L (ref 3.5–5.1)
POTASSIUM SERPL-SCNC: 3.6 MMOL/L (ref 3.5–5.1)
POTASSIUM SERPL-SCNC: 3.6 MMOL/L (ref 3.5–5.1)
POTASSIUM SERPL-SCNC: 3.9 MMOL/L (ref 3.5–5.1)
POTASSIUM SERPL-SCNC: 4.2 MMOL/L (ref 3.5–5.1)
POTASSIUM SERPL-SCNC: 4.3 MMOL/L (ref 3.5–5.1)
POTASSIUM SERPL-SCNC: 4.5 MMOL/L (ref 3.5–5.1)
POTASSIUM SERPL-SCNC: 5 MMOL/L (ref 3.5–5.1)
POTASSIUM SERPL-SCNC: 5.1 MMOL/L (ref 3.5–5.1)
PRO-BNP: 1350 PG/ML (ref 0–449)
PRO-BNP: 2814 PG/ML (ref 0–449)
PRO-BNP: 4049 PG/ML (ref 0–449)
PRO-BNP: 4777 PG/ML (ref 0–449)
PRO-BNP: 5130 PG/ML (ref 0–449)
PRO-BNP: 8820 PG/ML (ref 0–449)
PROTEIN PROTEIN: 17 MG/DL
PROTEIN PROTEIN: 24 MG/DL
PROTEIN UA: 30 MG/DL
PROTEIN UA: ABNORMAL MG/DL
PROTEIN UA: ABNORMAL MG/DL
PROTHROMBIN TIME: 19.4 SEC (ref 10–13.2)
PROTHROMBIN TIME: 20.9 SEC (ref 10–13.2)
PROTHROMBIN TIME: 23.1 SEC (ref 10–13.2)
PROTHROMBIN TIME: 24.8 SEC (ref 10–13.2)
PROTHROMBIN TIME: 32.4 SEC (ref 10–13.2)
PROTHROMBIN TIME: 40.5 SEC (ref 10–13.2)
PROTHROMBIN TIME: 40.5 SEC (ref 10–13.2)
PROTHROMBIN TIME: 45.2 SEC (ref 10–13.2)
RBC # BLD: 2.91 M/UL (ref 4.2–5.9)
RBC # BLD: 2.93 M/UL (ref 4.2–5.9)
RBC # BLD: 2.96 M/UL (ref 4.2–5.9)
RBC # BLD: 3.03 M/UL (ref 4.2–5.9)
RBC # BLD: 3.13 M/UL (ref 4.2–5.9)
RBC # BLD: 3.14 M/UL (ref 4.2–5.9)
RBC # BLD: 3.18 M/UL (ref 4.2–5.9)
RBC # BLD: 3.2 M/UL (ref 4.2–5.9)
RBC # BLD: 3.21 M/UL (ref 4.2–5.9)
RBC # BLD: 3.25 M/UL (ref 4.2–5.9)
RBC # BLD: 3.29 M/UL (ref 4.2–5.9)
RBC UA: 1 /HPF (ref 0–4)
RBC UA: 6 /HPF (ref 0–4)
RBC UA: ABNORMAL /HPF (ref 0–4)
SODIUM BLD-SCNC: 132 MMOL/L (ref 136–145)
SODIUM BLD-SCNC: 133 MMOL/L (ref 136–145)
SODIUM BLD-SCNC: 134 MMOL/L (ref 136–145)
SODIUM BLD-SCNC: 136 MMOL/L (ref 136–145)
SODIUM BLD-SCNC: 136 MMOL/L (ref 136–145)
SODIUM BLD-SCNC: 139 MMOL/L (ref 136–145)
SODIUM BLD-SCNC: 140 MMOL/L (ref 136–145)
SODIUM BLD-SCNC: 140 MMOL/L (ref 136–145)
SODIUM BLD-SCNC: 142 MMOL/L (ref 136–145)
SODIUM BLD-SCNC: 142 MMOL/L (ref 136–145)
SPECIFIC GRAVITY UA: 1.01 (ref 1–1.03)
TOTAL CK: 86 U/L (ref 39–308)
TOTAL IRON BINDING CAPACITY: 304 UG/DL (ref 260–445)
TOTAL PROTEIN: 6.5 G/DL (ref 6.4–8.2)
TROPONIN: 0.02 NG/ML
TROPONIN: 0.03 NG/ML
TSH REFLEX: 2.19 UIU/ML (ref 0.27–4.2)
URINE REFLEX TO CULTURE: ABNORMAL
URINE TYPE: ABNORMAL
UROBILINOGEN, URINE: 0.2 E.U./DL
UROBILINOGEN, URINE: 1 E.U./DL
UROBILINOGEN, URINE: 1 E.U./DL
VITAMIN D 25-HYDROXY: 22.2 NG/ML
WBC # BLD: 10.4 K/UL (ref 4–11)
WBC # BLD: 18.2 K/UL (ref 4–11)
WBC # BLD: 19 K/UL (ref 4–11)
WBC # BLD: 5.4 K/UL (ref 4–11)
WBC # BLD: 5.9 K/UL (ref 4–11)
WBC # BLD: 6 K/UL (ref 4–11)
WBC # BLD: 6.5 K/UL (ref 4–11)
WBC # BLD: 7.1 K/UL (ref 4–11)
WBC # BLD: 7.3 K/UL (ref 4–11)
WBC # BLD: 7.7 K/UL (ref 4–11)
WBC # BLD: 8.1 K/UL (ref 4–11)
WBC UA: 1 /HPF (ref 0–5)
WBC UA: 3 /HPF (ref 0–5)
WBC UA: ABNORMAL /HPF (ref 0–5)

## 2020-01-01 PROCEDURE — 29580 STRAPPING UNNA BOOT: CPT

## 2020-01-01 PROCEDURE — 2580000003 HC RX 258: Performed by: EMERGENCY MEDICINE

## 2020-01-01 PROCEDURE — 6360000002 HC RX W HCPCS: Performed by: INTERNAL MEDICINE

## 2020-01-01 PROCEDURE — 97530 THERAPEUTIC ACTIVITIES: CPT

## 2020-01-01 PROCEDURE — 84484 ASSAY OF TROPONIN QUANT: CPT

## 2020-01-01 PROCEDURE — 82550 ASSAY OF CK (CPK): CPT

## 2020-01-01 PROCEDURE — 83540 ASSAY OF IRON: CPT

## 2020-01-01 PROCEDURE — 99212 OFFICE O/P EST SF 10 MIN: CPT | Performed by: INTERNAL MEDICINE

## 2020-01-01 PROCEDURE — 85610 PROTHROMBIN TIME: CPT

## 2020-01-01 PROCEDURE — 97597 DBRDMT OPN WND 1ST 20 CM/<: CPT

## 2020-01-01 PROCEDURE — 2060000000 HC ICU INTERMEDIATE R&B

## 2020-01-01 PROCEDURE — 99214 OFFICE O/P EST MOD 30 MIN: CPT | Performed by: INTERNAL MEDICINE

## 2020-01-01 PROCEDURE — 2500000003 HC RX 250 WO HCPCS: Performed by: EMERGENCY MEDICINE

## 2020-01-01 PROCEDURE — 6370000000 HC RX 637 (ALT 250 FOR IP): Performed by: INTERNAL MEDICINE

## 2020-01-01 PROCEDURE — 1036F TOBACCO NON-USER: CPT | Performed by: NURSE PRACTITIONER

## 2020-01-01 PROCEDURE — 2580000003 HC RX 258: Performed by: INTERNAL MEDICINE

## 2020-01-01 PROCEDURE — 71046 X-RAY EXAM CHEST 2 VIEWS: CPT

## 2020-01-01 PROCEDURE — 36415 COLL VENOUS BLD VENIPUNCTURE: CPT

## 2020-01-01 PROCEDURE — 85025 COMPLETE CBC W/AUTO DIFF WBC: CPT

## 2020-01-01 PROCEDURE — 1123F ACP DISCUSS/DSCN MKR DOCD: CPT | Performed by: SURGERY

## 2020-01-01 PROCEDURE — 80048 BASIC METABOLIC PNL TOTAL CA: CPT

## 2020-01-01 PROCEDURE — 99233 SBSQ HOSP IP/OBS HIGH 50: CPT | Performed by: NURSE PRACTITIONER

## 2020-01-01 PROCEDURE — 99232 SBSQ HOSP IP/OBS MODERATE 35: CPT | Performed by: NURSE PRACTITIONER

## 2020-01-01 PROCEDURE — 83880 ASSAY OF NATRIURETIC PEPTIDE: CPT

## 2020-01-01 PROCEDURE — G8427 DOCREV CUR MEDS BY ELIG CLIN: HCPCS | Performed by: NURSE PRACTITIONER

## 2020-01-01 PROCEDURE — G8427 DOCREV CUR MEDS BY ELIG CLIN: HCPCS | Performed by: INTERNAL MEDICINE

## 2020-01-01 PROCEDURE — 97161 PT EVAL LOW COMPLEX 20 MIN: CPT

## 2020-01-01 PROCEDURE — 1123F ACP DISCUSS/DSCN MKR DOCD: CPT | Performed by: INTERNAL MEDICINE

## 2020-01-01 PROCEDURE — 99212 OFFICE O/P EST SF 10 MIN: CPT

## 2020-01-01 PROCEDURE — 11042 DBRDMT SUBQ TIS 1ST 20SQCM/<: CPT

## 2020-01-01 PROCEDURE — 6370000000 HC RX 637 (ALT 250 FOR IP): Performed by: HOSPITALIST

## 2020-01-01 PROCEDURE — 6370000000 HC RX 637 (ALT 250 FOR IP): Performed by: NURSE PRACTITIONER

## 2020-01-01 PROCEDURE — 84443 ASSAY THYROID STIM HORMONE: CPT

## 2020-01-01 PROCEDURE — 93306 TTE W/DOPPLER COMPLETE: CPT | Performed by: INTERNAL MEDICINE

## 2020-01-01 PROCEDURE — 1036F TOBACCO NON-USER: CPT | Performed by: INTERNAL MEDICINE

## 2020-01-01 PROCEDURE — 4040F PNEUMOC VAC/ADMIN/RCVD: CPT | Performed by: FAMILY MEDICINE

## 2020-01-01 PROCEDURE — 11042 DBRDMT SUBQ TIS 1ST 20SQCM/<: CPT | Performed by: INTERNAL MEDICINE

## 2020-01-01 PROCEDURE — G8427 DOCREV CUR MEDS BY ELIG CLIN: HCPCS | Performed by: FAMILY MEDICINE

## 2020-01-01 PROCEDURE — 99213 OFFICE O/P EST LOW 20 MIN: CPT | Performed by: SURGERY

## 2020-01-01 PROCEDURE — 4004F PT TOBACCO SCREEN RCVD TLK: CPT | Performed by: INTERNAL MEDICINE

## 2020-01-01 PROCEDURE — 93970 EXTREMITY STUDY: CPT

## 2020-01-01 PROCEDURE — 6370000000 HC RX 637 (ALT 250 FOR IP): Performed by: EMERGENCY MEDICINE

## 2020-01-01 PROCEDURE — 99214 OFFICE O/P EST MOD 30 MIN: CPT | Performed by: SURGERY

## 2020-01-01 PROCEDURE — G8420 CALC BMI NORM PARAMETERS: HCPCS | Performed by: INTERNAL MEDICINE

## 2020-01-01 PROCEDURE — 99214 OFFICE O/P EST MOD 30 MIN: CPT | Performed by: NURSE PRACTITIONER

## 2020-01-01 PROCEDURE — 11045 DBRDMT SUBQ TISS EACH ADDL: CPT | Performed by: SURGERY

## 2020-01-01 PROCEDURE — 4040F PNEUMOC VAC/ADMIN/RCVD: CPT | Performed by: INTERNAL MEDICINE

## 2020-01-01 PROCEDURE — 86038 ANTINUCLEAR ANTIBODIES: CPT

## 2020-01-01 PROCEDURE — G8510 SCR DEP NEG, NO PLAN REQD: HCPCS | Performed by: FAMILY MEDICINE

## 2020-01-01 PROCEDURE — 99232 SBSQ HOSP IP/OBS MODERATE 35: CPT | Performed by: INTERNAL MEDICINE

## 2020-01-01 PROCEDURE — 99223 1ST HOSP IP/OBS HIGH 75: CPT | Performed by: INTERNAL MEDICINE

## 2020-01-01 PROCEDURE — 93005 ELECTROCARDIOGRAM TRACING: CPT | Performed by: EMERGENCY MEDICINE

## 2020-01-01 PROCEDURE — G8427 DOCREV CUR MEDS BY ELIG CLIN: HCPCS | Performed by: SURGERY

## 2020-01-01 PROCEDURE — 83550 IRON BINDING TEST: CPT

## 2020-01-01 PROCEDURE — 11045 DBRDMT SUBQ TISS EACH ADDL: CPT

## 2020-01-01 PROCEDURE — 85027 COMPLETE CBC AUTOMATED: CPT

## 2020-01-01 PROCEDURE — 11042 DBRDMT SUBQ TIS 1ST 20SQCM/<: CPT | Performed by: SURGERY

## 2020-01-01 PROCEDURE — 4004F PT TOBACCO SCREEN RCVD TLK: CPT | Performed by: SURGERY

## 2020-01-01 PROCEDURE — G8420 CALC BMI NORM PARAMETERS: HCPCS | Performed by: NURSE PRACTITIONER

## 2020-01-01 PROCEDURE — 99211 OFF/OP EST MAY X REQ PHY/QHP: CPT

## 2020-01-01 PROCEDURE — 99285 EMERGENCY DEPT VISIT HI MDM: CPT

## 2020-01-01 PROCEDURE — 83970 ASSAY OF PARATHORMONE: CPT

## 2020-01-01 PROCEDURE — 99213 OFFICE O/P EST LOW 20 MIN: CPT

## 2020-01-01 PROCEDURE — 82570 ASSAY OF URINE CREATININE: CPT

## 2020-01-01 PROCEDURE — 1111F DSCHRG MED/CURRENT MED MERGE: CPT | Performed by: NURSE PRACTITIONER

## 2020-01-01 PROCEDURE — 4040F PNEUMOC VAC/ADMIN/RCVD: CPT | Performed by: NURSE PRACTITIONER

## 2020-01-01 PROCEDURE — G8420 CALC BMI NORM PARAMETERS: HCPCS | Performed by: FAMILY MEDICINE

## 2020-01-01 PROCEDURE — 99496 TRANSJ CARE MGMT HIGH F2F 7D: CPT | Performed by: NURSE PRACTITIONER

## 2020-01-01 PROCEDURE — 97116 GAIT TRAINING THERAPY: CPT

## 2020-01-01 PROCEDURE — G8482 FLU IMMUNIZE ORDER/ADMIN: HCPCS | Performed by: FAMILY MEDICINE

## 2020-01-01 PROCEDURE — 82728 ASSAY OF FERRITIN: CPT

## 2020-01-01 PROCEDURE — 99203 OFFICE O/P NEW LOW 30 MIN: CPT | Performed by: INTERNAL MEDICINE

## 2020-01-01 PROCEDURE — 1123F ACP DISCUSS/DSCN MKR DOCD: CPT | Performed by: FAMILY MEDICINE

## 2020-01-01 PROCEDURE — G8484 FLU IMMUNIZE NO ADMIN: HCPCS | Performed by: INTERNAL MEDICINE

## 2020-01-01 PROCEDURE — 93010 ELECTROCARDIOGRAM REPORT: CPT | Performed by: INTERNAL MEDICINE

## 2020-01-01 PROCEDURE — 81001 URINALYSIS AUTO W/SCOPE: CPT

## 2020-01-01 PROCEDURE — 80076 HEPATIC FUNCTION PANEL: CPT

## 2020-01-01 PROCEDURE — 86160 COMPLEMENT ANTIGEN: CPT

## 2020-01-01 PROCEDURE — 6360000002 HC RX W HCPCS: Performed by: NURSE PRACTITIONER

## 2020-01-01 PROCEDURE — 99214 OFFICE O/P EST MOD 30 MIN: CPT | Performed by: FAMILY MEDICINE

## 2020-01-01 PROCEDURE — 97165 OT EVAL LOW COMPLEX 30 MIN: CPT

## 2020-01-01 PROCEDURE — 86255 FLUORESCENT ANTIBODY SCREEN: CPT

## 2020-01-01 PROCEDURE — 96374 THER/PROPH/DIAG INJ IV PUSH: CPT

## 2020-01-01 PROCEDURE — 2580000003 HC RX 258

## 2020-01-01 PROCEDURE — 6370000000 HC RX 637 (ALT 250 FOR IP): Performed by: PODIATRIST

## 2020-01-01 PROCEDURE — 2500000003 HC RX 250 WO HCPCS: Performed by: NURSE PRACTITIONER

## 2020-01-01 PROCEDURE — 84156 ASSAY OF PROTEIN URINE: CPT

## 2020-01-01 PROCEDURE — 1036F TOBACCO NON-USER: CPT | Performed by: FAMILY MEDICINE

## 2020-01-01 PROCEDURE — G8484 FLU IMMUNIZE NO ADMIN: HCPCS | Performed by: SURGERY

## 2020-01-01 PROCEDURE — 99233 SBSQ HOSP IP/OBS HIGH 50: CPT | Performed by: INTERNAL MEDICINE

## 2020-01-01 PROCEDURE — 1111F DSCHRG MED/CURRENT MED MERGE: CPT | Performed by: FAMILY MEDICINE

## 2020-01-01 PROCEDURE — 99213 OFFICE O/P EST LOW 20 MIN: CPT | Performed by: FAMILY MEDICINE

## 2020-01-01 PROCEDURE — 1123F ACP DISCUSS/DSCN MKR DOCD: CPT | Performed by: NURSE PRACTITIONER

## 2020-01-01 PROCEDURE — 80069 RENAL FUNCTION PANEL: CPT

## 2020-01-01 PROCEDURE — 87040 BLOOD CULTURE FOR BACTERIA: CPT

## 2020-01-01 PROCEDURE — 1111F DSCHRG MED/CURRENT MED MERGE: CPT | Performed by: INTERNAL MEDICINE

## 2020-01-01 PROCEDURE — 99212 OFFICE O/P EST SF 10 MIN: CPT | Performed by: SURGERY

## 2020-01-01 PROCEDURE — G8420 CALC BMI NORM PARAMETERS: HCPCS | Performed by: SURGERY

## 2020-01-01 PROCEDURE — 99215 OFFICE O/P EST HI 40 MIN: CPT | Performed by: NURSE PRACTITIONER

## 2020-01-01 PROCEDURE — 4040F PNEUMOC VAC/ADMIN/RCVD: CPT | Performed by: SURGERY

## 2020-01-01 PROCEDURE — 71045 X-RAY EXAM CHEST 1 VIEW: CPT

## 2020-01-01 PROCEDURE — 82306 VITAMIN D 25 HYDROXY: CPT

## 2020-01-01 PROCEDURE — 97110 THERAPEUTIC EXERCISES: CPT

## 2020-01-01 RX ORDER — BETAMETHASONE DIPROPIONATE 0.05 %
OINTMENT (GRAM) TOPICAL ONCE
Status: CANCELLED | OUTPATIENT
Start: 2020-01-01

## 2020-01-01 RX ORDER — LIDOCAINE HYDROCHLORIDE 20 MG/ML
JELLY TOPICAL ONCE
Status: CANCELLED | OUTPATIENT
Start: 2020-01-01

## 2020-01-01 RX ORDER — LIDOCAINE HYDROCHLORIDE 40 MG/ML
SOLUTION TOPICAL ONCE
Status: CANCELLED | OUTPATIENT
Start: 2020-01-01 | End: 2020-01-01

## 2020-01-01 RX ORDER — LIDOCAINE 40 MG/G
CREAM TOPICAL ONCE
Status: CANCELLED | OUTPATIENT
Start: 2020-01-01

## 2020-01-01 RX ORDER — GENTAMICIN SULFATE 1 MG/G
OINTMENT TOPICAL ONCE
Status: CANCELLED | OUTPATIENT
Start: 2020-01-01

## 2020-01-01 RX ORDER — LIDOCAINE 50 MG/G
OINTMENT TOPICAL ONCE
Status: CANCELLED | OUTPATIENT
Start: 2020-01-01

## 2020-01-01 RX ORDER — BACITRACIN ZINC AND POLYMYXIN B SULFATE 500; 1000 [USP'U]/G; [USP'U]/G
OINTMENT TOPICAL ONCE
Status: CANCELLED | OUTPATIENT
Start: 2020-01-01

## 2020-01-01 RX ORDER — WARFARIN SODIUM 2 MG/1
2 TABLET ORAL
Status: DISCONTINUED | OUTPATIENT
Start: 2020-01-01 | End: 2020-01-01

## 2020-01-01 RX ORDER — LIDOCAINE HYDROCHLORIDE 40 MG/ML
SOLUTION TOPICAL ONCE
Status: DISCONTINUED | OUTPATIENT
Start: 2020-01-01 | End: 2020-01-01 | Stop reason: HOSPADM

## 2020-01-01 RX ORDER — BACITRACIN, NEOMYCIN, POLYMYXIN B 400; 3.5; 5 [USP'U]/G; MG/G; [USP'U]/G
OINTMENT TOPICAL ONCE
Status: CANCELLED | OUTPATIENT
Start: 2020-01-01

## 2020-01-01 RX ORDER — CLOBETASOL PROPIONATE 0.5 MG/G
OINTMENT TOPICAL ONCE
Status: CANCELLED | OUTPATIENT
Start: 2020-01-01

## 2020-01-01 RX ORDER — LIDOCAINE 40 MG/G
CREAM TOPICAL ONCE
Status: DISCONTINUED | OUTPATIENT
Start: 2020-01-01 | End: 2020-01-01 | Stop reason: HOSPADM

## 2020-01-01 RX ORDER — TORSEMIDE 20 MG/1
TABLET ORAL
Qty: 180 TABLET | OUTPATIENT
Start: 2020-01-01

## 2020-01-01 RX ORDER — LIDOCAINE HYDROCHLORIDE 40 MG/ML
SOLUTION TOPICAL ONCE
Status: CANCELLED | OUTPATIENT
Start: 2020-01-01

## 2020-01-01 RX ORDER — DILTIAZEM HYDROCHLORIDE 120 MG/1
CAPSULE, COATED, EXTENDED RELEASE ORAL
Qty: 180 CAPSULE | Refills: 1 | Status: SHIPPED | OUTPATIENT
Start: 2020-01-01 | End: 2020-01-01 | Stop reason: ALTCHOICE

## 2020-01-01 RX ORDER — TORSEMIDE 20 MG/1
40 TABLET ORAL 2 TIMES DAILY
Qty: 120 TABLET | Refills: 3 | Status: SHIPPED | OUTPATIENT
Start: 2020-01-01 | End: 2020-01-01

## 2020-01-01 RX ORDER — WARFARIN SODIUM 2 MG/1
4 TABLET ORAL
Status: DISCONTINUED | OUTPATIENT
Start: 2020-01-01 | End: 2020-01-01

## 2020-01-01 RX ORDER — TORSEMIDE 20 MG/1
TABLET ORAL
Qty: 360 TABLET | Refills: 2 | Status: ON HOLD | OUTPATIENT
Start: 2020-01-01 | End: 2020-01-01 | Stop reason: HOSPADM

## 2020-01-01 RX ORDER — CELECOXIB 200 MG/1
CAPSULE ORAL
Qty: 90 CAPSULE | Refills: 3 | Status: SHIPPED | OUTPATIENT
Start: 2020-01-01 | End: 2020-01-01 | Stop reason: ALTCHOICE

## 2020-01-01 RX ORDER — PROMETHAZINE HYDROCHLORIDE 25 MG/1
12.5 TABLET ORAL EVERY 6 HOURS PRN
Status: DISCONTINUED | OUTPATIENT
Start: 2020-01-01 | End: 2020-01-01 | Stop reason: HOSPADM

## 2020-01-01 RX ORDER — ALLOPURINOL 100 MG/1
TABLET ORAL
Qty: 90 TABLET | Refills: 3 | Status: SHIPPED | OUTPATIENT
Start: 2020-01-01 | End: 2020-01-01 | Stop reason: SDUPTHER

## 2020-01-01 RX ORDER — DILTIAZEM HYDROCHLORIDE 5 MG/ML
10 INJECTION INTRAVENOUS ONCE
Status: COMPLETED | OUTPATIENT
Start: 2020-01-01 | End: 2020-01-01

## 2020-01-01 RX ORDER — POLYETHYLENE GLYCOL 3350 17 G/17G
17 POWDER, FOR SOLUTION ORAL DAILY PRN
Status: DISCONTINUED | OUTPATIENT
Start: 2020-01-01 | End: 2020-01-01 | Stop reason: HOSPADM

## 2020-01-01 RX ORDER — GINSENG 100 MG
CAPSULE ORAL ONCE
Status: CANCELLED | OUTPATIENT
Start: 2020-01-01

## 2020-01-01 RX ORDER — AMIODARONE HYDROCHLORIDE 400 MG/1
400 TABLET ORAL 3 TIMES DAILY
Qty: 90 TABLET | Refills: 1 | Status: SHIPPED | OUTPATIENT
Start: 2020-01-01 | End: 2020-01-01

## 2020-01-01 RX ORDER — WARFARIN SODIUM 4 MG
4 TABLET ORAL DAILY
Qty: 90 TABLET | Refills: 0 | Status: SHIPPED | OUTPATIENT
Start: 2020-01-01 | End: 2020-01-01

## 2020-01-01 RX ORDER — CLOBETASOL PROPIONATE 0.5 MG/G
OINTMENT TOPICAL ONCE
Status: CANCELLED | OUTPATIENT
Start: 2020-01-01 | End: 2020-01-01

## 2020-01-01 RX ORDER — AMIODARONE HYDROCHLORIDE 200 MG/1
400 TABLET ORAL 3 TIMES DAILY
Status: DISCONTINUED | OUTPATIENT
Start: 2020-01-01 | End: 2020-01-01 | Stop reason: HOSPADM

## 2020-01-01 RX ORDER — ATORVASTATIN CALCIUM 40 MG/1
40 TABLET, FILM COATED ORAL DAILY
Qty: 90 TABLET | Refills: 3 | Status: SHIPPED | OUTPATIENT
Start: 2020-01-01

## 2020-01-01 RX ORDER — WARFARIN SODIUM 4 MG/1
1-2 TABLET ORAL SEE ADMIN INSTRUCTIONS
COMMUNITY

## 2020-01-01 RX ORDER — CARVEDILOL 12.5 MG/1
TABLET ORAL
Qty: 180 TABLET | Refills: 1 | Status: SHIPPED | OUTPATIENT
Start: 2020-01-01 | End: 2020-01-01 | Stop reason: SDUPTHER

## 2020-01-01 RX ORDER — FUROSEMIDE 40 MG/1
60 TABLET ORAL 2 TIMES DAILY
Qty: 180 TABLET | Refills: 3 | Status: SHIPPED | OUTPATIENT
Start: 2020-01-01 | End: 2020-01-01 | Stop reason: ALTCHOICE

## 2020-01-01 RX ORDER — LIDOCAINE 40 MG/G
CREAM TOPICAL PRN
Status: DISCONTINUED | OUTPATIENT
Start: 2020-01-01 | End: 2020-01-01 | Stop reason: HOSPADM

## 2020-01-01 RX ORDER — AMIODARONE HYDROCHLORIDE 200 MG/1
200 TABLET ORAL DAILY
Qty: 90 TABLET | Refills: 3 | Status: SHIPPED | OUTPATIENT
Start: 2020-01-01

## 2020-01-01 RX ORDER — BETAMETHASONE DIPROPIONATE 0.5 MG/G
CREAM TOPICAL ONCE
Status: DISCONTINUED | OUTPATIENT
Start: 2020-01-01 | End: 2020-01-01 | Stop reason: HOSPADM

## 2020-01-01 RX ORDER — CARVEDILOL 25 MG/1
25 TABLET ORAL 2 TIMES DAILY
Qty: 180 TABLET | Refills: 1 | Status: SHIPPED | OUTPATIENT
Start: 2020-01-01

## 2020-01-01 RX ORDER — CARVEDILOL 6.25 MG/1
6.25 TABLET ORAL 2 TIMES DAILY WITH MEALS
Status: DISCONTINUED | OUTPATIENT
Start: 2020-01-01 | End: 2020-01-01

## 2020-01-01 RX ORDER — ALLOPURINOL 100 MG/1
TABLET ORAL
Qty: 90 TABLET | Refills: 3 | Status: SHIPPED | OUTPATIENT
Start: 2020-01-01

## 2020-01-01 RX ORDER — BACITRACIN ZINC AND POLYMYXIN B SULFATE 500; 1000 [USP'U]/G; [USP'U]/G
OINTMENT TOPICAL ONCE
Status: CANCELLED | OUTPATIENT
Start: 2020-01-01 | End: 2020-01-01

## 2020-01-01 RX ORDER — GENTAMICIN SULFATE 1 MG/G
OINTMENT TOPICAL ONCE
Status: CANCELLED | OUTPATIENT
Start: 2020-01-01 | End: 2020-01-01

## 2020-01-01 RX ORDER — TORSEMIDE 20 MG/1
20 TABLET ORAL DAILY
Qty: 30 TABLET | Refills: 3 | Status: SHIPPED | OUTPATIENT
Start: 2020-01-01 | End: 2020-01-01

## 2020-01-01 RX ORDER — SODIUM CHLORIDE 0.9 % (FLUSH) 0.9 %
10 SYRINGE (ML) INJECTION PRN
Status: DISCONTINUED | OUTPATIENT
Start: 2020-01-01 | End: 2020-01-01 | Stop reason: HOSPADM

## 2020-01-01 RX ORDER — BACITRACIN ZINC AND POLYMYXIN B SULFATE 500; 1000 [USP'U]/G; [USP'U]/G
OINTMENT TOPICAL ONCE
Status: DISCONTINUED | OUTPATIENT
Start: 2020-01-01 | End: 2020-01-01 | Stop reason: HOSPADM

## 2020-01-01 RX ORDER — FUROSEMIDE 40 MG/1
40 TABLET ORAL 2 TIMES DAILY
Qty: 180 TABLET | Refills: 3 | Status: SHIPPED | OUTPATIENT
Start: 2020-01-01 | End: 2020-01-01

## 2020-01-01 RX ORDER — ATORVASTATIN CALCIUM 40 MG/1
40 TABLET, FILM COATED ORAL DAILY
Status: DISCONTINUED | OUTPATIENT
Start: 2020-01-01 | End: 2020-01-01 | Stop reason: HOSPADM

## 2020-01-01 RX ORDER — LIDOCAINE HYDROCHLORIDE 20 MG/ML
JELLY TOPICAL ONCE
Status: CANCELLED | OUTPATIENT
Start: 2020-01-01 | End: 2020-01-01

## 2020-01-01 RX ORDER — BACITRACIN, NEOMYCIN, POLYMYXIN B 400; 3.5; 5 [USP'U]/G; MG/G; [USP'U]/G
OINTMENT TOPICAL ONCE
Status: CANCELLED | OUTPATIENT
Start: 2020-01-01 | End: 2020-01-01

## 2020-01-01 RX ORDER — ACETAMINOPHEN 325 MG/1
650 TABLET ORAL ONCE
Status: COMPLETED | OUTPATIENT
Start: 2020-01-01 | End: 2020-01-01

## 2020-01-01 RX ORDER — CARVEDILOL 6.25 MG/1
6.25 TABLET ORAL ONCE
Status: COMPLETED | OUTPATIENT
Start: 2020-01-01 | End: 2020-01-01

## 2020-01-01 RX ORDER — AMIODARONE HYDROCHLORIDE 200 MG/1
200 TABLET ORAL 3 TIMES DAILY
Status: DISCONTINUED | OUTPATIENT
Start: 2020-01-01 | End: 2020-01-01

## 2020-01-01 RX ORDER — CARVEDILOL 12.5 MG/1
TABLET ORAL
Qty: 180 TABLET | Refills: 3 | Status: SHIPPED | OUTPATIENT
Start: 2020-01-01 | End: 2020-01-01

## 2020-01-01 RX ORDER — DILTIAZEM HYDROCHLORIDE 120 MG/1
CAPSULE, COATED, EXTENDED RELEASE ORAL
Qty: 20 CAPSULE | Refills: 0 | Status: SHIPPED | OUTPATIENT
Start: 2020-01-01 | End: 2020-01-01

## 2020-01-01 RX ORDER — ALLOPURINOL 300 MG/1
TABLET ORAL
Qty: 90 TABLET | Refills: 3 | Status: SHIPPED | OUTPATIENT
Start: 2020-01-01 | End: 2020-01-01 | Stop reason: DRUGHIGH

## 2020-01-01 RX ORDER — FUROSEMIDE 10 MG/ML
40 INJECTION INTRAMUSCULAR; INTRAVENOUS 2 TIMES DAILY
Status: DISCONTINUED | OUTPATIENT
Start: 2020-01-01 | End: 2020-01-01

## 2020-01-01 RX ORDER — LIDOCAINE 50 MG/G
OINTMENT TOPICAL ONCE
Status: CANCELLED | OUTPATIENT
Start: 2020-01-01 | End: 2020-01-01

## 2020-01-01 RX ORDER — GINSENG 100 MG
CAPSULE ORAL ONCE
Status: CANCELLED | OUTPATIENT
Start: 2020-01-01 | End: 2020-01-01

## 2020-01-01 RX ORDER — LANOLIN ALCOHOL/MO/W.PET/CERES
6 CREAM (GRAM) TOPICAL NIGHTLY PRN
Status: DISCONTINUED | OUTPATIENT
Start: 2020-01-01 | End: 2020-01-01 | Stop reason: HOSPADM

## 2020-01-01 RX ORDER — TORSEMIDE 20 MG/1
20 TABLET ORAL DAILY
Status: DISCONTINUED | OUTPATIENT
Start: 2020-01-01 | End: 2020-01-01 | Stop reason: HOSPADM

## 2020-01-01 RX ORDER — SODIUM CHLORIDE 9 MG/ML
INJECTION, SOLUTION INTRAVENOUS
Status: COMPLETED
Start: 2020-01-01 | End: 2020-01-01

## 2020-01-01 RX ORDER — BETAMETHASONE DIPROPIONATE 0.05 %
OINTMENT (GRAM) TOPICAL ONCE
Status: CANCELLED | OUTPATIENT
Start: 2020-01-01 | End: 2020-01-01

## 2020-01-01 RX ORDER — ATORVASTATIN CALCIUM 40 MG/1
TABLET, FILM COATED ORAL
Qty: 90 TABLET | Refills: 3 | Status: SHIPPED | OUTPATIENT
Start: 2020-01-01 | End: 2020-01-01 | Stop reason: SDUPTHER

## 2020-01-01 RX ORDER — LISINOPRIL 20 MG/1
TABLET ORAL
Qty: 90 TABLET | Refills: 3 | Status: SHIPPED | OUTPATIENT
Start: 2020-01-01 | End: 2020-01-01

## 2020-01-01 RX ORDER — LIDOCAINE 40 MG/G
CREAM TOPICAL ONCE
Status: CANCELLED | OUTPATIENT
Start: 2020-01-01 | End: 2020-01-01

## 2020-01-01 RX ORDER — BETAMETHASONE DIPROPIONATE 0.05 %
OINTMENT (GRAM) TOPICAL ONCE
Status: DISCONTINUED | OUTPATIENT
Start: 2020-01-01 | End: 2020-01-01 | Stop reason: HOSPADM

## 2020-01-01 RX ORDER — FERROUS SULFATE 325(65) MG
325 TABLET ORAL
Status: DISCONTINUED | OUTPATIENT
Start: 2020-01-01 | End: 2020-01-01 | Stop reason: HOSPADM

## 2020-01-01 RX ORDER — WARFARIN SODIUM 4 MG
4 TABLET ORAL DAILY
Qty: 90 TABLET | Refills: 0 | Status: SHIPPED | OUTPATIENT
Start: 2020-01-01 | End: 2020-01-01 | Stop reason: SDUPTHER

## 2020-01-01 RX ORDER — DIGOXIN 0.25 MG/ML
500 INJECTION INTRAMUSCULAR; INTRAVENOUS ONCE
Status: COMPLETED | OUTPATIENT
Start: 2020-01-01 | End: 2020-01-01

## 2020-01-01 RX ORDER — WARFARIN SODIUM 4 MG
TABLET ORAL
Qty: 90 TABLET | Refills: 0 | Status: SHIPPED | OUTPATIENT
Start: 2020-01-01 | End: 2020-01-01 | Stop reason: SDUPTHER

## 2020-01-01 RX ORDER — ASPIRIN 81 MG/1
81 TABLET ORAL DAILY
Status: DISCONTINUED | OUTPATIENT
Start: 2020-01-01 | End: 2020-01-01 | Stop reason: HOSPADM

## 2020-01-01 RX ORDER — GINSENG 100 MG
CAPSULE ORAL ONCE
Status: DISCONTINUED | OUTPATIENT
Start: 2020-01-01 | End: 2020-01-01 | Stop reason: HOSPADM

## 2020-01-01 RX ORDER — AMMONIUM LACTATE 12 G/100G
CREAM TOPICAL 2 TIMES DAILY
Status: DISCONTINUED | OUTPATIENT
Start: 2020-01-01 | End: 2020-01-01 | Stop reason: HOSPADM

## 2020-01-01 RX ORDER — AMIODARONE HYDROCHLORIDE 400 MG/1
TABLET ORAL
Qty: 46 TABLET | Refills: 1 | Status: SHIPPED | OUTPATIENT
Start: 2020-01-01 | End: 2020-01-01

## 2020-01-01 RX ORDER — ONDANSETRON 2 MG/ML
4 INJECTION INTRAMUSCULAR; INTRAVENOUS EVERY 6 HOURS PRN
Status: DISCONTINUED | OUTPATIENT
Start: 2020-01-01 | End: 2020-01-01 | Stop reason: HOSPADM

## 2020-01-01 RX ORDER — TORSEMIDE 20 MG/1
20 TABLET ORAL 2 TIMES DAILY
Qty: 60 TABLET | Refills: 3 | Status: SHIPPED | OUTPATIENT
Start: 2020-01-01 | End: 2020-01-01

## 2020-01-01 RX ORDER — SODIUM CHLORIDE 0.9 % (FLUSH) 0.9 %
10 SYRINGE (ML) INJECTION EVERY 12 HOURS SCHEDULED
Status: DISCONTINUED | OUTPATIENT
Start: 2020-01-01 | End: 2020-01-01 | Stop reason: HOSPADM

## 2020-01-01 RX ORDER — UREA 40 %
CREAM (GRAM) TOPICAL 2 TIMES DAILY
Status: DISCONTINUED | OUTPATIENT
Start: 2020-01-01 | End: 2020-01-01 | Stop reason: RX

## 2020-01-01 RX ORDER — ACETAMINOPHEN 325 MG/1
650 TABLET ORAL EVERY 6 HOURS PRN
Status: DISCONTINUED | OUTPATIENT
Start: 2020-01-01 | End: 2020-01-01 | Stop reason: HOSPADM

## 2020-01-01 RX ORDER — CARVEDILOL 6.25 MG/1
12.5 TABLET ORAL 2 TIMES DAILY WITH MEALS
Status: DISCONTINUED | OUTPATIENT
Start: 2020-01-01 | End: 2020-01-01 | Stop reason: HOSPADM

## 2020-01-01 RX ORDER — WARFARIN SODIUM 2 MG/1
2 TABLET ORAL
Status: COMPLETED | OUTPATIENT
Start: 2020-01-01 | End: 2020-01-01

## 2020-01-01 RX ORDER — ACETAMINOPHEN 650 MG/1
650 SUPPOSITORY RECTAL EVERY 6 HOURS PRN
Status: DISCONTINUED | OUTPATIENT
Start: 2020-01-01 | End: 2020-01-01 | Stop reason: HOSPADM

## 2020-01-01 RX ADMIN — ENOXAPARIN SODIUM 70 MG: 80 INJECTION SUBCUTANEOUS at 09:27

## 2020-01-01 RX ADMIN — WARFARIN SODIUM 6 MG: 1 TABLET ORAL at 20:53

## 2020-01-01 RX ADMIN — FERROUS SULFATE TAB 325 MG (65 MG ELEMENTAL FE) 325 MG: 325 (65 FE) TAB at 09:38

## 2020-01-01 RX ADMIN — Medication 10 ML: at 09:40

## 2020-01-01 RX ADMIN — CARVEDILOL 12.5 MG: 6.25 TABLET, FILM COATED ORAL at 17:01

## 2020-01-01 RX ADMIN — ACETAMINOPHEN 650 MG: 325 TABLET, FILM COATED ORAL at 03:22

## 2020-01-01 RX ADMIN — CARVEDILOL 12.5 MG: 6.25 TABLET, FILM COATED ORAL at 17:55

## 2020-01-01 RX ADMIN — WARFARIN SODIUM 4 MG: 2 TABLET ORAL at 17:55

## 2020-01-01 RX ADMIN — POLYETHYLENE GLYCOL 3350 17 G: 17 POWDER, FOR SOLUTION ORAL at 07:51

## 2020-01-01 RX ADMIN — FUROSEMIDE 40 MG: 10 INJECTION, SOLUTION INTRAMUSCULAR; INTRAVENOUS at 17:54

## 2020-01-01 RX ADMIN — DIGOXIN 500 MCG: 0.25 INJECTION INTRAMUSCULAR; INTRAVENOUS at 09:59

## 2020-01-01 RX ADMIN — Medication 10 ML: at 21:21

## 2020-01-01 RX ADMIN — Medication 10 ML: at 21:01

## 2020-01-01 RX ADMIN — DESMOPRESSIN ACETATE 40 MG: 0.2 TABLET ORAL at 09:39

## 2020-01-01 RX ADMIN — Medication 10 ML: at 09:34

## 2020-01-01 RX ADMIN — TORSEMIDE 20 MG: 20 TABLET ORAL at 09:39

## 2020-01-01 RX ADMIN — WARFARIN SODIUM 2 MG: 2 TABLET ORAL at 17:02

## 2020-01-01 RX ADMIN — MELATONIN TAB 3 MG 6 MG: 3 TAB at 20:56

## 2020-01-01 RX ADMIN — TORSEMIDE 20 MG: 20 TABLET ORAL at 09:31

## 2020-01-01 RX ADMIN — TORSEMIDE 20 MG: 20 TABLET ORAL at 09:34

## 2020-01-01 RX ADMIN — AMIODARONE HYDROCHLORIDE 400 MG: 200 TABLET ORAL at 09:38

## 2020-01-01 RX ADMIN — DILTIAZEM HYDROCHLORIDE 5 MG/HR: 5 INJECTION INTRAVENOUS at 10:54

## 2020-01-01 RX ADMIN — Medication 10 ML: at 20:12

## 2020-01-01 RX ADMIN — AMIODARONE HYDROCHLORIDE 200 MG: 200 TABLET ORAL at 22:12

## 2020-01-01 RX ADMIN — ASPIRIN 81 MG: 81 TABLET, COATED ORAL at 09:39

## 2020-01-01 RX ADMIN — Medication 10 ML: at 07:50

## 2020-01-01 RX ADMIN — MELATONIN TAB 3 MG 6 MG: 3 TAB at 20:42

## 2020-01-01 RX ADMIN — CARVEDILOL 12.5 MG: 6.25 TABLET, FILM COATED ORAL at 09:34

## 2020-01-01 RX ADMIN — CARVEDILOL 12.5 MG: 6.25 TABLET, FILM COATED ORAL at 09:39

## 2020-01-01 RX ADMIN — DESMOPRESSIN ACETATE 40 MG: 0.2 TABLET ORAL at 09:27

## 2020-01-01 RX ADMIN — ASPIRIN 81 MG: 81 TABLET, COATED ORAL at 09:26

## 2020-01-01 RX ADMIN — FERROUS SULFATE TAB 325 MG (65 MG ELEMENTAL FE) 325 MG: 325 (65 FE) TAB at 17:02

## 2020-01-01 RX ADMIN — Medication 10 ML: at 20:56

## 2020-01-01 RX ADMIN — AMIODARONE HYDROCHLORIDE 400 MG: 200 TABLET ORAL at 20:56

## 2020-01-01 RX ADMIN — DILTIAZEM HYDROCHLORIDE 10 MG: 5 INJECTION INTRAVENOUS at 14:49

## 2020-01-01 RX ADMIN — Medication 10 ML: at 11:23

## 2020-01-01 RX ADMIN — FUROSEMIDE 40 MG: 10 INJECTION, SOLUTION INTRAMUSCULAR; INTRAVENOUS at 18:30

## 2020-01-01 RX ADMIN — AMIODARONE HYDROCHLORIDE 200 MG: 200 TABLET ORAL at 16:30

## 2020-01-01 RX ADMIN — AMIODARONE HYDROCHLORIDE 200 MG: 200 TABLET ORAL at 13:18

## 2020-01-01 RX ADMIN — DILTIAZEM HYDROCHLORIDE 5 MG/HR: 5 INJECTION INTRAVENOUS at 17:04

## 2020-01-01 RX ADMIN — DILTIAZEM HYDROCHLORIDE 2.5 MG/HR: 5 INJECTION INTRAVENOUS at 06:37

## 2020-01-01 RX ADMIN — Medication 10 ML: at 13:23

## 2020-01-01 RX ADMIN — AMIODARONE HYDROCHLORIDE 200 MG: 200 TABLET ORAL at 09:27

## 2020-01-01 RX ADMIN — CARVEDILOL 6.25 MG: 6.25 TABLET, FILM COATED ORAL at 11:40

## 2020-01-01 RX ADMIN — ASPIRIN 81 MG: 81 TABLET, COATED ORAL at 09:27

## 2020-01-01 RX ADMIN — AMIODARONE HYDROCHLORIDE 300 MG: 50 INJECTION, SOLUTION INTRAVENOUS at 11:42

## 2020-01-01 RX ADMIN — Medication: at 20:56

## 2020-01-01 RX ADMIN — PROMETHAZINE HYDROCHLORIDE 12.5 MG: 25 TABLET ORAL at 03:22

## 2020-01-01 RX ADMIN — AMIODARONE HYDROCHLORIDE 200 MG: 200 TABLET ORAL at 09:26

## 2020-01-01 RX ADMIN — CARVEDILOL 12.5 MG: 6.25 TABLET, FILM COATED ORAL at 09:26

## 2020-01-01 RX ADMIN — ENOXAPARIN SODIUM 70 MG: 80 INJECTION SUBCUTANEOUS at 13:18

## 2020-01-01 RX ADMIN — Medication: at 14:36

## 2020-01-01 RX ADMIN — Medication 10 ML: at 22:12

## 2020-01-01 RX ADMIN — Medication 10 ML: at 20:42

## 2020-01-01 RX ADMIN — CARVEDILOL 6.25 MG: 6.25 TABLET, FILM COATED ORAL at 11:47

## 2020-01-01 RX ADMIN — CARVEDILOL 6.25 MG: 6.25 TABLET, FILM COATED ORAL at 21:21

## 2020-01-01 RX ADMIN — FUROSEMIDE 40 MG: 10 INJECTION, SOLUTION INTRAMUSCULAR; INTRAVENOUS at 07:50

## 2020-01-01 RX ADMIN — ASPIRIN 81 MG: 81 TABLET, COATED ORAL at 09:31

## 2020-01-01 RX ADMIN — DESMOPRESSIN ACETATE 40 MG: 0.2 TABLET ORAL at 21:21

## 2020-01-01 RX ADMIN — WARFARIN SODIUM 4 MG: 2 TABLET ORAL at 18:20

## 2020-01-01 RX ADMIN — AMIODARONE HYDROCHLORIDE 200 MG: 200 TABLET ORAL at 20:53

## 2020-01-01 RX ADMIN — MELATONIN TAB 3 MG 6 MG: 3 TAB at 22:12

## 2020-01-01 RX ADMIN — AMIODARONE HYDROCHLORIDE 400 MG: 200 TABLET ORAL at 13:11

## 2020-01-01 RX ADMIN — DESMOPRESSIN ACETATE 40 MG: 0.2 TABLET ORAL at 09:31

## 2020-01-01 RX ADMIN — CARVEDILOL 12.5 MG: 6.25 TABLET, FILM COATED ORAL at 20:53

## 2020-01-01 RX ADMIN — ASPIRIN 81 MG: 81 TABLET, COATED ORAL at 09:34

## 2020-01-01 RX ADMIN — DESMOPRESSIN ACETATE 40 MG: 0.2 TABLET ORAL at 09:26

## 2020-01-01 RX ADMIN — Medication 10 ML: at 09:32

## 2020-01-01 RX ADMIN — DILTIAZEM HYDROCHLORIDE 10 MG/HR: 5 INJECTION INTRAVENOUS at 04:37

## 2020-01-01 RX ADMIN — ASPIRIN 81 MG: 81 TABLET, COATED ORAL at 07:50

## 2020-01-01 RX ADMIN — DESMOPRESSIN ACETATE 40 MG: 0.2 TABLET ORAL at 09:34

## 2020-01-01 RX ADMIN — TORSEMIDE 20 MG: 20 TABLET ORAL at 09:27

## 2020-01-01 RX ADMIN — FUROSEMIDE 40 MG: 10 INJECTION, SOLUTION INTRAMUSCULAR; INTRAVENOUS at 11:17

## 2020-01-01 RX ADMIN — ACETAMINOPHEN 650 MG: 325 TABLET, FILM COATED ORAL at 14:49

## 2020-01-01 RX ADMIN — CARVEDILOL 6.25 MG: 6.25 TABLET, FILM COATED ORAL at 09:31

## 2020-01-01 RX ADMIN — SODIUM CHLORIDE 250 ML: 9 INJECTION, SOLUTION INTRAVENOUS at 14:27

## 2020-01-01 RX ADMIN — CARVEDILOL 12.5 MG: 6.25 TABLET, FILM COATED ORAL at 18:20

## 2020-01-01 ASSESSMENT — PAIN DESCRIPTION - PAIN TYPE
TYPE: CHRONIC PAIN
TYPE: ACUTE PAIN
TYPE: CHRONIC PAIN
TYPE: ACUTE PAIN
TYPE: CHRONIC PAIN

## 2020-01-01 ASSESSMENT — ENCOUNTER SYMPTOMS
SHORTNESS OF BREATH: 0
FACIAL SWELLING: 0
WHEEZING: 1
ABDOMINAL DISTENTION: 0
COUGH: 0
FACIAL SWELLING: 0
ABDOMINAL PAIN: 0
SHORTNESS OF BREATH: 1
EYE REDNESS: 0
BLOOD IN STOOL: 0
CONSTIPATION: 0
DIARRHEA: 0
ROS SKIN COMMENTS: BLE
CHEST TIGHTNESS: 0
VOMITING: 0
BLOOD IN STOOL: 0
SHORTNESS OF BREATH: 0
CONSTIPATION: 0
BLOOD IN STOOL: 0
SHORTNESS OF BREATH: 0
EYE DISCHARGE: 0
COUGH: 0
DIARRHEA: 0
NAUSEA: 0
VOMITING: 0
SHORTNESS OF BREATH: 1
FACIAL SWELLING: 0
COUGH: 0
ABDOMINAL DISTENTION: 0
PHOTOPHOBIA: 0
BLOOD IN STOOL: 0
EYE DISCHARGE: 0
PHOTOPHOBIA: 0
PHOTOPHOBIA: 0
CHEST TIGHTNESS: 0
DIARRHEA: 0
VOMITING: 0
ABDOMINAL PAIN: 0
CONSTIPATION: 0
WHEEZING: 1
SHORTNESS OF BREATH: 1
RHINORRHEA: 0
GASTROINTESTINAL NEGATIVE: 1
GASTROINTESTINAL NEGATIVE: 1
ABDOMINAL DISTENTION: 0
SORE THROAT: 0
ABDOMINAL PAIN: 0
GASTROINTESTINAL NEGATIVE: 1
ABDOMINAL PAIN: 0
COUGH: 0
COUGH: 0
EYE DISCHARGE: 0
CHEST TIGHTNESS: 0
SHORTNESS OF BREATH: 1
NAUSEA: 0
VOMITING: 0
EYE REDNESS: 0
NAUSEA: 0
EYE REDNESS: 0
SHORTNESS OF BREATH: 1
GASTROINTESTINAL NEGATIVE: 1
CONSTIPATION: 0
DIARRHEA: 0
NAUSEA: 0

## 2020-01-01 ASSESSMENT — PAIN SCALES - GENERAL
PAINLEVEL_OUTOF10: 0
PAINLEVEL_OUTOF10: 4
PAINLEVEL_OUTOF10: 0
PAINLEVEL_OUTOF10: 4
PAINLEVEL_OUTOF10: 0
PAINLEVEL_OUTOF10: 2
PAINLEVEL_OUTOF10: 3
PAINLEVEL_OUTOF10: 0
PAINLEVEL_OUTOF10: 0
PAINLEVEL_OUTOF10: 4
PAINLEVEL_OUTOF10: 0

## 2020-01-01 ASSESSMENT — PAIN DESCRIPTION - DESCRIPTORS
DESCRIPTORS: BURNING
DESCRIPTORS: NAGGING
DESCRIPTORS: ACHING

## 2020-01-01 ASSESSMENT — PAIN DESCRIPTION - PROGRESSION
CLINICAL_PROGRESSION: GRADUALLY WORSENING
CLINICAL_PROGRESSION: NOT CHANGED

## 2020-01-01 ASSESSMENT — PATIENT HEALTH QUESTIONNAIRE - PHQ9
2. FEELING DOWN, DEPRESSED OR HOPELESS: 0
SUM OF ALL RESPONSES TO PHQ QUESTIONS 1-9: 0
1. LITTLE INTEREST OR PLEASURE IN DOING THINGS: 0
SUM OF ALL RESPONSES TO PHQ QUESTIONS 1-9: 0
SUM OF ALL RESPONSES TO PHQ9 QUESTIONS 1 & 2: 0

## 2020-01-01 ASSESSMENT — PAIN DESCRIPTION - LOCATION
LOCATION: BACK
LOCATION: ANKLE
LOCATION: LEG
LOCATION: ANKLE
LOCATION: LEG

## 2020-01-01 ASSESSMENT — PAIN DESCRIPTION - FREQUENCY
FREQUENCY: CONTINUOUS
FREQUENCY: INTERMITTENT

## 2020-01-01 ASSESSMENT — PAIN SCALES - WONG BAKER
WONGBAKER_NUMERICALRESPONSE: 0

## 2020-01-01 ASSESSMENT — PAIN DESCRIPTION - ORIENTATION
ORIENTATION: LEFT
ORIENTATION: LEFT
ORIENTATION: RIGHT;LEFT
ORIENTATION: LEFT

## 2020-01-01 ASSESSMENT — PAIN DESCRIPTION - ONSET: ONSET: ON-GOING

## 2020-01-08 ENCOUNTER — ANTI-COAG VISIT (OUTPATIENT)
Dept: PHARMACY | Age: 80
End: 2020-01-08
Payer: MEDICARE

## 2020-01-08 LAB — INTERNATIONAL NORMALIZATION RATIO, POC: 2.9

## 2020-01-08 PROCEDURE — 99211 OFF/OP EST MAY X REQ PHY/QHP: CPT

## 2020-01-08 PROCEDURE — 85610 PROTHROMBIN TIME: CPT

## 2020-01-08 NOTE — PROGRESS NOTES
Mr. Nishant Red is a 70 y.o. y/o male with history of Heart Valve Replacement, aortic valve, about 1997 by Adelia Aguillon at [facility]   He presents today for anticoagulation monitoring and adjustment. Pertinent AFib, PMH: HTN, CHF, Chronic Kidney disease(moderate)    Patient Reported Findings:  Yes     No  [x]   []       Patient verifies current dosing regimen as listed- follows AVS  []   [x]       S/S bleeding/bruising/swelling/SOB  []   [x]       Blood in urine or stool- denies, states had blood in urine 2 months ago but it stopped   []   [x]       Procedures scheduled in the future at this time  []   [x]       Missed Dose   []   [x]       Extra Dose  []   [x]       Change in medications pt states that he is no longer taking digoxin---> allopurinol dose dec to 100mg daily 6 weeks ago     []   [x]       Change in health/diet/appetite- Overall his appetite is poor, he states that he \"hasn't been eating much of anything lately\". He states that he continues to drink Ensure every day. Has not been eating much/missed a few ensures. -->missed on Ensure but otherwise the same --->says he has not been eating much greens ---> a few not a lot ---> no changes   []   [x]       Change in alcohol use does not drink  []   [x]       Change in activity  []   [x]       Hospital admission  []   [x]       Emergency department visit  []   [x]       Other complaints    Clinical Outcomes:  Yes     No  []   [x]       Major bleeding event  []   [x]       Thromboembolic event  Patient states that he gets brand Coumadin from mail order. States \"warfarin just doesn't work for [employer] Duration of warfarin Therapy: indefinite  INR Range:  2.5-3.5    INR 2.9 today   Continue taking dose of 2 mg on Mon and 4 mg all other days   Encouraged to maintain a consistency of vegetables/salads. Recheck INR in 6 weeks on 2/19    Referring cardiologist is Dr. Dino Bloch.    INR (no units)   Date Value   01/08/2020 2.9   12/04/2019 2.5   10/28/2019 2.7 09/26/2019 2.9   08/31/2018 4   08/03/2018 3.1   07/06/2018 2.6   06/15/2018 2.5

## 2020-01-17 RX ORDER — DILTIAZEM HYDROCHLORIDE 120 MG/1
CAPSULE, COATED, EXTENDED RELEASE ORAL
Qty: 180 CAPSULE | Refills: 3 | Status: SHIPPED | OUTPATIENT
Start: 2020-01-17 | End: 2020-01-01 | Stop reason: SDUPTHER

## 2020-01-27 NOTE — TELEPHONE ENCOUNTER
Advised to contact Dr. Janes Trinidad for he has been the provider prescribing patient's coumadin. Claudia Brown will call cardiology.

## 2020-01-29 NOTE — TELEPHONE ENCOUNTER
Wife called stating Samantha Sires did receive the Rx but they cannot fill it until 2/11/20. She believes he will need more than a 10 day supply - he will still run out before meds are delivered.     Please advise

## 2020-01-29 NOTE — TELEPHONE ENCOUNTER
diltiazem (CARDIZEM CD) 120 MG extended release capsule 180 capsule 3 1/17/2020     Sig: TAKE 1 CAPSULE TWICE DAILY.       Pharmacy:   [address], [address] [phone] - f [phone]    Patient would like to know if he can get like a 10 day supply since he has only on left sent to a local pharmacy    [facility]     Provider out of the office

## 2020-02-10 NOTE — TELEPHONE ENCOUNTER
Pt was treated and discharged from the [facility] ER today. Pt is SOB and has low 02. Pt feels like he is having CHF symptoms, trouble laying flay and fluid build up. Pt's daughter thinks that he should have been admitted. Please call the pt to advise.

## 2020-02-10 NOTE — PROGRESS NOTES
Pt is here with   Chief Complaint   Patient presents with    Shortness of Breath    for  2-3 weeks. Pt is complaining of:    Cough: No  Sputum: No,   Nasal Congestion: Yes  Nasal Discharge: Yes, Color: clear  Ear Pain: No  Sore Throat: No  Chest Pain/Tightness: Yes  SOB: Yes - states if walks too fast or when lies down at night. States will feel like smothering but sits up and feels better and then able to lie back down and go to sleep. States no change in weight (weighs almost daily, usually within 1-2 lbs) and no swelling in legs  Wheezing: Yes  Fever: No  Headache/sinus pressure: No  Fatigue: No  Muscle aches: No    Symptoms are show no change. Has tried nothing. Social History     Tobacco Use   Smoking Status Former Smoker    Packs/day: 1.00    Years: 25.00    Pack years: 25.00    Types: Cigarettes    Last attempt to quit: 1993    Years since quittin.1   Smokeless Tobacco Former User    Types: Langston Felty Quit date: 1982   Tobacco Comment    quit 25 yrs ago       Allergies   Allergen Reactions    Ceclor [Cefaclor] Other (See Comments)     CNS side effects, slurred speech. Lips and tounge swell. Vitals:    02/10/20 1216   BP: (!) 140/82   Site: Left Upper Arm   Position: Sitting   Cuff Size: Medium Adult   Pulse: 84   Temp: 97 °F (36.1 °C)   TempSrc: Tympanic   SpO2: 94%   Weight: 155 lb (70.3 kg)     Wt Readings from Last 3 Encounters:   02/10/20 155 lb (70.3 kg)   19 154 lb (69.9 kg)   10/25/19 155 lb (70.3 kg)     Body mass index is 23.57 kg/m². Alert and oriented x 4 NAD, affect appropriate and normal appearing weight, well hydrated, well developed.   Left TM blocked by wax, canal wax and pinna nl  Right TM blocked by wax, canal wax and pinna nl  No nodes neck  Nares red and congested, no drainage  OP mild erythema, no exudate, no swelling  Lung clear with good air movement and effort  CV irreg irreg with 3/6 sys murmur  No edema in legs      ASSESSMENT AND PLAN:

## 2020-02-11 NOTE — TELEPHONE ENCOUNTER
----- Message from Esther Rubin MD sent at 2/10/2020  6:10 PM EST -----  No signs of CHF but kidneys looking little worse  Decrease lasix to 1 tablet daily  Continue to weigh daily  Increase fluids  Referral to renal  Recheck BMP 1 week

## 2020-02-24 PROBLEM — N17.9 AKI (ACUTE KIDNEY INJURY) (HCC): Status: ACTIVE | Noted: 2020-01-01

## 2020-02-25 NOTE — TELEPHONE ENCOUNTER
Wife called to reschedule patient's appt to next week. Patient was due in on 2/19 and rescheduled for 2/25. She now wants to reschedule to 3/3. States patient has been ill with nausea, vomiting and diarrhea. Explained the importance we see patient asap. She rescheduled patient for 1:15p then called back and rescheduled him for 2/26 @ 11:15a.

## 2020-02-26 NOTE — PROGRESS NOTES
Mr. Ryan Roach is a 70 y.o. y/o male with history of Heart Valve Replacement, aortic valve, about 1997 by Lavera Najjar at [facility]   He presents today for anticoagulation monitoring and adjustment. Pertinent AFib, PMH: HTN, CHF, Chronic Kidney disease(moderate)    Patient Reported Findings:  Yes     No  [x]   []       Patient verifies current dosing regimen as listed- follows AVS calendar  []   [x]       S/S bleeding/bruising/swelling/SOB  []   [x]       Blood in urine or stool- denies, states had blood in urine 2 months ago but it stopped   []   [x]       Procedures scheduled in the future at this time  []   [x]       Missed Dose   []   [x]       Extra Dose  []   [x]       Change in medications pt states that he is no longer taking digoxin---> allopurinol dose dec to 100mg daily     []   [x]       Change in health/diet/appetite- Overall his appetite is poor, he states that he \"hasn't been eating much of anything lately\". He states that he continues to drink Ensure every day. Has not been eating much/missed a few ensures. -->missed on Ensure but otherwise the same --->says he has not been eating much greens ---> patient reports N/V/D for about 3 days last week. Symptoms now resolved. Did not go to doctor, no abx or new medications  []   [x]       Change in alcohol use does not drink  []   [x]       Change in activity  []   [x]       Hospital admission  []   [x]       Emergency department visit  []   [x]       Other complaints    Clinical Outcomes:  Yes     No  []   [x]       Major bleeding event  []   [x]       Thromboembolic event    Patient states that he gets brand Coumadin from mail order. States \"warfarin just doesn't work for [employer] Duration of warfarin Therapy: indefinite  INR Range:  2.5-3.5    INR 2.9 today   Continue taking dose of 2 mg on Mon and 4 mg all other days   Encouraged to maintain a consistency of vegetables/salads.   Recheck INR in 6 weeks on 4/8    Referring cardiologist is  Leora.   INR (no units)   Date Value   02/26/2020 2.9   01/08/2020 2.9   12/04/2019 2.5   10/28/2019 2.7   08/31/2018 4   08/03/2018 3.1   07/06/2018 2.6   06/15/2018 2.5

## 2020-06-01 NOTE — TELEPHONE ENCOUNTER
Called patient and spoke with wife. She is going to have patient call clinic back when he is awake regarding missed INR check.

## 2020-06-08 NOTE — PROGRESS NOTES
INR in 6 weeks on 7/20    Referring cardiologist is Dr. Toyin Liang.    INR (no units)   Date Value   06/08/2020 3.30   02/26/2020 2.9   01/08/2020 2.9   12/04/2019 2.5   10/28/2019 2.7   08/31/2018 4   08/03/2018 3.1   07/06/2018 2.6       CLINICAL PHARMACY CONSULT: MED RECONCILIATION/REVIEW ADDENDUM    For Pharmacy Admin Tracking Only    PHSO: Yes  Total # of Interventions Recommended: 0  - Maintenance Safety Lab Monitoring #: 1  Total Interventions Accepted: 0  Time Spent (min): Via Velma Evans, PharmD

## 2020-06-30 PROBLEM — Z95.2 S/P AVR (AORTIC VALVE REPLACEMENT): Status: ACTIVE | Noted: 2020-01-01

## 2020-06-30 NOTE — PROGRESS NOTES
[address]   Cardiac Follow Up     Referring Provider:  Hannah Valenzuela MD     Chief Complaint   Patient presents with    Follow-up     Echo    Coronary Artery Disease    Atrial Fibrillation      History of Present Illness:   Mr. Lynn Rodriguez is seen today for a follow up visit and an ECHO; he has a history of AVR ([place]), Atrial Fibrillation, hypertension, and hyperlipidemia. Today he states over the last month he has been  feeling more sob and like fluid on his lungs, has felt some improvement in the last few weeks since lasix was increased. He appears fluid overloaded on exam today. He had an Echo today, results reviewed and discussed. His daughter is with him for the visit. Reports pain in his BLE, pain with walking. Past Medical History:   has a past medical history of Arrhythmia, Hyperlipidemia, Hypertension, and Valvular disease. Surgical History:   has a past surgical history that includes Aortic valve replacement and Carpal tunnel release (Left). Social History:   reports that he quit smoking about 27 years ago. His smoking use included cigarettes. He has a 25.00 pack-year smoking history. He quit smokeless tobacco use about 38 years ago. His smokeless tobacco use included chew. He reports that he does not drink alcohol or use drugs. Family History:  family history includes Heart Disease in his paternal grandfather; Hypertension in an other family member.      Current Outpatient Medications   Medication Sig Dispense Refill    COUMADIN 4 MG tablet TAKE 1 TABLET DAILY 90 tablet 0    celecoxib (CELEBREX) 200 MG capsule TAKE 1 CAPSULE DAILY 90 capsule 3    allopurinol (ZYLOPRIM) 100 MG tablet TAKE 1 TABLET BY MOUTH DAILY 90 tablet 3    diltiazem (CARDIZEM CD) 120 MG extended release capsule TAKE 1 CAPSULE BY MOUTH TWICE DAILY 180 capsule 1    atorvastatin (LIPITOR) 40 MG tablet TAKE 1 TABLET DAILY 90 tablet 3    lisinopril (PRINIVIL;ZESTRIL) 20 MG tablet TAKE 1 TABLET DAILY 90 tablet 3    Nutritional Supplements (ENSURE PLUS PO) Take 1 Can by mouth daily      carvedilol (COREG) 6.25 MG tablet TAKE 1 TABLET TWICE DAILY  WITH MEALS 180 tablet 3    furosemide (LASIX) 40 MG tablet TAKE 1 AND 1/2 TABLETS     DAILY 135 tablet 3    triamcinolone (KENALOG) 0.1 % cream APPLY TOPICALLY TWICE DAILY 240 g 3    aspirin 81 MG tablet Take 81 mg by mouth daily      Multiple Vitamins-Minerals (THERAPEUTIC MULTIVITAMIN-MINERALS) tablet Take 1 tablet by mouth daily       No current facility-administered medications for this visit. Allergies:  Ceclor [cefaclor]     Review of Systems:   · Constitutional: there has been no unanticipated weight loss. There's been no change in energy level, sleep pattern, or activity level. · Eyes: No visual changes or diplopia. No scleral icterus. · ENT: No Headaches, hearing loss or vertigo. No mouth sores or sore throat. · Cardiovascular: Reviewed in HPI  · Respiratory: No cough or wheezing, no sputum production. No hematemesis. · Gastrointestinal: No abdominal pain, appetite loss, blood in stools. No change in bowel or bladder habits. · Genitourinary: No dysuria, trouble voiding, or hematuria. · Musculoskeletal:  No gait disturbance, weakness or joint complaints. + leg pain   · Integumentary: No rash or pruritis. · Neurological: No headache, diplopia, change in muscle strength, numbness or tingling. No change in gait, balance, coordination, mood, affect, memory, mentation, behavior. · Psychiatric: No anxiety, no depression. · Endocrine: No malaise, fatigue or temperature intolerance. No excessive thirst, fluid intake, or urination. No tremor. · Hematologic/Lymphatic: No abnormal bruising or bleeding, blood clots or swollen lymph nodes. · Allergic/Immunologic: No nasal congestion or hives.     Physical Examination:    Vitals:    06/30/20 1309   BP: 124/62   Pulse: 86   Resp: 16   Temp: 97.9 °F (36.6 °C)   SpO2: 98%        Constitutional and General Appearance: appears chronically ill  Respiratory:  · Normal excursion and expansion without use of accessory muscles  · Resp Auscultation: Normal breath sounds without dullness  Cardiovascular:  · The apical impulses not displaced  · Normal prosthetic heart sounds, irregular heart rhythm. No murmur  · Cervical veins are not engorged  · The carotid upstroke is normal in amplitude and contour without delay or bruit  · Peripheral pulses are symmetrical and full  · There is no clubbing, cyanosis of the extremities. · BLE edema   · Femoral Arteries: 2+ and equal  · Pedal Pulses: 2+ and equal   Abdomen:  · No masses or tenderness  · Liver/Spleen: No Abnormalities Noted  Neurological/Psychiatric:  · Alert and oriented in all spheres  · Moves all extremities well  · Exhibits normal gait balance and coordination  · No abnormalities of mood, affect, memory, mentation, or behavior are noted    Assessment/Plan:    1. Heart valve replaced by other means/[place] AVR:   ECHO 2017>  well-seated prosthetic valve   Echo today- results reviewed and discussed with the patient. 2. Atrial fibrillation: HR regular & controlled. On Coumadin with INRs managed through [facility]   3. Essential hypertension, benign: stable Blood pressure 124/62, pulse 86, temperature 97.9 °F (36.6 °C), resp. rate 16, height 5' 8\" (1.727 m), weight 159 lb (72.1 kg), SpO2 98 %. 4. Hyperlipidemia: On Lipitor 40 mg daily     5. Chronic combined systolic and diastolic CHF (congestive heart failure) (HCC)  Fluid overloaded on exam today  Increase  Furosemide (lasix)  to 40mg 2x daily   Increase carvedilol (COREG) to  12.5 MG tablet  2x daily   Stop diltiazem (CARDIZEM CD) 120 MG extended release capsule  Return for regular follow up in 2-3 weeks with the NP- consider starting Entresto at that time.           PLAN: Echo 6/30/20 results reviewed and discussed with the patient   Increase  Furosemide (lasix)  to 40mg 2x daily   Increase carvedilol (COREG) to  12.5 MG tablet  2x daily   Stop diltiazem (CARDIZEM CD) 120 MG extended release capsule  Arterial Dopplers - pt complaining of pain in legs    Return for regular follow up in 2-3 weeks with the NP- consider starting Entresto at that time. I appreciate the opportunity of cooperating in the care of this individual.    Libra Evans. Tosha Masters M.D., [facility] [place]    Patient's problem list, medications, allergies, past medical, surgical, social and family histories were reviewed and updated as appropriate. Scribe's attestation: This note was scribed in the presence of Dr. Tosha Masters MD, by Ken Winter RN. The scribe's documentation has been prepared under my direction and personally reviewed by me in its entirety. I confirm that the note above accurately reflects all work, treatment, procedures, and medical decision making performed by me.

## 2020-06-30 NOTE — PATIENT INSTRUCTIONS
Increase  Furosemide (lasix)  to 40mg 2x daily   Increase carvedilol (COREG) to  12.5 MG tablet  2x daily   Stop diltiazem (CARDIZEM CD) 120 MG extended release capsule    Return in 2-3 weeks for follow up

## 2020-07-15 PROBLEM — R06.02 SOB (SHORTNESS OF BREATH): Status: ACTIVE | Noted: 2020-01-01

## 2020-07-15 PROBLEM — R60.0 LOCALIZED EDEMA: Status: ACTIVE | Noted: 2020-01-01

## 2020-07-15 PROBLEM — I42.0 DILATED CARDIOMYOPATHY (HCC): Status: ACTIVE | Noted: 2020-01-01

## 2020-07-15 PROBLEM — I50.41 ACUTE COMBINED SYSTOLIC AND DIASTOLIC CONGESTIVE HEART FAILURE (HCC): Status: ACTIVE | Noted: 2020-01-01

## 2020-07-15 NOTE — PROGRESS NOTES
[facility]   Congestive Heart Failure    PrimaryCare Doctor:  Samreen Cai MD  Primary Cardiologist: Reagan Davila  Last/Next OV: 6/30/20    Chief Complaint:  SOB    History of Present Illness:  Isaiah Brunner is a 70 y.o. male with PMH AVR, AF, HTN, HLD who presents today for CHF f/u. At the last OV 2 weeks ago, he c/o increased SOB, had echo that showed drop in EF and lasix increased, coreg increased, cardizem stopped. Art doppler ordered for leg pain  Today he/ c/o continued dyspnea, orthopnea, PND, worsening fatigue, and edema. His wt is up 6more pounds from last OV. He denies chest pain, exertional chest pressure/discomfort, early saiety, syncope. 156lb home scale yesterday    ER Visit: No  Recent Hospitalization: No    Baseline Weight: 152 per pt    Wt Readings from Last 3 Encounters:   06/30/20 159 lb (72.1 kg)   06/11/20 161 lb 6.4 oz (73.2 kg)   02/24/20 154 lb (69.9 kg)          EF: 30%  Cardiac Imaging: Echo 6/30/20   Summary   Normal left ventricular size. Concentric left ventricular hypertrophy. Decreased left ventricular systolic function with inferoseptal hypokinesis. Estimated EF 30%. E/e'=28. Indeterminate diastolic function. A-fib. Mild thickening of anterior leaflet of mitral valve that appears to prolapse   mildly. The maximum mitral valve pressure gradient is 12 mmHg and the mean pressure   gradient is 5 mmHg. Moderately severe mitral regurgitation with posteriorly directed jet. The left atrium is dilated. A mechanical aortic valve appears well seated with a maximum gradient of 35   mmHg and a mean gradient of 22 mmHg. Trivial aortic regurgitation. The ascending aorta is mildly dilated. 4.0cm. Normal right ventricular size and mildly decreased in function. Moderate tricuspid regurgitation directed anteriorly. PASP 46mmHg. The right atrium is dilated. Trivial pulmonic regurgitation.    IVC size is normal (<2.1 cm) but collapses < 50% with respiration consistent   with elevated RA pressure (8 mmHg). Echo 4/23/17  Summary   -Mild left ventricular hypertrophy. -Global ejection fraction is mild-moderately decreased and estimated at   40-45%. -There is abnormal septal motion.   -Moderate biatrial enlargement.   -The mechanical aortic valve appears well seated with a maximum gradient of   25 mmHg and a mean gradient of 16 mmHg. There is trace aortic insufficiency.   -Thickened mitral valve without evidence of stenosis. Mild mitral annular   calcification.   -There is mild prolapse of the anterior mitral leaflet. Mitral regurgitation   is present that may be moderate.   -There is mild tricuspid regurgitation with RVSP estimated at 31 mmHg. Device: No   ICD counseling:No     Activity: below baseline  Can you walk 1-2 blocks or do a moderate amount of house/yard work? No      NYHA Class: III     Sodium Restrictions: 2g  Fluid Restrictions: 48-64 oz/day  Sodium and fluid restriction compliance: educated today, over on both    Pt Education: The patient has received education on the following topics: dietary sodium restriction, heart failure medications, the importance of physical activity, symptom management and weight monitoring   }      Past Medical History:   has a past medical history of Arrhythmia, Hyperlipidemia, Hypertension, and Valvular disease. Surgical History:   has a past surgical history that includes Aortic valve replacement and Carpal tunnel release (Left). Social History:   reports that he quit smoking about 27 years ago. His smoking use included cigarettes. He has a 25.00 pack-year smoking history. He quit smokeless tobacco use about 38 years ago. His smokeless tobacco use included chew. He reports that he does not drink alcohol or use drugs.    Family History:   Family History   Problem Relation Age of Onset    Heart Disease Paternal Grandfather     Hypertension Other     High Cholesterol Neg Hx     High Blood Pressure Neg Hx HomeMedications:  Prior to Admission medications    Medication Sig Start Date End Date Taking? Authorizing Provider   furosemide (LASIX) 40 MG tablet Take 1 tablet by mouth 2 times daily 6/30/20   Vikram Key MD   carvedilol (COREG) 12.5 MG tablet TAKE 1 TABLET TWICE DAILY  WITH MEALS 6/30/20   Vikram Key MD   COUMADIN 4 MG tablet TAKE 1 TABLET DAILY 4/17/20   Vikram Key MD   celecoxib (CELEBREX) 200 MG capsule TAKE 1 CAPSULE DAILY 4/16/20   Lisha Melchor MD   allopurinol (ZYLOPRIM) 100 MG tablet TAKE 1 TABLET BY MOUTH DAILY 3/9/20   Lisha Melchor MD   atorvastatin (LIPITOR) 40 MG tablet TAKE 1 TABLET DAILY 1/29/20   Lisha Melchor MD   lisinopril (PRINIVIL;ZESTRIL) 20 MG tablet TAKE 1 TABLET DAILY 1/29/20   Lisha Melchor MD   Nutritional Supplements (ENSURE PLUS PO) Take 1 Can by mouth daily    Historical Provider, MD   triamcinolone (KENALOG) 0.1 % cream APPLY TOPICALLY TWICE DAILY 8/24/18   Lisha Melchor MD   aspirin 81 MG tablet Take 81 mg by mouth daily    Historical Provider, MD   Multiple Vitamins-Minerals (THERAPEUTIC MULTIVITAMIN-MINERALS) tablet Take 1 tablet by mouth daily    Historical Provider, MD        Allergies:  Ceclor [cefaclor]     ROS:   Review of Systems   Constitutional: Positive for fatigue. Respiratory: Positive for shortness of breath and wheezing. Negative for cough. Cardiovascular: Positive for leg swelling. Negative for chest pain and palpitations. Gastrointestinal: Negative. Genitourinary: Negative. Musculoskeletal: Negative. Skin: Negative. Neurological: Negative. Hematological: Negative. Psychiatric/Behavioral: Negative. Physical Examination:    Vitals:    07/15/20 1532   BP: (!) 110/56   Pulse: 84   SpO2: 96%   Weight: 165 lb (74.8 kg)   Height: 5' 9\" (1.753 m)           Physical Exam  Constitutional:       Appearance: Normal appearance. HENT:      Head: Normocephalic and atraumatic.    Eyes:      Extraocular Movements: Extraocular movements intact. Pupils: Pupils are equal, round, and reactive to light. Neck:      Musculoskeletal: Normal range of motion and neck supple. Cardiovascular:      Rate and Rhythm: Normal rate. Rhythm irregular. Heart sounds: Murmur present. Pulmonary:      Effort: Pulmonary effort is normal.      Breath sounds: Rales present. Abdominal:      Palpations: Abdomen is soft. Musculoskeletal: Normal range of motion. Right lower leg: Edema present. Left lower leg: Edema present. Comments: R>L, 1+   Skin:     General: Skin is warm and dry. Neurological:      General: No focal deficit present. Mental Status: He is alert and oriented to person, place, and time. Mental status is at baseline. Psychiatric:         Mood and Affect: Mood normal.         Behavior: Behavior normal.         Thought Content:  Thought content normal.         Judgment: Judgment normal.         Lab Data:    CBC:   Lab Results   Component Value Date    WBC 9.5 04/26/2017    WBC 11.7 04/25/2017    WBC 19.7 04/23/2017    RBC 3.61 04/26/2017    RBC 3.59 04/25/2017    RBC 3.70 04/23/2017    HGB 11.4 04/26/2017    HGB 11.2 04/25/2017    HGB 11.6 04/23/2017    HCT 34.6 04/26/2017    HCT 34.7 04/25/2017    HCT 35.4 04/23/2017    MCV 95.7 04/26/2017    MCV 96.7 04/25/2017    MCV 95.7 04/23/2017    RDW 15.4 04/26/2017    RDW 15.9 04/25/2017    RDW 15.6 04/23/2017     04/26/2017     04/25/2017     04/23/2017     BMP:  Lab Results   Component Value Date     03/09/2020     02/21/2020     02/10/2020    K 3.5 03/09/2020    K 3.6 02/21/2020    K 4.3 02/10/2020     03/09/2020     02/21/2020     02/10/2020    CO2 36 03/09/2020    CO2 34 02/21/2020    CO2 31 02/10/2020    BUN 28 03/09/2020    BUN 23 02/21/2020    BUN 31 02/10/2020    CREATININE 1.7 02/10/2020    CREATININE 1.16 12/29/2018    CREATININE 1.11 05/29/2018     BNP:   Lab Results   Component Value Date PROBNP 1,350 02/10/2020    PROBNP 3,431 04/23/2017     Iron Studies:  No components found for: FE,  TIBC,  FERRITIN  Iron Deficiency Anemia:  No IV Iron Therapy:  No  2017 ACC/AHA HF Guidelines:   intravenous iron replacement in patients with New York Heart Association (NYHA) class II and III HF and iron deficiency (ferritin <100 ng/ml or 100-300 ng/ml if transferrin saturation <20%), to improve functional status and QoL. Assessment/Plan:    1. Acute combined systolic and diastolic congestive heart failure (HCC) - volume overloaded, increase lasix, decrease sodium and fluid intake, labs today   2. Essential hypertension, benign - controlled   3. Atrial fibrillation, unspecified type ([place]) - rate controlled on BB, on AC   4. Dilated cardiomyopathy ([place]) - on BB and ACE, will consider frederick after labs, entresto after diuresis   5. SOB (shortness of breath) - diurse   6. Localized edema diurese     *more than 50 percent of the face-to-face portion of the office visit was spent counseling and coordinating care including:   Diagnostic results, impressions, or recommended diagnostic studies  Prognosis  Risks and benefits of management (treatment) options  Risk factor reduction  Patient and family education. Instructions:   1. Medications: increase lasix to 60mg twice a day, take on an empty stomach first thing in the morning and second dose in the afternoon between lunch and dinner. Call Yeny Denise Friday if no wt loss  2. Labs: today  3. Lifestyle Recommendations: Weigh yourself every day in the morning after urination, call Doylene Denise if wt increases 2-3lb in one day or 5lb in one week, Limit sodium to 2000mg/day and fluids to 2L or 64oz/day. 4. Follow up: 2 weeks with [facility] CHF Resource Line: [phone]  Heart Failure Websites:   www.heart. org  Www.cardiosmart. org    Websites with Low Sodium Recipes: www.mayoclinic.org/healthy-lifestyle/recipes/low-sodium-recipes  www.[employer]/recipes    Smartphone tahir's that can help you keep track of symptoms, weights, and medications:  HF Storylines  HF Path  My HF  CareZone  Point of Care Heart Failure  WOWME  H2O Overload    Nutrition Tahir to track calories, carbohydrates and sodium content of food:   CalorieKing  [employer]encia[employer]itnessPal    I appreciate the opportunity of cooperating in the care of this individual.    Mart Guardado, MAYRA, 7/15/2020,8:33 AM    QUALITY MEASURES  1. Tobacco Cessation Counseling: NA  2. Retake of BP if >140/90:   NA  3. Documentation to PCP/referring for new patient:  Sent to PCP at close of office visit  4. CAD patient on anti-platelet: na  5. CAD patient on STATIN therapy:  na  6. Patient with CHF and aFib on anticoagulation:  Yes   7. Patient Education:Yes   8. BB for LVSD :  Yes   9. ACE/ARB for LVSD:  Yes   10.  Left Ventricular Ejection Fraction (LVEF) Assessment:  Yes

## 2020-07-20 NOTE — TELEPHONE ENCOUNTER
Stop lasix and start torsemide 40mg twice a day with breakfast and lunch. Call Wednesday if no wt loss.  Corby Donald

## 2020-07-20 NOTE — TELEPHONE ENCOUNTER
Daughter LM over the weekend because this patient is not losing weight. He has not gained weight but Jaren Mccullough said she wanted him to lose 2 pounds a day . What should this patient do?

## 2020-07-20 NOTE — TELEPHONE ENCOUNTER
Tried to call the Patient about the message below and his wife told me to call there daughter but when I tried her number it was not in service. Will try to call again later.

## 2020-07-20 NOTE — PROGRESS NOTES
Mr. Maida Drummond is a 70 y.o. y/o male with history of Heart Valve Replacement, aortic valve, about 1997 by Verlee Blizzard at [facility]   He presents today for anticoagulation monitoring and adjustment. Pertinent AFib, PMH: HTN, CHF, Chronic Kidney disease(moderate)    Patient Reported Findings:  Yes     No  [x]   []       Patient verifies current dosing regimen as listed- follows AVS calendar  []   [x]       S/S bleeding/bruising/swelling/SOB-denies   []   [x]       Blood in urine or stool- denies   []   [x]       Procedures scheduled in the future at this time  []   [x]       Missed Dose   []   [x]       Extra Dose- denies   []   [x]       Change in medications pt states that he is no longer taking digoxin---> allopurinol dose dec to 100mg daily ---> no changes ---> inc lasix and coreg, stop cardizem, then dc lasix and changed to torsemide, taking celebrex      []   [x]       Change in health/diet/appetite- Overall his appetite is poor, he states that he \"hasn't been eating much of anything lately\". He states that he continues to drink Ensure every day. Has not been eating much/missed a few ensures. -->missed on Ensure but otherwise the same --->says he has not been eating much greens ---> patient reports N/V/D for about 3 days last week. Symptoms now resolved. Did not go to doctor, no abx or new medications---> no changes---> not much greens recently, no NVD   []   [x]       Change in alcohol use does not drink  []   [x]       Change in activity  []   [x]       Hospital admission  []   [x]       Emergency department visit  []   [x]       Other complaints    Clinical Outcomes:  Yes     No  []   [x]       Major bleeding event  []   [x]       Thromboembolic event    Patient states that he gets brand Coumadin from mail order. States \"warfarin just doesn't work for [employer]      Duration of warfarin Therapy: indefinite  INR Range:  2.5-3.5    INR is 4.3 today dt less greens- will go back to normal. Since normally therapeutic on this dose, will hold and continue. Hold tonight then continue taking dose of 2 mg on Mon and 4 mg all other days   Encouraged to maintain a consistency of vegetables/salads. Recheck INR in 3 weeks on 8/10 then go back to 6 weeks     Referring cardiologist is Dr. Lizzy Salmeron. INR (no units)   Date Value   07/20/2020 4.3   06/08/2020 3.30   06/08/2020 3.3 (A)   02/26/2020 2.9   01/08/2020 2.9   12/04/2019 2.5   08/31/2018 4   08/03/2018 3.1       CLINICAL PHARMACY CONSULT: MED RECONCILIATION/REVIEW ADDENDUM    For Pharmacy Admin Tracking Only    PHSO: Yes  Total # of Interventions Recommended: 5  - Decreased Dose #: 1  - Updated Order #: 4 Updated Order Reason(s):  Other  - Maintenance Safety Lab Monitoring #: 1  Total Interventions Accepted: 5  Time Spent (min): Via Velma Evans PharmD

## 2020-07-20 NOTE — TELEPHONE ENCOUNTER
Daughter called CHF Resource line over the weekend. Patient continues with symptoms.  Orthopnea, edema and weight still at 162 lb

## 2020-07-20 NOTE — TELEPHONE ENCOUNTER
----- Message from SARAH Morocho CNP sent at 7/16/2020  8:41 AM EDT -----  Fluid number is very elevated and kidneys are stressed so please ask him to hold his lisinopril until we get the fluid off. Kavya Diss to patient about his lab results. He verbalized understanding.

## 2020-07-24 NOTE — TELEPHONE ENCOUNTER
He should probably go to ER, kidney function was not great on last blood draw and I am not thrilled about increasing torsemide more without new labs.  Isael Chand

## 2020-07-24 NOTE — TELEPHONE ENCOUNTER
Daughter left message on HF resource line. Patient did have a day's improvement with diuretic changes, however she states he is still persisting with dyspnea and orthopnea. Feels like he is smothering.

## 2020-07-27 NOTE — TELEPHONE ENCOUNTER
Called and checked on patient. States breathing is better today. Weight today 156 lb.  He is taking the increased torsemide 40 mg bid and has follow up on Wednesday 7/29 with Dustin Limon

## 2020-07-28 NOTE — PROGRESS NOTES
regurgitation. IVC size is normal (<2.1 cm) but collapses < 50% with respiration consistent   with elevated RA pressure (8 mmHg). Echo 4/23/17  Summary   -Mild left ventricular hypertrophy. -Global ejection fraction is mild-moderately decreased and estimated at   40-45%. -There is abnormal septal motion.   -Moderate biatrial enlargement.   -The mechanical aortic valve appears well seated with a maximum gradient of   25 mmHg and a mean gradient of 16 mmHg. There is trace aortic insufficiency.   -Thickened mitral valve without evidence of stenosis. Mild mitral annular   calcification.   -There is mild prolapse of the anterior mitral leaflet. Mitral regurgitation   is present that may be moderate.   -There is mild tricuspid regurgitation with RVSP estimated at 31 mmHg. Device: No   ICD counseling:No     Activity: below baseline  Can you walk 1-2 blocks or do a moderate amount of house/yard work? No      NYHA Class: III     Sodium Restrictions: 2g  Fluid Restrictions: 48-64 oz/day  Sodium and fluid restriction compliance: educated today, over on both    Pt Education: The patient has received education on the following topics: dietary sodium restriction, heart failure medications, the importance of physical activity, symptom management and weight monitoring      Past Medical History:   has a past medical history of Arrhythmia, Hyperlipidemia, Hypertension, and Valvular disease. Surgical History:   has a past surgical history that includes Aortic valve replacement and Carpal tunnel release (Left). Social History:   reports that he quit smoking about 27 years ago. His smoking use included cigarettes. He has a 25.00 pack-year smoking history. He quit smokeless tobacco use about 38 years ago. His smokeless tobacco use included chew. He reports that he does not drink alcohol or use drugs.    Family History:   Family History   Problem Relation Age of Onset    Heart Disease Paternal Grandfather     Hypertension Other     High Cholesterol Neg Hx     High Blood Pressure Neg Hx        HomeMedications:  Prior to Admission medications    Medication Sig Start Date End Date Taking? Authorizing Provider   torsemide (DEMADEX) 20 MG tablet TAKE 2 TABLETS BY MOUTH TWICE DAILY 7/21/20   SARAH Saab - CNP   carvedilol (COREG) 12.5 MG tablet TAKE 1 TABLET TWICE DAILY  WITH MEALS 6/30/20   Juan Jose Manning MD   COUMADIN 4 MG tablet TAKE 1 TABLET DAILY  Patient taking differently: 4mg six days a week; 2mg one day a week. Managed by [facility] 4/17/20   Juan Jose Manning MD   celecoxib (CELEBREX) 200 MG capsule TAKE 1 CAPSULE DAILY 4/16/20   Geovanna Lucia MD   allopurinol (ZYLOPRIM) 100 MG tablet TAKE 1 TABLET BY MOUTH DAILY 3/9/20   Geovanna Lucia MD   atorvastatin (LIPITOR) 40 MG tablet TAKE 1 TABLET DAILY 1/29/20   Geovanna Lucia MD   lisinopril (PRINIVIL;ZESTRIL) 20 MG tablet TAKE 1 TABLET DAILY 1/29/20   Geovanna Lucia MD   Nutritional Supplements (ENSURE PLUS PO) Take 1 Can by mouth daily    Historical Provider, MD   triamcinolone (KENALOG) 0.1 % cream APPLY TOPICALLY TWICE DAILY 8/24/18   Geovanna Lucia MD   aspirin 81 MG tablet Take 81 mg by mouth daily    Historical Provider, MD   Multiple Vitamins-Minerals (THERAPEUTIC MULTIVITAMIN-MINERALS) tablet Take 1 tablet by mouth daily    Historical Provider, MD        Allergies:  Ceclor [cefaclor]     ROS:   Review of Systems   Constitutional: Positive for fatigue. Respiratory: Positive for shortness of breath and wheezing. Negative for cough. Orthopnea   Cardiovascular: Positive for leg swelling. Negative for chest pain and palpitations. Weeping   Gastrointestinal: Negative. Genitourinary: Negative. Musculoskeletal: Negative. Skin: Negative. Neurological: Negative. Hematological: Negative. Psychiatric/Behavioral: Negative.         Physical Examination:    Vitals:    07/29/20 1146   BP: 98/64   Site: Left Upper Arm   Position: Sitting   Cuff Size: Medium Adult   Pulse: 88   SpO2: 100%   Weight: 164 lb 8 oz (74.6 kg)   Height: 5' 11\" (1.803 m)           Physical Exam  Constitutional:       Appearance: Normal appearance. HENT:      Head: Normocephalic and atraumatic. Eyes:      Extraocular Movements: Extraocular movements intact. Pupils: Pupils are equal, round, and reactive to light. Neck:      Musculoskeletal: Normal range of motion and neck supple. Cardiovascular:      Rate and Rhythm: Normal rate. Rhythm irregular. Heart sounds: Murmur present. Pulmonary:      Effort: Pulmonary effort is normal.      Breath sounds: Rales present. Abdominal:      Palpations: Abdomen is soft. Musculoskeletal: Normal range of motion. Right lower leg: Edema present. Left lower leg: Edema present. Comments: R>L, 2+, weeping blisters   Skin:     General: Skin is warm and dry. Neurological:      General: No focal deficit present. Mental Status: He is alert and oriented to person, place, and time. Mental status is at baseline. Psychiatric:         Mood and Affect: Mood normal.         Behavior: Behavior normal.         Thought Content:  Thought content normal.         Judgment: Judgment normal.         Lab Data:    CBC:   Lab Results   Component Value Date    WBC 9.5 04/26/2017    WBC 11.7 04/25/2017    WBC 19.7 04/23/2017    RBC 3.61 04/26/2017    RBC 3.59 04/25/2017    RBC 3.70 04/23/2017    HGB 11.4 04/26/2017    HGB 11.2 04/25/2017    HGB 11.6 04/23/2017    HCT 34.6 04/26/2017    HCT 34.7 04/25/2017    HCT 35.4 04/23/2017    MCV 95.7 04/26/2017    MCV 96.7 04/25/2017    MCV 95.7 04/23/2017    RDW 15.4 04/26/2017    RDW 15.9 04/25/2017    RDW 15.6 04/23/2017     04/26/2017     04/25/2017     04/23/2017     BMP:  Lab Results   Component Value Date     07/15/2020     03/09/2020     02/21/2020    K 4.5 07/15/2020    K 3.5 03/09/2020    K 3.6 02/21/2020    CL 94 07/15/2020  03/09/2020     02/21/2020    CO2 26 07/15/2020    CO2 36 03/09/2020    CO2 34 02/21/2020    BUN 63 07/15/2020    BUN 28 03/09/2020    BUN 23 02/21/2020    CREATININE 2.8 07/15/2020    CREATININE 1.7 02/10/2020    CREATININE 1.16 12/29/2018     BNP:   Lab Results   Component Value Date    PROBNP 2,814 07/15/2020    PROBNP 1,350 02/10/2020    PROBNP 3,431 04/23/2017     Iron Studies:  No components found for: FE,  TIBC,  FERRITIN  Iron Deficiency Anemia:  No    IV Iron Therapy:  No  2017 ACC/AHA HF Guidelines:   intravenous iron replacement in patients with New York Heart Association (NYHA) class II and III HF and iron deficiency (ferritin <100 ng/ml or 100-300 ng/ml if transferrin saturation <20%), to improve functional status and QoL. Assessment/Plan:    Encounter Diagnoses        Acute combined systolic and diastolic congestive heart failure (HCC)     Dilated cardiomyopathy (HCC)     Essential hypertension, benign     Localized edema     SOB (shortness of breath)     RACIEL (acute kidney injury) ([facility] [address]        Recommend ED for admission for CHF/RACIEL      I appreciate the opportunity of cooperating in the care of this individual.    ANITHA LOPEZ, MAYRA, 7/28/2020,10:48 AM    QUALITY MEASURES  1. Tobacco Cessation Counseling: NA  2. Retake of BP if >140/90:   NA  3. Documentation to PCP/referring for new patient:  Sent to PCP at close of office visit  4. CAD patient on anti-platelet: na  5. CAD patient on STATIN therapy:  na  6. Patient with CHF and aFib on anticoagulation:  Yes   7. Patient Education:Yes   8. BB for LVSD :  Yes   9. ACE/ARB for LVSD:  Yes   10.  Left Ventricular Ejection Fraction (LVEF) Assessment:  Yes

## 2020-07-29 PROBLEM — I48.0 PAROXYSMAL A-FIB (HCC): Status: ACTIVE | Noted: 2020-01-01

## 2020-07-29 NOTE — PLAN OF CARE
Problem: Falls - Risk of:  Goal: Will remain free from falls  Description: Will remain free from falls  Outcome: Ongoing  Goal: Absence of physical injury  Description: Absence of physical injury  Outcome: Ongoing     Problem: Skin Integrity:  Goal: Will show no infection signs and symptoms  Description: Will show no infection signs and symptoms  Outcome: Ongoing  Goal: Absence of new skin breakdown  Description: Absence of new skin breakdown  Outcome: Ongoing     Problem:  Activity:  Goal: Ability to tolerate increased activity will improve  Description: Ability to tolerate increased activity will improve  Outcome: Ongoing  Goal: Expression of feelings of increased energy will increase  Description: Expression of feelings of increased energy will increase  Outcome: Ongoing     Problem: Cardiac:  Goal: Ability to maintain an adequate cardiac output will improve  Description: Ability to maintain an adequate cardiac output will improve  Outcome: Ongoing  Goal: Complications related to the disease process, condition or treatment will be avoided or minimized  Description: Complications related to the disease process, condition or treatment will be avoided or minimized  Outcome: Ongoing     Problem: Coping:  Goal: Level of anxiety will decrease  Description: Level of anxiety will decrease  Outcome: Ongoing  Goal: General experience of comfort will improve  Description: General experience of comfort will improve  Outcome: Ongoing     Problem: Health Behavior:  Goal: Ability to manage health-related needs will improve  Description: Ability to manage health-related needs will improve  Outcome: Ongoing     Problem: Safety:  Goal: Ability to remain free from injury will improve  Description: Ability to remain free from injury will improve  Outcome: Ongoing  Goal: Will show no signs and symptoms of excessive bleeding  Description: Will show no signs and symptoms of excessive bleeding  Outcome: Ongoing  Goal: Free from accidental physical injury  Description: Free from accidental physical injury  Outcome: Ongoing  Goal: Free from intentional harm  Description: Free from intentional harm  Outcome: Ongoing     Problem: Infection:  Goal: Will remain free from infection  Description: Will remain free from infection  Outcome: Ongoing     Problem: Daily Care:  Goal: Daily care needs are met  Description: Daily care needs are met  Outcome: Ongoing     Problem: Pain:  Goal: Patient's pain/discomfort is manageable  Description: Patient's pain/discomfort is manageable  Outcome: Ongoing     Problem: Skin Integrity:  Goal: Skin integrity will stabilize  Description: Skin integrity will stabilize  Outcome: Ongoing     Problem: Discharge Planning:  Goal: Patients continuum of care needs are met  Description: Patients continuum of care needs are met  Outcome: Ongoing

## 2020-07-29 NOTE — CONSULTS
MD Betzaida Van MD Arlean Hinders, MD                                  Office: [phone]                 Fax: [phone]          [employer]                     NEPHROLOGY CONSULTATION NOTE:     PATIENT NAME: Shaylee Elmore  : 1940  MRN: [id number]      Name:  Shaylee Elmore Date/Time of Admission: 2020 12:44 PM    CSN: [id number] Attending Provider: Rowan Day MD   Room/Bed: [address] [place] [address] : 1940 Age: 70 y.o. Reason for Nephrology consult :  Evaluation of patient with worsening renal failure. History of Presenting complaint:       Shaylee Elmore 70 y.o. Has been sent from his cardiologist's office with multiple complaints of generalized weakness, worsening shortness of breath, worsening fluid retention despite large dose diuretics and worsening renal failure. Patient is found to have A. Fib with RVR. Significant decline in kidney functions. BUN is 91 and a creatinine of 3.6. Patient has a sodium of 133. He has a history of worsening chronic kidney disease. Stage IV. Patient follows with Dr. Tamara Martinez in the outpatient. His baseline creatinine is approximately 2.4. There has been a steady worsening of his kidney function due to a combination of decompensated CHF and also requiring large doses of diuretics. Patient currently is on torsemide 40 mg twice daily. On admission he is found to have borderline hypotension. Also complains of worsening lower extremity edema. No complaints of decreased urine output. Medications reviewed. Medical records reviewed.           Past Medical History :         Past Medical History:   Diagnosis Date    Acute combined systolic and diastolic (congestive) hrt fail ([address]     Acute kidney injury ([address]     Arrhythmia     Atrial fibrillation (HCC)     Carotid artery stenosis     CKD (chronic kidney disease)     Dilated cardiomyopathy (HCC)     Hyperlipidemia     Hypertension     IGT (impaired glucose tolerance)     Meningioma (HCC)     Valvular disease        Medications  Which i have  Reviewed, also have reviewed home medications  Prior to Admission medications    Medication Sig Start Date End Date Taking? Authorizing Provider   warfarin (COUMADIN) 4 MG tablet Take 2-4 mg by mouth See Admin Instructions Take 2 mg (4 mg x 0.5 tablet) every Mon; take 4 mg (4 mg x 1 tablet) all other days. Managed by [facility].    Yes Historical Provider, MD   torsemide (DEMADEX) 20 MG tablet TAKE 2 TABLETS BY MOUTH TWICE DAILY 7/21/20  Yes SARAH Castellanos - CNP   carvedilol (COREG) 12.5 MG tablet TAKE 1 TABLET TWICE DAILY  WITH MEALS 6/30/20  Yes Mala Hendrix MD   allopurinol (ZYLOPRIM) 100 MG tablet TAKE 1 TABLET BY MOUTH DAILY 3/9/20  Yes Bria Baker MD   atorvastatin (LIPITOR) 40 MG tablet TAKE 1 TABLET DAILY 1/29/20  Yes Bria Baker MD   lisinopril (PRINIVIL;ZESTRIL) 20 MG tablet TAKE 1 TABLET DAILY 1/29/20  Yes Bria Baker MD   aspirin 81 MG tablet Take 81 mg by mouth daily   Yes Historical Provider, MD   Multiple Vitamins-Minerals (THERAPEUTIC MULTIVITAMIN-MINERALS) tablet Take 1 tablet by mouth daily   Yes Historical Provider, MD     Current Facility-Administered Medications: [START ON 7/30/2020] aspirin EC tablet 81 mg, 81 mg, Oral, Daily  [START ON 7/30/2020] atorvastatin (LIPITOR) tablet 40 mg, 40 mg, Oral, Daily  sodium chloride flush 0.9 % injection 10 mL, 10 mL, Intravenous, 2 times per day  sodium chloride flush 0.9 % injection 10 mL, 10 mL, Intravenous, PRN  acetaminophen (TYLENOL) tablet 650 mg, 650 mg, Oral, Q6H PRN **OR** acetaminophen (TYLENOL) suppository 650 mg, 650 mg, Rectal, Q6H PRN  polyethylene glycol (GLYCOLAX) packet 17 g, 17 g, Oral, Daily PRN  promethazine (PHENERGAN) tablet 12.5 mg, 12.5 mg, Oral, Q6H PRN **OR** ondansetron (ZOFRAN) injection 4 mg, 4 mg, Intravenous, Q6H PRN  dilTIAZem 125 mg in dextrose 5 % 125 mL infusion, 5 mg/hr, Intravenous, Continuous  furosemide (LASIX) injection 40 mg, 40 mg, Intravenous, BID   dilTIAZem (CARDIZEM) 125 mg in dextrose 5% 125 mL infusion 5 mg/hr (20 4530)         Allergies. Allergies   Allergen Reactions    Ceclor [Cefaclor] Other (See Comments)     CNS side effects, slurred speech. Lips and tounge swell. Social History.   Social History     Socioeconomic History    Marital status:      Spouse name: Not on file    Number of children: Not on file    Years of education: Not on file    Highest education level: Not on file   Occupational History    Not on file   Social Needs    Financial resource strain: Not on file    Food insecurity     Worry: Not on file     Inability: Not on file    Transportation needs     Medical: Not on file     Non-medical: Not on file   Tobacco Use    Smoking status: Former Smoker     Packs/day: 1.00     Years: 25.00     Pack years: 25.00     Types: Cigarettes     Last attempt to quit: 1993     Years since quittin.5    Smokeless tobacco: Former User     Types: Chew     Quit date: 1982    Tobacco comment: quit 25 yrs ago   Substance and Sexual Activity    Alcohol use: No     Alcohol/week: 0.0 standard drinks    Drug use: No    Sexual activity: Yes     Partners: Female     Comment:    Lifestyle    Physical activity     Days per week: Not on file     Minutes per session: Not on file    Stress: Not on file   Relationships    Social connections     Talks on phone: Not on file     Gets together: Not on file     Attends Confucianist service: Not on file     Active member of club or organization: Not on file     Attends meetings of clubs or organizations: Not on file     Relationship status: Not on file    Intimate partner violence     Fear of current or ex partner: Not on file     Emotionally abused: Not on file     Physically abused: Not on file     Forced sexual activity: Not on file   Other Topics Concern    Not on file Social History Narrative    Not on file       Family History    Family History   Problem Relation Age of Onset    Heart Disease Paternal Grandfather     Hypertension Other     High Cholesterol Neg Hx     High Blood Pressure Neg Hx        Review of Systems. Complains of generalized weakness   No systemic complaints of fever, weight loss or night sweat. System Review  Of  Eyes/ HEN MT  ARE negative. Cardiac. No chest pain or palpitations. RS. Increasing shortness of breath on minimal exertion. No wheeze. PA No abdominal pain or diarrhea  : Decreased urine output . No hematuria.  CNS. No headache or limb weakness. Psy:  No features of anxiety or depression  Skin:  No rash or nodules  Endocrine: No history of diabetes . Hematology: No history of anemia. Rest of the 10 system review is normal       PHYSICAL EXAMINATION. BP 98/69   Pulse 136   Temp 98.7 °F (37.1 °C) (Temporal)   Resp 16   Ht 5' 9\" (1.753 m)   Wt 158 lb 8 oz (71.9 kg)   SpO2 96%   BMI 23.41 kg/m²   No intake or output data in the 24 hours ending 07/29/20 1728    INTAKE/OUTPUT:  No intake/output data recorded. No intake/output data recorded. Ill Appearing. Moderate respiratory distress. Awake alert    Borderline hypotension. External exam of the ears and nose are normal  HENT: exam is normal  Eyes: Pupils are equal, round, and reactive to light. Lymph Nodes. No axillary or cervical lymph nodes are palpable. Neck. JVD not visible. No lymph nodes palpable. CVS.  Heart sounds are normal.Palpation of the heart is normal. No murmurs. No pericardial rub.  RS.dullness on percussion of the lower chest wall. Bilateral Basal rales. PA soft , bowel sounds are normal no distension and no tenderness to palpation. Skin No rash , No palpable nodules  Musculoskeletal: Normal range of motion. 1+ edema and no tenderness. CNS  No focal    Edema Worse. More awake and alert.    All pulses are well felt      Labs dialysis. Fluid Overload. agree with Lasix 40 mg IV every 8 and  Edema is worse increase diuretics. Monitor urine output and report if less than 50ml/hr. Renal functions and electrolytes need close monitoring due toWorsening Kidney function. Repeat chest xray in the morning. Treatment Plan discussed withpatient, family, treating team and RN. Treatment plan discussed with patient., High risk. and critically ill -time spent 35 mins. Thanks for the consult             Electronically Signed:  Sophie Wilkins MD 7/29/2020          Arterial Blood Gasses  No results for input(s): PH, PCO2, PO2 in the last 72 hours. Invalid input(s): Hardblanca Vogt    UA:No results for input(s): NITRITE, COLORU, PHUR, LABCAST, WBCUA, RBCUA, MUCUS, TRICHOMONAS, YEAST, BACTERIA, CLARITYU, SPECGRAV, LEUKOCYTESUR, UROBILINOGEN, BILIRUBINUR, BLOODU, GLUCOSEU, AMORPHOUS in the last 72 hours. Invalid input(s): Era Feeling    LIVER PROFILE: No results for input(s): AST, ALT, LIPASE, BILIDIR, BILITOT, ALKPHOS in the last 72 hours. Invalid input(s): AMYLASE,  ALB  PT/INR:    Lab Results   Component Value Date    PROTIME 37.7 06/08/2020    PROTIME 48.3 08/31/2018    PROTIME 37.3 08/03/2018    PROTIME 31 07/06/2018    INR 4.3 07/20/2020    INR 3.30 06/08/2020    INR 3.3 06/08/2020    INR 2.9 02/26/2020    INR 2.9 01/08/2020    INR 4 08/31/2018     PTT:    Lab Results   Component Value Date    APTT 38.9 01/23/2010     SNOW:  No results found for: ANATITER, SNOW        RADIOLOGY:    Xr Chest Portable    Result Date: 7/29/2020  EXAMINATION: ONE XRAY VIEW OF THE CHEST 7/29/2020 1:22 pm COMPARISON: 02/10/2020 HISTORY: ORDERING SYSTEM PROVIDED HISTORY: chf TECHNOLOGIST PROVIDED HISTORY: Reason for exam:->chf FINDINGS: The cardiac silhouette is enlarged and stable status post median sternotomy. Aortic vascular calcification. Stable elevation of the left hemidiaphragm with dependent atelectasis.   No acute infiltrate, significant pleural fluid or evidence of overt failure. No acute cardiopulmonary disease. Cardiomegaly without overt failure. Imaging Results.   Chest X Ray reviwed by me          Electronically Signed:  Stacy Batres MD 7/29/2020

## 2020-07-29 NOTE — ED PROVIDER NOTES
I independently performed a history and physical on Katie Eden. All diagnostic, treatment, and disposition decisions were made by myself in conjunction with the advanced practice provider. Briefly, this is a 70 y.o. male here for congestive heart failure exacerbation. The patient is known to cardiology was at the heart failure clinic today. Despite extensive attempts to treat his heart failure with outpatient management, the patient continues to worsen. He has had significant paroxysmal nocturnal dyspnea, requiring recliner to sleep at night. He also has some dyspnea on exertion. Patient has had weeping edema in his bilateral lower extremities. They are also painful and achy. On exam, the patient is visibly short of breath with tachypnea and mildly increased work of breathing. He has diminished breath sounds bilaterally with faint rales. Heart is irregular and tachycardic. He has +4 pitting edema in the bilateral lower extremities with active weeping of clear fluid. Patient is awake, alert, oriented to person place time and situation. Insight and judgment are normal.  Speech is clear. He appears thin and frail. He appears ill, but nontoxic. EKG  The Ekg interpreted by me in the absence of a cardiologist shows. atrial fibrillation with a rate of 136  Axis is   Normal  QTc is  normal  Intervals and Durations are unremarkable. No specific ST-T wave changes appreciated. No evidence of acute ischemia. No significant change from prior EKG dated 4/23/2017    MDM  Patient's presentation is complicated by chronic kidney disease with acute worsening. This is likely secondary to the increasing diuretic doses he has been receiving as an outpatient. Further, the patient is in atrial fibrillation with RVR. We treated this with IV Cardizem. Discussed case with Dr. Jorge Gordillo. Prefer to start with Cardizem. May need amiodarone. FINAL IMPRESSION  1.  Atrial fibrillation with RVR ([facility] [address]    2. Acute on chronic congestive heart failure, unspecified heart failure type ([facility] [address]    3. Acute renal failure superimposed on chronic kidney disease, unspecified CKD stage, unspecified acute renal failure type (HCC)        Blood pressure 100/73, pulse 138, temperature 98.2 °F (36.8 °C), temperature source Temporal, resp. rate 20, height 5' 9\" (1.753 m), weight 165 lb (74.8 kg), SpO2 98 %. For further details of [address] emergency department encounter, please see documentation by advanced practice provider, Calli Shaffer CNP.        Estuardo Sexton MD  07/29/20 02013 Jordyn Howell MD  08/04/20 [id number]

## 2020-07-29 NOTE — PROGRESS NOTES
Clinical Pharmacy Note: Pharmacy to Dose Warfarin    Pharmacy consulted by Dr. Rico Alcantara to dose warfarin. Patricia Ortiz is a 70 y.o. male  is receiving warfarin for indication: Afib. INR Goal Range: 2-3  Prior to admission warfarin dosing regimen: 2 mg every Mon; take 4 mg all other days  INR today:   Lab Results   Component Value Date    INR 3.45 07/29/2020       Assessment/Plan:  INR is supratherapeutic on prior to admission dosing regimen. Based on today's assessment, HOLD warfarin. A dosing placeholder has been entered which requires no action from nursing. Daily INR is ordered. Pharmacy will continue to monitor and make adjustments to regimen as necessary.      Thank you for the consult,     Logan Aguilar, PharmD  Clinical Pharmacist [id number]  7/29/2020

## 2020-07-29 NOTE — PROGRESS NOTES
VSS.  Cardizem gtt increased to 10 ml/ hr d/t patients HR continuing to be between 120-150 bpm.  Monitor tech notified to update RN in the event that the patients Systolic B/P drops to 90 or lower. Pt resting in bed, will continue to monitor.   Coby Feng RN

## 2020-07-29 NOTE — H&P
HOSPITALISTS HISTORY AND PHYSICAL    7/29/2020 4:37 PM    Patient Information:  Neo Cage is a 70 y.o. male [id number]  PCP:  Lizzy Moctezuma MD (Tel: [phone] )    Chief complaint:    Chief Complaint   Patient presents with    Abnormal Lab     heart failure clinic sent pt for an admission. decrease EF, decrease kidney labs, heart failure. left ankle pain       History of Present Illness:  Annika Gonzales is a 70 y.o. male who has been having increasing sob at rest and excertion along with orthopnea. Patient has been diuresed as outpatient to not much success. Patient had a cardiologist office and was found to be in A. fib with RVR and acute heart failure. Patient was brought to the ED for admission. Patient denies fevers chills chest pain but does state has increased swelling in his feet states he is not short of breath but daughter in the room states patient has become increasingly severely short of breath. No recent travel or exposure to anyone with cold that. REVIEW OF SYSTEMS:   Constitutional: Negative for fever,chills or night sweats  ENT: Negative for rhinorrhea, epistaxis, hoarseness, sore throat. Respiratory: See above  Cardiovascular: Negative for chest pain, palpitations   Gastrointestinal: Negative for nausea, vomiting, diarrhea  Genitourinary: Negative for polyuria, dysuria   Hematologic/Lymphatic: Negative for bleeding tendency, easy bruising  Musculoskeletal: Negative for myalgias and arthralgias  Neurologic: Negative for confusion,dysarthria. Skin: Negative for itching,rash  Psychiatric: Negative for depression,anxiety, agitation. Endocrine: Negative for polydipsia,polyuria,heat /cold intolerance.     Past Medical History:   has a past medical history of Acute combined systolic and diastolic (congestive) hrt fail ([facility] Acute kidney injury ([facility] Arrhythmia, Atrial fibrillation ([facility] Carotid artery stenosis, CKD (chronic kidney disease), Dilated cardiomyopathy ([facility] Hyperlipidemia, Hypertension, IGT (impaired glucose tolerance), [facility] ([facility] and Valvular disease. Past Surgical History:   has a past surgical history that includes Aortic valve replacement and Carpal tunnel release (Left). Medications:  No current facility-administered medications on file prior to encounter. Current Outpatient Medications on File Prior to Encounter   Medication Sig Dispense Refill    warfarin (COUMADIN) 4 MG tablet Take 2-4 mg by mouth See Admin Instructions Take 2 mg (4 mg x 0.5 tablet) every Mon; take 4 mg (4 mg x 1 tablet) all other days. Managed by [facility].  torsemide (DEMADEX) 20 MG tablet TAKE 2 TABLETS BY MOUTH TWICE DAILY 360 tablet 2    carvedilol (COREG) 12.5 MG tablet TAKE 1 TABLET TWICE DAILY  WITH MEALS 180 tablet 1    allopurinol (ZYLOPRIM) 100 MG tablet TAKE 1 TABLET BY MOUTH DAILY 90 tablet 3    atorvastatin (LIPITOR) 40 MG tablet TAKE 1 TABLET DAILY 90 tablet 3    lisinopril (PRINIVIL;ZESTRIL) 20 MG tablet TAKE 1 TABLET DAILY 90 tablet 3    aspirin 81 MG tablet Take 81 mg by mouth daily      Multiple Vitamins-Minerals (THERAPEUTIC MULTIVITAMIN-MINERALS) tablet Take 1 tablet by mouth daily         Allergies: Allergies   Allergen Reactions    Ceclor [Cefaclor] Other (See Comments)     CNS side effects, slurred speech. Lips and tounge swell. Social History:  Patient Lives at home   reports that he quit smoking about 27 years ago. His smoking use included cigarettes. He has a 25.00 pack-year smoking history. He quit smokeless tobacco use about 38 years ago. His smokeless tobacco use included chew. He reports that he does not drink alcohol or use drugs. Family History:  family history includes Heart Disease in his paternal grandfather; Hypertension in an other family member.  ,     Physical Exam:  /74   Pulse 139 Temp 98.2 °F (36.8 °C) (Temporal)   Resp 20   Ht 5' 9\" (1.753 m)   Wt 165 lb (74.8 kg)   SpO2 98%   BMI 24.37 kg/m²     General appearance:  Appears comfortable. Alert oriented x2 and to time but not sure of the president knows date  HEENT: atraumatic, Pupils equal, muscous membranes moist, no masses appreciated  Cardiovascular: Irregularly irregular grade 2 out of 6 blowing systolic murmur  Respiratory: CTAB no wheezing  Gastrointestinal: Abdomen soft, non-tender, BS+  EXT: 2+ bilateral lower extremity pitting edema  Neurology: no gross focal deficts  Psychiatry: Appropriate affect. Not agitated  Skin: Warm, dry, no rashes appreciated    Labs:  CBC:   Lab Results   Component Value Date    WBC 8.1 07/29/2020    RBC 3.25 07/29/2020    HGB 10.4 07/29/2020    HCT 31.4 07/29/2020    MCV 96.4 07/29/2020    MCH 32.0 07/29/2020    MCHC 33.2 07/29/2020    RDW 16.7 07/29/2020     07/29/2020    MPV 9.3 07/29/2020     BMP:    Lab Results   Component Value Date     07/29/2020    K 4.4 07/29/2020    CL 97 07/29/2020    CO2 22 07/29/2020    BUN 91 07/29/2020    CREATININE 3.6 07/29/2020    CALCIUM 9.3 07/29/2020    GFRAA 20 07/29/2020    GFRAA >60 12/28/2011    LABGLOM 16 07/29/2020    LABGLOM 61 12/29/2018    GLUCOSE 136 07/29/2020    GLUCOSE 98 03/09/2020     XR CHEST PORTABLE   Final Result   No acute cardiopulmonary disease. Cardiomegaly without overt failure. Recent imaging reviewed    Problem List  Active Problems:    Paroxysmal A-fib (HCC)  Resolved Problems:    * No resolved hospital problems.  *        Assessment/Plan:   Paroxysmal A. fib with RVR started on diltiazem drip discussed with cardiology continue Coumadin for anticoagulation pharmacy to dose    Acute on chronic systolic heart failure with EF of 30% and severe mitral regurgitation  -Lasix 40 mg IV twice daily    RACIEL on CKD 3 nephrology consulted avoid nephrotoxic medications continue diuresis        Full Code Coumadin for anticoagulation        Admit as inpatient I anticipate hospitalization spanning more than two midnights for investigation and treatment of the above medically necessary diagnoses. Please note that some part of this chart was generated using Dragon dictation software. Although every effort was made to ensure the accuracy of this automated transcription, some errors in transcription may have occurred inadvertently. If you may need any clarification, please do not hesitate to contact me through [place] [phone].        Belén Benoit MD    7/29/2020 4:37 PM

## 2020-07-29 NOTE — ED PROVIDER NOTES
[facility]        Pt Name: Yifan Gonzales  MRN: [id number]  [place] 1940  Date of evaluation: 7/29/2020  Provider: SARAH Gunter - MAYRA  PCP: Rina Mendoza MD    This patient was seen and evaluated by the attending physician Joseph Ortiz MD.    [address] [place] [address]       Chief Complaint   Patient presents with    Abnormal Lab     heart failure clinic sent pt for an admission. decrease EF, decrease kidney labs, heart failure. left ankle pain       HISTORY OF PRESENT ILLNESS   (Location/Symptom, Timing/Onset,Context/Setting, Quality, Duration, Modifying Factors, Severity)  Note limiting factors. Yifan Gonzales is a 70 y.o. male who presents emergency department with concern for heart failure. Patient reports that about a month ago he had a bad echocardiogram.  They have been trying to manage this as an outpatient but he continues to decompensate. He reports that he has swollen and weepy legs. Today in the heart failure clinic he was identified to have acute renal failure. This prompted them to send him to the ER. He denies fever, rash, headaches, dizziness, chest pain, cough, congestion, abdominal pain, nausea, vomiting, diarrhea, constipation, blood in the stool, or painful urination. One friend/family member at bedside. Nursing Notes triage note reviewed and agreed with or any disagreements were addressed  in the HPI. REVIEW OF SYSTEMS    (2-9 systems for level 4, 10 or more for level 5)     Review of Systems   Constitutional: Negative for chills and fever. HENT: Negative for postnasal drip, rhinorrhea and sore throat. Eyes: Negative for visual disturbance. Respiratory: Positive for shortness of breath. Cardiovascular: Positive for leg swelling. Negative for chest pain. Gastrointestinal: Negative for abdominal pain, blood in stool, constipation, diarrhea, nausea and vomiting. Genitourinary: Negative for dysuria, flank pain and hematuria. Skin: Negative for rash. Neurological: Negative for weakness and headaches. All other systems reviewed and are negative. Positives and Pertinent negatives as per HPI. Except as noted above in the ROS, all other systems were reviewed and negative.        PAST MEDICAL HISTORY     Past Medical History:   Diagnosis Date    Acute combined systolic and diastolic (congestive) hrt fail ([facility] [address]     Acute kidney injury ([facility]     Arrhythmia     Atrial fibrillation (HCC)     Carotid artery stenosis     CKD (chronic kidney disease)     Dilated cardiomyopathy (HCC)     Hyperlipidemia     Hypertension     IGT (impaired glucose tolerance)     Meningioma (HCC)     Valvular disease          SURGICAL HISTORY       Past Surgical History:   Procedure Laterality Date    AORTIC VALVE REPLACEMENT      CARPAL TUNNEL RELEASE Left          CURRENT MEDICATIONS       Previous Medications    ALLOPURINOL (ZYLOPRIM) 100 MG TABLET    TAKE 1 TABLET BY MOUTH DAILY    ASPIRIN 81 MG TABLET    Take 81 mg by mouth daily    ATORVASTATIN (LIPITOR) 40 MG TABLET    TAKE 1 TABLET DAILY    CARVEDILOL (COREG) 12.5 MG TABLET    TAKE 1 TABLET TWICE DAILY  WITH MEALS    CELECOXIB (CELEBREX) 200 MG CAPSULE    TAKE 1 CAPSULE DAILY    COUMADIN 4 MG TABLET    TAKE 1 TABLET DAILY    LISINOPRIL (PRINIVIL;ZESTRIL) 20 MG TABLET    TAKE 1 TABLET DAILY    MULTIPLE VITAMINS-MINERALS (THERAPEUTIC MULTIVITAMIN-MINERALS) TABLET    Take 1 tablet by mouth daily    NUTRITIONAL SUPPLEMENTS (ENSURE PLUS PO)    Take 1 Can by mouth daily    TORSEMIDE (DEMADEX) 20 MG TABLET    TAKE 2 TABLETS BY MOUTH TWICE DAILY    TRIAMCINOLONE (KENALOG) 0.1 % CREAM    APPLY TOPICALLY TWICE DAILY         ALLERGIES     Ceclor [cefaclor]    FAMILY HISTORY       Family History   Problem Relation Age of Onset    Heart Disease Paternal Grandfather     Hypertension Other     High Cholesterol Neg Hx     High Blood Pressure Neg Hx           SOCIAL HISTORY       Social History     Socioeconomic History    Marital status:      Spouse name: None    Number of children: None    Years of education: None    Highest education level: None   Occupational History    None   Social Needs    Financial resource strain: None    Food insecurity     Worry: None     Inability: None    Transportation needs     Medical: None     Non-medical: None   Tobacco Use    Smoking status: Former Smoker     Packs/day: 1.00     Years: 25.00     Pack years: 25.00     Types: Cigarettes     Last attempt to quit: 1993     Years since quittin.5    Smokeless tobacco: Former User     Types: Chew     Quit date: 1982    Tobacco comment: quit 25 yrs ago   Substance and Sexual Activity    Alcohol use: No     Alcohol/week: 0.0 standard drinks    Drug use: No    Sexual activity: Yes     Partners: Female     Comment:    Lifestyle    Physical activity     Days per week: None     Minutes per session: None    Stress: None   Relationships    Social connections     Talks on phone: None     Gets together: None     Attends [employer] service: None     Active member of club or organization: None     Attends meetings of clubs or organizations: None     Relationship status: None    Intimate partner violence     Fear of current or ex partner: None     Emotionally abused: None     Physically abused: None     Forced sexual activity: None   Other Topics Concern    None   Social History Narrative    None       SCREENINGS             PHYSICAL EXAM  (up to 7 for level 4, 8 or more for level 5)     ED Triage Vitals   BP Temp Temp Source Pulse Resp SpO2 Height Weight   20 1238 20 1238 20 1238 20 1238 20 1238 20 1324 20 1238 20 1238   (!) 196/116 98.2 °F (36.8 °C) Temporal 112 18 98 % 5' 9\" (1.753 m) 165 lb (74.8 kg)       Physical Exam  Vitals signs and nursing note reviewed.    Constitutional: Appearance: He is well-developed. He is not diaphoretic. HENT:      Head: Normocephalic and atraumatic. Eyes:      General: No scleral icterus. Right eye: No discharge. Left eye: No discharge. Neck:      Musculoskeletal: Normal range of motion and neck supple. Cardiovascular:      Rate and Rhythm: Tachycardia present. Rhythm irregular. Heart sounds: Normal heart sounds. No murmur. No friction rub. No gallop. Pulmonary:      Effort: Pulmonary effort is normal. No respiratory distress. Breath sounds: Normal breath sounds. No stridor. No wheezing or rales. Chest:      Chest wall: No tenderness. Abdominal:      General: Bowel sounds are normal. There is no distension. Palpations: Abdomen is soft. There is no mass. Tenderness: There is no abdominal tenderness. There is no guarding or rebound. Musculoskeletal: Normal range of motion. General: No tenderness. Right lower leg: Edema present. Left lower leg: Edema present. Skin:     General: Skin is warm and dry. Coloration: Skin is pale. Neurological:      Mental Status: He is alert and oriented to person, place, and time.       Coordination: Coordination normal.   Psychiatric:         Behavior: Behavior normal.         DIAGNOSTIC RESULTS   LABS:    Labs Reviewed   CBC WITH AUTO DIFFERENTIAL - Abnormal; Notable for the following components:       Result Value    RBC 3.25 (*)     Hemoglobin 10.4 (*)     Hematocrit 31.4 (*)     RDW 16.7 (*)     All other components within normal limits    Narrative:     Performed at:  [facility] [address], [place], [facility] [address]   Phone [phone]   BASIC METABOLIC PANEL W/ REFLEX TO MG FOR LOW K - Abnormal; Notable for the following components:    Sodium 133 (*)     Chloride 97 (*)     Glucose 136 (*)     BUN 91 (*)     CREATININE 3.6 (*)     GFR Non- 16 (*)     GFR  20 (*)     All other components within normal limits    Narrative:     Nia Atkins  [facility] tel. [phone],  Chemistry results called to and read back by justino leone dhaval er, 07/29/2020  13:49, by Ignacio Patiño  Performed at:  [facility] [address] [facility], [facility] [place]   Phone [phone]   TROPONIN - Abnormal; Notable for the following components:    Troponin 0.02 (*)     All other components within normal limits    Narrative:     [facility] [phone],  Chemistry results called to and read back by justino leone [facility], 07/29/2020  13:49, by Ignacio Patiño  Performed at:  [facility]  [address] [address] [facility] [place]   Phone [phone]   BRAIN NATRIURETIC PEPTIDE - Abnormal; Notable for the following components:    Pro-BNP 4,777 (*)     All other components within normal limits    Narrative:     [facility]. [phone],  Chemistry results called [facility] and [facility] back [facility], 07/29/2020  13:49, by Ignacio Patiño  Performed at:  [facility] [address] [facility], [facility] [place]   Phone [phone]       All other labs werewithin normal range or not returned as of this dictation. EKG: All EKG's are interpreted by the Emergency Department Physician who either signs or Co-signs this chart in the absence of acardiologist.  Please see their note for interpretation of EKG. RADIOLOGY:   Interpretation per the Radiologist below, if available at the time of this note:    XR CHEST PORTABLE   Final Result   No acute cardiopulmonary disease. Cardiomegaly without overt failure. Xr Chest Portable    Result Date: 7/29/2020  EXAMINATION: ONE XRAY VIEW OF THE CHEST 7/29/2020 1:22 pm COMPARISON: 02/10/2020 HISTORY: ORDERING SYSTEM PROVIDED HISTORY: chf TECHNOLOGIST PROVIDED HISTORY: Reason for exam:->chf FINDINGS: The cardiac silhouette is enlarged and stable status post median sternotomy.  Aortic vascular calcification. Stable elevation of the left hemidiaphragm with dependent atelectasis. No acute infiltrate, significant pleural fluid or evidence of overt failure. No acute cardiopulmonary disease. Cardiomegaly without overt failure. PROCEDURES   Unless otherwise noted below, none     Procedures    CRITICAL CARE TIME     There was a high probability of life-threatening clinical deterioration in the patient's condition requiring my urgent intervention. The total critical care time spent while evaluating and treating this patient was at least 35 minutes. This excludes time spent doing separately billable procedures. This includes time at the bedside, data interpretation, medication management, obtaining critical history from collateral sources if the patient is unable to provide it directly, and physician consultation. Specifics of interventions taken and potentially life-threatening diagnostic considerations are listed in the medical decision making. CONSULTS:  IP CONSULT TO HOSPITALIST      EMERGENCY DEPARTMENT COURSE and DIFFERENTIAL DIAGNOSIS/MDM:   Vitals:    Vitals:    07/29/20 1238 07/29/20 1324   BP: (!) 196/116 100/73   Pulse: 112 138   Resp: 18 20   Temp: 98.2 °F (36.8 °C)    TempSrc: Temporal    SpO2:  98%   Weight: 165 lb (74.8 kg)    Height: 5' 9\" (1.753 m)        Cadence Headley was given the following medications:  Medications   dilTIAZem injection 10 mg (has no administration in time range)       Cadence Headley was evaluated in the emergency department with concern for shortness of breath and swelling of his feet and ankles. He has known heart failure. Appears to be worse today. He has A. fib with RVR. Treated with diltiazem for that. Additionally his creatinine is elevated concerning for acute on chronic renal failure. I do feel inpatient management is warranted. The hospitalist was consulted and is in agreement for admission.   This plan was discussed with the patient who is in agreement with the plan. FINAL IMPRESSION      1. Atrial fibrillation with RVR ([facility] [address]    2. Acute on chronic congestive heart failure, unspecified heart failure type ([facility]    3.  Acute renal failure superimposed on chronic kidney disease, unspecified CKD stage, unspecified acute renal failure type [facility])          DISPOSITION/PLAN   DISPOSITION Decision To Admit 07/29/2020 02:40:43 PM      (Please note that portions of this note were completed with a voice recognition program.  Efforts were made to edit the dictations but occasionally words are mis-transcribed.)    SARAH Cooper CNP (electronically signed)        SARAH Cooper CNP  07/29/20 5745

## 2020-07-29 NOTE — ED NOTES
Bedside report given to RN.   Pt transported to      Samira Caba RN  07/29/20 4582
Pt here with daughter, A&O, resp easy and non labored at rest.  Pt is in afib on monitor with rate 130's-140's bpm.  Pt states he has hx of afib. PT has BLE weeping edema.   Pt updated on plan of care, will continue to monitor     Tali Nielsen RN  07/29/20 2239
capsule TAKE 1 CAPSULE DAILY 90 capsule 3    [DISCONTINUED] Nutritional Supplements (ENSURE PLUS PO) Take 1 Can by mouth daily      [DISCONTINUED] triamcinolone (KENALOG) 0.1 % cream APPLY TOPICALLY TWICE DAILY 240 g 3

## 2020-07-30 NOTE — PROGRESS NOTES
Pt is alert and oriented x4 awake in bed. Pt finished breakfast and is awaiting transport to [facility]. Daughter at bedside. VSS. Will continue to monitor.   Jessika Babin RN

## 2020-07-30 NOTE — PROGRESS NOTES
Advanced Care Planning Note. Purpose of Encounter: Advanced care planning in light of hospilization  Parties In Attendance: Patient,    Decisional Capacity: Yes  Subjective: Patient/family understand that this conversation is to address long term care goal  Objective: cr 3.4  Goals of Care Determination: Patient would pursue cpr intubation , peg tube and dialsyiss if needed no tracheostomy  Code Status: full code  Time spent on Advanced care Plannin minutes  Advanced Care Planning Documents: documented advanced directives   wife is the POA.     Ashley Cano MD  2020 5:04 PM

## 2020-07-30 NOTE — PROGRESS NOTES
Pt in bed resting with feet soaking in chlorhexidine rinse. Bed alarm on. Bed in locked and lowest position. VSS. Will continue to monitor.  Andreina Cheung RN

## 2020-07-30 NOTE — PROGRESS NOTES
Pt sat up in chair to eat dinner. SBA with walker to bed. Stood to use urinal at bedside. Pt back in bed and family member is gone. Pradeep Koch RN.

## 2020-07-30 NOTE — PROGRESS NOTES
Pt BLE wrapped d/t swelling and blisters. Patient also has fungus growing on his right and left foot, order for wound care consult initiated.  Walt Oates RN

## 2020-07-30 NOTE — PROGRESS NOTES
Pt awake and confused. Pt removed his tele and un plugged himself from bedside monitor. Pt is oriented to self only and c/o nausea and back pain. Pt medicated for both complaints per MAR. Pt legs wrapped to help with edema and drainage from blisters. Pt denies any other needs at this time, will continue to monitor.  Enrique Tao RN

## 2020-07-30 NOTE — PROGRESS NOTES
Pt removed tele and pulled off blankets. Pt has been up to the side of bed x 3, and trying to climb between rails. Pt is confused and unable to follow directions. Pt is pleasant and can be reoriented but orientation level only last for a very short time.   Shubham Srinivasan RN

## 2020-07-30 NOTE — PROGRESS NOTES
Pt remains  confused and constantly trying to climb out of bed, pt removed tele and b/p cuff after several reminders to leave in place. Pt found trying to remove IV. IV wrapped to protect.    Velma Moralez RN

## 2020-07-30 NOTE — PROGRESS NOTES
[facility] PROGRESS NOTE    7/30/2020 4:54 PM        Name: Abby Ramsay . Admitted: 7/29/2020  Primary Care Provider: Britney Hendricks MD (Tel: [phone])          Subjective:    Patient lying in bed less sob today no chest pain     Reviewed interval ancillary notes    Current Medications  Efferdent Denture Cleanser TBEF 1 each, PRN  Fixodent Complete CREA 1 each, PRN  aspirin EC tablet 81 mg, Daily  atorvastatin (LIPITOR) tablet 40 mg, Daily  sodium chloride flush 0.9 % injection 10 mL, 2 times per day  sodium chloride flush 0.9 % injection 10 mL, PRN  acetaminophen (TYLENOL) tablet 650 mg, Q6H PRN    Or  acetaminophen (TYLENOL) suppository 650 mg, Q6H PRN  polyethylene glycol (GLYCOLAX) packet 17 g, Daily PRN  promethazine (PHENERGAN) tablet 12.5 mg, Q6H PRN    Or  ondansetron (ZOFRAN) injection 4 mg, Q6H PRN  dilTIAZem 125 mg in dextrose 5 % 125 mL infusion, Continuous  furosemide (LASIX) injection 40 mg, BID  warfarin (COUMADIN) daily dosing (placeholder), RX Placeholder        Objective:  BP (!) 111/52   Pulse 115   Temp 98.6 °F (37 °C) (Oral)   Resp 18   Ht 5' 9\" (1.753 m)   Wt 156 lb 12.8 oz (71.1 kg)   SpO2 90%   BMI 23.16 kg/m²     Intake/Output Summary (Last 24 hours) at 7/30/2020 1654  Last data filed at 7/30/2020 1244  Gross per 24 hour   Intake 229.83 ml   Output 1200 ml   Net -970.17 ml      Wt Readings from Last 3 Encounters:   07/30/20 156 lb 12.8 oz (71.1 kg)   07/29/20 164 lb 8 oz (74.6 kg)   07/15/20 165 lb (74.8 kg)       General appearance:  Appears comfortable.   Alert oriented x2 and to time but not sure of the president knows date  HEENT: atraumatic, Pupils equal, muscous membranes moist, no masses appreciated  Cardiovascular: Irregularly irregular grade 2 out of 6 blowing systolic murmur  Respiratory: CTAB no wheezing  Gastrointestinal: Abdomen soft, non-tender, BS+  EXT: 2+

## 2020-07-30 NOTE — PROGRESS NOTES
MD Chris Rae MD Dixon Byes, MD                                  Office: [phone]                 Fax: [phone]          [facility]                     NEPHROLOGY IN PATIENT PROGRESS NOTE:     PATIENT NAME: Haydee Terry  : 1940  MRN: [id number]        Request cardiology team to call me cell phone #[phone],  To get cardiology input, whether to proceed for long-term hemodialysis treatment. Thanks      Subjective:       Patient is weak and lethargic. Daughter present at the bedside. Reports that he is intermittently confused. shortness of breath. On IV Lasix 40 mg twice daily. Decreased urine output. Lower extremity edema. Medications reviewed. Assessment and Plan    Assessment:     Acute renal failure. Severe. worsening. Nonoliguric. Failed diuretic treatment and possible resistance  Decompensated CHF. History of dilated cardiomyopathy. History of mitral valvular heart disease. Chronic kidney disease. Stage IV. Generalized weakness and lethargy. Altered mental status change. Anemia. Plan:       Acute kidney injury:  Has developed severe renal failure. Worsening confusion and edema. He has a background of advanced chronic kidney disease. This most likely related to decompensated CHF. Difficult to accurately determine volume status. May require right heart cath. Patient has been on multiple diuretic medications. will discuss with cardiology team.    Worsening WBC count. Increased to 19,000. BUN is 86 and a creatinine of 3.4. Etiology for rapid worsening,? Either volume depletion due to excessive diuretic or worsening decompensated CHF. Chest x-ray done yesterday shows cardiomegaly but no airspace disease or  Edema  Worsening lower extremity edema    Electrolytes are stable. Repeat hemoglobin is 9.9. No immediate indications for dialysis. Treatment plan updated with family at bedside.  will discuss with cardiology team and proceed further. Medications reviewed. Adjusted for level of renal function. Nephrotoxic medications discontinued. Antibiotic dose appropriate for level of renal      The patient is not on ACE inhibitor. Lasix IV 40 mg twice daily. Diltiazem infusion. Patient is critically ill due to severe renal failure    Total time spent 35 minutes            EXAM  Vitals:    07/30/20 0815   BP: 102/68   Pulse: 94   Resp: 17   Temp: 96.5 °F (35.8 °C)   SpO2: 96%       Intake/Output Summary (Last 24 hours) at 7/30/2020 1044  Last data filed at 7/30/2020 0946  Gross per 24 hour   Intake 159.83 ml   Output 925 ml   Net -765.17 ml       ill appearing. Moderate respiratory distress. lethargic. Borderline hypotension. External exam of the ears and nose are normal  HENT: exam is normal  Eyes: Pupils are equal, round, and reactive to light. Lymph Nodes. No axillary or cervical lymph nodes are palpable. Neck. JVD not visible. No lymph nodes palpable. CVS.  Heart sounds are normal.Palpation of the heart is normal. No murmurs. No pericardial rub.  RS.dullness on percussion of the lower chest wall. Bilateral Basal rales. PA soft , bowel sounds are normal no distension and no tenderness to palpation. Skin No rash , No palpable nodules  Musculoskeletal: Normal range of motion. 1+ edema and no tenderness. CNS  No focal    Edema Worse. Lethargic. All pulses are well felt      Active Problems:    Paroxysmal A-fib (HCC)  Resolved Problems:    * No resolved hospital problems. *        Medications Reviewed by michelle Flowers aspirin  81 mg Oral Daily    atorvastatin  40 mg Oral Daily    sodium chloride flush  10 mL Intravenous 2 times per day    furosemide  40 mg Intravenous BID    warfarin (COUMADIN) daily dosing (placeholder)   Other RX Placeholder      dilTIAZem (CARDIZEM) 125 mg in dextrose 5% 125 mL infusion 10 mg/hr (07/30/20 9228)       Data Review.     Labs reviewed by me       CBC: Recent Labs     07/29/20  1310 07/30/20  0648   WBC 8.1 19.0*   HGB 10.4* 9.9*   HCT 31.4* 30.7*   MCV 96.4 95.6    126*     BMP:   Recent Labs     07/29/20  1310 07/30/20  0648   * 134*   K 4.4 4.3   CL 97* 96*   CO2 22 22   BUN 91* 86*   CREATININE 3.6* 3.4*     Magnesium: No results found for: MG  Lab Results   Component Value Date    CREATININE 3.4 07/30/2020       Arterial Blood Gasses  No results for input(s): PH, PCO2, PO2 in the last 72 hours. Invalid input(s): Urszula Gaspar    UA:No results for input(s): NITRITE, COLORU, PHUR, LABCAST, WBCUA, RBCUA, MUCUS, TRICHOMONAS, YEAST, BACTERIA, CLARITYU, SPECGRAV, LEUKOCYTESUR, UROBILINOGEN, BILIRUBINUR, BLOODU, GLUCOSEU, AMORPHOUS in the last 72 hours. Invalid input(s): Isidra Champion    LIVER PROFILE: No results for input(s): AST, ALT, LIPASE, BILIDIR, BILITOT, ALKPHOS in the last 72 hours. Invalid input(s): AMYLASE,  ALB  PT/INR:    Lab Results   Component Value Date    PROTIME 45.2 07/30/2020    PROTIME 40.5 07/29/2020    PROTIME 37.7 06/08/2020    PROTIME 48.3 08/31/2018    PROTIME 37.3 08/03/2018    PROTIME 31 07/06/2018    INR 3.84 07/30/2020    INR 3.45 07/29/2020    INR 4.3 07/20/2020    INR 3.30 06/08/2020     PTT:    Lab Results   Component Value Date    APTT 38.9 01/23/2010     SNOW:  No results found for: ANATITER, SNOW  CHEMISTRY COMMON GROUP :   Lab Results   Component Value Date    GLUCOSE 114 07/30/2020    GLUCOSE 98 03/09/2020    TSH 1.86 12/03/2014     Recent Labs     07/29/20  1310 07/30/20  0648   GLUCOSE 136* 114*   CALCIUM 9.3 9.2         RADIOLOGY:        Imaging Results. Chest X Ray reviwed by me    Chest Xray Reviewed by me  Renal Ultrasound Reviewed by me    EKG reviewed by me.                 Electronically Signed: Rose Mary Grijalva MD 7/30/2020 10:44 AM

## 2020-07-30 NOTE — CONSULTS
[facility]  Advanced CHF/Pulmonary Hypertension   Cardiac Evaluation      Jenna Briseno  YOB: 1940    Requesting PHysician:  Amy Haywood CNP      Chief Complaint   Patient presents with    Abnormal Lab     heart failure clinic sent pt for an admission. decrease EF, decrease kidney labs, heart failure. left ankle pain        History of Present Illness:  Camron Murray is a 71 yo male with PMH of AVR, AF, HTN, HLD who presents with increased SOB, edema, and had echo that showed drop in LVEF from 40 to 30%. We have been increasing oral diuretics over the past 2 weeks but creatinine worsened and his symptoms have not improved. His weight has continued to increase. His legs are weeping, and he complains of increased shortness of breath and continued orthopnea. He denies chest pain, exertional chest pain, discomfort, early satiety, syncope. He was admitted from the office for diuresis. He has had progressive kidney disease with increase in creatinine up to mid 3 range. Since being admitted, his selling has improved. He has only diuresed 1100 cc. He is being seen by nephrology. Family is at bedside. His EKG shows atrial fibrillation with rapid ventricular response. Echo:  6/30/20:   Normal left ventricular size. Concentric left ventricular hypertrophy. Decreased left ventricular systolic function with inferoseptal hypokinesis. Estimated EF 30%. E/e'=28. Indeterminate diastolic function. A-fib. Mild thickening of anterior leaflet of mitral valve that appears to prolapse   mildly. The maximum mitral valve pressure gradient is 12 mmHg and the mean pressure   gradient is 5 mmHg. Moderately severe mitral regurgitation with posteriorly directed jet. The left atrium is dilated. A mechanical aortic valve appears well seated with a maximum gradient of 35   mmHg and a mean gradient of 22 mmHg. Trivial aortic regurgitation.    The ascending aorta is mildly dilated. 4.0cm. Normal right ventricular size and mildly decreased in function. Moderate tricuspid regurgitation directed anteriorly. PASP 46mmHg. The right atrium is dilated. Trivial pulmonic regurgitation. IVC size is normal (<2.1 cm) but collapses < 50% with respiration consistent   with elevated RA pressure (8 mmHg). Meds prior to admission:  Warfarin  Torsemide 40 bid  Carvedilol 12.5 bid  lipitor  Lisinopril 20 mg po qd  Aspirin 81  Allergies   Allergen Reactions    Ceclor [Cefaclor] Other (See Comments)     CNS side effects, slurred speech. Lips and tounge swell.      Current Facility-Administered Medications   Medication Dose Route Frequency Provider Last Rate Last Dose    Efferdent Denture Cleanser TBEF 1 each  1 each Does not apply PRN George Brock MD        Fixodent Complete CREA 1 each  1 each Does not apply PRN George Brock MD        aspirin EC tablet 81 mg  81 mg Oral Daily George Brock MD   81 mg at 07/30/20 0926    atorvastatin (LIPITOR) tablet 40 mg  40 mg Oral Daily George Brock MD   40 mg at 07/30/20 1055    sodium chloride flush 0.9 % injection 10 mL  10 mL Intravenous 2 times per day George Brock MD   10 mL at 07/30/20 1123    sodium chloride flush 0.9 % injection 10 mL  10 mL Intravenous PRN George Brock MD        acetaminophen (TYLENOL) tablet 650 mg  650 mg Oral Q6H PRN George Brock MD   650 mg at 07/30/20 5182    Or    acetaminophen (TYLENOL) suppository 650 mg  650 mg Rectal Q6H PRN George Brock MD        polyethylene glycol (GLYCOLAX) packet 17 g  17 g Oral Daily PRN George Brock MD        promethazine (PHENERGAN) tablet 12.5 mg  12.5 mg Oral Q6H PRN George Brock MD   12.5 mg at 07/30/20 0322    Or    ondansetron (ZOFRAN) injection 4 mg  4 mg Intravenous Q6H PRN George Brock MD        dilTIAZem 125 mg in dextrose 5 % 125 mL infusion  5 mg/hr Intravenous Continuous Lizzeth Whitaker MD 10 mL/hr at 07/30/20 Lifestyle    Physical activity     Days per week: Not on file     Minutes per session: Not on file    Stress: Not on file   Relationships    Social connections     Talks on phone: Not on file     Gets together: Not on file     Attends Yarsanism service: Not on file     Active member of club or organization: Not on file     Attends meetings of clubs or organizations: Not on file     Relationship status: Not on file    Intimate partner violence     Fear of current or ex partner: Not on file     Emotionally abused: Not on file     Physically abused: Not on file     Forced sexual activity: Not on file   Other Topics Concern    Not on file   Social History Narrative    Not on file       Review of Systems:   · Constitutional: there has been no unanticipated weight loss. There's been no change in energy level, sleep pattern, or activity level. · Eyes: No visual changes or diplopia. No scleral icterus. · ENT: No Headaches, hearing loss or vertigo. No mouth sores or sore throat. · Cardiovascular: Reviewed in HPI  · Respiratory: No cough or wheezing, no sputum production. No hematemesis. · Gastrointestinal: No abdominal pain, appetite loss, blood in stools. No change in bowel or bladder habits. · Genitourinary: No dysuria, trouble voiding, or hematuria. · Musculoskeletal:  No gait disturbance, weakness or joint complaints. · Integumentary: No rash or pruritis. · Neurological: No headache, diplopia, change in muscle strength, numbness or tingling. No change in gait, balance, coordination, mood, affect, memory, mentation, behavior. · Psychiatric: No anxiety, no depression. · Endocrine: No malaise, fatigue or temperature intolerance. No excessive thirst, fluid intake, or urination. No tremor. · Hematologic/Lymphatic: No abnormal bruising or bleeding, blood clots or swollen lymph nodes. · Allergic/Immunologic: No nasal congestion or hives.     Physical Examination:    Vitals:    07/30/20 0430 07/30/20 7599 07/30/20 0815 07/30/20 1156   BP: 126/69 100/67 102/68 105/66   Pulse: 96 93 94 96   Resp:   17 19   Temp:   96.5 °F (35.8 °C) 97.8 °F (36.6 °C)   TempSrc:   Oral Oral   SpO2:   96% 91%   Weight:       Height:         Body mass index is 23.16 kg/m². Wt Readings from Last 3 Encounters:   07/30/20 156 lb 12.8 oz (71.1 kg)   07/29/20 164 lb 8 oz (74.6 kg)   07/15/20 165 lb (74.8 kg)     BP Readings from Last 3 Encounters:   07/30/20 105/66   07/29/20 98/64   07/15/20 (!) 110/56     Constitutional and General Appearance:   Chronically ill appearing  HEENT:  NC/AT  [place]  No problems with hearing  Skin:  Warm, dry  Respiratory:  · Normal excursion and expansion without use of accessory muscles  · Resp Auscultation: Normal breath sounds without dullness  Cardiovascular:  · The apical impulses not displaced  · Heart tones are crisp and normal  · Cervical veins are not engorged  · The carotid upstroke is normal in amplitude and contour without delay or bruit  · JVP 10-11 cm H2O  RRR with nl S1 and S2 without m,r,g  · Peripheral pulses are symmetrical and full  · There is no clubbing, cyanosis of the extremities. · 1+ bilateral edema  · Femoral Arteries: 2+ and equal  · Pedal Pulses: 2+ and equal   Neck:  · No thyromegaly  Abdomen:  · No masses or tenderness  · Liver/Spleen: No Abnormalities Noted  Neurological/Psychiatric:  · Alert and oriented in all spheres  · Moves all extremities well  · Exhibits normal gait balance and coordination  · No abnormalities of mood, affect, memory, mentation, or behavior are noted    Labs were reviewed including labs from other hospital systems through [facility]. Cardiac testing was reviewed including echos, nuclear scans, cardiac catheterization, including from other hospital systems through [facility]. Assessment:    1. Atrial fibrillation with RVR ([facility] [address]    2. Acute on chronic systolic congestive heart failure, unspecified heart failure type ([facility] [address]    3.  Acute renal failure superimposed on chronic kidney disease, unspecified CKD stage, unspecified acute renal failure type ([facility] [address]     4. History of AVR    Plan:  1. Restart carvedilol 6;25 mg po bid and wean off diltiazem for afib  2. Stop lisinopril  3  Continue IV lasix  4. Nephrology onboard  5. Kidney disease is worsening. May need dialysis    I appreciate the opportunity of cooperating in the care of this patient.     Kaleb Hastings M.D., [facility] [place]

## 2020-07-31 NOTE — PROGRESS NOTES
[facility]   Progress Note  CHF/Pulmonary Hypertension Cardiology    Chief complaint: We are following this patient for acute on chronic combined systolic and diastolic HF, atrial fib with RVR  HPI:  Deysi Sanchez is a 71 yo male with PMH of AVR, AF, HTN, HLD who presents with increased SOB, edema, and had echo that showed drop in LVEF from 40 to 30%. We have been increasing oral diuretics over the past 2 weeks but creatinine worsened and his symptoms have not improved. His weight has continued to increase. His legs are weeping, and he complains of increased shortness of breath and continued orthopnea.       He denies chest pain, exertional chest pain, discomfort, early satiety, syncope.       He was admitted from the office for diuresis. He has had progressive kidney disease with increase in creatinine up to mid 3 range.       Since being admitted, his selling has improved. He has only diuresed 1100 cc. He is being seen by nephrology. Family is at bedside. His EKG shows atrial fibrillation with rapid ventricular response.        Echo:  6/30/20:   Normal left ventricular size.   Concentric left ventricular hypertrophy.   Decreased left ventricular systolic function with inferoseptal hypokinesis.   Estimated EF 30%.   E/e'=28. Indeterminate diastolic function. A-fib.   Mild thickening of anterior leaflet of mitral valve that appears to prolapse   mildly.   The maximum mitral valve pressure gradient is 12 mmHg and the mean pressure   gradient is 5 mmHg.   Moderately severe mitral regurgitation with posteriorly directed jet.   The left atrium is dilated.   A mechanical aortic valve appears well seated with a maximum gradient of 35   mmHg and a mean gradient of 22 mmHg.   Trivial aortic regurgitation.   The ascending aorta is mildly dilated. 4.0cm.   Normal right ventricular size and mildly decreased in function.   Moderate tricuspid regurgitation directed anteriorly.  PASP 46mmHg.   The right atrium is dilated.   Trivial pulmonic regurgitation.  IVC size is normal (<2.1 cm) but collapses < 50% with respiration consistent   with elevated RA pressure (8 mmHg).    Meds prior to admission:  Warfarin  Torsemide 40 bid  Carvedilol 12.5 bid  lipitor  Lisinopril 20 mg po qd  Aspirin 81    ROS:  He has diuresed. Swelling is much improved. Today his blood pressure has been low when he sits up on the side of the bed. Still on diltiazem drip, weaning off. Heart rate up and down.     Medications/Labs all Reviewed    Lab Results   Component Value Date    WBC 18.2 (H) 07/30/2020    HGB 9.5 (L) 07/30/2020    HCT 29.0 (L) 07/30/2020    MCV 95.7 07/30/2020     (L) 07/30/2020     Lab Results   Component Value Date    CREATININE 3.1 (H) 07/31/2020    BUN 88 (HH) 07/31/2020     07/31/2020    K 3.9 07/31/2020    CL 97 (L) 07/31/2020    CO2 25 07/31/2020     Lab Results   Component Value Date    INR 3.45 (H) 07/31/2020    PROTIME 40.5 (H) 07/31/2020        Physical Examination:    /69   Pulse 98   Temp 98.2 °F (36.8 °C) (Oral)   Resp 18   Ht 5' 9\" (1.753 m)   Wt 156 lb 12.8 oz (71.1 kg)   SpO2 98%   BMI 23.16 kg/m²      Chronically ill appearing  HEENT:  NC/AT  Respiratory:  · Resp Assessment: Normal respiratory effort  · Resp Auscultation: Clear to auscultation bilaterally   Cardiovascular:  · Auscultation: regular rate and rhythm, normal S1S2, no murmur, rub or gallop  · Palpation:  Nl PMI  · JVP:  normal  · Extremities: No Edema  Abdomen:  · Soft, non-tender  · Normal bowel sounds  Extremities:  ·  No Cyanosis or Clubbing  Neurological/Psychiatric:  · Oriented to time, place, and person  · Non-anxious  Skin Warm and dry    Lab Results   Component Value Date     07/31/2020     07/30/2020     07/29/2020    K 3.9 07/31/2020    K 4.3 07/30/2020    K 4.4 07/29/2020    K 4.5 07/15/2020    K 3.5 03/09/2020    BUN 88 07/31/2020    BUN 86 07/30/2020    BUN 91 07/29/2020    CREATININE 3.1 07/31/2020    CREATININE 3.4 07/30/2020    CREATININE 3.6 07/29/2020    GLUCOSE 129 07/31/2020    GLUCOSE 114 07/30/2020    GLUCOSE 98 03/09/2020    GLUCOSE 148 02/21/2020     Lab Results   Component Value Date    PROBNP 4,777 (H) 07/29/2020    PROBNP 2,814 (H) 07/15/2020    PROBNP 1,350 (H) 02/10/2020     Lab Results   Component Value Date    ALT 22 07/25/2019    ALT 13 05/29/2018    AST 20 07/25/2019    AST 18 05/29/2018     Lab Results   Component Value Date    HGB 9.5 07/30/2020    HGB 9.9 07/30/2020    HCT 29.0 07/30/2020    HCT 30.7 07/30/2020     07/30/2020     07/30/2020     Lab Results   Component Value Date    TRIG 84 07/25/2019    TRIG 60 05/29/2018    HDL 45 07/25/2019    HDL 49 05/29/2018    HDL 40 12/28/2011    LDLCALC 29 07/25/2019    LDLCALC 51 05/29/2018     Assessment:    1. Atrial fibrillation with RVR ([facility] [address]    2. Acute on chronic systolic congestive heart failure, unspecified heart failure type ([facility] [address]    3. Acute renal failure superimposed on chronic kidney disease, unspecified CKD stage, unspecified acute renal failure type ([facility] [address]     4. History of AVR     Plan:  1.  continue carvedilol 6;25 mg po bid   2. Diltiazem drip now down to 5 mg/hr, weaning to 2.5. Wean to off keeping HR generally < 110. He was on a higher dose of carvedilol at home. Can increase when off drip. 3.  Stop lisinopril due to worsening renal function. Also cannot take ARB and spironolactone for same reason. 4  hold IV lasix, restart torsemide 20 mg po qd tomorrow    5. Nephrology onboard  6. Kidney disease appears stable currently. Can likely be discharged when off diltiazem and heart rate controlled, possibly 1-2 days. Although his kidney function is stable, we are going to continue to dacosta between elevated creatinine and fluid overload. Ongoing discussion r.e. future dialysis.     NYHA Class: 4    Omaira Cho MD, 7/31/2020 3:22 PM

## 2020-07-31 NOTE — PROGRESS NOTES
The [place] Sleepiness Scale       The [place] Sleepiness Scale is widely used in the field of sleep medicine as a subjective measure of a patient's sleepiness. The test is a list of eight situations in which you rate your tendency to become sleepy on a scale of 0, no chance to 3, high chance of dozing. Your score is based on a scale of 0 to 24. The scale estimates whether you are experiencing excessive sleepiness that possibly requires medical attention. How Sleepy Are You? How sleepy are you to doze off or fall asleep in the following situations? You should rate your chances of dozing off, not just feeling tired. Even if you have not done some of these things recently try to determine how they would have affected you.  For each situation, decide whether or not you would have:     0 = No chance of dozing 1 = Slight chance of dozing   2 = Moderate chance of  dozing 3 = High change of dozing       Situation                                                                                     Chance of Dozing    Sitting and reading  0 =  []  1 =    [] 2 =    [] 3 =    [x]    Watching TV  0 =  []  1 =    [] 2 =    [] 3 =    [x]      Sitting inactive in public place (e.g., a theater or a meeting)  0 =  [x]  1 =    [] 2 =    [] 3 =    []    As a passenger in a car for an hour without a break          0  =  []  1 =    [] 2 =    [] 3 =    [x]    Lying down to rest in the afternoon when circumstances permit    0 =  []  1 =    [] 2 =    [x] 3 =    []    Sitting and talking to someone  0 =  [x]  1 =    [] 2 =    [] 3 =    []      Sitting quietly after a lunch without alcohol  0 =  []  1 =    [] 2 =    [] 3 =    [x]    In a car, while stopped for a few minutes in traffic                                                                      0 =  [x]  1 =    [] 2 =    [] 3 =    []    Total Score = 14    If your total score is 10 or greater, you are experiencing excessive sleepiness and should consider seeking a medical

## 2020-07-31 NOTE — PROGRESS NOTES
Nutrition Note    CHF diet education    Pt and daughter were both educated on CHF diet education including low sodium diet and fluid restriction. RD provided handouts and food label. Pt and daughter verbalized understanding.      Electronically signed by Yoandy Olivo RD, [address] , LD on 7/31/20 at 10:03 AM EDT    Contact: [phone]

## 2020-07-31 NOTE — PROGRESS NOTES
Remains in AF w/RVR - Diltiazem reduced to 5 mg/hr as R/T recent hypotension, and to accommodate impending Coreg dose - instructed pt on same; fine bilat crackles persist, but are more scattered, and pt coughs nonproductively, and is achieving increased O2 sats; SBP >100.

## 2020-07-31 NOTE — PROGRESS NOTES
[facility] PROGRESS NOTE    7/31/2020 9:18 AM        Name: Katie Eden . Admitted: 7/29/2020  Primary Care Provider: Alka Lynch MD (Tel: [phone])          Subjective:    Patient sitting in bed feeling better no chest pain or sob    Reviewed interval ancillary notes    Current Medications  Efferdent Denture Cleanser TBEF 1 each, PRN  Fixodent Complete CREA 1 each, PRN  aspirin EC tablet 81 mg, Daily  atorvastatin (LIPITOR) tablet 40 mg, Daily  sodium chloride flush 0.9 % injection 10 mL, 2 times per day  sodium chloride flush 0.9 % injection 10 mL, PRN  acetaminophen (TYLENOL) tablet 650 mg, Q6H PRN    Or  acetaminophen (TYLENOL) suppository 650 mg, Q6H PRN  polyethylene glycol (GLYCOLAX) packet 17 g, Daily PRN  promethazine (PHENERGAN) tablet 12.5 mg, Q6H PRN    Or  ondansetron (ZOFRAN) injection 4 mg, Q6H PRN  dilTIAZem 125 mg in dextrose 5 % 125 mL infusion, Continuous  furosemide (LASIX) injection 40 mg, BID  warfarin (COUMADIN) daily dosing (placeholder), RX Placeholder        Objective:  /74   Pulse 103   Temp 98.3 °F (36.8 °C) (Oral)   Resp 18   Ht 5' 9\" (1.753 m)   Wt 156 lb 12.8 oz (71.1 kg)   SpO2 94%   BMI 23.16 kg/m²     Intake/Output Summary (Last 24 hours) at 7/31/2020 0918  Last data filed at 7/30/2020 1755  Gross per 24 hour   Intake 310 ml   Output 775 ml   Net -465 ml      Wt Readings from Last 3 Encounters:   07/30/20 156 lb 12.8 oz (71.1 kg)   07/29/20 164 lb 8 oz (74.6 kg)   07/15/20 165 lb (74.8 kg)       General appearance:  Appears comfortable.   Alert oriented x2 and to time but not sure of the president knows date  HEENT: atraumatic, Pupils equal, muscous membranes moist, no masses appreciated  Cardiovascular: Irregularly irregular grade 2 out of 6 blowing systolic murmur  Respiratory: CTAB no wheezing  Gastrointestinal: Abdomen soft, non-tender, BS+  EXT: 2+ bilateral lower extremity pitting edema  Neurology: no gross focal deficts  Psychiatry: Appropriate affect. Not agitated  Skin: Warm, dry, no rashes appreciated    Labs and Tests:  CBC:   Recent Labs     07/29/20  1310 07/30/20  0648 07/30/20  1246   WBC 8.1 19.0* 18.2*   HGB 10.4* 9.9* 9.5*    126* 128*     BMP:    Recent Labs     07/29/20  1310 07/30/20  0648   * 134*   K 4.4 4.3   CL 97* 96*   CO2 22 22   BUN 91* 86*   CREATININE 3.6* 3.4*   GLUCOSE 136* 114*     Hepatic: No results for input(s): AST, ALT, ALB, BILITOT, ALKPHOS in the last 72 hours. XR CHEST (2 VW)   Final Result   Mild cardiomegaly. Minimal to slight pulmonary vascular congestion. No   other significant abnormality. XR CHEST PORTABLE   Final Result   No acute cardiopulmonary disease. Cardiomegaly without overt failure. Recent imaging reviewed    Problem List  Active Problems:    Paroxysmal A-fib (HCC)  Resolved Problems:    * No resolved hospital problems.  *       Assessment/Plan:   Paroxysmal A. fib with RVR started on diltiazem drip discussed with cardiology continue Coumadin for anticoagulation pharmacy to dose  - still on dilt gtt started coreg will ask cards plan to wean of gtt     Acute on chronic systolic heart failure with EF of 30% and severe mitral regurgitation  -Lasix 40 mg IV twice daily     RACIEL on CKD 3 awaiting repeat labs    Leukocytosis :  Repeat labs pending, cxr and ua neg no fever monitor           Full Code Coumadin for anticoagulation    Scott Chung MD   7/31/2020 9:18 AM

## 2020-07-31 NOTE — PLAN OF CARE
Problem: Falls - Risk of:  Goal: Will remain free from falls  Description: Will remain free from falls  Outcome: Ongoing  Note: Call light in reach; bed in low position; SR up x2; pt affirms awareness and understanding of need to call for and await assist with OOB mobility, and consistently demonstrates same; requires cga with amb. Problem: Skin Integrity:  Goal: Will show no infection signs and symptoms  Description: Will show no infection signs and symptoms  Outcome: Ongoing  Note: Afebrile; no current s/s of infection     Problem:  Activity:  Goal: Ability to tolerate increased activity will improve  Description: Ability to tolerate increased activity will improve  Outcome: Ongoing  Note: Fatigues at times, but affirms feels much improved since admission     Problem: Cardiac:  Goal: Ability to maintain an adequate cardiac output will improve  Description: Ability to maintain an adequate cardiac output will improve  Outcome: Ongoing  Note: Periph pulses wak but palpable  Goal: Complications related to the disease process, condition or treatment will be avoided or minimized  Description: Complications related to the disease process, condition or treatment will be avoided or minimized  Outcome: Ongoing  Note: Pt affirms awareness of risks; current supratherapeutic INR represents reduced risk of stroke (but increased risk of bleeding)     Problem: Coping:  Goal: Level of anxiety will decrease  Description: Level of anxiety will decrease  Outcome: Ongoing  Note: Calm, cooperative, but states desire to go home  Goal: General experience of comfort will improve  Description: General experience of comfort will improve  Outcome: Ongoing  Note: Denies pain or discomfort     Problem: Safety:  Goal: Will show no signs and symptoms of excessive bleeding  Description: Will show no signs and symptoms of excessive bleeding  Outcome: Ongoing  Note: INR elevated; no current, overt s/s of b;eeding     Problem: Pain:  Goal: Pain level will decrease  Description: Pain level will decrease  Outcome: Ongoing  Note: Denies pain at this time; affirms awareness to report onset of pain to staff     Problem: Skin Integrity:  Goal: Skin integrity will stabilize  Description: Skin integrity will stabilize  Outcome: Ongoing  Note: Dry dressings and mepilex/foam dressings intact to BLE wounds     Problem: Discharge Planning:  Goal: Patients continuum of care needs are met  Description: Patients continuum of care needs are met  Outcome: Ongoing  Note: Anticipate discharge to home with spouse; may benefit from [address]; no DME needs evident at this time     Problem: Cardiovascular  Goal: No DVT, peripheral vascular complications  Outcome: Ongoing  Note: No calf tenderness or s/s of dvt  Goal: Hemodynamic stability  Outcome: Ongoing  Note: BP WNL; mildly tachycardic - exacerbated by min exertion; remains in AF w/RVR on Diltiazem infusion @ 7.5 mg/hr  Goal: Anticoagulate/Hct stable  Outcome: Ongoing  Note: INR 3.45 on Coumadin regimen     Problem: Respiratory  Goal: No pulmonary complications  Outcome: Ongoing  Note: Scattered fine crackles T/O bilat; denies dyspnea  Goal: O2 Sat > 90%  Outcome: Ongoing  Note: WNL on RA at rest     Problem: GI  Goal: No bowel complications  Outcome: Ongoing  Note: Last BM early this AM ' states stool was fairly hard     Problem:   Goal: No urinary complication  Outcome: Ongoing  Note: Denies dysuria     Problem: Neurological  Intervention: Neurological Status Assessment  Note: Denies lightheadedness, numbness, tingling

## 2020-07-31 NOTE — PROGRESS NOTES
MD Elijah Jeffers MD Epimenio Blowers, MD                                  Office: [phone]                 Fax: [phone]          [employer]                     NEPHROLOGY IN PATIENT PROGRESS NOTE:     PATIENT NAME: Chivo Gonzalez  : 1940  MRN: [id number]            Subjective:       Generalized weakness. More awake and alert. Stable hypotension. On diltiazem infusion. IV Lasix. Improving urine output. Still has edema. Medications reviewed. Assessment and Plan    Assessment:     Acute renal failure. Severe. slow improvement Nonoliguric. Failed diuretic treatment and possible resistance  Decompensated CHF. History of dilated cardiomyopathy. History of mitral valvular heart disease. Chronic kidney disease. Stage IV. Generalized weakness and lethargy. Altered mental status change. Anemia. Plan:       Acute kidney injury. Slow improvement. Repeat BUN 88 and a creatinine of 3.1. Was 80 BUN had a creatinine of 3.6 on admission. On diltiazem infusion. Improving A. Fib and rapid heart rate. Slow improving kidney function most likely related to improving decompensated CHF, precipitated by A. Fib with RVR. Appreciate help from Dr. Sandy Carney. Follow-up recommendations as outlined by Dr. Sandy Carney. Hold ACE inhibitors and spironolactone. Discontinue the IV Lasix. Switch to torsemide 20 mg once daily. Taper down diltiazem infusion. Renal functions almost to his baseline level. Electrolytes are stable. Potassium levels are stable. Elevated WBC count 18.2. No immediate indications for dialysis. Family understand severity of renal failure, and possible need for dialysis, if there is any further worsening of kidney function down the road. Medications reviewed and appropriate. Anemia levels are stable. High complexity. Time spent 35 minutes.   Discussed with treatment team.          Homar Loving:    20 8145 BP: 111/74   Pulse: 103   Resp: 18   Temp: 98.3 °F (36.8 °C)   SpO2: 94%       Intake/Output Summary (Last 24 hours) at 7/31/2020 0943  Last data filed at 7/30/2020 1755  Gross per 24 hour   Intake 310 ml   Output 775 ml   Net -465 ml       ill appearing. Moderate respiratory distress. lethargic. Borderline hypotension. External exam of the ears and nose are normal  HENT: exam is normal  Eyes: Pupils are equal, round, and reactive to light. Lymph Nodes. No axillary or cervical lymph nodes are palpable. Neck. JVD not visible. No lymph nodes palpable. CVS.  Heart sounds are normal.Palpation of the heart is normal. No murmurs. No pericardial rub.  RS.dullness on percussion of the lower chest wall. Bilateral Basal rales. PA soft , bowel sounds are normal no distension and no tenderness to palpation. Skin No rash , No palpable nodules  Musculoskeletal: Normal range of motion. 1+ edema and no tenderness. CNS  No focal    Edema Worse. Lethargic. All pulses are well felt      Active Problems:    Paroxysmal A-fib (HCC)  Resolved Problems:    * No resolved hospital problems. *        Medications Reviewed by me  [facility] aspirin  81 mg Oral Daily    atorvastatin  40 mg Oral Daily    sodium chloride flush  10 mL Intravenous 2 times per day    furosemide  40 mg Intravenous BID    warfarin (COUMADIN) daily dosing (placeholder)   Other RX Placeholder      dilTIAZem (CARDIZEM) 125 mg in dextrose 5% 125 mL infusion 7.5 mg/hr (07/30/20 2004)       Data Review.     Labs reviewed by me       CBC:   Recent Labs     07/29/20  1310 07/30/20  0648 07/30/20  1246   WBC 8.1 19.0* 18.2*   HGB 10.4* 9.9* 9.5*   HCT 31.4* 30.7* 29.0*   MCV 96.4 95.6 95.7    126* 128*     BMP:   Recent Labs     07/29/20  1310 07/30/20  0648   * 134*   K 4.4 4.3   CL 97* 96*   CO2 22 22   BUN 91* 86*   CREATININE 3.6* 3.4*     Magnesium: No results found for: MG  Lab Results   Component Value Date    CREATININE 3.4 07/30/2020 Arterial Blood Gasses  No results for input(s): PH, PCO2, PO2 in the last 72 hours. Invalid input(s): [id number], INSPIREDO2    UA:  Recent Labs     07/30/20  2019   COLORU YELLOW   PHUR 5.0   WBCUA 3-5   RBCUA 3-4   BACTERIA Rare*   CLARITYU Clear   SPECGRAV 1.012   LEUKOCYTESUR Negative   UROBILINOGEN 0.2   BILIRUBINUR Negative   BLOODU MODERATE*   GLUCOSEU Negative       LIVER PROFILE: No results for input(s): AST, ALT, LIPASE, BILIDIR, BILITOT, ALKPHOS in the last 72 hours. Invalid input(s): AMYLASE,  ALB  PT/INR:    Lab Results   Component Value Date    PROTIME 40.5 07/31/2020    PROTIME 45.2 07/30/2020    PROTIME 40.5 07/29/2020    PROTIME 48.3 08/31/2018    PROTIME 37.3 08/03/2018    PROTIME 31 07/06/2018    INR 3.45 07/31/2020    INR 3.84 07/30/2020    INR 3.45 07/29/2020     PTT:    Lab Results   Component Value Date    APTT 38.9 01/23/2010     SNOW:  No results found for: ANATITER, SNOW  CHEMISTRY COMMON GROUP :   Lab Results   Component Value Date    GLUCOSE 114 07/30/2020    GLUCOSE 98 03/09/2020    TSH 1.86 12/03/2014     Recent Labs     07/29/20  1310 07/30/20  0648   GLUCOSE 136* 114*   CALCIUM 9.3 9.2         RADIOLOGY:        Imaging Results. Chest X Ray reviwed by me    Chest Xray Reviewed by me  Renal Ultrasound Reviewed by me    EKG reviewed by me.                 Electronically Signed: Sidney Jay MD 7/31/2020 9:43 AM

## 2020-07-31 NOTE — PROGRESS NOTES
Occupational Therapy   Occupational Therapy Initial Assessment  Date: 2020   Patient Name: Haydee Terry  MRN: [id number]     : 1940    Date of Service: 2020    Discharge Recommendations:  Haydee Terry scored a 21/24 on the -[facility] ADL Inpatient form. At this time, no further OT is recommended upon discharge due to pt expected to be at baseline level of occupational function by discharge. Recommend patient returns to prior setting. OT Equipment Recommendations  Equipment Needed: No    Assessment   Performance deficits / Impairments: Decreased functional mobility ; Decreased ADL status; Decreased endurance  Assessment: Pt is below his baseline level of occupational function, based on the above deficits associated with paroxysmal A-fib. Pt would benefit from continued skilled acute OT services to address these deficits. Treatment Diagnosis: Decreased ADL status, functional mobility and endurance associated with paroxysmal A-fib  Prognosis: Good  Decision Making: Low Complexity  History: Pt 69 yo, lives w/wife, stays on main level of home, independent ADLs, shares IADLs, independent ambulation, no falls. PMH: HTN, HLD, heart failure, CKD, CAD, AVR  Exam: ROM, MMT, 6 clicks, 3 performance deficits/impairments, stable presentation  Assistance / Modification: SBA for functional mobility/transfers w/RW, independent socks  OT Education: OT Role;Plan of Care  Patient Education: OT benedicto, d/c recommendation. Pt verbalized understanding. May need reinforcement d/t [place]. Barriers to Learning: Hearing  REQUIRES OT FOLLOW UP: Yes  Activity Tolerance  Activity Tolerance: Patient Tolerated treatment well  Activity Tolerance: After walk, HR ranging 118-136  Safety Devices  Safety Devices in place: Yes  Type of devices: All fall risk precautions in place;Call light within reach; Chair alarm in place;Nurse notified; Left in chair;Gait belt(Daughter present)           Patient Diagnosis(es): The primary treatment  Patient Currently in Pain: Denies  Social/Functional History  Social/Functional History  Lives With: Spouse  Type of Home: House  Home Layout: Two level, Performs ADL's on one level, Able to Live on Main level with bedroom/bathroom  Home Access: Stairs to enter with rails  Entrance Stairs - Number of Steps: 3 steps  Entrance Stairs - Rails: Left(when ascending)  Bathroom Shower/Tub: Tub/Shower unit, Shower chair with back(chair sits inside the tub)  Bathroom Toilet: Standard  Bathroom Equipment: Grab bars in shower, Hand-held shower, Grab bars around toilet, Shower chair, Commode(does not use the bedside commode)  Home Equipment: Vijay Arteaga, Grab bars(Domingori-Cansandra)  Receives Help From: (no outside help)  ADL Assistance: Independent(able to shower and dress independently)  Homemaking Assistance: Independent(does cooking and cleaning by himself: tag teams with wife to complete stuff)  Ambulation Assistance: Independent(uses cane on [employer])  Transfer Assistance: Independent  Active : Yes  Leisure & Hobbies: enjoys gardening roses and tomatoes, playing wit 1year old grandson  Additional Comments: No falls within the last 6 months       Objective   Vision: Impaired  Vision Exceptions: Wears glasses for reading  Hearing: Exceptions to WFL(decent hearing but has to talk loud)    Orientation  Overall Orientation Status: Within Normal Limits  Observation/Palpation  Posture: Fair(forward lean of trunk while seated)  Balance  Standing Balance: Stand by assistance(w/RW)  Standing Balance  Time: 1 min X 2  Activity: functional mobility ~100 ft X 2  Comment: Pt provided seated rest break prior to return to his room.   Functional Mobility  Functional - Mobility Device: Rolling Walker  Activity: Other  Assist Level: Stand by assistance  ADL  LE Dressing: Independent(socks)  Additional Comments: Pt declined other ADL activity  Tone RUE  RUE Tone: Normotonic  Tone LUE  LUE Tone: Normotonic  Coordination  Movements Are Fluid And Coordinated: Yes     Bed mobility  Supine to Sit: Modified independent(Not seen due to patient seated EOB upon arrival of PT/OT but patient was seen in passing performing it Mod I)  Scooting: Modified independent  Transfers  Stand Step Transfers: Stand by assistance(w/RW)  Sit to stand: Stand by assistance  Stand to sit: Stand by assistance  Vision - Basic Assessment  Prior Vision: Wears glasses only for reading  Visual History: No significant visual history  Patient Visual Report: No visual complaint reported.   Cognition  Overall Cognitive Status: WFL  Perception  Overall Perceptual Status: WFL     Sensation  Overall Sensation Status: WNL        LUE AROM (degrees)  LUE AROM : WNL  Left Hand AROM (degrees)  Left Hand AROM: WNL  RUE AROM (degrees)  RUE AROM : WNL  Right Hand AROM (degrees)  Right Hand AROM: WNL  LUE Strength  Gross LUE Strength: WNL(5/5 elbow, shoulder)  L Hand General: 5/5  RUE Strength  Gross RUE Strength: WNL(5/5 elbow, shoulder)  R Hand General: 5/5                   Plan   Plan  Times per week: 3-5  Current Treatment Recommendations: Self-Care / ADL, Endurance Training, Functional Mobility Training    AM-PAC Score        AM-[facility] Inpatient Daily Activity Raw Score: 21 (07/31/20 1041)  -PAC Inpatient ADL T-Scale Score : 44.27 (07/31/20 1041)  ADL Inpatient CMS 0-100% Score: 32.79 (07/31/20 1041)  ADL Inpatient CMS G-Code Modifier : Keith Romo (07/31/20 1041)    Goals  Short term goals  Time Frame for Short term goals: Discharge  Short term goal 1: Mod I for functional transfers w/RW to ADL surfaces  Short term goal 2: Mod I for functional mobility w/RW for ADL activity  Short term goal 3: S/U for UB bathing/Independent for UB dressing  Short term goal 4: Mod I for LB bathing/dressing  Short term goal 5: Pt to tolerate standing 5-10 min for ADL activity/functional mobility       Therapy Time   Individual Concurrent Group Co-treatment   Time In 0842         Time Out 0920         Minutes 38 Timed Code Treatment Minutes:  23 Minutes    Total Treatment Minutes:  [address], Eric [address], OTR/L, [id number]

## 2020-07-31 NOTE — PROGRESS NOTES
increased shortness of breath. Patient has been seen in the past for HF education. He recently started to follow with HF NP a few weeks ago. Patient was noted to have increasing weight. Diuretics were adjusted but he persisted with symptoms. He does weigh himself at home. He feels his baseline is 160 lb however per NP notes, baseline felt to be 156 lb. Current weight is 156 lb. Discussed with patient the need to establish new baseline, and that it may be lower than this. Patient follows low sodium diet at home. Wife tries to cook low sodium and avoid processed foods. They do not eat out often. He does follow a 64 oz fluid restriction. He is compliant with medications and with follow up visits. Patient provided with both written and verbal education on CHF signs/ symptoms, causes, discharge medications,   daily weights, low sodium diet, activity, and follow-up. Pt to call if gains 3 pounds in one day or 5 pounds in one week. Mutually agreed upon goals were discussed such as calling the MD as soon as they recognize symptoms and weight gain, maintaining his proper diet, taking medications as prescribed, joining rehab when able. Patient provided with CHF Zone Management tool and CHF symptoms magnet. Discussed importance of lifestyle changes: agrees to calling early with symptoms    PATIENT/CAREGIVER TEACHING:    Level of patient/caregiver understanding able to:   [x ] Verbalize understanding [ ] Demonstrate understanding [ ] Teach back   [ ] Needs reinforcement [ ] Other:       Time spent teachin mins    1. WEIGHT: Admit Weight: 165 lb (74.8 kg)      Today  Weight: 156 lb 12.8 oz (71.1 kg)   2. I/O     Intake/Output Summary (Last 24 hours) at 2020 1611  Last data filed at 2020 1325  Gross per 24 hour   Intake 690 ml   Output 1000 ml   Net -310 ml       Recommendations: 1.patient needs close follow up at discharge. 2. CHF pathway on anila if discharged with home care.    3. Continue to educate on S/S.   4. Emphasize daily weights, diet, and knowing when and who to call  5. Provided patient with CHF Resource Line for questions and concerns.        GUILLERMO NIELSEN 7/31/2020 4:11 PM

## 2020-07-31 NOTE — PROGRESS NOTES
Fewer fine crackles at bases and RML; BRISSA clears with DB effort, pt affirms resp improving; remains in AF but rate control improved since Coreg added, and remains on Diltiazem infusion @ 5 mg/hr; SBP <100, but stable; no other significant changes since earlier assessment, except as noted; no s/s of acute distress at present.

## 2020-07-31 NOTE — DISCHARGE INSTR - COC
Continuity of Care Form  [facility]      _x_ Daily Visits x 3     _x_Cardiovascular Assessment.  Titrate O2 to keep SaO2 greater than 90%    _x_ Daily Weights- Baseline Wt:***   Call MD if:   3 pound weight gain or loss in one day OR 5 pound weight gain in one week       _x_ Labs:   BMP,BNP     Please start on ***   Frequency: Weekly x 4              Fax results to: [facility]: [phone]      _x_ Med List attached:   Hold Coreg/Metoprolol if HR less than 45 or patient symptomatic*   Hold ACE/ARB if SBP less than 85 or patient symptomatic*   Do not hold Spironolactone (aldactone) for hypotension/bradycardia   Call MD for questions: [facility]: [phone]    _x_Follow up appointment with cardiology: date/time: ***    _Patient Name: Blake Parada   :  1940  MRN:  [id number]    Admit date:  2020  Discharge date:  ***    Code Status Order: Full Code   Advance Directives:   Ainsley Kurtz 33 Directive Type of Healthcare Directive Copy in [facility] [address] Agent's Name Healthcare Agent's Phone Number    20 1645  No, patient does not have an advance directive for healthcare treatment -- -- -- -- --          Admitting Physician:  Shirley Doss MD  PCP: Darcy Holstein, MD    Discharging Nurse: [facility] Unit/Room#: 5YW-8717/3691-07  Discharging Unit Phone Number: ***    Emergency Contact:   Extended Emergency Contact Information  Primary Emergency Contact: Kyra Lares  Address: [address] Phone: [phone]  Relation: Spouse  Secondary Emergency Contact: [address] [place] [address] Phone: [phone]  Mobile Phone: [phone]  Relation: Child    Past Surgical History:  Past Surgical History:   Procedure Laterality Date    AORTIC VALVE REPLACEMENT      CARPAL TUNNEL RELEASE Left     COLONOSCOPY  2010    SKIN BIOPSY         Immunization (1.753 m)   Wt 156 lb 12.8 oz (71.1 kg)   SpO2 96%   BMI 23.16 kg/m²     Last documented pain score (0-10 scale): Pain Level: 0  Last Weight:   Wt Readings from Last 1 Encounters:   20 156 lb 12.8 oz (71.1 kg)     Mental Status:  {IP PT MENTAL STATUS:50010}    IV Access:  { DELPHINE IV ACCESS:795178101}    Nursing Mobility/ADLs:  Walking   {CHP DME QPZV:525656263}  Transfer  {CHP DME LAYT:883896278}  Bathing  {CHP DME LVCE:451095449}  Dressing  {CHP DME VJRO:403524726}  Toileting  {CHP DME IUGD:857866803}  Feeding  {CHP DME XDZV:746157257}  Med Admin  {CHP DME JARH:605585492}  Med Delivery   { DELPHINE MED Delivery:956664452}    Wound Care Documentation and Therapy:        Elimination:  Continence:   · Bowel: {YES / RS:94228}  · Bladder: {YES / YW:62051}  Urinary Catheter: {Urinary Catheter:466490836}   Colostomy/Ileostomy/Ileal Conduit: {YES / HE:52645}       Date of Last BM: ***    Intake/Output Summary (Last 24 hours) at 2020 1632  Last data filed at 2020 1325  Gross per 24 hour   Intake 690 ml   Output 1000 ml   Net -310 ml     I/O last 3 completed shifts:   In: 80 [P.O.:690]  Out: 1000 [Urine:1000]    Safety Concerns:     [employer] Safety Concerns:235424686}    Impairments/Disabilities:      [address] [employer] Impairments/Disabilities:007414719}    Nutrition Therapy:  Current Nutrition Therapy:   [employer] Diet List:181024461}    Routes of Feeding: {CHP DME Other Feedings:468518752}  Liquids: {Slp liquid thickness:68412}  Daily Fluid Restriction: {CHP DME Yes amt example:902391421}  Last Modified Barium Swallow with Video (Video Swallowing Test): {Done Not Done NVRV:946691311}    Treatments at the Time of Hospital Discharge:   Respiratory Treatments: ***  Oxygen Therapy:  {Therapy; copd oxygen:53159}  Ventilator:    {ROMA ELAM Vent EGVE:253682760}    Rehab Therapies: {THERAPEUTIC INTERVENTION:0021332879}  Weight Bearing Status/Restrictions: {ROMA ELAM Weight Bearin}  Other Medical Equipment (for information only, NOT a DME order):  {EQUIPMENT:423202809}  Other Treatments: ***    Patient's personal belongings (please select all that are sent with patient):  {CHP DME Belongings:714965869}    RN SIGNATURE:  {Esignature:444997978}    CASE MANAGEMENT/SOCIAL WORK SECTION    Inpatient Status Date: ***    Readmission Risk Assessment Score:  Readmission Risk              Risk of Unplanned Readmission:        17           Discharging to Facility/ Agency   · Name:   · Address:  · Phone:  · Fax:    Dialysis Facility (if applicable)   · Name:  · Address:  · Dialysis Schedule:  · Phone:  · Fax:    / signature: {Esignature:154111331}    PHYSICIAN SECTION    Prognosis: {Prognosis:9890873925}    Condition at Discharge: [address] [place] [address] Patient Condition:558584617}    Rehab Potential (if transferring to Rehab): {Prognosis:6524350363}    Recommended Labs or Other Treatments After Discharge: ***    Physician Certification: I certify the above information and transfer of Jm Pritchett  is necessary for the continuing treatment of the diagnosis listed and that he requires {Admit to Appropriate Level of Care:64660} for {GREATER/LESS:246834566} 30 days.      Update Admission H&P: {CHP DME Changes in GLNCM:227265154}    PHYSICIAN SIGNATURE:  {Esignature:045928677}

## 2020-07-31 NOTE — PROGRESS NOTES
Physical Therapy    Facility/Department: [address]  Initial Assessment    NAME: Annika Gonzales  : 1940  MRN: [id number]    Date of Service: 2020    Discharge Recommendations: Annika Gonzales scored a 22/24 on the AM-PAC short mobility form. At this time, no further PT is recommended upon discharge because pt is at baseline level and has wife at home to assist.  Recommend patient returns to prior setting with prior services. PT Equipment Recommendations  Equipment Needed: No    Assessment   Body structures, Functions, Activity limitations: Decreased strength;Decreased endurance  Assessment: Pt presents with chronic weakness and decreased endurance that affects patients ability to complete all activities that he wants. Pt is safe to return home but would benefit from skilled therapy while still in hospital to increase endurance and to give exercises to assist in LE strength. Treatment Diagnosis: weakness and decreased endurance  Prognosis: Good  Decision Making: Low Complexity  PT Education: Goals;PT Role;Plan of Care  Patient Education: Pt verbalized understanding  Barriers to Learning: None  REQUIRES PT FOLLOW UP: Yes  Activity Tolerance  Activity Tolerance: Patient Tolerated treatment well  Activity Tolerance: Pt tolerated treatment well and was able to complete all activities asked of him. Pt did report feeling a little tired at the end of the session but had no reports of dizziness or becoming lightheaded       Patient Diagnosis(es): The primary encounter diagnosis was Atrial fibrillation with RVR ([facility] [address] Diagnoses of Acute on chronic congestive heart failure, unspecified heart failure type ([facility] [address] and Acute renal failure superimposed on chronic kidney disease, unspecified CKD stage, unspecified acute renal failure type [facility]) were also pertinent to this visit.      has a past medical history of Acute combined systolic and diastolic (congestive) hrt fail ([address] Acute kidney injury [facility]), Arrhythmia, Atrial fibrillation ([facility] [address] Carotid artery stenosis, CKD (chronic kidney disease), Dilated cardiomyopathy ([facility] [address] Hyperlipidemia, Hypertension, IGT (impaired glucose tolerance), Meningioma ([facility] [address] and Valvular disease. has a past surgical history that includes Aortic valve replacement; Carpal tunnel release (Left); skin biopsy; and Colonoscopy (2010). Restrictions  Restrictions/Precautions  Restrictions/Precautions: Fall Risk(High Fall Risk)  Required Braces or Orthoses?: No  Position Activity Restriction  Other position/activity restrictions: Bernardo Jarrett is a 70 y.o. male who presents emergency department with concern for heart failure. Patient reports that about a month ago he had a bad echocardiogram.  They have been trying to manage this as an outpatient but he continues to decompensate. He reports that he has swollen and weepy legs. Today in the heart failure clinic he was identified to have acute renal failure. This prompted them to send him to the ER.      Vision/Hearing  Vision: Impaired  Vision Exceptions: Wears glasses for reading  Hearing: Exceptions to WFL(decent hearing but has to talk loud)       Subjective  General  Chart Reviewed: Yes  Family / Caregiver Present: Yes(Daughter, Juju)  Diagnosis: Paroxysmal A-fib  Follows Commands: Within Functional Limits  General Comment  Comments: Pt was seated on EOB finishing up breakfast with daughter present upon arrival of PT/OT, pt agreeable to therapy, no alarm present  Subjective  Subjective: Pt reports no pain  Pain Screening  Patient Currently in Pain: Denies  Vital Signs  Patient Currently in Pain: Denies       Orientation  Orientation  Overall Orientation Status: Within Normal Limits(O&A x4)     Social/Functional History  Social/Functional History  Lives With: Spouse  Type of Home: House  Home Layout: Two level, Performs ADL's on one level, Able to Live on Main level with bedroom/bathroom  Home Access: Stairs to enter with rails  Entrance Stairs - Number of Steps: 3 steps  Entrance Stairs - Rails: Left(when ascending)  Bathroom Shower/Tub: Tub/Shower unit, Shower chair with back(chair sits inside the tub)  Bathroom Toilet: Standard  Bathroom Equipment: Grab bars in shower, Hand-held shower, Grab bars around toilet, Shower chair, Commode(does not use the bedside commode)  Home Equipment: [place], Grab bars([employer])  Receives Help From: (no outside help)  ADL Assistance: Independent(able to shower and dress independently)  Homemaking Assistance: Independent(does cooking and cleaning by himself: tag teams with wife to complete stuff)  Ambulation Assistance: Independent(uses cane on [employer])  Transfer Assistance: Independent  Active : Yes  Leisure & Hobbies: enjoys gardening roses and tomatoes, playing wit 1year old grandson  Additional Comments: No falls within the last 6 months    Objective     Observation/Palpation  Posture: Fair(forward lean of trunk while seated)    AROM RLE (degrees)  RLE AROM: WNL  AROM LLE (degrees)  LLE AROM : WNL  Strength RLE  Strength RLE: Exception  Comment: Pt was a 4/5 for hip flexion; due bandages over open sores on the shin and foot a proper MMT was not performed for knee extension and ankle dorsiflexion but pt was able to maintain both positions for 5 seconds but does report that his legs have been weak for years  Strength LLE  Strength LLE: Exception  Comment: Pt was a 4/5 for hip flexion; due bandages over open sores on the shin and foot a proper MMT was not performed for knee extension and ankle dorsiflexion but pt was able to maintain both positions for 5 seconds but does report that his legs have been weak for years  Tone RLE  RLE Tone: Normotonic  Tone LLE  LLE Tone: Normotonic  Motor Control  Gross Motor?: WNL  Coordination  Rapid Alternating Movements: Normal  Sensation  Overall Sensation Status: WNL  Bed mobility  Supine to Sit: Modified independent(Not seen due to patient seated EOB upon arrival of PT/OT but patient was seen in passing performing it Mod I)  Scooting: Modified independent  Transfers  Sit to Stand: Stand by assistance(From EOB)  Stand to sit: Stand by assistance(To EOB and to recliner)  Stand Pivot Transfers: Stand by assistance(From EOB to recliner with a RW)  Ambulation  Ambulation?: Yes  Ambulation 1  Surface: level tile  Device: No Device  Assistance: Stand by assistance  Quality of Gait: Pt had a slow lashay and a decreased step length and height but was overall still functional  Distance: 100 feet x2 with a seated rest break after each ambulation  Comments: Pt reports that his walking feels comparable to what it was before hospital admission. Daughter reports that he is doing much better as she had to wheel him into the hospital on day of entry. Stairs/Curb  Stairs?: No     Balance  Sitting - Static: Good(Supervision)  Sitting - Dynamic: Good;-(Pt had some difficulty donning and doffing socks but was still SBA)  Standing - Static: Good;-(SBA)  Standing - Dynamic: Good;-(SBA)        Plan   Plan  Times per week: 1-2x  Times per day: Daily  Current Treatment Recommendations: Strengthening, Endurance Training, Stair training, Gait Training  Safety Devices  Type of devices:  All fall risk precautions in place, Call light within reach, Chair alarm in place, Gait belt, Patient at risk for falls, Left in chair; Nurse notified  Restraints  Initially in place: No      AM-PAC Score  AM-PAC Inpatient Mobility Raw Score : 22 (07/31/20 1041)  AM-PAC Inpatient T-Scale Score : 53.28 (07/31/20 1041)  Mobility Inpatient CMS 0-100% Score: 20.91 (07/31/20 1041)  Mobility Inpatient CMS G-Code Modifier : Jessika Reyna (07/31/20 1041)          Goals  Short term goals  Time Frame for Short term goals: discharge  Short term goal 1: Pt will perform a sit to/from stand Mod I  Short term goal 2: Pt will ambulate 300 feet with a cane as needed with Mod I assistance  Short term goal 3: Pt will ascend/descend 3 steps with Min A and AD as needed  Patient Goals   Patient goals : none stated       Therapy Time   Individual Concurrent Group Co-treatment   Time In 0842         Time Out 0920         Minutes 38         Timed Code Treatment Minutes: [address] [facility] [place], PT   [place], [place]  I agree with the above note. PT directly observed the SPT with the patient.   Ivonne Sauceda [place] DPT [id number]

## 2020-08-01 NOTE — PROGRESS NOTES
Pt A/Ox2 throughout most of the night, unaware of situation and place. HR trending in 130s-150s this evening, BP remains stable, Cardizem gtt increased from 2.5ml/hr to 5ml/hr to obtain goal of HR < 120. Will continue to monitor.

## 2020-08-01 NOTE — CONSULTS
Podiatry Progress Note    Subjective:  Timi Bai is a 70 y.o. male who has been having increasing sob at rest and excertion along with orthopnea. Patient has been diuresed as outpatient to not much success. Patient had a cardiologist office and was found to be in A. fib with RVR and acute heart failure. Patient was brought to the ED for admission. Patient denies fevers chills chest pain but does state has increased swelling in his feet states he is not short of breath but daughter in the room states patient has become increasingly severely short of breath. No recent travel or exposure to anyone with cold that. Podiatry consulted for eval and management of growth on feet. States they have been present for several years. States that are moderately bothersome. Admits that he usually cuts them off him self at home. Past Medical History:   has a past medical history of Acute combined systolic and diastolic (congestive) hrt fail ([address] Acute kidney injury ([address] Arrhythmia, Atrial fibrillation ([address] Carotid artery stenosis, CKD (chronic kidney disease), Dilated cardiomyopathy ([address] Hyperlipidemia, Hypertension, IGT (impaired glucose tolerance), Meningioma ([address] and Valvular disease.      Past Surgical History:   has a past surgical history that includes Aortic valve replacement and Carpal tunnel release (Left).      Medications:  No current facility-administered medications on file prior to encounter.              Current Outpatient Medications on File Prior to Encounter   Medication Sig Dispense Refill    warfarin (COUMADIN) 4 MG tablet Take 2-4 mg by mouth See Admin Instructions Take 2 mg (4 mg x 0.5 tablet) every Mon; take 4 mg (4 mg x 1 tablet) all other days.  Managed by [facility].        torsemide (DEMADEX) 20 MG tablet TAKE 2 TABLETS BY MOUTH TWICE DAILY 360 tablet 2    carvedilol (COREG) 12.5 MG tablet TAKE 1 TABLET TWICE DAILY  WITH MEALS 180 tablet 1    allopurinol (ZYLOPRIM) 100 MG tablet TAKE 1 TABLET BY MOUTH DAILY 90 tablet 3    atorvastatin (LIPITOR) 40 MG tablet TAKE 1 TABLET DAILY 90 tablet 3    lisinopril (PRINIVIL;ZESTRIL) 20 MG tablet TAKE 1 TABLET DAILY 90 tablet 3    aspirin 81 MG tablet Take 81 mg by mouth daily        Multiple Vitamins-Minerals (THERAPEUTIC MULTIVITAMIN-MINERALS) tablet Take 1 tablet by mouth daily            Allergies: Allergies   Allergen Reactions    Ceclor [Cefaclor] Other (See Comments)       CNS side effects, slurred speech. Lips and tounge swell.         Social History:  Patient Lives at home   reports that he quit smoking about 27 years ago. His smoking use included cigarettes. He has a 25.00 pack-year smoking history. He quit smokeless tobacco use about 38 years ago. His smokeless tobacco use included chew. He reports that he does not drink alcohol or use drugs.      Family History:  family history includes Heart Disease in his paternal grandfather; Hypertension in an other family member. ROS: Denies nausea, vomiting, fevers, chills. Objective:    /74   Pulse 112   Temp 98.1 °F (36.7 °C) (Oral)   Resp 18   Ht 5' 9\" (1.753 m)   Wt 156 lb 14.4 oz (71.2 kg)   SpO2 97%   BMI 23.17 kg/m²   In: 840 [P.O.:840]  Out: 975 [Urine:975]     Exam:  Vascular: Edema noted b/l LE. Hair growth decreased b/l LE. Venous stasis changes noted to b/l LE. Pitting edema noted b/l. Derm: Thickened discolored nails noted b/l Nails are mycotic and dystrophic. Diffuse callused growth of skin in plantar feet noted. Musculoskeletal: Muscle strength decreased b/l. Ankle joint and 1st MPJ ROM decreased. Contracted lesser digits noted. B/l. Neuro: Sensation to light touch intact.        Data:    CBC with Differential:    Lab Results   Component Value Date    WBC 10.4 08/01/2020    RBC 3.18 08/01/2020    HGB 9.9 08/01/2020    HCT 30.1 08/01/2020     08/01/2020    MCV 94.8 08/01/2020    MCH 31.1 08/01/2020    MCHC 32.8 08/01/2020    RDW 16.3 08/01/2020    NRBC CANCELED 12/03/2014    NRBC CANCELED 12/03/2014    SEGSPCT 57.6 12/03/2014    BANDSPCT CANCELED 12/03/2014    BLASTSPCT CANCELED 12/03/2014    METASPCT CANCELED 12/03/2014    LYMPHOPCT 11.7 08/01/2020    PROMYELOPCT CANCELED 12/03/2014    MONOPCT 7.2 08/01/2020    MYELOPCT CANCELED 12/03/2014    EOSPCT 2.5 11/17/2010    BASOPCT 0.5 08/01/2020    MONOSABS 0.8 08/01/2020    LYMPHSABS 1.2 08/01/2020    EOSABS 0.0 08/01/2020    BASOSABS 0.0 08/01/2020    DIFFTYPE Auto 11/17/2010     CMP:    Lab Results   Component Value Date     08/01/2020    K 3.9 08/01/2020    CL 99 08/01/2020    CO2 25 08/01/2020    BUN 91 08/01/2020    CREATININE 2.9 08/01/2020    GFRAA 26 08/01/2020    GFRAA >60 12/28/2011    AGRATIO 0.8 07/25/2019    LABGLOM 21 08/01/2020    LABGLOM 61 12/29/2018    GLUCOSE 137 08/01/2020    GLUCOSE 98 03/09/2020    PROT 6.9 05/29/2018    PROT 7.8 12/28/2011    LABALBU 3.4 07/31/2020    CALCIUM 9.2 08/01/2020    BILITOT 0.9 07/25/2019    ALKPHOS 135 07/25/2019    AST 20 07/25/2019    ALT 22 07/25/2019        Assessment:  Onychogryphosis  Corns/calluses b/l foot  Cutaneous horn b/l foot  Xerosis b/l foot    Plan:  Pt examined and evaluated. Urea cream ordered for b/l foot to be applied BID  Pt to follow up in office once d/c for possible removal of keratin build up.          Gayle Tavarez, ANGEL  8/1/2020

## 2020-08-01 NOTE — PLAN OF CARE
Re-Assessment complete, see flow sheet. Has been very restless & confused to purpose today, up to chair & back to bed multiple times. Atrial fibrillation, rate rate ranging from 89 to 126 this afternoon, primarily around 100-110 since 1400. Will continue to monitor. Donald Grigsby RN, BSN, PCCN.

## 2020-08-01 NOTE — PROGRESS NOTES
[facility] PROGRESS NOTE    8/1/2020 12:53 PM        Name: Robel Anthony . Admitted: 7/29/2020  Primary Care Provider: Marin Servin MD (Tel: [phone])          Subjective:    Patient lying in bed feeling better no sob    Reviewed interval ancillary notes    Current Medications  carvedilol (COREG) tablet 12.5 mg, BID WC  [START ON 8/3/2020] warfarin (COUMADIN) tablet 2 mg, Once per day on Mon    And  warfarin (COUMADIN) tablet 4 mg, Once per day on Sun Tue Wed Thu Fri Sat  torsemide (DEMADEX) tablet 20 mg, Daily  Efferdent Denture Cleanser TBEF 1 each, PRN  Fixodent Complete CREA 1 each, PRN  aspirin EC tablet 81 mg, Daily  atorvastatin (LIPITOR) tablet 40 mg, Daily  sodium chloride flush 0.9 % injection 10 mL, 2 times per day  sodium chloride flush 0.9 % injection 10 mL, PRN  acetaminophen (TYLENOL) tablet 650 mg, Q6H PRN    Or  acetaminophen (TYLENOL) suppository 650 mg, Q6H PRN  polyethylene glycol (GLYCOLAX) packet 17 g, Daily PRN  promethazine (PHENERGAN) tablet 12.5 mg, Q6H PRN    Or  ondansetron (ZOFRAN) injection 4 mg, Q6H PRN  dilTIAZem 125 mg in dextrose 5 % 125 mL infusion, Continuous        Objective:  /74   Pulse 112   Temp 98.1 °F (36.7 °C) (Oral)   Resp 18   Ht 5' 9\" (1.753 m)   Wt 156 lb 14.4 oz (71.2 kg)   SpO2 97%   BMI 23.17 kg/m²     Intake/Output Summary (Last 24 hours) at 8/1/2020 1253  Last data filed at 8/1/2020 1021  Gross per 24 hour   Intake 960 ml   Output 975 ml   Net -15 ml      Wt Readings from Last 3 Encounters:   08/01/20 156 lb 14.4 oz (71.2 kg)   07/29/20 164 lb 8 oz (74.6 kg)   07/15/20 165 lb (74.8 kg)       General appearance:  Appears comfortable.   Alert oriented x2 and to time but not sure of the president knows date  HEENT: atraumatic, Pupils equal, muscous membranes moist, no masses appreciated  Cardiovascular: Irregularly irregular grade 2 out of 6 blowing systolic murmur  Respiratory: CTAB no wheezing  Gastrointestinal: Abdomen soft, non-tender, BS+  EXT:trace edema  Neurology: no gross focal deficts  Psychiatry: Appropriate affect. Not agitated  Skin: Warm, dry, no rashes appreciated    Labs and Tests:  CBC:   Recent Labs     07/30/20  0648 07/30/20  1246 08/01/20  0459   WBC 19.0* 18.2* 10.4   HGB 9.9* 9.5* 9.9*   * 128* 125*     BMP:    Recent Labs     07/30/20  0648 07/31/20  0452 08/01/20  0459   * 136 136   K 4.3 3.9 3.9   CL 96* 97* 99   CO2 22 25 25   BUN 86* 88* 91*   CREATININE 3.4* 3.1* 2.9*   GLUCOSE 114* 129* 137*     Hepatic: No results for input(s): AST, ALT, ALB, BILITOT, ALKPHOS in the last 72 hours. XR CHEST (2 VW)   Final Result   Mild cardiomegaly. Minimal to slight pulmonary vascular congestion. No   other significant abnormality. XR CHEST PORTABLE   Final Result   No acute cardiopulmonary disease. Cardiomegaly without overt failure. Recent imaging reviewed    Problem List  Active Problems:    Paroxysmal A-fib (HCC)  Resolved Problems:    * No resolved hospital problems.  *       Assessment/Plan:   Paroxysmal A. fib with RVR started on diltiazem drip discussed with cardiology continue Coumadin for anticoagulation pharmacy to dose  -still on dilt increase coreg     Acute on chronic systolic heart failure with EF of 30% and severe mitral regurgitation  On toresime monitor     RACIEL on CKD 3 improving    Leukocytosis :  resolved           Full Code Coumadin for anticoagulation    Randal Salas MD   8/1/2020 12:53 PM

## 2020-08-01 NOTE — PLAN OF CARE
Re-Assessment complete, see flow sheet. /74   Pulse 112   Temp 98.1 °F (36.7 °C) (Oral)   Resp 18   Ht 5' 9\" (1.753 m)   Wt 156 lb 14.4 oz (71.2 kg)   SpO2 97%   BMI 23.17 kg/m²  room air, remains in atrial fibrillation RVR. Lower legs & feet cleaned, popped blister right lower leg rinsed with saline & mepilex border applied. Proximal & distal popped blisters left lower leg & intact water blister left medial lower leg rinsed with saline, mepilex border placed on proximal popped blister, foam dressing to left medial & distal blisters. Up to chair with minimal assist of 1, the patient tolerated ambulation fairly well. Follows direction poorly. Chair alarm & video monitor in place. Tru Wolff RN, BSN, PCCN.

## 2020-08-01 NOTE — PROGRESS NOTES
136   K 4.3 3.9 3.9   CL 96* 97* 99   CO2 22 25 25   PHOS  --  4.5  --    BUN 86* 88* 91*   CREATININE 3.4* 3.1* 2.9*     Magnesium: No results found for: MG  Lab Results   Component Value Date    CREATININE 2.9 08/01/2020       Arterial Blood Gasses  No results for input(s): PH, PCO2, PO2 in the last 72 hours. Invalid input(s): [id number], INSPIREDO2    UA:  Recent Labs     07/30/20 2019   COLORU YELLOW   PHUR 5.0   WBCUA 3-5   RBCUA 3-4   BACTERIA Rare*   CLARITYU Clear   SPECGRAV 1.012   LEUKOCYTESUR Negative   UROBILINOGEN 0.2   BILIRUBINUR Negative   BLOODU MODERATE*   GLUCOSEU Negative       LIVER PROFILE: No results for input(s): AST, ALT, LIPASE, BILIDIR, BILITOT, ALKPHOS in the last 72 hours. Invalid input(s): AMYLASE,  ALB  PT/INR:    Lab Results   Component Value Date    PROTIME 23.1 08/01/2020    PROTIME 40.5 07/31/2020    PROTIME 45.2 07/30/2020    PROTIME 48.3 08/31/2018    PROTIME 37.3 08/03/2018    PROTIME 31 07/06/2018    INR 1.98 08/01/2020    INR 3.45 07/31/2020    INR 3.84 07/30/2020     PTT:    Lab Results   Component Value Date    APTT 38.9 01/23/2010     SNOW:  No results found for: ANATITER, SNOW  CHEMISTRY COMMON GROUP :   Lab Results   Component Value Date    GLUCOSE 137 08/01/2020    GLUCOSE 98 03/09/2020    TSH 1.86 12/03/2014     Recent Labs     07/29/20  1310 07/30/20  0648 07/31/20  0452 08/01/20  0459   GLUCOSE 136* 114* 129* 137*   CALCIUM 9.3 9.2 8.8 9.2         RADIOLOGY:        Imaging Results.   Chest X Ray reviwed by me               Electronically Signed: Tasia Cedeño MD 8/1/2020 9:15 AM

## 2020-08-01 NOTE — PROGRESS NOTES
[facility]   Progress Note  Cardiology    CC:  Afib, CHF    HPI:  He feels somewhat better. Breathing and edema improved. Still weak. Remains on iv cardizem for afib. Rate increased from 2.5 to 5 due to elevated HR. Medications/Labs all Reviewed    Lab Results   Component Value Date    WBC 10.4 08/01/2020    HGB 9.9 (L) 08/01/2020    HCT 30.1 (L) 08/01/2020    MCV 94.8 08/01/2020     (L) 08/01/2020     Lab Results   Component Value Date    CREATININE 2.9 (H) 08/01/2020    BUN 91 (HH) 08/01/2020     08/01/2020    K 3.9 08/01/2020    CL 99 08/01/2020    CO2 25 08/01/2020     Lab Results   Component Value Date    INR 1.98 (H) 08/01/2020    PROTIME 23.1 (H) 08/01/2020        Physical Examination:    /63   Pulse 113   Temp 98 °F (36.7 °C) (Oral)   Resp 18   Ht 5' 9\" (1.753 m)   Wt 156 lb 14.4 oz (71.2 kg)   SpO2 95%   BMI 23.17 kg/m²      Respiratory:  · Resp Assessment: Normal respiratory effort  · Resp Auscultation: Clear to auscultation bilaterally   Cardiovascular:  · Auscultation: tachycardic irregular rate and rhythm, normal mechanical valve click, systolic murmur,   · Palpation:  Nl PMI  · JVP:  normal  · Extremities: No Edema  Abdomen:  · Soft, non-tender  · Normal bowel sounds  Extremities:  ·  No Cyanosis or Clubbing  Neurological/Psychiatric:  · Oriented to time, place, and person  · Non-anxious  Skin:   · Warm and dry      Assessment:        Paroxysmal A-fib (HCC)  Plan: Heart rate remains fast. Increase coreg to 12.5 BID. Continue cardizem drip, but wean as tolerated. Watch BP as was low sitting yesterday. Fine today but hasn't been up     AVR-Mechanical:  On anticoagulation with coumadin  INR 1.98. Follow    Acute on chronic renal insufficiency:  Unchanged today. Fluid status difficult to determine. Iv lasix was stopped now on po torsemide. Other nephrotoxic agents stopped. Follow.  Renal also following    CHF:  Acute on chronic systolic and diastolic CHF  Improved with diuresis  However, ACE and aldactone stopped due to renal issues.   Follow      Remains ill with multiple issues and high risk of decompensation    Radu Murry MD, 8/1/2020 10:27 AM

## 2020-08-02 NOTE — PROGRESS NOTES
[facility] PROGRESS NOTE    8/2/2020 1:13 PM        Name: Timi Bai . Admitted: 7/29/2020  Primary Care Provider: Hannah Valenzuela MD (Tel: [phone])          Subjective:    Sitting in bed feeling better no sob or chest pain    Reviewed interval ancillary notes    Current Medications  enoxaparin (LOVENOX) injection 70 mg, Daily  amiodarone (CORDARONE) tablet 200 mg, TID  carvedilol (COREG) tablet 12.5 mg, BID WC  [START ON 8/3/2020] warfarin (COUMADIN) tablet 2 mg, Once per day on Mon    And  warfarin (COUMADIN) tablet 4 mg, Once per day on Sun Tue Wed Thu Fri Sat  melatonin tablet 6 mg, Nightly PRN  torsemide (DEMADEX) tablet 20 mg, Daily  Efferdent Denture Cleanser TBEF 1 each, PRN  Fixodent Complete CREA 1 each, PRN  aspirin EC tablet 81 mg, Daily  atorvastatin (LIPITOR) tablet 40 mg, Daily  sodium chloride flush 0.9 % injection 10 mL, 2 times per day  sodium chloride flush 0.9 % injection 10 mL, PRN  acetaminophen (TYLENOL) tablet 650 mg, Q6H PRN    Or  acetaminophen (TYLENOL) suppository 650 mg, Q6H PRN  polyethylene glycol (GLYCOLAX) packet 17 g, Daily PRN  promethazine (PHENERGAN) tablet 12.5 mg, Q6H PRN    Or  ondansetron (ZOFRAN) injection 4 mg, Q6H PRN  dilTIAZem 125 mg in dextrose 5 % 125 mL infusion, Continuous        Objective:  /68   Pulse 118   Temp 97.4 °F (36.3 °C) (Oral)   Resp 18   Ht 5' 9\" (1.753 m)   Wt 156 lb 14.4 oz (71.2 kg)   SpO2 93%   BMI 23.17 kg/m²     Intake/Output Summary (Last 24 hours) at 8/2/2020 1313  Last data filed at 8/2/2020 0455  Gross per 24 hour   Intake 240 ml   Output 500 ml   Net -260 ml      Wt Readings from Last 3 Encounters:   08/01/20 156 lb 14.4 oz (71.2 kg)   07/29/20 164 lb 8 oz (74.6 kg)   07/15/20 165 lb (74.8 kg)       General appearance:  Appears comfortable.   Alert oriented x2 and to time but not sure of the president knows date  HEENT: atraumatic, Pupils equal, muscous membranes moist, no masses appreciated  Cardiovascular: Irregularly irregular grade 2 out of 6 blowing systolic murmur  Respiratory: CTAB no wheezing  Gastrointestinal: Abdomen soft, non-tender, BS+  EXT:trace edema  Neurology: no gross focal deficts  Psychiatry: Appropriate affect. Not agitated  Skin: Warm, dry, no rashes appreciated    Labs and Tests:  CBC:   Recent Labs     08/01/20  0459 08/02/20  0544   WBC 10.4 7.3   HGB 9.9* 9.9*   * 116*     BMP:    Recent Labs     07/31/20  0452 08/01/20  0459 08/02/20  0544    136 139   K 3.9 3.9 3.9   CL 97* 99 104   CO2 25 25 25   BUN 88* 91* 83*   CREATININE 3.1* 2.9* 2.4*   GLUCOSE 129* 137* 129*     Hepatic:   Recent Labs     08/02/20  0544   AST 33   ALT 31   BILITOT 1.6*   ALKPHOS 97     XR CHEST (2 VW)   Final Result   Mild cardiomegaly. Minimal to slight pulmonary vascular congestion. No   other significant abnormality. XR CHEST PORTABLE   Final Result   No acute cardiopulmonary disease. Cardiomegaly without overt failure. Recent imaging reviewed    Problem List  Active Problems:    Paroxysmal A-fib (HCC)  Resolved Problems:    * No resolved hospital problems.  *       Assessment/Plan:   Paroxysmal A. fib with RVR started on diltiazem drip discussed with cardiology continue Coumadin for anticoagulation pharmacy to dose  -started on amiodarone wean dilt gtt as toelrated     Acute on chronic systolic heart failure with EF of 30% and severe mitral regurgitation  On toresmide monito electrooytesd and creatinine     RACIEL on CKD 3 improving    Leukocytosis :  resolved           Full Code Coumadin for anticoagulation    Rowan Day MD   8/2/2020 1:13 PM

## 2020-08-02 NOTE — PROGRESS NOTES
[address]   Electrophysiology Progress Note     Date: 8/2/2020  Admit Date: 7/29/2020     Reason for consultation: AF, CHF    Chief Complaint:   Chief Complaint   Patient presents with    Abnormal Lab     heart failure clinic sent pt for an admission. decrease EF, decrease kidney labs, heart failure. left ankle pain       History of Present Illness: History obtained from patient and medical record. Crow Bernard is a 70 y.o. male with a past medical history of HTN, HLD, CHF, aortic stenosis s/p mechanical AVR, and AF. Hx of DCCV    Pt admitted from cardiology office for worsening kidney function and leg swelling. He was diuresed with IV lasix. Interval Hx: Today, he is being seen for follow up. He remains in AF, rate 100-120s. He states that he does feel his heart race from time to time. We discussed the difficult nature of controlling his HR given his other co-morbidities. Will add amiodarone. Patient seen and examined. Clinical notes reviewed. Telemetry reviewed. No new complaints today. No major events overnight. Denies having chest pain, palpitations, shortness of breath, orthopnea/PND, cough, or dizziness at the time of this visit. Allergies: Allergies   Allergen Reactions    Ceclor [Cefaclor] Other (See Comments)     CNS side effects, slurred speech. Lips and tounge swell. Home Meds:  Prior to Visit Medications    Medication Sig Taking? Authorizing Provider   warfarin (COUMADIN) 4 MG tablet Take 2-4 mg by mouth See Admin Instructions Take 2 mg (4 mg x 0.5 tablet) every Mon; take 4 mg (4 mg x 1 tablet) all other days. Managed by [facility].  Yes Historical Provider, MD   torsemide (DEMADEX) 20 MG tablet TAKE 2 TABLETS BY MOUTH TWICE DAILY Yes Doc Hinojosa, APRN - CNP   carvedilol (COREG) 12.5 MG tablet TAKE 1 TABLET TWICE DAILY  WITH MEALS Yes Tarsha Guerra MD   allopurinol (ZYLOPRIM) 100 MG tablet TAKE 1 TABLET BY MOUTH DAILY Yes Annemarie Epps MD atorvastatin (LIPITOR) 40 MG tablet TAKE 1 TABLET DAILY Yes Geovanna Lucia MD   lisinopril (PRINIVIL;ZESTRIL) 20 MG tablet TAKE 1 TABLET DAILY Yes Geovanna Lucia MD   aspirin 81 MG tablet Take 81 mg by mouth daily Yes Historical Provider, MD   Multiple Vitamins-Minerals (THERAPEUTIC MULTIVITAMIN-MINERALS) tablet Take 1 tablet by mouth daily Yes Historical Provider, MD      Scheduled Meds:   carvedilol  12.5 mg Oral BID WC    [START ON 8/3/2020] warfarin  2 mg Oral Once per day on Mon    And    warfarin  4 mg Oral Once per day on Sun Tue Wed Thu Fri Sat    torsemide  20 mg Oral Daily    aspirin  81 mg Oral Daily    atorvastatin  40 mg Oral Daily    sodium chloride flush  10 mL Intravenous 2 times per day     Continuous Infusions:   dilTIAZem (CARDIZEM) 125 mg in dextrose 5% 125 mL infusion 2.5 mg/hr (08/02/20 0637)     PRN Meds:melatonin, Efferdent Denture Cleanser, Fixodent Complete, sodium chloride flush, acetaminophen **OR** acetaminophen, polyethylene glycol, promethazine **OR** ondansetron     Past Medical History:  Past Medical History:   Diagnosis Date    Acute combined systolic and diastolic [facility]congestive[facility]rt fail [facility]     Acute kidney injury ([facility]     Arrhythmia     Atrial fibrillation ([facility]     Carotid artery stenosis     CKD (chronic kidney disease)     Dilated cardiomyopathy ([facility]     Hyperlipidemia     Hypertension     IGT (impaired glucose tolerance)     Meningioma ([facility]     Valvular disease         Past Surgical History:    has a past surgical history that includes Aortic valve replacement; Carpal tunnel release (Left); skin biopsy; and Colonoscopy (2010). Social History:  Reviewed. reports that he quit smoking about 27 years ago. His smoking use included cigarettes. He has a 25.00 pack-year smoking history. He quit smokeless tobacco use about 38 years ago. His smokeless tobacco use included chew. He reports that he does not drink alcohol or use drugs.      Family History:  Reviewed. family history includes Heart Disease in his paternal grandfather; Hypertension in an other family member. Review of Systems:  · Constitutional: Negative for fever, night sweats, chills, weight changes, or weakness  · Skin: Negative for rash, dry skin, pruritus, bruising, bleeding, blood clots, or changes in skin pigment  · HEENT: Negative for vision changes, ringing in the ears, sore throat, dysphagia, or swollen lymph nodes  · Respiratory: Reviewed in HPI  · Cardiovascular: Reviewed in HPI  · Gastrointestinal: Negative for abdominal pain, N/V/D, constipation, or black/tarry stools  · Genito-Urinary: Negative for dysuria, incontinence, urgency, or hematuria  · Musculoskeletal: Negative for joint swelling, muscle pain, or injuries  · Neurological/Psych: Negative for confusion, seizures, headaches, balance issues or TIA-like symptoms. No anxiety, depression, or insomnia    Physical Examination:  Vitals:    08/02/20 1123   BP: 108/68   Pulse: 118   Resp: 18   Temp: 97.4 °F (36.3 °C)   SpO2: 93%      In: 240 [P.O.:240]  Out: 500    Wt Readings from Last 3 Encounters:   08/01/20 156 lb 14.4 oz (71.2 kg)   07/29/20 164 lb 8 oz (74.6 kg)   07/15/20 165 lb (74.8 kg)       Intake/Output Summary (Last 24 hours) at 8/2/2020 1127  Last data filed at 8/2/2020 0455  Gross per 24 hour   Intake 480 ml   Output 500 ml   Net -20 ml       Telemetry: Personally Reviewed  - Atrial fibrillation, rate 100-120s  · Constitutional: Cooperative and in no apparent distress, and appears stated age  · Skin: Warm and pink; no pallor, cyanosis, bruising, or clubbing. + Wounds to BLE  · HEENT: Symmetric and normocephalic. PERRL, EOM intact. Conjunctiva pink with clear sclera. Mucus membranes pink and moist. Teeth intact. Thyroid smooth without nodules or goiter. · Cardiovascular: Tachycardic rate and irregular rhythm. S1/S2 present without rubs, or gallops. + Murmur. Peripheral pulses 2+, capillary refill < 3 seconds.  No elevation of JVP. 1+ BLE edema  · Respiratory: Respirations symmetric and unlabored. Lungs diminished to auscultation bilaterally, no wheezing, crackles, or rhonchi  · Gastrointestinal: Abdomen soft and round. Bowel sounds normoactive in all quadrants without tenderness or masses. · Musculoskeletal: Bilateral upper and lower extremity strength 5/5 with full ROM  · Neurologic/Psych: Awake and orientated to person, place and time. Calm affect, appropriate mood    Pertinent labs, diagnostic, device, and imaging results reviewed as a part of this visit    Labs:    BMP:   Recent Labs     20  0452 20  0459 20  0544    136 139   K 3.9 3.9 3.9   CL 97* 99 104   CO2 25 25 25   PHOS 4.5  --   --    BUN 88* 91* 83*   CREATININE 3.1* 2.9* 2.4*     Estimated Creatinine Clearance: 26 mL/min (A) (based on SCr of 2.4 mg/dL (H)). CBC:   Recent Labs     20  1246 20  0544   WBC 18.2* 10.4 7.3   HGB 9.5* 9.9* 9.9*   HCT 29.0* 30.1* 29.9*   MCV 95.7 94.8 95.6   * 125* 116*     Thyroid:   Lab Results   Component Value Date    TSH 1.86 2014     Lipids:   Lab Results   Component Value Date    CHOL 114 2018    CHOL 65 2018    HDL 45 2019    HDL 40 2011    TRIG 84 2019     LFTS:   Lab Results   Component Value Date    ALT 22 2019    AST 20 2019    ALKPHOS 135 2019    PROT 6.9 2018    PROT 7.8 2011    AGRATIO 0.8 2019    BILITOT 0.9 2019     Cardiac Enzymes:   Lab Results   Component Value Date    CKTOTAL 76 2010    TROPONINI 0.03 2020    TROPONINI 0.03 2020    TROPONINI 0.03 2020     Coags:   Lab Results   Component Value Date    PROTIME 19.4 2020    PROTIME 48.3 2018    INR 1.66 2020       EC/20  Atrial fibrillation with RVR    ECHO:     Normal left ventricular size. Concentric left ventricular hypertrophy.    Decreased left ventricular systolic function with inferoseptal hypokinesis. Estimated EF 30%. E/e'=28. Indeterminate diastolic function. A-fib. Mild thickening of anterior leaflet of mitral valve that appears to prolapse   mildly. The maximum mitral valve pressure gradient is 12 mmHg and the mean pressure gradient is 5 mmHg. Moderately severe mitral regurgitation with posteriorly directed jet. The left atrium is dilated. A mechanical aortic valve appears well seated with a maximum gradient of 35 mmHg and a mean gradient of 22 mmHg. Trivial aortic regurgitation. The ascending aorta is mildly dilated. 4.0cm. Normal right ventricular size and mildly decreased in function. Moderate tricuspid regurgitation directed anteriorly. PASP 46mmHg. The right atrium is dilated. Trivial pulmonic regurgitation. IVC size is normal (<2.1 cm) but collapses < 50% with respiration consistent with elevated RA pressure (8 mmHg).     CXR: 7/30/20  Mild cardiomegaly.  Minimal to slight pulmonary vascular congestion.  No    other significant abnormality.           Problem List:   Patient Active Problem List    Diagnosis Date Noted    Essential hypertension, benign 06/22/2011     Priority: High    Atrial fibrillation 06/22/2011     Priority: High    IGT (impaired glucose tolerance) 06/03/2016     Priority: Medium    Chronic kidney disease, stage III (moderate) ([address] 05/16/2013     Priority: Medium    Paroxysmal A-fib ([address] 07/29/2020    Dilated cardiomyopathy ([address] 07/15/2020    SOB (shortness of breath) 07/15/2020    Localized edema 07/15/2020    Acute combined systolic and diastolic congestive heart failure ([address] 07/15/2020    S/P AVR (aortic valve replacement) 06/30/2020    RACIEL (acute kidney injury) ([address] 02/24/2020    Labyrinthine vertigo 06/29/2018    Bilateral carotid artery stenosis 05/01/2017    Atherosclerosis of abdominal aorta ([address] 05/01/2017    Meningioma (HCC)     Chronic combined systolic and diastolic CHF (congestive heart failure) ([facility] [facility]     Lung nodule 04/23/2017    Hypertrophy of prostate without urinary obstruction and other lower urinary tract symptoms (LUTS) 05/16/2013    Peptic ulcer 05/16/2013    Gout 05/16/2013    Generalized osteoarthrosis, involving multiple sites 05/16/2013    Heart valve replaced by other means 06/22/2011    Hyperlipidemia other unspecified. 06/22/2011    Aortic regurgitation 06/22/2011        Assessment and Plan:     1. Persistent Atrial Fibrillation  - Currently in atrial fibrillation, rate 100-120s  - Continue coreg 12.5 mg BID   - Due to CKD and soft blood pressures, anti-arrhythmic therapies are limited to amiodarone. I have discussed with patient side effects of amiodarone including but not limited to thyroid disease, discoloration of skin and cornea and pulmonary fibrosis. Patient would like to continue with it.     ~ Start oral loading with 200 mg TID x1 week, then 200 mg daily    - Wean cardizem gtt to keep HR <100    - [id number] score:high  ~ On coumadin; INR 1.66. Bridge with SQ lovenox daily. Goal INR 2.5-3.5 with mechanical valve    - Afib risk factors including age, HTN, obesity, inactivity and JEREMIAH were discussed with patient. Risk factor modification recommended   ~ TSH: will check       - Treatment options including cardioversion, rate control strategy, antiarrhythmics, anticoagulation and possible ablation were discussed with patient. Risks, benefits and alternative of each treatment options were explained. All questions answered    ~ Poor candidate for cardioversion or ablation given long standing nature of his AF    2. Aortic Stenosis   - S/p mechanical AVR   - On coumadin    3. Chronic combined heart failure (NYHA Class III)  - Appears decompensated, but improving.  Likely worsened by AF with RVR   ~ EF 30% per echo (6/20)  - Continue with BB, torsemide 20 mg QD  ~ ACE-I/ARB contraindicated due to renal insufficiency and risk of hyperkalemia  - Monitor I&Os, daily weights    4. Non-ischemic Cardiomyopathy   - Likely tachycardia induced from AF RVR   - Previously 45% in 2017   - On OMT; uptitrate as tolerated     - Consider GXT as outpatient once acute issues improve to rule out any CAD     - Discussed need for possibl AICD in future if EF does not improve in 3-6 months on OMT    5. Mitral Regurgitation, TR   - Noted on echo   - Likely worsened by acute CHF   - Continue to monitor    6. CKD   - Improving    ~ Creatinine 2.4/BUN 83 this AM   - Monitor I&Os   - Nephrology following    Multiple medical conditions with risk of decompensation. All pertinent information and plan of care discussed with the EP physician, Dr. Morena Gaffney    All questions and concerns were addressed to the patient. Alternatives to my treatment were discussed. I have discussed the above stated plan with patient and the nurse. The patient verbalized understanding and agreed with the plan. The patient was seen for >35 minutes. I reviewed interval history, physical exam, review of data including labs, imaging, development and implementation of treatment plan and coordination of complex care. Thank you for allowing to us to participate in the care of Haydee Terry.     SARAH Ruby-CNP  [facility]   Office: [phone]

## 2020-08-02 NOTE — PLAN OF CARE
Re-Assessment complete, see flow sheet. /69   Pulse 98   Temp 98.2 °F (36.8 °C) (Oral)   Resp 18   Ht 5' 9\" (1.753 m)   Wt 156 lb 14.4 oz (71.2 kg)   SpO2 96%   BMI 23.17 kg/m²  room air, atrial fibrillation. Very restless. Up out of bed & back to bed with minimal assist of 1, unsteady on feet. Will continue to monitor. Mary Godfrey RN, BSN, PCCN.

## 2020-08-02 NOTE — PROGRESS NOTES
MD Kat Nath MD Edith Mussel, MD                                  Office: [phone]                 Fax: [phone]          [employer]                     NEPHROLOGY IN PATIENT PROGRESS NOTE:     PATIENT NAME: Timi Bai  : 1940  MRN: [id number]      Assessment and Plan    Assessment:     Acute renal failure on chronic kidney disease stage III,. Severe. slow improvement Nonoliguric. Baseline creatinine worsened recently, 1.7 earlier this year  On admission 3.6, that was the peak   Now improving slowly    Chronic kidney disease. Stage IV. Follows with Dr. Winifred Dorado, next appointment     Failed diuretic treatment and possible resistance  Decompensated CHF. History of dilated cardiomyopathy. History of mitral valvular heart disease. Generalized weakness and lethargy. Altered mental status change. Anemia. Paroxysmal A-fib (HCC) [I48.0]  Paroxysmal A-fib ([address] [I48.0]          Plan:       Acute kidney injury. Slow improving kidney function most likely related to improving decompensated CHF, precipitated by A. Fib with RVR. Improving A. Fib and rapid heart rate. Needing diltiazem infusion. Renal functions almost to his baseline level. Electrolytes are stable  Potassium levels are stable. Continue to hold ACE inhibitors and spironolactone. For now Discontinued the IV Lasix. Switched to torsemide 20 mg once daily. Taper down diltiazem infusion. Appreciated help from cardiology,  Follow-up recommendations as outlined by Dr. Debra Galarza. No immediate indications for dialysis. Family understand severity of renal failure, and possible need for dialysis, if there is any further worsening of kidney function down the road. Disposition, not today, hopefully next 1-2 days, if kidney function and fluid overload continues to improve      High complexity. Multiple medical problems.    Discussed with patient and treatment team.    And his daughter     Thank you for allowing me to participate in this patient's care. Please do not hesitate to contact me for any questions/concerns. We will follow along with you. Priti Cristobal MD  [facility] [address]   Phone: [phone] or [facility] [employer]  Fax: [phone]            Subjective:   Again today am seen resting in bed,  No acute complaints reportedly    His daughter at bedside - discussed w/ her       Medications and medical records reviewed. Objective:   EXAM  Vitals:    08/02/20 0454   BP: 107/81   Pulse: 111   Resp: 16   Temp: 97.5 °F (36.4 °C)   SpO2: 94%       Intake/Output Summary (Last 24 hours) at 8/2/2020 0921  Last data filed at 8/2/2020 0455  Gross per 24 hour   Intake 840 ml   Output 500 ml   Net 340 ml       ill appearing. Moderate respiratory distress. lethargic. Borderline hypotension. CVS.  Heart sounds are normal.Palpation of the heart is normal. No murmurs. No pericardial rub.  RS.dullness on percussion of the lower chest wall. Bilateral Basal rales. PA soft , bowel sounds are normal no distension and no tenderness to palpation. Musculoskeletal: . 1+ edema and no tenderness. CNS  No focal    Edemaimproved. Active Problems:    Paroxysmal A-fib (HCC)  Resolved Problems:    * No resolved hospital problems. *        Medications Reviewed by me  Gavi Rouse carvedilol  12.5 mg Oral BID WC    [START ON 8/3/2020] warfarin  2 mg Oral Once per day on Mon    And    warfarin  4 mg Oral Once per day on Sun Tue Wed Thu Fri Sat    torsemide  20 mg Oral Daily    aspirin  81 mg Oral Daily    atorvastatin  40 mg Oral Daily    sodium chloride flush  10 mL Intravenous 2 times per day      dilTIAZem (CARDIZEM) 125 mg in dextrose 5% 125 mL infusion 2.5 mg/hr (08/02/20 8979)       Data Review.     Labs reviewed by me       CBC:   Recent Labs     07/30/20  1246 08/01/20  0459 08/02/20  0544   WBC 18.2* 10.4 7.3   HGB 9.5* 9.9* 9.9*   HCT 29.0* 30.1* 29.9*   MCV 95.7 94.8 95.6   * 125* 116*     BMP:   Recent Labs     07/31/20  0452 08/01/20  0459 08/02/20  0544    136 139   K 3.9 3.9 3.9   CL 97* 99 104   CO2 25 25 25   PHOS 4.5  --   --    BUN 88* 91* 83*   CREATININE 3.1* 2.9* 2.4*     Magnesium: No results found for: MG  Lab Results   Component Value Date    CREATININE 2.4 08/02/2020       Arterial Blood Gasses  No results for input(s): PH, PCO2, PO2 in the last 72 hours. Invalid input(s): [id number], INSPIREDO2    UA:  Recent Labs     07/30/20  2019   COLORU YELLOW   PHUR 5.0   WBCUA 3-5   RBCUA 3-4   BACTERIA Rare*   CLARITYU Clear   SPECGRAV 1.012   LEUKOCYTESUR Negative   UROBILINOGEN 0.2   BILIRUBINUR Negative   BLOODU MODERATE*   GLUCOSEU Negative       LIVER PROFILE: No results for input(s): AST, ALT, LIPASE, BILIDIR, BILITOT, ALKPHOS in the last 72 hours. Invalid input(s): AMYLASE,  ALB  PT/INR:    Lab Results   Component Value Date    PROTIME 19.4 08/02/2020    PROTIME 23.1 08/01/2020    PROTIME 40.5 07/31/2020    PROTIME 48.3 08/31/2018    PROTIME 37.3 08/03/2018    PROTIME 31 07/06/2018    INR 1.66 08/02/2020    INR 1.98 08/01/2020    INR 3.45 07/31/2020     PTT:    Lab Results   Component Value Date    APTT 38.9 01/23/2010     SNOW:  No results found for: ANATITER, SNOW  CHEMISTRY COMMON GROUP :   Lab Results   Component Value Date    GLUCOSE 129 08/02/2020    GLUCOSE 98 03/09/2020    TSH 1.86 12/03/2014     Recent Labs     07/31/20  0452 08/01/20  0459 08/02/20  0544   GLUCOSE 129* 137* 129*   CALCIUM 8.8 9.2 9.2         RADIOLOGY:        Imaging Results.   Chest X Ray reviwed by me               Electronically Signed: Bernadette Jean MD 8/2/2020 9:21 AM

## 2020-08-03 NOTE — PROGRESS NOTES
[facility] Note    Patient Active Problem List   Diagnosis    Essential hypertension, benign    Atrial fibrillation    Heart valve replaced by other means    Hyperlipidemia other unspecified.  Aortic regurgitation    Chronic kidney disease, stage III (moderate) ([facility])    Hypertrophy of prostate without urinary obstruction and other lower urinary tract symptoms (LUTS)    Peptic ulcer    Gout    Generalized osteoarthrosis, involving multiple sites    IGT (impaired glucose tolerance)    Lung nodule    Chronic combined systolic and diastolic CHF (congestive heart failure) (HCC)    Meningioma (HCC)    Bilateral carotid artery stenosis    Atherosclerosis of abdominal aorta (HCC)    Labyrinthine vertigo    RACIEL (acute kidney injury) ([address]    S/P AVR (aortic valve replacement)    Dilated cardiomyopathy (HCC)    SOB (shortness of breath)    Localized edema    Acute combined systolic and diastolic congestive heart failure (HCC)    Paroxysmal A-fib ([facility])       Past Medical History:   has a past medical history of Acute combined systolic and diastolic (congestive) hrt fail ([facility] Acute kidney injury ([facility] Arrhythmia, Atrial fibrillation ([facility] Carotid artery stenosis, CKD (chronic kidney disease), Dilated cardiomyopathy ([address] Hyperlipidemia, Hypertension, IGT (impaired glucose tolerance), Meningioma ([facility] and Valvular disease. Past Social History:   reports that he quit smoking about 27 years ago. His smoking use included cigarettes. He has a 25.00 pack-year smoking history. He quit smokeless tobacco use about 38 years ago. His smokeless tobacco use included chew. He reports that he does not drink alcohol or use drugs. Subjective:   Rapid A. Fib. No other complaints. Review of Systems   Constitutional: Negative for activity change, appetite change, chills, fatigue, fever and unexpected weight change.    HENT: Negative for congestion and facial swelling. Eyes: Negative for photophobia, discharge and redness. Respiratory: Negative for cough, chest tightness and shortness of breath. Cardiovascular: Negative for chest pain, palpitations and leg swelling. Gastrointestinal: Negative for abdominal distention, abdominal pain, blood in stool, constipation, diarrhea, nausea and vomiting. Endocrine: Negative for cold intolerance, heat intolerance and polyuria. Genitourinary: Negative for decreased urine volume, difficulty urinating, flank pain and hematuria. Musculoskeletal: Negative for joint swelling and neck pain. Neurological: Negative for dizziness, seizures, syncope, speech difficulty, light-headedness and headaches. Hematological: Does not bruise/bleed easily. Psychiatric/Behavioral: Negative for agitation, confusion and hallucinations. Objective:      BP 98/70   Pulse 141   Temp 97 °F (36.1 °C) (Oral)   Resp 18   Ht 5' 9\" (1.753 m)   Wt 156 lb 3.2 oz (70.9 kg)   SpO2 92%   BMI 23.07 kg/m²     Wt Readings from Last 3 Encounters:   08/03/20 156 lb 3.2 oz (70.9 kg)   07/29/20 164 lb 8 oz (74.6 kg)   07/15/20 165 lb (74.8 kg)       BP Readings from Last 3 Encounters:   08/03/20 98/70   07/29/20 98/64   07/15/20 (!) 110/56     Chest- clear  Heart-irregular  Abd-soft  Ext- no edema    Labs  Hemoglobin   Date Value Ref Range Status   08/03/2020 10.2 (L) 13.5 - 17.5 g/dL Final     Hematocrit   Date Value Ref Range Status   08/03/2020 31.3 (L) 40.5 - 52.5 % Final     WBC   Date Value Ref Range Status   08/03/2020 6.5 4.0 - 11.0 K/uL Final     Platelets   Date Value Ref Range Status   08/03/2020 141 135 - 450 K/uL Final     Lab Results   Component Value Date    CREATININE 2.2 (H) 08/03/2020    BUN 69 (H) 08/03/2020     08/03/2020    K 4.2 08/03/2020     08/03/2020    CO2 25 08/03/2020       Assessment/Plan:  Acute renal failure on chronic kidney disease stage III,. Severe. Nonoliguric. Now better.

## 2020-08-03 NOTE — PROGRESS NOTES
[address]  HEART FAILURE  Progress Note      Admit Date 7/29/2020     Reason for Consult:      Reason for Consultation/Chief Complaint: CHF    HPI:    Roberto Borja is a 70 y.o. male with PMH of AVR, AF, HTN, HLD, HFrEF with recent drop in LVEF from 40 to 30% admitted from the office for diuresis. He has had progressive kidney disease with increase in creatinine up to mid 3 range. EKG on admit with atrial fibrillation with rapid ventricular response. Very little diuresis, Amio started yesterday for continued AF/RVR and EP to see today. Subjective:  Patient is being seen for AHF. There were no acute overnight cardiac events. Today Mr. Nallely Crook denies chest pain or palpitations, states his SOB and edema have improved some since admit but not back to baseline.        Baseline Weight: 152   Wt Readings from Last 3 Encounters:   08/03/20 156 lb 3.2 oz (70.9 kg)   07/29/20 164 lb 8 oz (74.6 kg)   07/15/20 165 lb (74.8 kg)          Objective:   /76   Pulse 134   Temp 97.4 °F (36.3 °C) (Oral)   Resp 14   Ht 5' 9\" (1.753 m)   Wt 156 lb 3.2 oz (70.9 kg)   SpO2 93%   BMI 23.07 kg/m²       Intake/Output Summary (Last 24 hours) at 8/3/2020 0841  Last data filed at 8/3/2020 0240  Gross per 24 hour   Intake 480 ml   Output 725 ml   Net -245 ml      Wt Readings from Last 3 Encounters:   08/03/20 156 lb 3.2 oz (70.9 kg)   07/29/20 164 lb 8 oz (74.6 kg)   07/15/20 165 lb (74.8 kg)      In: 240 [P.O.:240]  Out: 725     TELEMETRY: AF    Physical Exam:  General Appearance:  Non-obese/Well Nourished  Respiratory:  · Resp Auscultation: Normal breath sounds without dullness  Cardiovascular:  · Auscultation: irregular rate and rhythm, normal S1S2,2/6 murmur  · Palpation: Normal    · Pedal Pulses: 2+ and equal   Abdomen:  · Soft, NT, ND, + bs  Extremities:  · No Cyanosis or Clubbing  · Extremities: 1+ edema  Neurological/Psychiatric:  · Oriented to time, place, and person  · Non-anxious    MEDICATIONS: Scheduled Meds:   Scheduled Meds:   enoxaparin  1 mg/kg Subcutaneous Daily    amiodarone  200 mg Oral TID    carvedilol  12.5 mg Oral BID WC    warfarin  2 mg Oral Once per day on Mon    And    warfarin  4 mg Oral Once per day on Sun Tue Wed Thu Fri Sat    torsemide  20 mg Oral Daily    aspirin  81 mg Oral Daily    atorvastatin  40 mg Oral Daily    sodium chloride flush  10 mL Intravenous 2 times per day     Continuous Infusions:   dilTIAZem (CARDIZEM) 125 mg in dextrose 5% 125 mL infusion Stopped (08/02/20 1324)     PRN Meds:.melatonin, Efferdent Denture Cleanser, Fixodent Complete, sodium chloride flush, acetaminophen **OR** acetaminophen, polyethylene glycol, promethazine **OR** ondansetron  Continuous Infusions:   dilTIAZem (CARDIZEM) 125 mg in dextrose 5% 125 mL infusion Stopped (08/02/20 1324)       Intake/Output Summary (Last 24 hours) at 8/3/2020 0841  Last data filed at 8/3/2020 0240  Gross per 24 hour   Intake 480 ml   Output 725 ml   Net -245 ml       Lab Data:  CBC:   Lab Results   Component Value Date    WBC 6.5 08/03/2020    HGB 10.2 08/03/2020     08/03/2020     BMP:  Lab Results   Component Value Date     08/03/2020    K 4.2 08/03/2020     08/03/2020    CO2 25 08/03/2020    BUN 69 08/03/2020    CREATININE 2.2 08/03/2020    GLUCOSE 133 08/03/2020    GLUCOSE 98 03/09/2020     INR:   Lab Results   Component Value Date    INR 1.79 08/03/2020    INR 1.66 08/02/2020    INR 1.98 08/01/2020        CARDIAC LABS  ENZYMES:No results for input(s): CKMB, CKMBINDEX, TROPONINI in the last 72 hours.     Invalid input(s): CKTOTAL;3  FASTING LIPID PANEL:  Lab Results   Component Value Date    HDL 45 07/25/2019    HDL 40 12/28/2011    LDLCALC 29 07/25/2019    TRIG 84 07/25/2019    TSH 1.86 12/03/2014     LIVER PROFILE:  Lab Results   Component Value Date    AST 33 08/02/2020    AST 20 07/25/2019    ALT 31 08/02/2020    ALT 22 07/25/2019     BNP:   Lab Results   Component Value Date PROBNP 4,777 07/29/2020    PROBNP 2,814 07/15/2020    PROBNP 1,350 02/10/2020     Iron Studies:  No results found for: FERRITIN  No results found for: IRON, TIBC, FERRITIN   Iron Deficiency Anemia:  No IV Iron Therapy:  No  2017 ACC/AHA HF Guidelines:   intravenous iron replacement in patients with New York Heart Association (NYHA) class II and III HF and iron deficiency(ferritin <100 ng/ml or 100-300 ng/ml if transferrin saturation <20%), to improve functional status and QoL. 1. WEIGHT: Admit Weight: 165 lb (74.8 kg)      Today  Weight: 156 lb 3.2 oz (70.9 kg)   2. I/O     Intake/Output Summary (Last 24 hours) at 8/3/2020 0841  Last data filed at 8/3/2020 0240  Gross per 24 hour   Intake 480 ml   Output 725 ml   Net -245 ml       Cardiac Testing:     ECHO:6/20   Normal left ventricular size.   Concentric left ventricular hypertrophy.   Decreased left ventricular systolic function with inferoseptal hypokinesis.   Estimated EF 30%.   E/e'=28. Indeterminate diastolic function. A-fib.   Mild thickening of anterior leaflet of mitral valve that appears to prolapse   mildly.   The maximum mitral valve pressure gradient is 12 mmHg and the mean pressure gradient is 5 mmHg.   Moderately severe mitral regurgitation with posteriorly directed jet.   The left atrium is dilated.   A mechanical aortic valve appears well seated with a maximum gradient of 35 mmHg and a mean gradient of 22 mmHg.   Trivial aortic regurgitation.   The ascending aorta is mildly dilated. 4.0cm.   Normal right ventricular size and mildly decreased in function.   Moderate tricuspid regurgitation directed anteriorly. PASP 46mmHg.   The right atrium is dilated.   Trivial pulmonic regurgitation.  IVC size is normal (<2.1 cm) but collapses < 50% with respiration consistent with elevated RA pressure (8 mmHg). Assessment/Plan:     1. AHF- 1600 out, Cr back to baseline, continue po torsemide  2.  AF- rate still elevated, EP consult today, continue Amio, BB  3.  NICM- new drop in EF, will need ischemic eval, continue BB, no ACE/ARB for CKD        I appreciate the opportunity of cooperating in the care of this individual.    ERA Garcia, [address] 8/3/2020, 8:41 AM  Heart Failure  The [address], [address]  Ph: [phone]      Core Measures:   · Discharge instructions:   · LVEF documented:   · ACEI for LV dysfunction:   · Smoking Cessation:

## 2020-08-03 NOTE — PROGRESS NOTES
[address]   Electrophysiology Progress Note   Date: 8/3/2020  Admit Date: 7/29/2020     Reason for consultation: AF, CHF    Chief Complaint:   Chief Complaint   Patient presents with    Abnormal Lab     heart failure clinic sent pt for an admission. decrease EF, decrease kidney labs, heart failure. left ankle pain     History of Present Illness: History obtained from patient and medical record. Jm Pritchett is a 70 y.o. male with a past medical history of HTN, HLD, CHF, aortic stenosis s/p mechanical AVR, and AF. Hx of DCCV    Presented from cardiology office for worsening renal fx and BLE swelling. He has been diuresing and echo with new drop in EF from 40% to 30%. He has persistent afib on ECG since 2017, and was found to be in RVR on admission. Interval Hx: Today, he is being seen for follow up. He has been in persistent afib, HR trending up 120's-150's. He is asymptomatic. BP soft. No new complaints today. Denies having chest pain, palpitations, shortness of breath, orthopnea/PND, cough, or dizziness. Patient seen and examined. Clinical notes reviewed. Telemetry reviewed. Allergies: Allergies   Allergen Reactions    Ceclor [Cefaclor] Other (See Comments)     CNS side effects, slurred speech. Lips and tounge swell. Home Meds:  Prior to Visit Medications    Medication Sig Taking? Authorizing Provider   warfarin (COUMADIN) 4 MG tablet Take 2-4 mg by mouth See Admin Instructions Take 2 mg (4 mg x 0.5 tablet) every Mon; take 4 mg (4 mg x 1 tablet) all other days. Managed by [facility].  Yes Historical Provider, MD   torsemide (DEMADEX) 20 MG tablet TAKE 2 TABLETS BY MOUTH TWICE DAILY Yes Dani Claudio APRN - CNP   carvedilol (COREG) 12.5 MG tablet TAKE 1 TABLET TWICE DAILY  WITH MEALS Yes Lizbeth Stephens MD   allopurinol (ZYLOPRIM) 100 MG tablet TAKE 1 TABLET BY MOUTH DAILY Yes Ferny Godoy MD   atorvastatin (LIPITOR) 40 MG tablet TAKE 1 TABLET DAILY Yes Reyna Ding MD   lisinopril (PRINIVIL;ZESTRIL) 20 MG tablet TAKE 1 TABLET DAILY Yes Reyna Ding MD   aspirin 81 MG tablet Take 81 mg by mouth daily Yes Historical Provider, MD   Multiple Vitamins-Minerals (THERAPEUTIC MULTIVITAMIN-MINERALS) tablet Take 1 tablet by mouth daily Yes Historical Provider, MD      Scheduled Meds:   enoxaparin  1 mg/kg Subcutaneous Daily    amiodarone  200 mg Oral TID    carvedilol  12.5 mg Oral BID WC    warfarin  2 mg Oral Once per day on Mon    And    warfarin  4 mg Oral Once per day on Sun Tue Wed Thu Fri Sat    torsemide  20 mg Oral Daily    aspirin  81 mg Oral Daily    atorvastatin  40 mg Oral Daily    sodium chloride flush  10 mL Intravenous 2 times per day     Continuous Infusions:   dilTIAZem (CARDIZEM) 125 mg in dextrose 5% 125 mL infusion Stopped (08/02/20 1324)     PRN Meds:melatonin, Efferdent Denture Cleanser, Fixodent Complete, sodium chloride flush, acetaminophen **OR** acetaminophen, polyethylene glycol, promethazine **OR** ondansetron     Past Medical History:  Past Medical History:   Diagnosis Date    [facility] combined systolic [facility] diastolic [facility]est[facility]     Acute kidney injury [facility]     Arrhythmia     Atrial fibrillation [facility]     Carotid artery stenosis     CKD (chronic kidney disease)     Dilated cardiomyopathy ([facility]     Hyperlipidemia     Hypertension     IGT (impaired glucose tolerance)     Meningioma ([facility]     Valvular disease         Past Surgical History:    has a past surgical history that includes Aortic valve replacement; Carpal tunnel release (Left); skin biopsy; and Colonoscopy (2010). Social History:  Reviewed. reports that he quit smoking about 27 years ago. His smoking use included cigarettes. He has a 25.00 pack-year smoking history. He quit smokeless tobacco use about 38 years ago. His smokeless tobacco use included chew. He reports that he does not drink alcohol or use drugs.      Family History:  Reviewed. family history includes Heart Disease in his paternal grandfather; Hypertension in an other family member. Review of Systems:  · Constitutional: Negative for fever, night sweats, chills, weight changes, or weakness  · Skin: Negative for rash, dry skin, pruritus, bruising, bleeding, blood clots, or changes in skin pigment  · HEENT: Negative for vision changes, ringing in the ears, sore throat, dysphagia, or swollen lymph nodes  · Respiratory: Reviewed in HPI  · Cardiovascular: Reviewed in HPI  · Gastrointestinal: Negative for abdominal pain, N/V/D, constipation, or black/tarry stools  · Genito-Urinary: Negative for dysuria, incontinence, urgency, or hematuria  · Musculoskeletal: Negative for joint swelling, muscle pain, or injuries  · Neurological/Psych: Negative for confusion, seizures, headaches, balance issues or TIA-like symptoms. No anxiety, depression, or insomnia    Physical Examination:  Vitals:    08/03/20 0835   BP: 98/70   Pulse:    Resp:    Temp:    SpO2:       In: 240 [P.O.:240]  Out: 725    Wt Readings from Last 3 Encounters:   08/03/20 156 lb 3.2 oz (70.9 kg)   07/29/20 164 lb 8 oz (74.6 kg)   07/15/20 165 lb (74.8 kg)       Intake/Output Summary (Last 24 hours) at 8/3/2020 1051  Last data filed at 8/3/2020 0240  Gross per 24 hour   Intake 480 ml   Output 725 ml   Net -245 ml     Telemetry: Personally Reviewed Atrial fibrillation w/ RVR  · Constitutional: Cooperative and in no apparent distress, and appears well nourished  · Skin: Warm and pink; no pallor, cyanosis, bruising, or clubbing  · HEENT: Symmetric and normocephalic. PERRL, EOM intact. Conjunctiva pink with clear sclera. Mucus membranes pink and moist. Teeth intact. Thyroid smooth without nodules or goiter. · Cardiovascular: irregular and rhythm. S1 & S2, no murmurs, rubs, or gallops. Peripheral pulses 2+, capillary refill < 3 seconds. negative elevation of JVP.  1+ BLE edema  · Respiratory: Respirations symmetric and unlabored. Lungs clear to auscultation bilaterally, no wheezing, crackles, or rhonchi + diminished  · Gastrointestinal: Abdomen soft and round. Bowel sounds normoactive in all quadrants without tenderness or masses. · Musculoskeletal: Bilateral upper and lower extremity strength 5/5 with full ROM  · Neurologic/Psych: Awake and orientated to person, place and time. Calm affect, appropriate mood    Pertinent labs, diagnostic, device, and imaging results reviewed as a part of this visit    Labs:    BMP:   Recent Labs     20  0544 20  0516    139 139   K 3.9 3.9 4.2   CL 99 104 101   CO2 25 25 25   BUN 91* 83* 69*   CREATININE 2.9* 2.4* 2.2*     Estimated Creatinine Clearance: 28 mL/min (A) (based on SCr of 2.2 mg/dL (H)). CBC:   Recent Labs     2044 20  0516   WBC 10.4 7.3 6.5   HGB 9.9* 9.9* 10.2*   HCT 30.1* 29.9* 31.3*   MCV 94.8 95.6 95.1   * 116* 141     Thyroid:   Lab Results   Component Value Date    TSH 1.86 2014     Lipids:   Lab Results   Component Value Date    CHOL 114 2018    CHOL 65 2018    HDL 45 2019    HDL 40 2011    TRIG 84 2019     LFTS:   Lab Results   Component Value Date    ALT 31 2020    AST 33 2020    ALKPHOS 97 2020    PROT 6.5 2020    PROT 7.8 2011    AGRATIO 0.8 2019    BILITOT 1.6 2020    BILITOT 0.9 2019     Cardiac Enzymes:   Lab Results   Component Value Date    CKTOTAL 76 2010    TROPONINI 0.03 2020    TROPONINI 0.03 2020    TROPONINI 0.03 2020     Coags:   Lab Results   Component Value Date    PROTIME 20.9 2020    PROTIME 48.3 2018    INR 1.79 2020       EC/20  Atrial fibrillation with RVR    ECHO:     Normal left ventricular size. Concentric left ventricular hypertrophy. Decreased left ventricular systolic function with inferoseptal hypokinesis. Estimated EF 30%. E/e'=28. Indeterminate diastolic function. A-fib. Mild thickening of anterior leaflet of mitral valve that appears to prolapse   mildly. The maximum mitral valve pressure gradient is 12 mmHg and the mean pressure gradient is 5 mmHg. Moderately severe mitral regurgitation with posteriorly directed jet. The left atrium is dilated. A mechanical aortic valve appears well seated with a maximum gradient of 35 mmHg and a mean gradient of 22 mmHg. Trivial aortic regurgitation. The ascending aorta is mildly dilated. 4.0cm. Normal right ventricular size and mildly decreased in function. Moderate tricuspid regurgitation directed anteriorly. PASP 46mmHg. The right atrium is dilated. Trivial pulmonic regurgitation. IVC size is normal (<2.1 cm) but collapses < 50% with respiration consistent with elevated RA pressure (8 mmHg).     CXR: 7/30/20  Mild cardiomegaly.  Minimal to slight pulmonary vascular congestion.  No    other significant abnormality.           Problem List:   Patient Active Problem List    Diagnosis Date Noted    Essential hypertension, benign 06/22/2011     Priority: High    Atrial fibrillation 06/22/2011     Priority: High    IGT (impaired glucose tolerance) 06/03/2016     Priority: Medium    Chronic kidney disease, stage III (moderate) ([address] 05/16/2013     Priority: Medium    Paroxysmal A-fib ([address] 07/29/2020    Dilated cardiomyopathy ([address] 07/15/2020    SOB (shortness of breath) 07/15/2020    Localized edema 07/15/2020    Acute combined systolic and diastolic congestive heart failure ([address] 07/15/2020    S/P AVR (aortic valve replacement) 06/30/2020    RACIEL (acute kidney injury) ([address] 02/24/2020    Labyrinthine vertigo 06/29/2018    Bilateral carotid artery stenosis 05/01/2017    Atherosclerosis of abdominal aorta ([address] 05/01/2017    Meningioma (HCC)     Chronic combined systolic and diastolic CHF (congestive heart failure) ([address]     Lung nodule 04/23/2017    Hypertrophy of prostate without urinary obstruction and other lower urinary tract symptoms (LUTS) 05/16/2013    Peptic ulcer 05/16/2013    Gout 05/16/2013    Generalized osteoarthrosis, involving multiple sites 05/16/2013    Heart valve replaced by other means 06/22/2011    Hyperlipidemia other unspecified. 06/22/2011    Aortic regurgitation 06/22/2011      Assessment and Plan:   Persistent Atrial Fibrillation  - Afib/'s-150 on telemetry  - Coreg 12.5 mg bid and amiodarone tid  - Denies symptoms in fib  - [id number] score:  ; [id number] Vasc score and anticoagulation discussed. High risk for stroke and thromboembolism. Anticoagulation is recommended.   ~ On warfarin with Lovenox bridge due to hx of mechanical valve  - Afib risk factors including age, HTN, obesity, inactivity and JEREMIAH were discussed with patient. Risk factor modification recommended   ~ TSH 2.19 (8/2/2020)     - Treatment options including cardioversion, rate control strategy, antiarrhythmics, anticoagulation and possible ablation were discussed with patient. Risks, benefits and alternative of each treatment options were explained. All questions answered     ~ Discussed with patient side effects of amiodarone including but not limited to thyroid disease, discoloration of skin and cornea and pulmonary fibrosis.  Patient would like to continue with it.     ~ Poor candidate for cardioversion or ablation given long standing nature of his AF, however due to worsening CHF will discuss with EP physician  Aortic Stenosis   - S/p mechanical AVR   - On coumadin  Non-ischemic Cardiomyopathy/Acute on chronic combined heart failure (NYHA Class III)  - Improving, Some SOB above baseline, continues w/ BLE edema   ~ EF 30% per echo (6/20)  - On BB and Torsemide   ~ ACE-I/ARB contraindicated due to renal insufficiency and risk of hyperkalemia  - Monitor I&Os, daily weights   - Reduced EF could be tachycardia induced from AF/RVR   - Previously 45% in 2017   - On OMT; uptitrate as tolerated   - Consider further ischemic eval timing to be determined IP vs OP   - Consider AICD placement, he has been on OMT   Mitral Regurgitation, TR   - Noted on echo   - Continue to monitor  CKD   - Improving    ~ Creatinine 2.2/BUN 69 this AM   - Nephrology following    - Give IV amiodarone bolus 300 mg   - Add digoxin if no improvement in HR later today   - Will discuss plan of care with EP physician, Dr. Nida Crane conditions with risk of decompensation. All pertinent information and plan of care discussed with the  All questions and concerns were addressed to the patient. Alternatives to my treatment were discussed. I have discussed the above stated plan with patient and the nurse. The patient verbalized understanding and agreed with the plan. The patient was seen for >35 minutes. I reviewed interval history, physical exam, review of data including labs, imaging, development and implementation of treatment plan and coordination of complex care. Thank you for allowing to us to participate in the care of Cadence Headley.     Mandi Dale, SARAH-CNP  [address]   Office: [phone]

## 2020-08-03 NOTE — PROGRESS NOTES
Cardiology nurse notified of elevated HR in afib via perfect serve. Awaiting response. BPs low. Pt comfortable and asymptomatic.   Addendum: this nurse instructed to give him his AM meds (amiodarone and metoprolol) Will monitor

## 2020-08-03 NOTE — PROGRESS NOTES
[facility] PROGRESS NOTE    8/3/2020 9:03 AM        Name: Edawrd Estrella . Admitted: 7/29/2020  Primary Care Provider: Rodo Marquis MD (Tel: [phone])          Subjective:    Sitting in chair eating breakfast feels better does not feel any heart palpitations or shortness of breath    Reviewed interval ancillary notes    Current Medications  enoxaparin (LOVENOX) injection 70 mg, Daily  amiodarone (CORDARONE) tablet 200 mg, TID  carvedilol (COREG) tablet 12.5 mg, BID WC  warfarin (COUMADIN) tablet 2 mg, Once per day on Mon    And  warfarin (COUMADIN) tablet 4 mg, Once per day on Sun Tue Wed Thu Fri Sat  melatonin tablet 6 mg, Nightly PRN  torsemide (DEMADEX) tablet 20 mg, Daily  Efferdent Denture Cleanser TBEF 1 each, PRN  Fixodent Complete CREA 1 each, PRN  aspirin EC tablet 81 mg, Daily  atorvastatin (LIPITOR) tablet 40 mg, Daily  sodium chloride flush 0.9 % injection 10 mL, 2 times per day  sodium chloride flush 0.9 % injection 10 mL, PRN  acetaminophen (TYLENOL) tablet 650 mg, Q6H PRN    Or  acetaminophen (TYLENOL) suppository 650 mg, Q6H PRN  polyethylene glycol (GLYCOLAX) packet 17 g, Daily PRN  promethazine (PHENERGAN) tablet 12.5 mg, Q6H PRN    Or  ondansetron (ZOFRAN) injection 4 mg, Q6H PRN  dilTIAZem 125 mg in dextrose 5 % 125 mL infusion, Continuous        Objective:  BP 98/70   Pulse 141   Temp 97.4 °F (36.3 °C) (Oral)   Resp 14   Ht 5' 9\" (1.753 m)   Wt 156 lb 3.2 oz (70.9 kg)   SpO2 93%   BMI 23.07 kg/m²     Intake/Output Summary (Last 24 hours) at 8/3/2020 0903  Last data filed at 8/3/2020 0240  Gross per 24 hour   Intake 480 ml   Output 725 ml   Net -245 ml      Wt Readings from Last 3 Encounters:   08/03/20 156 lb 3.2 oz (70.9 kg)   07/29/20 164 lb 8 oz (74.6 kg)   07/15/20 165 lb (74.8 kg)       General appearance:  Appears comfortable.   Alert oriented x2 and to time   HEENT: atraumatic, Pupils equal, muscous membranes moist, no masses appreciated  Cardiovascular: Irregularly irregular grade 2 out of 6 blowing systolic murmur  Respiratory: CTAB no wheezing  Gastrointestinal: Abdomen soft, non-tender, BS+  EXT:trace edema  Neurology: no gross focal deficts  Psychiatry: Appropriate affect. Not agitated  Skin: Warm, dry, no rashes appreciated    Labs and Tests:  CBC:   Recent Labs     08/01/20 0459 08/02/20  0544 08/03/20  0516   WBC 10.4 7.3 6.5   HGB 9.9* 9.9* 10.2*   * 116* 141     BMP:    Recent Labs     08/01/20 0459 08/02/20  0544 08/03/20  0516    139 139   K 3.9 3.9 4.2   CL 99 104 101   CO2 25 25 25   BUN 91* 83* 69*   CREATININE 2.9* 2.4* 2.2*   GLUCOSE 137* 129* 133*     Hepatic:   Recent Labs     08/02/20  0544   AST 33   ALT 31   BILITOT 1.6*   ALKPHOS 97     XR CHEST (2 VW)   Final Result   Mild cardiomegaly. Minimal to slight pulmonary vascular congestion. No   other significant abnormality. XR CHEST PORTABLE   Final Result   No acute cardiopulmonary disease. Cardiomegaly without overt failure. Recent imaging reviewed    Problem List  Active Problems:    Paroxysmal A-fib (HCC)  Resolved Problems:    * No resolved hospital problems.  *       Assessment/Plan:   Paroxysmal A. fib with RVR started on diltiazem drip discussed with cardiology continue Coumadin for anticoagulation pharmacy to dose  -started on amiodarone oral, still in afib with rvr will dicuss with cardiology  -      Acute on chronic systolic heart failure with EF of 30% and severe mitral regurgitation  On toresmide      RACIEL on CKD 3 improving    Leukocytosis :  resolved           Full Code Coumadin for anticoagulation    Clare Carrel, MD   8/3/2020 9:03 AM

## 2020-08-03 NOTE — FLOWSHEET NOTE
MD paged: \"Patient's heart rate is back and maintaining in the 120's to high 140's for the last.  BP was 108/76. Cardizem was d/c'd. Please advise as needed.   Thank you\"

## 2020-08-03 NOTE — PROGRESS NOTES
Assessment complete. VSS no SOB or any distress noted. Pt's HR increased to the 150's cardiology notified by prior RN. Pt put on bedside telemon to monitor BP and HR continuously as pt gets amiodarone bolus. Amiodarone bolus started. Pt's HR immediately began to improve. Pt tolerating well. Pt's lunch set up to eat. Pt Call light within reach, pt encouraged to call if any needs arise. No further requests at this time. Will continue to monitor.

## 2020-08-04 PROBLEM — I42.9 CARDIOMYOPATHY (HCC): Status: ACTIVE | Noted: 2020-01-01

## 2020-08-04 NOTE — CARE COORDINATION
Dr De La Vega Night office called back. Telephone order for amlactin 12% ointment for feet/ callouses. See orders.  PRICILA DiggsN, WOC  RN

## 2020-08-04 NOTE — PLAN OF CARE
Problem: Falls - Risk of:  Goal: Will remain free from falls  Description: Will remain free from falls  8/4/2020 1048 by Eduard Walker RN  Outcome: Ongoing  Note: Pt remains free from falls. Safety precautions in place. Bed in lowest position, bed wheels locked, call light with in reach, bed alarm on, yellow blanket in place, fall risk wrist band on, SAFE outside of doorway. Will continue to monitor. 8/4/2020 0040 by Syl Briceno RN  Outcome: Ongoing  Note: Pt remains free from falls. Safety precautions in place. Bed in lowest position, bed wheels locked, call light with in reach, bed alarm on, yellow blanket in place, fall risk wrist band on, SAFE outside of doorway. Camera in place. Will continue to monitor.     Goal: Absence of physical injury  Description: Absence of physical injury  Outcome: Ongoing

## 2020-08-04 NOTE — PROGRESS NOTES
[address]  HEART FAILURE  Progress Note      Admit Date 7/29/2020     Reason for Consult:      Reason for Consultation/Chief Complaint: CHF    HPI:    Haydee Terry is a 70 y.o. male with PMH of AVR, AF, HTN, HLD, HFrEF with recent drop in LVEF from 40 to 30% admitted from the office for diuresis. He has had progressive kidney disease with increase in creatinine up to mid 3 range. EKG on admit with atrial fibrillation with rapid ventricular response. Pt appears euvoluemic today, continues on Amio, HR still elevated. Subjective:  Patient is being seen for AHF. There were no acute overnight cardiac events. Today Mr. Genet Vidales denies chest pain or palpitations,or SOB. Moving around the room a lot, restless but oriented x2.        Baseline Weight: 152   Wt Readings from Last 3 Encounters:   08/03/20 156 lb 3.2 oz (70.9 kg)   07/29/20 164 lb 8 oz (74.6 kg)   07/15/20 165 lb (74.8 kg)          Objective:   BP 97/75   Pulse 122 Comment: ranging from one teens to 130's  Temp 98.6 °F (37 °C) (Temporal)   Resp 16   Ht 5' 9\" (1.753 m)   Wt 156 lb 3.2 oz (70.9 kg)   SpO2 96%   BMI 23.07 kg/m²       Intake/Output Summary (Last 24 hours) at 8/4/2020 0810  Last data filed at 8/4/2020 7849  Gross per 24 hour   Intake 1080 ml   Output 275 ml   Net 805 ml      Wt Readings from Last 3 Encounters:   08/03/20 156 lb 3.2 oz (70.9 kg)   07/29/20 164 lb 8 oz (74.6 kg)   07/15/20 165 lb (74.8 kg)      In: 250 [P.O.:250]  Out: 275     TELEMETRY: AF    Physical Exam:  General Appearance:  Non-obese/Well Nourished  Respiratory:  · Resp Auscultation: Normal breath sounds without dullness  Cardiovascular:  · Auscultation: irregular rate and rhythm, normal S1S2,2/6 murmur  · Palpation: Normal    · Pedal Pulses: 2+ and equal   Abdomen:  · Soft, NT, ND, + bs  Extremities:  · No Cyanosis or Clubbing  · Extremities: 1+ edema  Neurological/Psychiatric:  · Oriented to time, place, and person  · Non-anxious    MEDICATIONS:   Scheduled Meds:   Scheduled Meds:   warfarin  2 mg Oral Once per day on Mon    And    warfarin  4 mg Oral Once per day on Sun Tue Wed Thu Fri Sat    enoxaparin  1 mg/kg Subcutaneous Daily    amiodarone  200 mg Oral TID    carvedilol  12.5 mg Oral BID WC    torsemide  20 mg Oral Daily    aspirin  81 mg Oral Daily    atorvastatin  40 mg Oral Daily    sodium chloride flush  10 mL Intravenous 2 times per day     Continuous Infusions:   dilTIAZem (CARDIZEM) 125 mg in dextrose 5% 125 mL infusion Stopped (08/02/20 1324)     PRN Meds:.melatonin, Efferdent Denture Cleanser, Fixodent Complete, sodium chloride flush, acetaminophen **OR** acetaminophen, polyethylene glycol, promethazine **OR** ondansetron  Continuous Infusions:   dilTIAZem (CARDIZEM) 125 mg in dextrose 5% 125 mL infusion Stopped (08/02/20 1324)       Intake/Output Summary (Last 24 hours) at 8/4/2020 0810  Last data filed at 8/4/2020 0238  Gross per 24 hour   Intake 1080 ml   Output 275 ml   Net 805 ml       Lab Data:  CBC:   Lab Results   Component Value Date    WBC 7.1 08/04/2020    HGB 9.2 08/04/2020     08/04/2020     BMP:  Lab Results   Component Value Date     08/04/2020    K 3.9 08/04/2020     08/04/2020    CO2 28 08/04/2020    BUN 68 08/04/2020    CREATININE 2.1 08/04/2020    GLUCOSE 124 08/04/2020    GLUCOSE 98 03/09/2020     INR:   Lab Results   Component Value Date    INR 2.12 08/04/2020    INR 1.79 08/03/2020    INR 1.66 08/02/2020        CARDIAC LABS  ENZYMES:No results for input(s): CKMB, CKMBINDEX, TROPONINI in the last 72 hours.     Invalid input(s): CKTOTAL;3  FASTING LIPID PANEL:  Lab Results   Component Value Date    HDL 45 07/25/2019    HDL 40 12/28/2011    LDLCALC 29 07/25/2019    TRIG 84 07/25/2019    TSH 1.86 12/03/2014     LIVER PROFILE:  Lab Results   Component Value Date    AST 33 08/02/2020    AST 20 07/25/2019    ALT 31 08/02/2020    ALT 22 07/25/2019     BNP:   Lab Results   Component Value Date    PROBNP 8,820 08/04/2020    PROBNP 4,777 07/29/2020    PROBNP 2,814 07/15/2020     Iron Studies:  No results found for: FERRITIN  No results found for: IRON, TIBC, FERRITIN   Iron Deficiency Anemia:  No    IV Iron Therapy:  No  2017 ACC/AHA HF Guidelines:   intravenous iron replacement in patients with New York Heart Association (NYHA) class II and III HF and iron deficiency(ferritin <100 ng/ml or 100-300 ng/ml if transferrin saturation <20%), to improve functional status and QoL. 1. WEIGHT: Admit Weight: 165 lb (74.8 kg)      Today  Weight: 156 lb 3.2 oz (70.9 kg)   2. I/O     Intake/Output Summary (Last 24 hours) at 8/4/2020 0810  Last data filed at 8/4/2020 0238  Gross per 24 hour   Intake 1080 ml   Output 275 ml   Net 805 ml       Cardiac Testing:     ECHO:6/20   Normal left ventricular size.   Concentric left ventricular hypertrophy.   Decreased left ventricular systolic function with inferoseptal hypokinesis.   Estimated EF 30%.   E/e'=28. Indeterminate diastolic function. A-fib.   Mild thickening of anterior leaflet of mitral valve that appears to prolapse   mildly.   The maximum mitral valve pressure gradient is 12 mmHg and the mean pressure gradient is 5 mmHg.   Moderately severe mitral regurgitation with posteriorly directed jet.   The left atrium is dilated.   A mechanical aortic valve appears well seated with a maximum gradient of 35 mmHg and a mean gradient of 22 mmHg.   Trivial aortic regurgitation.   The ascending aorta is mildly dilated. 4.0cm.   Normal right ventricular size and mildly decreased in function.   Moderate tricuspid regurgitation directed anteriorly. PASP 46mmHg.   The right atrium is dilated.   Trivial pulmonic regurgitation.  IVC size is normal (<2.1 cm) but collapses < 50% with respiration consistent with elevated RA pressure (8 mmHg). Assessment/Plan:     1.  AHF- BNP still elevated, pt appears euvolemic, some hypotension yesterday, will need better HR control to maintain volume, continue po torsemide  2. AF- rate still elevated, per EP increase amio and one dose dig  3.  NICM- new drop in EF, will need ischemic eval, continue BB, no ACE/ARB for CKD        I appreciate the opportunity of cooperating in the care of this individual.    La Morales, [facility], [address] 8/4/2020, 8:10 AM  Heart Failure  The [address], [address]  Ph: [phone]      Core Measures:   · Discharge instructions:   · LVEF documented:   · ACEI for LV dysfunction:   · Smoking Cessation:

## 2020-08-04 NOTE — PLAN OF CARE
Problem: Falls - Risk of:  Goal: Will remain free from falls  Description: Will remain free from falls  Outcome: Ongoing  Note: Pt remains free from falls. Safety precautions in place. Bed in lowest position, bed wheels locked, call light with in reach, bed alarm on, yellow blanket in place, fall risk wrist band on, SAFE outside of doorway. Camera in place. Will continue to monitor. Problem: Skin Integrity:  Goal: Will show no infection signs and symptoms  Description: Will show no infection signs and symptoms  Outcome: Ongoing  Goal: Absence of new skin breakdown  Description: Absence of new skin breakdown  Outcome: Ongoing     Problem: Cardiac:  Goal: Ability to maintain an adequate cardiac output will improve  Description: Ability to maintain an adequate cardiac output will improve  Outcome: Ongoing  Note: Pt now on bedside telemon to monitor more closely.

## 2020-08-04 NOTE — CONSULTS
[address]   Electrophysiology Consultation   Date: 8/4/2020  Reason for Consultation: Atrial fibrillation, cardiomyopathy   Consult Requesting Physician: Scott Chung MD     Chief Complaint   Patient presents with    Abnormal Lab     heart failure clinic sent pt for an admission. decrease EF, decrease kidney labs, heart failure. left ankle pain     HPI: Bridger Lyon is a 70 y.o. male with Hx of HTN, HLD, CHF, aortic stenosis s/p mechanical AVR, and AF. Hx of DCCV. Presented from cardiology office for worsening renal fx and BLE swelling. He has been diuresing and echo with new drop in EF from 40% to 30%. He has persistent afib on ECG since 2017, and was found to be in RVR on admission. Past Medical History:   Diagnosis Date    Acute combined systolic and diastolic (congestive) hrt fail ([address]     Acute kidney injury ([address]     Arrhythmia     Atrial fibrillation (HCC)     Carotid artery stenosis     CKD (chronic kidney disease)     Dilated cardiomyopathy (HCC)     Hyperlipidemia     Hypertension     IGT (impaired glucose tolerance)     Meningioma (HCC)     Valvular disease         Past Surgical History:   Procedure Laterality Date    AORTIC VALVE REPLACEMENT      CARPAL TUNNEL RELEASE Left     COLONOSCOPY  2010    SKIN BIOPSY         Allergies   Allergen Reactions    Ceclor [Cefaclor] Other (See Comments)     CNS side effects, slurred speech. Lips and tounge swell. Social History:  Reviewed. reports that he quit smoking about 27 years ago. His smoking use included cigarettes. He has a 25.00 pack-year smoking history. He quit smokeless tobacco use about 38 years ago. His smokeless tobacco use included chew. He reports that he does not drink alcohol or use drugs. Family History:  Reviewed. family history includes Heart Disease in his paternal grandfather; Hypertension in an other family member.      Review of System:  All other systems reviewed and are negative nerve appears intact, No Gross deficit   · Skin: Skin is warm and dry. rash noted. Skin ulcers  · Psychiatric: Has a normal behavior     Labs, diagnostic and imaging results reviewed. Reviewed. Recent Labs     08/02/20 0544 08/03/20  0516 08/04/20  0452    139 139   K 3.9 4.2 3.9    101 102   CO2 25 25 28   BUN 83* 69* 68*   CREATININE 2.4* 2.2* 2.1*     Recent Labs     08/02/20  0544 08/03/20  0516 08/04/20  0452   WBC 7.3 6.5 7.1   HGB 9.9* 10.2* 9.2*   HCT 29.9* 31.3* 27.9*   MCV 95.6 95.1 94.1   * 141 132*     Lab Results   Component Value Date    CKTOTAL 76 01/23/2010    TROPONINI 0.03 07/30/2020     Lab Results   Component Value Date    BNP 83 11/17/2010     01/23/2010     Lab Results   Component Value Date    PROTIME 24.8 08/04/2020    PROTIME 20.9 08/03/2020    PROTIME 19.4 08/02/2020    PROTIME 48.3 08/31/2018    PROTIME 37.3 08/03/2018    PROTIME 31 07/06/2018    INR 2.12 08/04/2020    INR 1.79 08/03/2020    INR 1.66 08/02/2020     Lab Results   Component Value Date    CHOL 114 05/29/2018    CHOL 65 05/29/2018    HDL 45 07/25/2019    HDL 40 12/28/2011    TRIG 84 07/25/2019       ECG: Atrial fibrillation with rapid ventricular responseT wave abnormality, consider lateral ischemia   Echo: Conclusions      Summary   Normal left ventricular size. Concentric left ventricular hypertrophy. Decreased left ventricular systolic function with inferoseptal hypokinesis. Estimated EF 30%. E/e'=28. Indeterminate diastolic function. A-fib. Mild thickening of anterior leaflet of mitral valve that appears to prolapse   mildly. The maximum mitral valve pressure gradient is 12 mmHg and the mean pressure   gradient is 5 mmHg. Moderately severe mitral regurgitation with posteriorly directed jet. The left atrium is dilated. A mechanical aortic valve appears well seated with a maximum gradient of 35   mmHg and a mean gradient of 22 mmHg. Trivial aortic regurgitation.    The ascending aorta is mildly dilated. 4.0cm. Normal right ventricular size and mildly decreased in function. Moderate tricuspid regurgitation directed anteriorly. PASP 46mmHg. The right atrium is dilated. Trivial pulmonic regurgitation. IVC size is normal (<2.1 cm) but collapses < 50% with respiration consistent   with elevated RA pressure (8 mmHg).   Cath:     Scheduled Meds:   warfarin  2 mg Oral Once per day on Mon    And    warfarin  4 mg Oral Once per day on Sun Tue Wed Thu Fri Sat    enoxaparin  1 mg/kg Subcutaneous Daily    amiodarone  200 mg Oral TID    carvedilol  12.5 mg Oral BID WC    torsemide  20 mg Oral Daily    aspirin  81 mg Oral Daily    atorvastatin  40 mg Oral Daily    sodium chloride flush  10 mL Intravenous 2 times per day     Continuous Infusions:   dilTIAZem (CARDIZEM) 125 mg in dextrose 5% 125 mL infusion Stopped (08/02/20 1324)     PRN Meds:.melatonin, Efferdent Denture Cleanser, Fixodent Complete, sodium chloride flush, acetaminophen **OR** acetaminophen, polyethylene glycol, promethazine **OR** ondansetron     Patient Active Problem List    Diagnosis Date Noted    Essential hypertension, benign 06/22/2011     Priority: High    Atrial fibrillation 06/22/2011     Priority: High    IGT (impaired glucose tolerance) 06/03/2016     Priority: Medium    Chronic kidney disease, stage III (moderate) ([facility]) 05/16/2013     Priority: Medium    Parox[facility]mal [facility]fib [facility] 07/29/2020    Dilated cardiomyopathy ([facility] 07/15/2020    SOB (shortness of breath) 07/15/2020    Localized edema 07/15/2020    Acute combined systolic and diastolic congestive heart failure ([facility] 07/15/2020    S/P AVR (aortic valve replacement) 06/30/2020    [facility] 02/24/2020    Labyrinthine vertigo 06/29/2018    Bilateral carotid artery stenosis 05/01/2017    Atherosclerosis of abdominal aorta (HCC) 05/01/2017    Meningioma ([facility])     Chronic combined systolic and diastolic CHF

## 2020-08-04 NOTE — PROGRESS NOTES
Pupils equal, muscous membranes moist, no masses appreciated  Cardiovascular: Irregularly irregular grade 2 out of 6 blowing systolic murmur  Respiratory: CTAB no wheezing  Gastrointestinal: Abdomen soft, non-tender, BS+  EXT:trace edema  Neurology: no gross focal deficts  Psychiatry: Appropriate affect. Not agitated  Skin: Warm, dry, no rashes appreciated    Labs and Tests:  CBC:   Recent Labs     08/02/20  0544 08/03/20  0516 08/04/20  0452   WBC 7.3 6.5 7.1   HGB 9.9* 10.2* 9.2*   * 141 132*     BMP:    Recent Labs     08/02/20  0544 08/03/20  0516 08/04/20  0452    139 139   K 3.9 4.2 3.9    101 102   CO2 25 25 28   BUN 83* 69* 68*   CREATININE 2.4* 2.2* 2.1*   GLUCOSE 129* 133* 124*     Hepatic:   Recent Labs     08/02/20  0544   AST 33   ALT 31   BILITOT 1.6*   ALKPHOS 97     XR CHEST (2 VW)   Final Result   Mild cardiomegaly. Minimal to slight pulmonary vascular congestion. No   other significant abnormality. XR CHEST PORTABLE   Final Result   No acute cardiopulmonary disease. Cardiomegaly without overt failure. Recent imaging reviewed    Problem List  Active Problems:    Paroxysmal A-fib (HCC)  Resolved Problems:    * No resolved hospital problems.  *       Assessment/Plan:   Paroxysmal A. fib with RVR   - per ep titrating up amio and dig  -      Acute on chronic systolic heart failure with EF of 30% and severe mitral regurgitation  On toresmide      RACIEL on CKD 3 improving    Leukocytosis :  resolved           Full Code Coumadin for anticoagulation    Charlie Aguilar MD   8/4/2020 10:43 AM

## 2020-08-04 NOTE — PROGRESS NOTES
Pharmacy to Dose Warfarin    Pharmacy consulted to dose warfarin for mechanical valve/afib. INR Goal: 2.5-3.5    INR today: 2.12    Assessment/Plan:  - INR subtherapeutic  - Amiodarone dose doubled today  - Will give 2 mg of warfarin today  - Patient follows with [address] [place] [address] will continue to follow.     Kate Claudio PharmD  PGY-1 Pharmacy Resident  [id number]

## 2020-08-04 NOTE — CARE COORDINATION
Patient had wound care consult regarding blisters on b/l legs and callouses on b/l feet. Patient was seen by Dr Lenell Closs (podiatry) on 8/1. He ordered urea cream for feet. However pharmacy did not have the medication, and contacted Dr Lenell Closs. They were waiting for a response. This writer called Dr Lenell Closs today to clarify orders. Will continue mepilex dressings to blisters. Will place wound care orders.  Cristian Ambrosio, BSN,  [id number] [address] [place] [address]  RN   [phone]

## 2020-08-04 NOTE — PROGRESS NOTES
Awake, sitting up at side of bed, pleasant. Denies having any pain, no SOB. BP 97/75, heart rate irregular/tachy, ranging from low teens to mid 130's, afebrile. Assessment completed, see flow charts. No distress noted. preston

## 2020-08-04 NOTE — PROGRESS NOTES
[facility] Note    Patient Active Problem List   Diagnosis    Essential hypertension, benign    Atrial fibrillation    Heart valve replaced by other means    Hyperlipidemia other unspecified.  Aortic regurgitation    Chronic kidney disease, stage III (moderate) ([facility])    Hypertrophy of prostate without urinary obstruction and other lower urinary tract symptoms (LUTS)    Peptic ulcer    Gout    Generalized osteoarthrosis, involving multiple sites    IGT (impaired glucose tolerance)    Lung nodule    Chronic combined systolic and diastolic CHF (congestive heart failure) (HCC)    Meningioma (HCC)    Bilateral carotid artery stenosis    Atherosclerosis of abdominal aorta (HCC)    Labyrinthine vertigo    RACIEL (acute kidney injury) ([address]    S/P AVR (aortic valve replacement)    Dilated cardiomyopathy (HCC)    SOB (shortness of breath)    Localized edema    Acute combined systolic and diastolic congestive heart failure (HCC)    Paroxysmal A-fib ([facility])       Past Medical History:   has a past medical history of Acute combined systolic and diastolic (congestive) hrt fail ([facility] Acute kidney injury ([facility] Arrhythmia, Atrial fibrillation ([facility] Carotid artery stenosis, CKD (chronic kidney disease), Dilated cardiomyopathy ([address] Hyperlipidemia, Hypertension, IGT (impaired glucose tolerance), Meningioma ([facility] and Valvular disease. Past Social History:   reports that he quit smoking about 27 years ago. His smoking use included cigarettes. He has a 25.00 pack-year smoking history. He quit smokeless tobacco use about 38 years ago. His smokeless tobacco use included chew. He reports that he does not drink alcohol or use drugs. Subjective:   Rapid A. Fib. No other complaints. Review of Systems   Constitutional: Negative for activity change, appetite change, chills, fatigue, fever and unexpected weight change.    HENT: Negative for congestion and facial swelling. Eyes: Negative for photophobia, discharge and redness. Respiratory: Negative for cough, chest tightness and shortness of breath. Cardiovascular: Negative for chest pain, palpitations and leg swelling. Gastrointestinal: Negative for abdominal distention, abdominal pain, blood in stool, constipation, diarrhea, nausea and vomiting. Endocrine: Negative for cold intolerance, heat intolerance and polyuria. Genitourinary: Negative for decreased urine volume, difficulty urinating, flank pain and hematuria. Musculoskeletal: Negative for joint swelling and neck pain. Neurological: Negative for dizziness, seizures, syncope, speech difficulty, light-headedness and headaches. Hematological: Does not bruise/bleed easily. Psychiatric/Behavioral: Negative for agitation, confusion and hallucinations. Objective:      /74   Pulse 104   Temp 97.5 °F (36.4 °C) (Oral)   Resp 16   Ht 5' 9\" (1.753 m)   Wt 156 lb 8 oz (71 kg)   SpO2 95%   BMI 23.11 kg/m²     Wt Readings from Last 3 Encounters:   08/04/20 156 lb 8 oz (71 kg)   07/29/20 164 lb 8 oz (74.6 kg)   07/15/20 165 lb (74.8 kg)       BP Readings from Last 3 Encounters:   08/04/20 109/74   07/29/20 98/64   07/15/20 (!) 110/56     Chest- clear  Heart-irregular  Abd-soft  Ext- no edema    Labs  Hemoglobin   Date Value Ref Range Status   08/04/2020 9.2 (L) 13.5 - 17.5 g/dL Final     Hematocrit   Date Value Ref Range Status   08/04/2020 27.9 (L) 40.5 - 52.5 % Final     WBC   Date Value Ref Range Status   08/04/2020 7.1 4.0 - 11.0 K/uL Final     Platelets   Date Value Ref Range Status   08/04/2020 132 (L) 135 - 450 K/uL Final     Lab Results   Component Value Date    CREATININE 2.1 (H) 08/04/2020    BUN 68 (H) 08/04/2020     08/04/2020    K 3.9 08/04/2020     08/04/2020    CO2 28 08/04/2020       Assessment/Plan:  Acute renal failure on chronic kidney disease stage III,. Severe. Nonoliguric. Now better. Baseline creatinine 1.7 2/2020  On admission 3.6, that was the peak   Now improving slowly. Crea 2.1 today.      Chronic kidney disease. Stage III-IV. Follows with me, next appointment August/25/2020     Decompensated CHF. Now appears euvolemic. Volume neg about 1.2L. Weight at 156 from peak of 165lbs. At risk for recurrent CHF with rapid A. Fib. History of dilated cardiomyopathy. EF-30%. No ACEI/ARB for now. Appears euvolemic today. History of mitral valvular heart disease. Generalized weakness and lethargy. Altered mental status change. Better. Anemia. Hgb acceptable. Fe sat 7. Start po Fe.      Paroxysmal A-fib - management as per Medicine and Cards.      Dispo- awaiting better rate control.        Judy Aly MD

## 2020-08-04 NOTE — PROGRESS NOTES
Occupational Therapy  Facility/Department: [address]  Daily Treatment Note  NAME: Daphnie Cox  : 1940  MRN: [id number]    Date of Service: 2020    Discharge Recommendations:  Daphnie Cox scored a 21/24 on the AM-PAC ADL Inpatient form. Current research shows that an AM-PAC score of 18 or greater is typically associated with a discharge to the patient's home setting. Based on the patient's AM-PAC score, and their current ADL deficits, it is recommended that the patient have 2-3 sessions per week of Occupational Therapy at d/c to increase the patient's independence. At this time, this patient demonstrates the endurance and safety to discharge home with home services and a follow up treatment frequency of 2-3x/wk. Please see assessment section for further patient specific details. HOME HEALTH CARE: LEVEL 1 STANDARD    - Initial home health evaluation to occur within 24-48 hours, in patient home   - Therapy to evaluate with goal of regaining prior level of functioning   - Therapy to evaluate if patient has [id number] [facility] needs for personal care    If patient discharges prior to next session this note will serve as a discharge summary. Please see below for the latest assessment towards goals. OT Equipment Recommendations  Equipment Needed: No    Assessment   Performance deficits / Impairments: Decreased functional mobility ; Decreased ADL status; Decreased endurance  Assessment: Pt is below his baseline level of occupational function, based on the above deficits associated with paroxysmal A-fib. Pt would benefit from continued skilled acute OT services to address these deficits. Treatment Diagnosis: Decreased ADL status, functional mobility and endurance associated with paroxysmal A-fib  Prognosis: Good  History: Pt 69 yo, lives w/wife, stays on main level of home, independent ADLs, shares IADLs, independent ambulation, no falls.  PMH: HTN, HLD, heart failure, CKD, CAD, AVR  Exam: ROM, MMT, 6 clicks, 3 performance deficits/impairments, stable presentation  Assistance / Modification: SBA for functional mobility/transfers w/RW, independent socks  OT Education: OT Role;Plan of Care;Transfer Training  Patient Education: OT benedicto, d/c recommendation. UE ther ex. Pt verbalized and demonstrated understanding. May need reinforcement d/t [place]. Barriers to Learning: Hearing  REQUIRES OT FOLLOW UP: Yes  Activity Tolerance  Activity Tolerance: Patient Tolerated treatment well  Activity Tolerance: . Safety Devices  Safety Devices in place: Yes  Type of devices: All fall risk precautions in place;Call light within reach; Chair alarm in place;Nurse notified; Left in chair;Gait belt(Daughter present)         Patient Diagnosis(es): The primary encounter diagnosis was Atrial fibrillation with RVR ([facility] [address] Diagnoses of Acute on chronic congestive heart failure, unspecified heart failure type ([facility] [address] and Acute renal failure superimposed on chronic kidney disease, unspecified CKD stage, unspecified acute renal failure type [facility]) were also pertinent to this visit. has a past medical history of Acute combined systolic and diastolic (congestive) hrt fail ([facility] [place] [address] Acute kidney injury ([facility] [address] Arrhythmia, Atrial fibrillation ([facility] [address] Carotid artery stenosis, CKD (chronic kidney disease), Dilated cardiomyopathy ([facility] [address] Hyperlipidemia, Hypertension, IGT (impaired glucose tolerance), Meningioma ([facility] [address] and Valvular disease. has a past surgical history that includes Aortic valve replacement; Carpal tunnel release (Left); skin biopsy; and Colonoscopy (2010). Restrictions  Restrictions/Precautions  Restrictions/Precautions: Fall Risk(High Fall Risk)  Required Braces or Orthoses?: No  Position Activity Restriction  Other position/activity restrictions: Marybel Dangelo is a 70 y.o. male who presents emergency department with concern for heart failure.   Patient reports that about a month ago he had a bad echocardiogram.  They have been trying to manage this as an outpatient but he continues to decompensate. He reports that he has swollen and weepy legs. Today in the heart failure clinic he was identified to have acute renal failure. This prompted them to send him to the ER. Subjective   General  Chart Reviewed: Yes  Family / Caregiver Present: Yes(Daughter)  Referring Practitioner: Ze Goode MD , for d/c planning  Diagnosis: Paroxysmal A-fib  Subjective  Subjective: Pt supine in bed on OT arrival. Pt agreeable to OT tx. Pain Assessment  Pain Assessment: 0-10  Pain Level: 2(1-2)  Pain Type: Chronic pain  Pain Location: Ankle  Pain Orientation: Left  Pain Descriptors: Nagging  Pre Treatment Pain Screening  Intervention List: Patient able to continue with treatment  Vital Signs  Patient Currently in Pain: Yes   Orientation  Orientation  Overall Orientation Status: Within Normal Limits  Objective    ADL  Additional Comments: Pt declined ADL activity. Balance  Standing Balance: Stand by assistance(w/RW)  Standing Balance  Time: ~1 min  Activity: functional mobility ~30 ft in room  Comment: Pt declined further functional mobility. \"I'm not going out in the lua. \"  Functional Mobility  Functional - Mobility Device: Rolling Walker  Activity: Other  Assist Level: Stand by assistance  Bed mobility  Supine to Sit: Modified independent(HOB elevated)  Scooting: Modified independent  Transfers  Stand Step Transfers: Stand by assistance(w/RW; verbal cues and demonstration to keep walker with pt on transfer)  Sit to stand: Stand by assistance  Stand to sit: Stand by assistance                       Cognition  Overall Cognitive Status: WFL     Perception  Overall Perceptual Status: WFL              Type of ROM/Therapeutic Exercise  Type of ROM/Therapeutic Exercise: Free weights  Comment: 1 # wt B UEs. Pt would benefit from lifting heavier wts.   Exercises  Shoulder Flexion: X 15 reps  Chair Push-ups: X 10

## 2020-08-05 NOTE — PROGRESS NOTES
[address]  HEART FAILURE  Progress Note      Admit Date 7/29/2020     Reason for Consult:      Reason for Consultation/Chief Complaint: CHF    HPI:    Haydee Terry is a 70 y.o. male with PMH of AVR, AF, HTN, HLD, HFrEF with recent drop in LVEF from 40 to 30% admitted from the office for diuresis. He has had progressive kidney disease with increase in creatinine up to mid 3 range. EKG on admit with atrial fibrillation with rapid ventricular response. Pt diuresed and cr returned to baseline,  Amio increased yesterday and had one dose dig, HR improved this morning. Subjective:  Patient is being seen for AHF. There were no acute overnight cardiac events. Today Mr. Genet Vidales denies chest pain or palpitations,or SOB. Edema is improved but not back to baseline, pt anxious to go home. Baseline Weight: 152   Wt Readings from Last 3 Encounters:   08/05/20 156 lb 6.4 oz (70.9 kg)   07/29/20 164 lb 8 oz (74.6 kg)   07/15/20 165 lb (74.8 kg)          Objective:   /84   Pulse 84   Temp 98 °F (36.7 °C) (Oral)   Resp 17   Ht 5' 9\" (1.753 m)   Wt 156 lb 6.4 oz (70.9 kg)   SpO2 95%   BMI 23.10 kg/m²       Intake/Output Summary (Last 24 hours) at 8/5/2020 0906  Last data filed at 8/4/2020 1310  Gross per 24 hour   Intake 530 ml   Output 325 ml   Net 205 ml      Wt Readings from Last 3 Encounters:   08/05/20 156 lb 6.4 oz (70.9 kg)   07/29/20 164 lb 8 oz (74.6 kg)   07/15/20 165 lb (74.8 kg)      No intake/output data recorded.     TELEMETRY: AF    Physical Exam:  General Appearance:  Non-obese/Well Nourished  Respiratory:  · Resp Auscultation: Normal breath sounds without dullness  Cardiovascular:  · Auscultation: irregular rate and rhythm, normal S1S2,2/6 murmur  · Palpation: Normal    · Pedal Pulses: 2+ and equal   Abdomen:  · Soft, NT, ND, + bs  Extremities:  · No Cyanosis or Clubbing  · Extremities: 1+ edema  Neurological/Psychiatric:  · Oriented to time, place, and person  · Non-anxious    MEDICATIONS:   Scheduled Meds:   Scheduled Meds:   amiodarone  400 mg Oral TID    ammonium lactate   Topical BID    ferrous sulfate  325 mg Oral TID WC    enoxaparin  1 mg/kg Subcutaneous Daily    carvedilol  12.5 mg Oral BID WC    torsemide  20 mg Oral Daily    aspirin  81 mg Oral Daily    atorvastatin  40 mg Oral Daily    sodium chloride flush  10 mL Intravenous 2 times per day     Continuous Infusions:   dilTIAZem (CARDIZEM) 125 mg in dextrose 5% 125 mL infusion Stopped (08/02/20 1324)     PRN Meds:.melatonin, Efferdent Denture Cleanser, Fixodent Complete, sodium chloride flush, acetaminophen **OR** acetaminophen, polyethylene glycol, promethazine **OR** ondansetron  Continuous Infusions:   dilTIAZem (CARDIZEM) 125 mg in dextrose 5% 125 mL infusion Stopped (08/02/20 1324)       Intake/Output Summary (Last 24 hours) at 8/5/2020 0906  Last data filed at 8/4/2020 1310  Gross per 24 hour   Intake 530 ml   Output 325 ml   Net 205 ml       Lab Data:  CBC:   Lab Results   Component Value Date    WBC 7.7 08/05/2020    HGB 9.1 08/05/2020     08/05/2020     BMP:  Lab Results   Component Value Date     08/05/2020    K 3.9 08/05/2020     08/05/2020    CO2 26 08/05/2020    BUN 60 08/05/2020    CREATININE 2.0 08/05/2020    GLUCOSE 113 08/05/2020    GLUCOSE 98 03/09/2020     INR:   Lab Results   Component Value Date    INR 2.76 08/05/2020    INR 2.12 08/04/2020    INR 1.79 08/03/2020        CARDIAC LABS  ENZYMES:No results for input(s): CKMB, CKMBINDEX, TROPONINI in the last 72 hours.     Invalid input(s): CKTOTAL;3  FASTING LIPID PANEL:  Lab Results   Component Value Date    HDL 45 07/25/2019    HDL 40 12/28/2011    LDLCALC 29 07/25/2019    TRIG 84 07/25/2019    TSH 1.86 12/03/2014     LIVER PROFILE:  Lab Results   Component Value Date    AST 33 08/02/2020    AST 20 07/25/2019    ALT 31 08/02/2020    ALT 22 07/25/2019     BNP:   Lab Results   Component Value Date    PROBNP 8,820 08/04/2020    PROBNP 4,777 07/29/2020    PROBNP 2,814 07/15/2020     Iron Studies:    Lab Results   Component Value Date    FERRITIN 67.1 08/04/2020     Lab Results   Component Value Date    IRON 21 (L) 08/04/2020    TIBC 304 08/04/2020    FERRITIN 67.1 08/04/2020      Iron Deficiency Anemia:  No    IV Iron Therapy:  No  2017 ACC/AHA HF Guidelines:   intravenous iron replacement in patients with New York Heart Association (NYHA) class II and III HF and iron deficiency(ferritin <100 ng/ml or 100-300 ng/ml if transferrin saturation <20%), to improve functional status and QoL. 1. WEIGHT: Admit Weight: 165 lb (74.8 kg)      Today  Weight: 156 lb 6.4 oz (70.9 kg)   2. I/O     Intake/Output Summary (Last 24 hours) at 8/5/2020 0906  Last data filed at 8/4/2020 1310  Gross per 24 hour   Intake 530 ml   Output 325 ml   Net 205 ml       Cardiac Testing:     ECHO:6/20   Normal left ventricular size.   Concentric left ventricular hypertrophy.   Decreased left ventricular systolic function with inferoseptal hypokinesis.   Estimated EF 30%.   E/e'=28. Indeterminate diastolic function. A-fib.   Mild thickening of anterior leaflet of mitral valve that appears to prolapse   mildly.   The maximum mitral valve pressure gradient is 12 mmHg and the mean pressure gradient is 5 mmHg.   Moderately severe mitral regurgitation with posteriorly directed jet.   The left atrium is dilated.   A mechanical aortic valve appears well seated with a maximum gradient of 35 mmHg and a mean gradient of 22 mmHg.   Trivial aortic regurgitation.   The ascending aorta is mildly dilated. 4.0cm.   Normal right ventricular size and mildly decreased in function.   Moderate tricuspid regurgitation directed anteriorly. PASP 46mmHg.   The right atrium is dilated.   Trivial pulmonic regurgitation.  IVC size is normal (<2.1 cm) but collapses < 50% with respiration consistent with elevated RA pressure (8 mmHg). Assessment/Plan:     1.  AHF- compensated, continue po torsemide, pt to f/u with CHF clinic next week  2. AF- rate improved this morning, continue amio 400 tid for one week then decrease to BID, EP f/u  3.  NICM- new drop in EF, will need ischemic eval, continue BB, no ACE/ARB for CKD  PT ok for discharge today      I appreciate the opportunity of cooperating in the care of this individual.    Quentin Cadena [facility], [address] 8/5/2020, 9:06 AM  Heart Failure  The [address], [address]  Ph: [phone]      Core Measures:   · Discharge instructions:   · LVEF documented:   · ACEI for LV dysfunction:   · Smoking Cessation:

## 2020-08-05 NOTE — PROGRESS NOTES
CLINICAL PHARMACY NOTE: MEDS TO [facility] Select Patient?: No  Total # of Prescriptions Filled: 1   The following medications were delivered to the patient:  · Amiodarone 400mg  Total # of Interventions Completed: 0  Time Spent (min): 30    Additional Documentation:  Medication picked up by patient and wife in [address] [facility]

## 2020-08-05 NOTE — DISCHARGE SUMMARY
Hospital Medicine Discharge Summary    Patient: Annika Gonzales     Gender: male  : 1940   Age: 70 y.o. MRN: [id number]    Admitting Physician: Charlie Aguilar MD  Discharge Physician: Charlie Aguilar MD     Code Status: Full Code     Admit Date: 2020   Discharge Date:   2020    Disposition:  Home  Time spent arranging discharge: 35 minutes    Discharge Diagnoses: Active Hospital Problems    Diagnosis Date Noted    [facility] [I48.91] 2011     Priority: High    Stage 3 chronic kidney disease ([facility] [N18.3] 2013     Priority: Medium    Acute on chronic congestive heart failure [facility] [I50.9]     Paroxysmal A-fib (HCC) [I48.0] 2020    Cardiomyopathy ([facility] [I42.9] 07/15/2020    S/P AVR [Z95.2] 2020     Acute on chronic systolic heart failure with EF of 30% and severe mitral regurgitation  RACIEL on CKD 3 improving    Condition at Discharge:  Stable    Hospital Course:   Was admitted to hospital with acute a fib with RVR. Patient started on Dil drip and then amiodarone drip along with oral amiodarone patient heart rate was under better control and will follow up with EP as outpatient. Patient had acute on chronic systolic heart failure was initially dry with IV Lasix transition to oral torsemide. Patient also had RACIEL on CKD 3 which improved with diuresis.   Patient was discharged home with follow-up with nephrology and cardiology    Discharge Exam:    /84   Pulse 84   Temp 98 °F (36.7 °C) (Oral)   Resp 17   Ht 5' 9\" (1.753 m)   Wt 156 lb 6.4 oz (70.9 kg)   SpO2 95%   BMI 23.10 kg/m²   General appearance:  Appears comfortable.  Alert oriented x2 and to time   HEENT: atraumatic, Pupils equal, muscous membranes moist, no masses appreciated  Cardiovascular: Irregularly irregular grade 2 out of 6 blowing systolic murmur  Respiratory: CTAB no wheezing  Gastrointestinal: Abdomen soft, non-tender, BS+  EXT:trace edema  Neurology: no gross focal deficts  Psychiatry: Appropriate affect. Not agitated  Skin: Warm, dry, no rashes appreciated       Discharge Medications:   Current Discharge Medication List        Current Discharge Medication List      CONTINUE these medications which have CHANGED    Details   torsemide (DEMADEX) 20 MG tablet Take 1 tablet by mouth daily  Qty: 30 tablet, Refills: 3           Current Discharge Medication List      CONTINUE these medications which have NOT CHANGED    Details   warfarin (COUMADIN) 4 MG tablet Take 2-4 mg by mouth See Admin Instructions Take 2 mg (4 mg x 0.5 tablet) every Mon; take 4 mg (4 mg x 1 tablet) all other days. Managed by [facility].      carvedilol (COREG) 12.5 MG tablet TAKE 1 TABLET TWICE DAILY  WITH MEALS  Qty: 180 tablet, Refills: 1      allopurinol (ZYLOPRIM) 100 MG tablet TAKE 1 TABLET BY MOUTH DAILY  Qty: 90 tablet, Refills: 3      atorvastatin (LIPITOR) 40 MG tablet TAKE 1 TABLET DAILY  Qty: 90 tablet, Refills: 3      lisinopril (PRINIVIL;ZESTRIL) 20 MG tablet TAKE 1 TABLET DAILY  Qty: 90 tablet, Refills: 3      aspirin 81 MG tablet Take 81 mg by mouth daily      Multiple Vitamins-Minerals (THERAPEUTIC MULTIVITAMIN-MINERALS) tablet Take 1 tablet by mouth daily           Current Discharge Medication List          Labs:  For convenience and continuity at follow-up the following most recent labs are provided:    Lab Results   Component Value Date    WBC 7.7 08/05/2020    HGB 9.1 08/05/2020    HCT 27.9 08/05/2020    MCV 95.1 08/05/2020     08/05/2020     08/05/2020    K 3.9 08/05/2020     08/05/2020    CO2 26 08/05/2020    BUN 60 08/05/2020    CREATININE 2.0 08/05/2020    CALCIUM 8.5 08/05/2020    PHOS 4.5 07/31/2020    BNP 83 11/17/2010    ALKPHOS 97 08/02/2020    ALT 31 08/02/2020    AST 33 08/02/2020    BILITOT 1.6 08/02/2020    BILITOT 0.9 07/25/2019    BILIDIR 0.6 08/02/2020    LABALBU 3.6 08/02/2020    LDLCALC 29 07/25/2019    TRIG 84 07/25/2019     Lab

## 2020-08-05 NOTE — PROGRESS NOTES
[address]   Electrophysiology Progress Note   Date: 8/5/2020  Admit Date: 7/29/2020     Reason for consultation: AF, CHF    Chief Complaint:   Chief Complaint   Patient presents with    Abnormal Lab     heart failure clinic sent pt for an admission. decrease EF, decrease kidney labs, heart failure. left ankle pain     History of Present Illness: History obtained from patient and medical record. Blake Parada is a 70 y.o. male with a past medical history of HTN, HLD, CHF, aortic stenosis s/p mechanical AVR, and AF. Hx of DCCV. Presented from cardiology office for worsening renal fx and BLE swelling. He has been diuresing and echo with new drop in EF from 40% to 30%. He has persistent afib on ECG since 2017, and was found to be in RVR on admission. Interval Hx: Today, he is being seen for follow up. Remains in afib nor relatively rate controlled 's. Denies symptoms. Ready to go home. No new complaints today. No major events overnight. Denies having chest pain, palpitations, or dizziness. Patient seen and examined. Clinical notes reviewed. Telemetry reviewed. Allergies: Allergies   Allergen Reactions    Ceclor [Cefaclor] Other (See Comments)     CNS side effects, slurred speech. Lips and tounge swell. Home Meds:  Prior to Visit Medications    Medication Sig Taking? Authorizing Provider   torsemide (DEMADEX) 20 MG tablet Take 1 tablet by mouth daily Yes Shirley Doss MD   amiodarone (PACERONE) 400 MG tablet Take 1 tablet by mouth 3 times daily For one week then decrease to 1 tablet twice a day Yes SARAH Fernandez - CNP   warfarin (COUMADIN) 4 MG tablet Take 2-4 mg by mouth See Admin Instructions Take 2 mg (4 mg x 0.5 tablet) every Mon; take 4 mg (4 mg x 1 tablet) all other days. Managed by [facility].  Yes Historical Provider, MD   carvedilol (COREG) 12.5 MG tablet TAKE 1 TABLET TWICE DAILY  WITH MEALS Yes Tyler Cornelius MD   allopurinol (ZYLOPRIM) 100 MG tablet TAKE 1 TABLET BY MOUTH DAILY Yes Bridget Patel MD   atorvastatin (LIPITOR) 40 MG tablet TAKE 1 TABLET DAILY Yes Bridget Patel MD   lisinopril (PRINIVIL;ZESTRIL) 20 MG tablet TAKE 1 TABLET DAILY Yes Bridget Patel MD   aspirin 81 MG tablet Take 81 mg by mouth daily Yes Historical Provider, MD   Multiple Vitamins-Minerals (THERAPEUTIC MULTIVITAMIN-MINERALS) tablet Take 1 tablet by mouth daily Yes Historical Provider, MD      Scheduled Meds:   amiodarone  400 mg Oral TID    ammonium lactate   Topical BID    ferrous sulfate  325 mg Oral TID WC    enoxaparin  1 mg/kg Subcutaneous Daily    carvedilol  12.5 mg Oral BID WC    torsemide  20 mg Oral Daily    aspirin  81 mg Oral Daily    atorvastatin  40 mg Oral Daily    sodium chloride flush  10 mL Intravenous 2 times per day     Continuous Infusions:   dilTIAZem (CARDIZEM) 125 mg in dextrose 5% 125 mL infusion Stopped (08/02/20 1324)     PRN Meds:melatonin, Efferdent Denture Cleanser, Fixodent Complete, sodium chloride flush, acetaminophen **OR** acetaminophen, polyethylene glycol, promethazine **OR** ondansetron     Past Medical History:  Past Medical History:   Diagnosis Date    Acute combined systolic and diastolic [facility]congestive) [facility]rt fail [facility]     Acute kidney injury ([facility]     Arrhythmia     Atrial fibrillation ([facility]     Carotid artery stenosis     CKD (chronic kidney disease)     Dilated cardiomyopathy ([facility]     Hyperlipidemia     Hypertension     IGT (impaired glucose tolerance)     Meningioma ([facility] [facility]     Valvular disease         Past Surgical History:    has a past surgical history that includes Aortic valve replacement; Carpal tunnel release (Left); skin biopsy; and Colonoscopy (2010). Social History:  Reviewed. reports that he quit smoking about 27 years ago. His smoking use included cigarettes. He has a 25.00 pack-year smoking history. He quit smokeless tobacco use about 38 years ago.   His smokeless tobacco use included chew. He reports that he does not drink alcohol or use drugs. Family History:  Reviewed. family history includes Heart Disease in his paternal grandfather; Hypertension in an other family member. Review of Systems:  · Constitutional: Negative for fever, night sweats, chills, weight changes, or weakness  · Skin: Negative for rash, dry skin, pruritus, bruising, bleeding, blood clots, or changes in skin pigment  · HEENT: Negative for vision changes, ringing in the ears, sore throat, dysphagia, or swollen lymph nodes  · Respiratory: Reviewed in HPI  · Cardiovascular: Reviewed in HPI  · Gastrointestinal: Negative for abdominal pain, N/V/D, constipation, or black/tarry stools  · Genito-Urinary: Negative for dysuria, incontinence, urgency, or hematuria  · Musculoskeletal: Negative for joint swelling, muscle pain, or injuries  · Neurological/Psych: Negative for confusion, seizures, headaches, balance issues or TIA-like symptoms. No anxiety, depression, or insomnia    Physical Examination:  Vitals:    08/05/20 0530   BP: 127/84   Pulse: 84   Resp: 17   Temp: 98 °F (36.7 °C)   SpO2: 95%      No intake/output data recorded. Wt Readings from Last 3 Encounters:   08/05/20 156 lb 6.4 oz (70.9 kg)   07/29/20 164 lb 8 oz (74.6 kg)   07/15/20 165 lb (74.8 kg)       Intake/Output Summary (Last 24 hours) at 8/5/2020 1016  Last data filed at 8/4/2020 1310  Gross per 24 hour   Intake 280 ml   Output 325 ml   Net -45 ml     Telemetry: Personally Reviewed Atrial fibrillation   · Constitutional: Cooperative and in no apparent distress, and appears well nourished  · Skin: Warm and pink; no pallor, cyanosis, bruising, or clubbing  · HEENT: Symmetric and normocephalic. PERRL, EOM intact. Conjunctiva pink with clear sclera. Mucus membranes pink and moist. Teeth intact. Thyroid smooth without nodules or goiter. · Cardiovascular: irregular and rhythm. S1 & S2, no murmurs, rubs, or gallops.  Peripheral pulses 2+, capillary refill < 3 left ventricular systolic function with inferoseptal hypokinesis. Estimated EF 30%. E/e'=28. Indeterminate diastolic function. A-fib. Mild thickening of anterior leaflet of mitral valve that appears to prolapse   mildly. The maximum mitral valve pressure gradient is 12 mmHg and the mean pressure gradient is 5 mmHg. Moderately severe mitral regurgitation with posteriorly directed jet. The left atrium is dilated. A mechanical aortic valve appears well seated with a maximum gradient of 35 mmHg and a mean gradient of 22 mmHg. Trivial aortic regurgitation. The ascending aorta is mildly dilated. 4.0cm. Normal right ventricular size and mildly decreased in function. Moderate tricuspid regurgitation directed anteriorly. PASP 46mmHg. The right atrium is dilated. Trivial pulmonic regurgitation. IVC size is normal (<2.1 cm) but collapses < 50% with respiration consistent with elevated RA pressure (8 mmHg).     CXR: 7/30/20  Mild cardiomegaly.  Minimal to slight pulmonary vascular congestion.  No    other significant abnormality.           Problem List:   Patient Active Problem List    Diagnosis Date Noted    Essential hypertension, benign 06/22/2011     Priority: High    Atrial fibrillation [facility]VR ([facility] 06/22/2011     Priority: High    IGT (impaired glucose tolerance) 06/03/2016     Priority: Medium    Stage 3 chronic kidney disease ([facility] [facility] 05/16/2013     Priority: Medium    Acute on chronic congestive heart failure (HCC)     Paroxysmal A-fib ([facility] 07/29/2020    Cardiomyopathy ([facility] 07/15/2020    SOB (shortness of breath) 07/15/2020    Localized edema 07/15/2020    Acute combined systolic and diastolic congestive heart failure ([facility] [facility] 07/15/2020    S/P AVR 06/30/2020    RACIEL (acute kidney injury) ([facility] 02/24/2020    Labyrinthine vertigo 06/29/2018    Bilateral carotid artery stenosis 05/01/2017    Atherosclerosis of abdominal aorta (HCC) 05/01/2017    Meningioma (HCC)     Chronic combined systolic and diastolic CHF (congestive heart failure) [facility]     Lung nodule 04/23/2017    Hypertrophy of prostate without urinary obstruction and other lower urinary tract symptoms (LUTS) 05/16/2013    Peptic ulcer 05/16/2013    Gout 05/16/2013    Generalized osteoarthrosis, involving multiple sites 05/16/2013    Heart valve replaced by other means 06/22/2011    Hyperlipidemia other unspecified. 06/22/2011    Aortic regurgitation 06/22/2011      Assessment and Plan:   Persistent Atrial Fibrillation  - Afib CVR on telemetry   - Coreg 12.5 mg bid and amiodarone 400 mg tid  - Denies symptoms in fib  - [id number] score:  4 (age, HTN, CHF); [id number] Vasc score and anticoagulation discussed. High risk for stroke and thromboembolism. Anticoagulation is recommended.   ~ On warfarin  Due to hx of mechanical valve, no s/s of bleeding  - Afib risk factors including age, HTN, obesity, inactivity and JEREMIAH were discussed with patient. Risk factor modification recommended   ~ TSH 2.19 (8/2/2020)     - Treatment options including cardioversion, rate control strategy, antiarrhythmics, anticoagulation and possible ablation were discussed with patient. Risks, benefits and alternative of each treatment options were explained. All questions answered     ~ Discussed with patient side effects of amiodarone including but not limited to thyroid disease, discoloration of skin and cornea and pulmonary fibrosis.  Patient would like to continue with it.     ~ Consider DCCV and ablation in the future after amiodarone loading  Aortic Stenosis   - S/p mechanical AVR   - On coumadin  Non-ischemic Cardiomyopathy/Acute on chronic combined heart failure (NYHA Class III)  - Appears compensated   ~ EF 30% per echo (6/20)  - Continue BB and Torsemide   ~ ACE-I/ARB contraindicated due to renal insufficiency and risk of hyperkalemia  - Monitor I&Os, daily weights   - Reduced EF could be tachycardia induced from AF/RVR   - Previously 45% in 2017   - On OMT; uptitrate as tolerated   - Needs ischemic evaluation when rate better controlled   - Consider AICD placement, he has been on OMT   Mitral Regurgitation, TR   - Noted on echo   - Continue to monitor  CKD   - Stable   - Nephrology following    - OK for discharge from EP standpoint on amiodarone 400 mg tid x 1 week, 400 mg bid x 1 week and 400 mg QD x 1 week and then 200 mg daily    ~ Reviewed amiodarone with patient and family at bedside and sent Rx to the outpatient pharmacy   - Follow up 1-2 months EP     Multiple medical conditions with risk of decompensation. All pertinent information and plan of care discussed with the  All questions and concerns were addressed to the patient. Alternatives to my treatment were discussed. I have discussed the above stated plan with patient and the nurse. The patient verbalized understanding and agreed with the plan. The patient was seen for >35 minutes. I reviewed interval history, physical exam, review of data including labs, imaging, development and implementation of treatment plan and coordination of complex care. Thank you for allowing to us to participate in the care of Fili Phillip.     SARAH Lyon-CNP  [facility]   Office: [phone]

## 2020-08-05 NOTE — PROGRESS NOTES
Perfect serve to Dr Sandi Sy as follows   [phone] Hospital or Facility: [facility] From: Haider Calvert RE: Claudia Dyer 01/26/1941 RM: [id number] suspected sepsis Need Callback: NO CALLBACK REQ 3T FYI  Unread

## 2020-08-05 NOTE — PROGRESS NOTES
[facility] Note    Patient Active Problem List   Diagnosis    Essential hypertension, benign    Atrial fibrillation with RVR ([facility] [facility]    Heart valve replaced by other means    Hyperlipidemia other unspecified.  Aortic regurgitation    Stage 3 chronic kidney disease (HCC)    Hypertrophy of prostate without urinary obstruction and other lower urinary tract symptoms (LUTS)    Peptic ulcer    Gout    Generalized osteoarthrosis, involving multiple sites    IGT (impaired glucose tolerance)    Lung nodule    Chronic combined systolic and diastolic CHF (congestive heart failure) (HCC)    Meningioma (HCC)    Bilateral carotid artery stenosis    Atherosclerosis of abdominal aorta (HCC)    Labyrinthine vertigo    RACIEL (acute kidney injury) ([address]    S/P AVR    Cardiomyopathy (HCC)    SOB (shortness of breath)    Localized edema    Acute combined systolic and diastolic congestive heart failure (HCC)    Paroxysmal A-fib (HCC)    Acute on chronic congestive heart failure (HCC)       Past Medical History:   has a past medical history of Acute combined systolic and diastolic (congestive) hrt fail ([facility] [facility] Acute kidney injury ([facility] [address] [facility] Arrhythmia, Atrial fibrillation ([facility] [address] Carotid artery stenosis, CKD (chronic kidney disease), Dilated cardiomyopathy ([address] Hyperlipidemia, Hypertension, IGT (impaired glucose tolerance), Meningioma ([facility] [address] [facility] and Valvular disease. Past Social History:   reports that he quit smoking about 27 years ago. His smoking use included cigarettes. He has a 25.00 pack-year smoking history. He quit smokeless tobacco use about 38 years ago. His smokeless tobacco use included chew. He reports that he does not drink alcohol or use drugs. Subjective:  No complaints. Review of Systems   Constitutional: Negative for activity change, appetite change, chills, fatigue, fever and unexpected weight change.    HENT: Negative for congestion Víctor Perez  [id number]  November 22, 2017    Chief Complaint   Patient presents with    Post-Op Check     LEFT TKA DOS 9/5/17       History: Ms. Víctor Perez is a pleasant 70 y.o. old female here regarding follow-up for her left TKA completed on 9/5/17. She is now approximately 2-1/2 months status post left total knee arthroplasty. She feels that she is doing rather well. Her right knee bothers her more. She has returned home and has visiting nurses on a regular basis. She has remained in the care of her chronic pain management provider for her chronic neck and low back issues and fibromyalgia. The patient's  past medical history, medications, allergies,  family history, social history, and review of systems have been reviewed, and dated and are recorded in the chart. Ht 5' 2.99\" (1.6 m)   Wt 171 lb 15.3 oz (78 kg)   BMI 30.47 kg/m²     Physical: Ms. Víctor Perez appears well, she is in no apparent distress, she demonstrates appropriate mood & affect. She is alert and oriented to person, place and time. She has mild swelling. There is No evidence of DVT seen on physical exam. She is neurovascularly intact distally. Range of motion is from 0 degrees to 120 degrees. The incision is  clean, dry, intact and nontender and without erythema. Strength in the knee is 4+/5. There is no instability with varus and valgus stressing of the knee. There is no pain with range of motion of the hips. Ms. Víctor Perez appears well, she is in no apparent distress, she demonstrates appropriate mood & affect. She is alert and oriented to person, place and time. Examination of the right lower extremity reveals no pain with range of motion of the hip. She has generalized tenderness to palpation about the joint line of the right knee. Range of motion is from -5 degrees to 115 degrees. Strength is 4+ to 5/5 for all muscle groups about the right knee.  There is patellofemoral crepitus with range of motion of the right knee. Varus and valgus stressing of the knees reveals no evidence of instability. There is a small effusion in the right knee. Anterior drawer and Lachman are negative bilaterally. Examination of the skin reveals no rashes, ulceration, or lesion, bilaterally in the lower extremities. Sensation to both lower extremities is grossly intact. Exam of both feet reveals pedal pulses intact and brisk cap refill. Patient is able to dorsiflex and wiggle all toes. Deep tendon reflexes of the lower extremities are normal and symmetric. X-rays: 2 views of the right knee obtained in the office today were extensively reviewed. X-rays confirm severe end-stage osteoarthritis of the right knee. Impression: Status post left Total Knee Arthroplasty,Doing well postoperatively. 2.  Severe osteoarthritis right knee      Plan:  She will continue to work aggressively on range of motion and strengthening: Natural history and expected course discussed. Questions answered. Quad strengthening exercises. The patient will continue her physical therapy program.    At this time, the patient will be scheduled for a right total knee arthroplasty. All risks including but not limited to blood loss, infection, persistent pain,stiffness, weakness,loosening,deep vein thrombosis,neurovascular injury, and the risks of anesthesia were discussed. The patient understands all risks and benefits of the procedure and agrees to proceed. The patient will see her primary care Yobani Leiva MD, for medical clearance. If the patient is on NSAIDS or blood thinners these medications will need to be discontinued one week prior to surgery. Baseline creatinine 1.7 2/2020  On admission 3.6, that was the peak   Now improving slowly. Crea 2 today.      Chronic kidney disease. Stage III-IV. Follows with me, next appointment August/25/2020     Decompensated CHF. Now appears euvolemic. Weight at 156 from peak of 165lbs. At risk for recurrent CHF with rapid A. Fib. On low dose Torsemide. History of dilated cardiomyopathy. EF-30%. No ACEI/ARB for now. Appears euvolemic today. History of mitral valvular heart disease. Generalized weakness and lethargy. Altered mental status change. Better. Anemia. Hgb acceptable. Fe sat 7. Started po Fe.      Paroxysmal A-fib - management as per Medicine and Cards. Dispo- awaiting better rate control. Stable from renal perspective.   F/U with me as outpt.        Judy Puente MD

## 2020-08-06 NOTE — TELEPHONE ENCOUNTER
pt was in hosptial 7/29-8/5 for a fib. Was started on diltiazem and amiodarone drip. was held first 2 days of admission then resumed on 8/1 with home dose. INR on d/c 2.7 was started on amiodarone outpatient with 400 mg TID x 7 days, 400 mg BID x 7 days, 400 mg qd x 7 days, then 200 mg qd. Called pt and spoke to him and wife regarding med changes and needing to adjust warfarin. Dose will need to be adjusted d/t high dose amiodarone. Instructed for patient to take 2 mg daily until return to clinic. Recheck INR as schedules 8/10. Verified understanding.

## 2020-08-06 NOTE — CARE COORDINATION
[address] Transitions Initial Follow Up Call    Call within 2 business days of discharge: Yes    Patient: Ermias Stockton Patient : 1940   MRN: [id number]  Reason for Admission: Afib with RVR  Discharge Date: 20 RARS: Readmission Risk Score: 17      Last Discharge [facility]       Complaint Diagnosis Description Type Department Provider    20 Abnormal Lab Atrial fibrillation with RVR ([address] . .. ED to Hosp-Admission (Discharged) (ADMITTED) Bianca Arora MD; Sami Wolff [employer]... Spoke with: Ermias Stockton & wife      Facility: [facility]    Non-face-to-face services provided:  Obtained and reviewed discharge summary and/or continuity of care documents    Care Transitions 24 Hour Call    Do you have any ongoing symptoms?:  No  Do you have a copy of your discharge instructions?:  Yes  Do you have all of your prescriptions and are they filled?:  Yes  Care Transitions Interventions         Follow Up: Patient reports that he is weak, denies any CP, SOB, cough, fever. He does have LE edema, wife reports that it does not seem to have increased, but daughter is on her way to assess and re-wrap legs. He has not weighed himself today, wife will weigh patient soon. CTN encouraged her to call cardiology with any 2-3# weight gain overnight and she verbalized understanding. Reviewed discharge instructions and medication changes, 1111F completed. CTN will continue with outreach follow up calls. Patient has cardiology appointment 20.     Future Appointments   Date Time Provider Lana Mathias   8/10/2020 10:30 AM [facility]TICOAGULATION [facility]INIC [facility]   2020  2:15 PM SARAH Mendosa CNP [facility]   2020  3:45 PM Judy Carmona MD [facility]   2020 11:00 AM SARAH Krishnan CNP [facility]   2020  9:30 AM Urszula Larios MD [facility]       Bib Teran RN

## 2020-08-07 NOTE — TELEPHONE ENCOUNTER
[facility]  TELEPHONE ENCOUNTER FORM    Elisha Oconnor 1940    ASSESSMENT:   1. Baseline weight: 156 lbs  2. Current weight: 156 lbs  3. Patient weighing daily: Yes  4. Symptoms: dyspnea, edema  5. Patient knows who to call with symptoms: Yes  6. Diet history:      Patient states sodium limitation is : 3,000 mg a day      Patient states fluid limitation is 64 ounces  Patient following diet as instructed: Yes  7. Medication history: taking medications as instructed Yes ; medication side effects noted No  8. Patient is involved in exercise program: No  9. Patient knows date and time of follow up appointment: Yes  10. Patient has transportation to appointments: Yes    RECOMMENDATIONS:   1. Medication: none  2. Diet: low sodium   3. MD/ Clinic appointment: 8/11/20 with Lesli Mcdermott NP  4.  Other: none

## 2020-08-10 NOTE — TELEPHONE ENCOUNTER
Pharmacist would like pt to come into [facility] after cardiology appt , scheduled pt at 245 . Needs to have INR checked d/t starting Amiodarone.

## 2020-08-10 NOTE — TELEPHONE ENCOUNTER
Pts wife called to cancel [facility] appt today, he is not feeling well. She wanted to r/s for tomorrow bc he has an appt w/ cardiology, no available appts in cc. Pt will let MD know he is due for INR and see if they can check it , if not pt will need to r/s. Will f/u tomorrow.

## 2020-08-10 NOTE — PROGRESS NOTES
[facility]   Congestive Heart Failure    PrimaryCare Doctor:  Lizeth Johns MD  Primary Cardiologist: Alie Vieira  Last/Next OV: 6/30/20    Chief Complaint:  SOB, edema    History of Present Illness:  Cadence Headley is a 70 y.o. male with PMH AVR, AF, HTN, HLD who presents today for hospital f/u. He was admitted from the office 7/29-8/5/20 for AF/RVR, CHF exacerbation. Hospital Course: admitted to hospital with acute a fib with RVR. Patient started on Dilt drip and then amiodarone drip along with oral amiodarone patient heart rate was under better control and will follow up with EP as outpatient. Patient had acute on chronic systolic heart failure was initially dry with IV Lasix transition to oral torsemide. Patient also had RACIEL on CKD 3 which improved with diuresis. Since Hospitalization: he c/o increased edema and wt, orthopnea, PND, and GALICIA. His legs are weeping again with open sores and blisters. He denies chest pain, exertional chest pressure/discomfort, early saiety, syncope. His HR is better today    Hosp d/c wt: 1 [facility]  wts: 153>159    Admit BNP: 4277     D/C BNP: 8820    Hosp f/u phone call: 8/7/20    Baseline Weight: 153    Wt Readings from Last 3 Encounters:   08/05/20 156 lb 6.4 oz (70.9 kg)   07/29/20 164 lb 8 oz (74.6 kg)   07/15/20 165 lb (74.8 kg)        EF: 30%  Cardiac Imaging: Echo 6/30/20   Summary   Normal left ventricular size. Concentric left ventricular hypertrophy. Decreased left ventricular systolic function with inferoseptal hypokinesis. Estimated EF 30%. E/e'=28. Indeterminate diastolic function. A-fib. Mild thickening of anterior leaflet of mitral valve that appears to prolapse   mildly. The maximum mitral valve pressure gradient is 12 mmHg and the mean pressure   gradient is 5 mmHg. Moderately severe mitral regurgitation with posteriorly directed jet. The left atrium is dilated.    A mechanical aortic valve appears well seated with a maximum gradient of 35   mmHg and a mean gradient of 22 mmHg. Trivial aortic regurgitation. The ascending aorta is mildly dilated. 4.0cm. Normal right ventricular size and mildly decreased in function. Moderate tricuspid regurgitation directed anteriorly. PASP 46mmHg. The right atrium is dilated. Trivial pulmonic regurgitation. IVC size is normal (<2.1 cm) but collapses < 50% with respiration consistent   with elevated RA pressure (8 mmHg). Echo 4/23/17  Summary   -Mild left ventricular hypertrophy. -Global ejection fraction is mild-moderately decreased and estimated at   40-45%. -There is abnormal septal motion.   -Moderate biatrial enlargement.   -The mechanical aortic valve appears well seated with a maximum gradient of   25 mmHg and a mean gradient of 16 mmHg. There is trace aortic insufficiency.   -Thickened mitral valve without evidence of stenosis. Mild mitral annular   calcification.   -There is mild prolapse of the anterior mitral leaflet. Mitral regurgitation   is present that may be moderate.   -There is mild tricuspid regurgitation with RVSP estimated at 31 mmHg. Device: No   ICD counseling:No     Activity: below baseline  Can you walk 1-2 blocks or do a moderate amount of house/yard work? No      NYHA Class: III     Sodium Restrictions: 2g  Fluid Restrictions: 48-64 oz/day  Sodium and fluid restriction compliance: educated today, over on both    Pt Education: The patient has received education on the following topics: dietary sodium restriction, heart failure medications, the importance of physical activity, symptom management and weight monitoring      Past Medical History:   has a past medical history of Acute combined systolic and diastolic (congestive) hrt fail ([address] Acute kidney injury ([address] Arrhythmia, Atrial fibrillation ([address] Carotid artery stenosis, CKD (chronic kidney disease), Dilated cardiomyopathy ([address] Hyperlipidemia, Hypertension, IGT (impaired glucose tolerance), Meningioma ([address] and Valvular disease. Surgical History:   has a past surgical history that includes Aortic valve replacement; Carpal tunnel release (Left); skin biopsy; and Colonoscopy (2010). Social History:   reports that he quit smoking about 27 years ago. His smoking use included cigarettes. He has a 25.00 pack-year smoking history. He quit smokeless tobacco use about 38 years ago. His smokeless tobacco use included chew. He reports that he does not drink alcohol or use drugs. Family History:   Family History   Problem Relation Age of Onset    Heart Disease Paternal Grandfather     Hypertension Other     High Cholesterol Neg Hx     High Blood Pressure Neg Hx        HomeMedications:  Prior to Admission medications    Medication Sig Start Date End Date Taking? Authorizing Provider   torsemide (DEMADEX) 20 MG tablet Take 1 tablet by mouth daily 8/5/20   George Brock MD   amiodarone (PACERONE) 400 MG tablet Take 1 tablet 3 times daily for 7 days, then 1 tablet 2 times daily for 7 days, then 1 tablet daily for 7 days, then half (0.5) tablet daily 8/5/20   SARAH Franklin - CNP   warfarin (COUMADIN) 4 MG tablet Take 2-4 mg by mouth See Admin Instructions Take 2 mg (4 mg x 0.5 tablet) every Mon; take 4 mg (4 mg x 1 tablet) all other days. Managed by [facility].     Historical Provider, MD   carvedilol (COREG) 12.5 MG tablet TAKE 1 TABLET TWICE DAILY  WITH MEALS 6/30/20   Nohemy Cruz MD   allopurinol (ZYLOPRIM) 100 MG tablet TAKE 1 TABLET BY MOUTH DAILY 3/9/20   Hima Randolph MD   atorvastatin (LIPITOR) 40 MG tablet TAKE 1 TABLET DAILY 1/29/20   Hima Randolph MD   lisinopril (PRINIVIL;ZESTRIL) 20 MG tablet TAKE 1 TABLET DAILY 1/29/20   Hima Randolph MD   aspirin 81 MG tablet Take 81 mg by mouth daily    Historical Provider, MD   Multiple Vitamins-Minerals (THERAPEUTIC MULTIVITAMIN-MINERALS) tablet Take 1 tablet by mouth daily    Historical Provider, MD        Allergies:  Cecgopi [cefaclor]     ROS:   Review of Systems   Constitutional: Positive for fatigue. Respiratory: Positive for shortness of breath. Orthopnea, PND   Cardiovascular: Positive for leg swelling. Negative for chest pain and palpitations. Weeping   Gastrointestinal: Negative. Genitourinary: Negative. Musculoskeletal: Negative. Skin: Positive for wound. BLE, blisters as well   Neurological: Negative. Hematological: Negative. Psychiatric/Behavioral: Negative. Physical Examination:    Vitals:    08/11/20 1430   BP: (!) 100/46   Pulse: 83   SpO2: 97%   Weight: 168 lb (76.2 kg)   Height: 5' 10\" (1.778 m)           Physical Exam  Constitutional:       Appearance: Normal appearance. HENT:      Head: Normocephalic and atraumatic. Eyes:      Extraocular Movements: Extraocular movements intact. Pupils: Pupils are equal, round, and reactive to light. Neck:      Musculoskeletal: Normal range of motion and neck supple. Cardiovascular:      Rate and Rhythm: Normal rate. Rhythm irregular. Heart sounds: Murmur present. Pulmonary:      Effort: Pulmonary effort is normal.      Breath sounds: Rales present. Abdominal:      Palpations: Abdomen is soft. Musculoskeletal: Normal range of motion. Right lower leg: Edema present. Left lower leg: Edema present. Comments: R>L, 2+, weeping blisters   Skin:     General: Skin is warm and dry. Neurological:      General: No focal deficit present. Mental Status: He is alert and oriented to person, place, and time. Mental status is at baseline. Psychiatric:         Mood and Affect: Mood normal.         Behavior: Behavior normal.         Thought Content:  Thought content normal.         Judgment: Judgment normal.         Lab Data:    CBC:   Lab Results   Component Value Date    WBC 7.7 08/05/2020    WBC 7.1 08/04/2020    WBC 6.5 08/03/2020    RBC 2.93 08/05/2020    RBC 2.96 08/04/2020    RBC 3.29 08/03/2020    HGB 9.1 08/05/2020    HGB 9.2 08/04/2020    HGB 10.2 08/03/2020    HCT 27.9 08/05/2020    HCT 27.9 08/04/2020    HCT 31.3 08/03/2020    MCV 95.1 08/05/2020    MCV 94.1 08/04/2020    MCV 95.1 08/03/2020    RDW 16.2 08/05/2020    RDW 16.7 08/04/2020    RDW 16.4 08/03/2020     08/05/2020     08/04/2020     08/03/2020     BMP:  Lab Results   Component Value Date     08/05/2020     08/04/2020     08/03/2020    K 3.9 08/05/2020    K 3.9 08/04/2020    K 4.2 08/03/2020     08/05/2020     08/04/2020     08/03/2020    CO2 26 08/05/2020    CO2 28 08/04/2020    CO2 25 08/03/2020    PHOS 4.5 07/31/2020    BUN 60 08/05/2020    BUN 68 08/04/2020    BUN 69 08/03/2020    CREATININE 2.0 08/05/2020    CREATININE 2.1 08/04/2020    CREATININE 2.2 08/03/2020     BNP:   Lab Results   Component Value Date    PROBNP 8,820 08/04/2020    PROBNP 4,777 07/29/2020    PROBNP 2,814 07/15/2020     Iron Studies:  No components found for: FE,  TIBC,  FERRITIN  Iron Deficiency Anemia:  yes   IV Iron Therapy:  Yes, Aug 2020  2017 ACC/AHA HF Guidelines:   intravenous iron replacement in patients with New York Heart Association (NYHA) class II and III HF and iron deficiency (ferritin <100 ng/ml or 100-300 ng/ml if transferrin saturation <20%), to improve functional status and QoL. Assessment/Plan:    Encounter Diagnoses        Acute combined systolic and diastolic congestive heart failure (HCC) Volume increasing again, increase torsemide, labs today    Essential hypertension, benign controlled    Atrial fibrillation with RVR (HCC) Rate improved with Amio, continue BB, AC, has EP f/u    Dilated cardiomyopathy (HCC) Continue BB and ACE, needs ischemic eval when stable    Localized edema Diurese, wound clinie       Instructions:   1. Increase torsemide to twice a day, call if your wt does not decrease by thursday  2. Labs today  Wound clinic for legs  3.  F/u with [facility] in 2 weeks      I appreciate the opportunity of cooperating in the care of this individual.    Jahaira Worrell CNP, 8/10/2020,11:48 AM    QUALITY MEASURES  1. Tobacco Cessation Counseling: NA  2. Retake of BP if >140/90:   NA  3. Documentation to PCP/referring for new patient:  Sent to PCP at close of office visit  4. CAD patient on anti-platelet: na  5. CAD patient on STATIN therapy:  na  6. Patient with CHF and aFib on anticoagulation:  Yes   7. Patient Education:Yes   8. BB for LVSD :  Yes   9. ACE/ARB for LVSD:  Yes   10.  Left Ventricular Ejection Fraction (LVEF) Assessment:  Yes

## 2020-08-11 NOTE — PATIENT INSTRUCTIONS
Instructions:   1. Increase torsemide to twice a day, call if your wt does not decrease by thursday  2. Labs today  3. Wound clinic for your legs  4.   F/u with Aura Danis in 2 weeks

## 2020-08-11 NOTE — TELEPHONE ENCOUNTER
Warfarin prescription phoned into [facility] [address] to [address] under Dr. Juan Jose Manning  Warfarin 2 mg tabs  Take 2 mg (2 mg x 1) every Mon, Wed, Fri; 1 mg (2 mg x 0.5) all other days   30 days   2 refills  Mary Jo Barber, PharmD, [facility]    **this is a tablet strength change**

## 2020-08-12 NOTE — TELEPHONE ENCOUNTER
----- Message from SARAH Rollins - CNS sent at 8/12/2020  8:24 AM EDT -----  Covering labs as Dustin Limon is out of the office  Fluid level improved  Continue current medications

## 2020-08-13 PROBLEM — L97.312 VENOUS STASIS ULCER OF RIGHT ANKLE WITH FAT LAYER EXPOSED WITHOUT VARICOSE VEINS (HCC): Status: ACTIVE | Noted: 2020-01-01

## 2020-08-13 PROBLEM — I87.2 VENOUS STASIS ULCER OF RIGHT ANKLE WITH FAT LAYER EXPOSED WITHOUT VARICOSE VEINS (HCC): Status: ACTIVE | Noted: 2020-01-01

## 2020-08-13 PROBLEM — I87.2 VENOUS STASIS ULCER OF LEFT CALF WITH FAT LAYER EXPOSED WITHOUT VARICOSE VEINS (HCC): Status: ACTIVE | Noted: 2020-01-01

## 2020-08-13 PROBLEM — L97.222 VENOUS STASIS ULCER OF LEFT CALF WITH FAT LAYER EXPOSED WITHOUT VARICOSE VEINS (HCC): Status: ACTIVE | Noted: 2020-01-01

## 2020-08-13 NOTE — PLAN OF CARE
Discharge instructions given. Patient verbalized understanding. Return to [facility] in 1 week.

## 2020-08-13 NOTE — H&P
[facility]  H&P and Procedure Note      Jose Soares  AGE: 70 y.o. GENDER: male  : 1940  TODAY'S DATE:  2020    Subjective:     Chief Complaint   Patient presents with    Wound Check     BLE edema,weeping 2-3 weeks         HISTORY of PRESENT ILLNESS HPI     Jose Soares is a 70 y.o. male who presents today for wound evaluation. History of Wound:   History of congestive heart failure and chronic kidney disease-seen at the congestive heart failure clinic earlier this week with worsening lower extremity edema. States previous skin breakdown has healed with his own measures and diuresis. His daughter brings him from [facility] and this is his first known open wound. No infectious symptoms. Right greater than left pain.     Wound Pain:  intermittent  Severity:  4 / 10   Wound Type:  venous  Modifying Factors:  edema and venous stasis  Associated Signs/Symptoms:  edema, erythema, drainage and pain        PAST MEDICAL HISTORY        Diagnosis Date    Acute combined systolic and diastolic (congestive) hrt fail (HCC)     Acute kidney injury ([address]     Arrhythmia     Atrial fibrillation (HCC)     Carotid artery stenosis     CKD (chronic kidney disease)     Dilated cardiomyopathy (HCC)     Hyperlipidemia     Hypertension     IGT (impaired glucose tolerance)     Meningioma (HCC)     Valvular disease        PAST SURGICAL HISTORY    Past Surgical History:   Procedure Laterality Date    AORTIC VALVE REPLACEMENT      CARPAL TUNNEL RELEASE Left     COLONOSCOPY  2010    SKIN BIOPSY         FAMILY HISTORY    Family History   Problem Relation Age of Onset    Heart Disease Paternal Grandfather     Hypertension Other     High Cholesterol Neg Hx     High Blood Pressure Neg Hx        SOCIAL HISTORY    Social History     Tobacco Use    Smoking status: Former Smoker     Packs/day: 1.00     Years: 25.00     Pack years: 25.00     Types: Cigarettes     Last attempt to quit: 1993     Years since quittin.6    Smokeless tobacco: Former User     Types: Chew     Quit date: 1982    Tobacco comment: quit 25 yrs ago   Substance Use Topics    Alcohol use: No     Alcohol/week: 0.0 standard drinks    Drug use: No       ALLERGIES    Allergies   Allergen Reactions    Ceclor [Cefaclor] Other (See Comments)     CNS side effects, slurred speech. Lips and tounge swell. MEDICATIONS    Current Outpatient Medications on File Prior to Encounter   Medication Sig Dispense Refill    torsemide (DEMADEX) 20 MG tablet Take 1 tablet by mouth 2 times daily 60 tablet 3    amiodarone (PACERONE) 400 MG tablet Take 1 tablet 3 times daily for 7 days, then 1 tablet 2 times daily for 7 days, then 1 tablet daily for 7 days, then half (0.5) tablet daily (Patient taking differently: Take 400 mg by mouth 3 times daily Take 1 tablet 3 times daily for 7 days, then 1 tablet 2 times daily for 7 days, then 1 tablet daily for 7 days, then half (0.5) tablet daily) 46 tablet 1    warfarin (COUMADIN) 4 MG tablet Take 2-4 mg by mouth See Admin Instructions Take 2 mg (4 mg x 0.5 tablet) every Mon; take 4 mg (4 mg x 1 tablet) all other days. Managed by [facility].  carvedilol (COREG) 12.5 MG tablet TAKE 1 TABLET TWICE DAILY  WITH MEALS 180 tablet 1    allopurinol (ZYLOPRIM) 100 MG tablet TAKE 1 TABLET BY MOUTH DAILY 90 tablet 3    atorvastatin (LIPITOR) 40 MG tablet TAKE 1 TABLET DAILY 90 tablet 3    lisinopril (PRINIVIL;ZESTRIL) 20 MG tablet TAKE 1 TABLET DAILY 90 tablet 3    aspirin 81 MG tablet Take 81 mg by mouth daily      Multiple Vitamins-Minerals (THERAPEUTIC MULTIVITAMIN-MINERALS) tablet Take 1 tablet by mouth daily       No current facility-administered medications on file prior to encounter. REVIEW OF SYSTEMS    Pertinent items are noted in HPI.       Objective:      /66   Pulse 99   Resp 16     PHYSICAL EXAM    General Appearance: alert and oriented to person, place and time, well-developed and well-nourished, in no acute distress  Skin: warm and dry  Head: normocephalic and atraumatic  Eyes: extraocular eye movements intact and conjunctivae normal  Extremities: no cyanosis and no clubbing  Musculoskeletal: normal range of motion, no joint swelling, deformity or tenderness      Assessment:     Patient Active Problem List   Diagnosis    Essential hypertension, benign    Atrial fibrillation with RVR (HCC)    Heart valve replaced by other means    Hyperlipidemia other unspecified.  Aortic regurgitation    Stage 3 chronic kidney disease (HCC)    Hypertrophy of prostate without urinary obstruction and other lower urinary tract symptoms (LUTS)    Peptic ulcer    Gout    Generalized osteoarthrosis, involving multiple sites    IGT (impaired glucose tolerance)    Lung nodule    Chronic combined systolic and diastolic CHF (congestive heart failure) ([facility])    Meningioma ([facility])    Bilateral carotid artery stenosis    Atherosclerosis of abdominal aorta ([facility])    Labyrinthine vertigo    [facility]) [facility]    S/P AVR    Cardiomyopathy (HCC)    SOB (shortness of breath)    Localized edema    Acute combined systolic and diastolic congestive heart failure (HCC)    Paroxysmal A-fib (HCC)    Acute on chronic congestive heart failure (HCC)    Venous stasis ulcer of right ankle with fat layer exposed without varicose veins ([facility])       Procedure Note    Performed by: Alem Dow DO    Consent obtained: Yes    Time out taken:  Yes    Pain Control: Anesthetic  Anesthetic: 4% Lidocaine Cream     Debridement:Excisional Debridement    Using curette the wound was sharply debrided    down through and including the removal of subcutaneous tissue.         Devitalized Tissue Debrided:  fibrin, slough and exudate    Pre Debridement Measurements:  Are located in the Wound Documentation Flow Sheet    Wound #: 1 and 2  and 3    Post  Debridement Measurements:  Wound Care Documentation:  Wound 08/13/20 Leg Right; Anterior #1 (Active)   Wound Image   08/13/20 0914   Wound Cleansed Rinsed/Irrigated with saline 08/13/20 0914   Wound Length (cm) 2.4 cm 08/13/20 0914   Wound Width (cm) 3.3 cm 08/13/20 0914   Wound Depth (cm) 0.1 cm 08/13/20 0914   Wound Surface Area (cm^2) 7.92 cm^2 08/13/20 0914   Wound Volume (cm^3) 0.79 cm^3 08/13/20 0914   Wound Assessment Slough; Yellow;Granulation tissue; Red 08/13/20 0914   Drainage Amount Large 08/13/20 0914   Drainage Description Serous;Purulent 08/13/20 0914   Odor None 08/13/20 0914   Pink%Wound Bed 50 08/13/20 0914   Red%Wound Bed 0 08/13/20 0914   Yellow%Wound Bed 50 08/13/20 0914   Black%Wound Bed 0 08/13/20 0914   Purple%Wound Bed 0 08/13/20 0914   Other%Wound Bed 0 08/13/20 0914   Number of days: 0       Wound 08/13/20 Leg Left;Medial;Distal #2 (Active)   Wound Image   08/13/20 0914   Wound Venous 08/13/20 0914   Wound Cleansed Rinsed/Irrigated with saline 08/13/20 0914   Wound Length (cm) 1.6 cm 08/13/20 0914   Wound Width (cm) 1.5 cm 08/13/20 0914   Wound Depth (cm) 0.1 cm 08/13/20 0914   Wound Surface Area (cm^2) 2.4 cm^2 08/13/20 0914   Wound Volume (cm^3) 0.24 cm^3 08/13/20 0914   Wound Assessment Yellow;Slough 08/13/20 0914   Drainage Amount Moderate 08/13/20 0914   Drainage Description Serous 08/13/20 0914   Patsy-wound Assessment Maceration 08/13/20 0914   Pink%Wound Bed 0 08/13/20 0914   Red%Wound Bed 0 08/13/20 0914   Yellow%Wound Bed 100 08/13/20 0914   Black%Wound Bed 0 08/13/20 0914   Purple%Wound Bed 0 08/13/20 0914   Number of days: 0       Wound 08/13/20 Leg Left;Proximal;Medial #3 (Active)   Wound Image   08/13/20 0914   Wound Venous 08/13/20 0914   Wound Cleansed Rinsed/Irrigated with saline 08/13/20 0914   Wound Length (cm) 3.2 cm 08/13/20 0914   Wound Width (cm) 3 cm 08/13/20 0914   Wound Depth (cm) 0.1 cm 08/13/20 0914   Wound Surface Area (cm^2) 9.6 cm^2 08/13/20 0914   Wound Volume (cm^3) 0.96 cm^3 08/13/20 0914 Wound Assessment Yellow;Slough;Pink 08/13/20 0914   Drainage Amount Large 08/13/20 0914   Drainage Description Serous 08/13/20 0914   Odor None 08/13/20 0914   Patsy-wound Assessment Pink 08/13/20 0914   Pink%Wound Bed 50 08/13/20 0914   Red%Wound Bed 0 08/13/20 0914   Yellow%Wound Bed 50 08/13/20 0914   Black%Wound Bed 0 08/13/20 0914   Purple%Wound Bed 0 08/13/20 0914   Number of days: 0         Total Surface Area Debrided:  19.92 sq cm     Percentage of wound debrided   100%    Bleeding:  Minimal    Hemostasis Achieved:  by pressure    Procedural Pain:  5/10    Post Procedural Pain:  3 / 10     Response to treatment:  Well tolerated by patient. Plan:     The nature of the patient's condition was explained in depth. The patient was informed that their compliance to the treatment plan is paramount to successful healing and prevention of further ulceration and/or infection     Discharge Treatment      Written Patient Discharge Instructions Given  Silver alginate and Unna boots bilaterally with RTC in one week. Thank you for allowing me to participate in the care of your patient. Please do not hesitate to call.      Trevor Schmitt D.O., [facility]  Interventional Cardiology     o: [phone]  [address] [place] [address]., [address], [address] [facility]

## 2020-08-13 NOTE — PROGRESS NOTES
[address]   Cardiac Follow Up     Referring Provider:  Guerrero Modi MD     Chief Complaint   Patient presents with    Follow-Up from Hospital     Per NPKV    Coronary Artery Disease    Congestive Heart Failure    Hypertension    Atrial Fibrillation      History of Present Illness:   Mr. Rickey Stoll is seen today for a follow up visit; he has a history of AVR ([place]), Atrial Fibrillation, hypertension, and hyperlipidemia. He was admitted from the office 7/29-8/5/20 for AF/RVR, CHF exacerbation. Hospital Course: admitted to hospital with acute a fib with RVR.  Patient started on Dilt drip and then amiodarone drip along with oral amiodarone patient heart rate was under better control and will follow up with EP as outpatient.  Patient had acute on chronic systolic heart failure was initially dry with IV Lasix transition to oral torsemide.  Patient also had RACIEL on CKD 3 which improved with diuresis.      Earlier this week he was seen in the CHF clinic, his legs were noted to be  weeping  with open sores and blisters. He was then seen yesterday in the wound clinic, where he was treated and legs were placed in [place] boots. He has felt better since his medication changes this week. Will see Dr. Aftab Baldwin next week. He denies chest pain, exertional chest pressure/discomfort, early satiety, syncope. His daughter is with him for the visit. Past Medical History:   has a past medical history of Acute combined systolic and diastolic (congestive) hrt fail ([address] Acute kidney injury ([address] Arrhythmia, Atrial fibrillation ([address] Carotid artery stenosis, CKD (chronic kidney disease), Dilated cardiomyopathy ([address] Hyperlipidemia, Hypertension, IGT (impaired glucose tolerance), Meningioma ([address] and Valvular disease. Surgical History:   has a past surgical history that includes Aortic valve replacement; Carpal tunnel release (Left); skin biopsy; and Colonoscopy (2010).      Social History:   reports that he quit smoking about 27 years ago. His smoking use included cigarettes. He has a 25.00 pack-year smoking history. He quit smokeless tobacco use about 38 years ago. His smokeless tobacco use included chew. He reports that he does not drink alcohol or use drugs. Family History:  family history includes Heart Disease in his paternal grandfather; Hypertension in an other family member. Current Outpatient Medications   Medication Sig Dispense Refill    torsemide (DEMADEX) 20 MG tablet Take 1 tablet by mouth 2 times daily 60 tablet 3    amiodarone (PACERONE) 400 MG tablet Take 1 tablet 3 times daily for 7 days, then 1 tablet 2 times daily for 7 days, then 1 tablet daily for 7 days, then half (0.5) tablet daily 46 tablet 1    warfarin (COUMADIN) 4 MG tablet Take 2-4 mg by mouth See Admin Instructions Take 2 mg (4 mg x 0.5 tablet) every Mon; take 4 mg (4 mg x 1 tablet) all other days. Managed by [facility].  carvedilol (COREG) 12.5 MG tablet TAKE 1 TABLET TWICE DAILY  WITH MEALS 180 tablet 1    allopurinol (ZYLOPRIM) 100 MG tablet TAKE 1 TABLET BY MOUTH DAILY 90 tablet 3    atorvastatin (LIPITOR) 40 MG tablet TAKE 1 TABLET DAILY 90 tablet 3    aspirin 81 MG tablet Take 81 mg by mouth daily      Multiple Vitamins-Minerals (THERAPEUTIC MULTIVITAMIN-MINERALS) tablet Take 1 tablet by mouth daily      lisinopril (PRINIVIL;ZESTRIL) 20 MG tablet TAKE 1 TABLET DAILY (Patient not taking: Reported on 8/14/2020) 90 tablet 3     No current facility-administered medications for this visit. Allergies:  Ceclor [cefaclor]     Review of Systems:   · Constitutional: there has been no unanticipated weight loss. There's been no change in energy level, sleep pattern, or activity level. · Eyes: No visual changes or diplopia. No scleral icterus. · ENT: No Headaches, hearing loss or vertigo. No mouth sores or sore throat.   · Cardiovascular: Reviewed in HPI  · Respiratory: No cough or wheezing, no sputum production. No hematemesis. · Gastrointestinal: No abdominal pain, appetite loss, blood in stools. No change in bowel or bladder habits. · Genitourinary: No dysuria, trouble voiding, or hematuria. · Musculoskeletal:  No gait disturbance, weakness or joint complaints. + leg pain   · Integumentary: No rash or pruritis. · Neurological: No headache, diplopia, change in muscle strength, numbness or tingling. No change in gait, balance, coordination, mood, affect, memory, mentation, behavior. · Psychiatric: No anxiety, no depression. · Endocrine: No malaise, fatigue or temperature intolerance. No excessive thirst, fluid intake, or urination. No tremor. · Hematologic/Lymphatic: No abnormal bruising or bleeding, blood clots or swollen lymph nodes. · Allergic/Immunologic: No nasal congestion or hives. Physical Examination:    Vitals:    08/14/20 0932   BP: 124/72   Pulse: 87   Resp: 18   Temp: 97 °F (36.1 °C)   SpO2: 97%        Constitutional and General Appearance: appears chronically ill  Skin:good turgor,intact without lesions  HEENT: EOMI ,normal  Neck:no JVD    Respiratory:  · Normal excursion and expansion without use of accessory muscles  · Resp Auscultation: Normal breath sounds without dullness  Cardiovascular:  · The apical impulses not displaced  · Normal prosthetic heart sounds, irregular heart rhythm. No murmur  · Cervical veins are not engorged  · The carotid upstroke is normal in amplitude and contour without delay or bruit  · Peripheral pulses are symmetrical and full  · There is no clubbing, cyanosis of the extremities.   · BLE edema- legs in [place] from yesterday's visit in wound clinic reviewed    · Femoral Arteries: 2+ and equal  · Pedal Pulses: 2+ and equal   Abdomen:  · No masses or tenderness  · Liver/Spleen: No Abnormalities Noted  Neurological/Psychiatric:  · Alert and oriented in all spheres  · Moves all extremities well  · Exhibits normal gait balance and

## 2020-08-14 NOTE — CARE COORDINATION
[address] Transitions Initial Follow Up Call    20    Patient:  Delvis Kunz Patient :  1940  MRN:  <[id number]>   Reason for Admission:  A-FIB  Discharge Date:  20  RARS:  [facility]      CTC attempt to reach Pt regarding recent hospital discharge. CTC unable to leave voice recording with call back number requesting a call back / no answer. Follow up appointments:    Future Appointments   Date Time Provider Lana Mathias   2020  9:45 AM [facility] ANTICOAGULATION CLINIC [facility]   2020  9:30 AM Julia Bonilla DO [facility]   2020  3:45 PM Judy Carmona MD [facility]   2020 11:00 AM SARAH Mendosa CNP  Cardio [facility]   2020 11:00 AM SARAH Krishnan CNP FF Cardio [facility]   2020  9:30 AM Urszula Larios MD [facility]   12/15/2020  9:15 AM Aurelia Hess MD [facility]       Cheko Hernandez.  DAMARI Nye, RN  Care Transition Coordinator  Contact Number:  [phone]

## 2020-08-18 NOTE — CARE COORDINATION
[address] Transitions Follow Up Call    2020    Patient: Annika Gonzales  Patient : 1940   MRN: [id number]  Reason for Admission: Afib with RVR   Discharge Date: 20 RARS: Readmission Risk Score: 16         Spoke with: [facility] Transitions Subsequent and Final Call    Subsequent and Final Calls  Do you have any ongoing symptoms?:  No  Have your medications changed?:  No  Do you have any questions related to your medications?:  No  Do you currently have any active services?:  No  Do you have any needs or concerns that I can assist you with?:  No  Identified Barriers:  None  Care Transitions Interventions  Other Interventions: Follow Up: Wife reports that patient is doing well, denies any CP, SOB, cough, edema, fever. Wife reports that patient will go to wound clinic this Thursday and follow up with cardiology provider next Wednesday. Wife denies any questions or concerns at this time. CTN encouraged her to contact Cardiology office with any concerns. CTN will continue with outreach follow up calls.     Future Appointments   Date Time Provider Lana Mathias   2020  9:30 AM [facility]   2020 10:30 AM [facility]ICO[facility]UL[facility]IC [facility]   2020  3:45 PM Judy Baron MD [facility]   2020 11:00 AM SARAH Wesley - CNP FF Cardio [facility]   2020 11:00 AM SARAH Bone CNP FF Cardio [facility]   2020  9:30 AM Lizzy Moctezuma MD [facility]   12/15/2020  9:15 AM Benedicto Liang MD [facility]       Jocelin Toscano RN

## 2020-08-20 NOTE — PLAN OF CARE
Discharge instructions given. Patient verbalized understanding. Return to [facility] in 1 week.

## 2020-08-20 NOTE — PROGRESS NOTES
Christine   Progress and Procedure Note      Ken Hardin  AGE: 70 y.o. GENDER: male  : 1940  TODAY'S DATE:  2020    Subjective:     Chief Complaint   Patient presents with    Wound Check     bilateral legs F/U         HISTORY of PRESENT ILLNESS HPI     Ken Hardin is a 70 y.o. male who presents today for wound evaluation. History of Wound:   History of congestive heart failure and chronic kidney disease-seen at the congestive heart failure clinic earlier this week with worsening lower extremity edema. States previous skin breakdown has healed with his own measures and diuresis. His daughter brings him from [place] and this is his first known open wound. No infectious symptoms. Right greater than left pain.     Wound Pain:  intermittent  Severity:  4 / 10   Wound Type:  venous  Modifying Factors:  edema and venous stasis  Associated Signs/Symptoms:  edema, erythema, drainage and pain        PAST MEDICAL HISTORY        Diagnosis Date    Acute combined systolic and diastolic (congestive) hrt fail (HCC)     Acute kidney injury ([address]     Arrhythmia     Atrial fibrillation (HCC)     Carotid artery stenosis     CKD (chronic kidney disease)     Dilated cardiomyopathy (HCC)     Hyperlipidemia     Hypertension     IGT (impaired glucose tolerance)     Meningioma (HCC)     Valvular disease        PAST SURGICAL HISTORY    Past Surgical History:   Procedure Laterality Date    AORTIC VALVE REPLACEMENT      CARPAL TUNNEL RELEASE Left     COLONOSCOPY  2010    SKIN BIOPSY         FAMILY HISTORY    Family History   Problem Relation Age of Onset    Heart Disease Paternal Grandfather     Hypertension Other     High Cholesterol Neg Hx     High Blood Pressure Neg Hx        SOCIAL HISTORY    Social History     Tobacco Use    Smoking status: Former Smoker     Packs/day: 1.00     Years: 25.00     Pack years: 25.00     Types: Cigarettes     Last attempt to quit: 1993     Years since quittin.6    Smokeless tobacco: Former User     Types: Chew     Quit date: 1982    Tobacco comment: quit 25 yrs ago   Substance Use Topics    Alcohol use: No     Alcohol/week: 0.0 standard drinks    Drug use: No       ALLERGIES    Allergies   Allergen Reactions    Ceclor [Cefaclor] Other (See Comments)     CNS side effects, slurred speech. Lips and tounge swell. MEDICATIONS    Current Outpatient Medications on File Prior to Encounter   Medication Sig Dispense Refill    torsemide (DEMADEX) 20 MG tablet Take 1 tablet by mouth 2 times daily 60 tablet 3    amiodarone (PACERONE) 400 MG tablet Take 1 tablet 3 times daily for 7 days, then 1 tablet 2 times daily for 7 days, then 1 tablet daily for 7 days, then half (0.5) tablet daily 46 tablet 1    warfarin (COUMADIN) 4 MG tablet Take 2-4 mg by mouth See Admin Instructions Take 2 mg (4 mg x 0.5 tablet) every Mon; take 4 mg (4 mg x 1 tablet) all other days. Managed by [facility].  carvedilol (COREG) 12.5 MG tablet TAKE 1 TABLET TWICE DAILY  WITH MEALS 180 tablet 1    allopurinol (ZYLOPRIM) 100 MG tablet TAKE 1 TABLET BY MOUTH DAILY 90 tablet 3    atorvastatin (LIPITOR) 40 MG tablet TAKE 1 TABLET DAILY 90 tablet 3    lisinopril (PRINIVIL;ZESTRIL) 20 MG tablet TAKE 1 TABLET DAILY (Patient not taking: Reported on 2020) 90 tablet 3    Multiple Vitamins-Minerals (THERAPEUTIC MULTIVITAMIN-MINERALS) tablet Take 1 tablet by mouth daily       No current facility-administered medications on file prior to encounter. REVIEW OF SYSTEMS    Pertinent items are noted in HPI.       Objective:      /81   Pulse 94   Temp 97.3 °F (36.3 °C) (Tympanic)   Wt 163 lb (73.9 kg)   BMI 24.07 kg/m²     PHYSICAL EXAM    General Appearance: alert and oriented to person, place and time, well-developed and well-nourished, in no acute distress  Skin: warm and dry  Head: normocephalic and atraumatic  Eyes: extraocular eye 08/13/20 Leg Right; Anterior #1 (Active)   Wound Image   08/13/20 0914   Wound Venous 08/20/20 0937   Wound Cleansed Rinsed/Irrigated with saline 08/20/20 1000   Wound Length (cm) 3.4 cm 08/20/20 0937   Wound Width (cm) 2.1 cm 08/20/20 0937   Wound Depth (cm) 0.1 cm 08/20/20 0937   Wound Surface Area (cm^2) 7.14 cm^2 08/20/20 0937   Change in Wound Size % (l*w) 9.85 08/20/20 0937   Wound Volume (cm^3) 0.71 cm^3 08/20/20 0937   Wound Healing % 10 08/20/20 0937   Post-Procedure Length (cm) 3.4 cm 08/20/20 1000   Post-Procedure Width (cm) 2.1 cm 08/20/20 1000   Post-Procedure Depth (cm) 0.1 cm 08/20/20 1000   Post-Procedure Surface Area (cm^2) 7.14 cm^2 08/20/20 1000   Post-Procedure Volume (cm^3) 0.71 cm^3 08/20/20 1000   Wound Assessment Bleeding 08/20/20 1000   Drainage Amount Moderate 08/20/20 1000   Drainage Description Serosanguinous 08/20/20 0937   Odor None 08/20/20 0937   Pink%Wound Bed 50 08/20/20 0937   Red%Wound Bed 0 08/20/20 0937   Yellow%Wound Bed 50 08/20/20 0937   Black%Wound Bed 0 08/20/20 0937   Purple%Wound Bed 0 08/20/20 0937   Other%Wound Bed 0 08/20/20 0937   Number of days: 7       Wound 08/13/20 Leg Left;Medial;Distal #2 (Active)   Wound Image   08/13/20 0914   Wound Venous 08/20/20 0937   Wound Cleansed Rinsed/Irrigated with saline 08/20/20 1000   Wound Length (cm) 1.7 cm 08/20/20 0937   Wound Width (cm) 1.6 cm 08/20/20 0937   Wound Depth (cm) 0.1 cm 08/20/20 0937   Wound Surface Area (cm^2) 2.72 cm^2 08/20/20 0937   Change in Wound Size % (l*w) -13.33 08/20/20 0937   Wound Volume (cm^3) 0.27 cm^3 08/20/20 0937   Wound Healing % -12 08/20/20 0937   Post-Procedure Length (cm) 1.7 cm 08/20/20 1000   Post-Procedure Width (cm) 1.6 cm 08/20/20 1000   Post-Procedure Depth (cm) 0.1 cm 08/20/20 1000   Post-Procedure Surface Area (cm^2) 2.72 cm^2 08/20/20 1000   Post-Procedure Volume (cm^3) 0.27 cm^3 08/20/20 1000   Wound Assessment Bleeding 08/20/20 1000   Drainage Amount Moderate 08/20/20 0937 successful healing and prevention of further ulceration and/or infection     Discharge Treatment      Written Patient Discharge Instructions Given  Silver alginate and Unna boots bilaterally with RTC in one week. Thank you for allowing me to participate in the care of your patient. Please do not hesitate to call.      Maine Zayas D.O., [facility]  Interventional Cardiology     o: [phone]  [address] [place] [address]., [address], [address] [place] [address]

## 2020-08-20 NOTE — PLAN OF CARE
Compression Application   Below Knee    NAME:  Katie Eden  YOB: 1940  MEDICAL RECORD NUMBER:  [id number]  DATE:  8/20/2020       Applied moisturizing agent to dry skin as needed. Appied primary and secondary dressing as ordered     Applied  Unna Boot wrap from toes to knee overlapping each time. Applied cast padding and coban from toes to below the knee. Instructed patient/caregiver to keep dressing dry and intact. DO NOT REMOVE DRESSING. Instructed pt/family/caregiver to report excessive draining, loose bandage, wet dressing, severe pain or tingling in toes. Applied Compression dressing below the knee to Bilateral lower leg(s)      Compression wrap(s) were applied per  Guidelines.      Electronically signed by Mortimer Grace, RN on 8/20/2020 at 10:21 AM

## 2020-08-24 NOTE — PROGRESS NOTES
Mr. Reji Anderson is a 70 y.o. y/o male with history of Heart Valve Replacement, aortic valve, about 1997 by Catarina Montoya at [facility]   He presents today for anticoagulation monitoring and adjustment. Pertinent AFib, PMH: HTN, CHF, Chronic Kidney disease(moderate)    Patient Reported Findings:  Yes     No  [x]   []       Patient verifies current dosing regimen as listed- follows AVS calendar---> pt presents to apt alone, doesn't know dosing---> follows AVS   []   [x]       S/S bleeding/bruising/swelling/SOB-denies   []   [x]       Blood in urine or stool- denies   []   [x]       Procedures scheduled in the future at this time  []   [x]       Missed Dose - denies   []   [x]       Extra Dose- denies   [x]   []       Change in medications pt states that he is no longer taking digoxin---> allopurinol dose dec to 100mg daily ---> inc lasix and coreg, stop cardizem, then dc lasix and changed to torsemide, taking celebrex ---->started amiodarone outpatient with 400 mg TID x 7 days (today is last day of TID, 8/11), starting 8/12 400 mg BID x 7 days, 400 mg qd x 7 days, then 200 mg qd. Inc torsemide---> taking amiodarone 200 mg qd  [x]   []       Change in health/diet/appetite- Overall his appetite is poor, he states that he \"hasn't been eating much of anything lately\". He states that he continues to drink Ensure every day. Has not been eating much/missed a few ensures. --> a few salads a week. No boost or ensure   []   [x]       Change in alcohol use does not drink  []   [x]       Change in activity  []   [x]       Hospital admission- pt was in hosptial 7/29-8/5 for a fib. Was started on diltiazem and amiodarone drip. was held first 2 days of admission then resumed on 8/1 with home dose.  INR on d/c 2.7 was started on amiodarone outpatient with 400 mg TID x 7 days, 400 mg BID x 7 days, 400 mg qd x 7 days, then 200 mg qd.  []   [x]       Emergency department visit  []   [x]       Other complaints    Clinical Outcomes:  Yes     No  []   [x]       Major bleeding event  []   [x]       Thromboembolic event    Patient states that he gets brand Coumadin from mail order. States \"warfarin just doesn't work for [employer] Duration of warfarin Therapy: indefinite  INR Range:  2.5-3.5     **they have stopped making Brand Coumadin so patient is going to have to switch over to generic warfarin, explained this to him and daughter at apt today. Also switched tablet strength to 2mg warfarin tablets and went over the switch with patient and daughter. INR is 2 today   Patient started high dose amiodarone taper. 400mg TID ending at last apt then starting 8/12 400 mg BID x 7 days then 400 mg qd x 7 days, then 200 mg qd. --> currently taking 200 mg amiodarone   INR dropped largely with last dose adjustment and as on lower dose of amiodarone. Increase weekly dose to 1 mg on Sun and Thurs and 2 mg all other days of the week (33% inc)  Encouraged to maintain a consistency of vegetables/salads. Recheck INR on Monday, 8/31    Referring cardiologist is Dr. Alie Vieira.    INR (no units)   Date Value   08/17/2020 3.3   08/11/2020 3.6   08/05/2020 2.76 (H)   08/04/2020 2.12 (H)   08/03/2020 1.79 (H)   08/02/2020 1.66 (H)       CLINICAL PHARMACY CONSULT: MED RECONCILIATION/REVIEW ADDENDUM    For Pharmacy Admin Tracking Only    PHSO: Yes  Total # of Interventions Recommended: 1  - Increased Dose #: 1  - Maintenance Safety Lab Monitoring #: 1  Total Interventions Accepted: 1  Time Spent (min): 15    Cece Marrufo, TariqD

## 2020-08-25 NOTE — PROGRESS NOTES
pressure (8 mmHg). Echo 4/23/17  Summary   -Mild left ventricular hypertrophy. -Global ejection fraction is mild-moderately decreased and estimated at   40-45%. -There is abnormal septal motion.   -Moderate biatrial enlargement.   -The mechanical aortic valve appears well seated with a maximum gradient of   25 mmHg and a mean gradient of 16 mmHg. There is trace aortic insufficiency.   -Thickened mitral valve without evidence of stenosis. Mild mitral annular   calcification.   -There is mild prolapse of the anterior mitral leaflet. Mitral regurgitation   is present that may be moderate.   -There is mild tricuspid regurgitation with RVSP estimated at 31 mmHg. Device: No   ICD counseling:No     Activity: below baseline  Can you walk 1-2 blocks or do a moderate amount of house/yard work? No      NYHA Class: III     Sodium Restrictions: 2g  Fluid Restrictions: 48-64 oz/day  Sodium and fluid restriction compliance: fair    Pt Education: The patient has received education on the following topics: dietary sodium restriction, heart failure medications, the importance of physical activity, symptom management and weight monitoring      Past Medical History:   has a past medical history of Acute combined systolic and diastolic (congestive) hrt fail ([address] Acute kidney injury ([address] Arrhythmia, Atrial fibrillation ([address] Carotid artery stenosis, CKD (chronic kidney disease), Dilated cardiomyopathy ([address] Hyperlipidemia, Hypertension, IGT (impaired glucose tolerance), Meningioma ([address] and Valvular disease. Surgical History:   has a past surgical history that includes Aortic valve replacement; Carpal tunnel release (Left); skin biopsy; and Colonoscopy (2010). Social History:   reports that he quit smoking about 27 years ago. His smoking use included cigarettes. He has a 25.00 pack-year smoking history. He quit smokeless tobacco use about 38 years ago. His smokeless tobacco use included chew.  He reports that he does not drink alcohol or use drugs. Family History:   Family History   Problem Relation Age of Onset    Heart Disease Paternal Grandfather     Hypertension Other     High Cholesterol Neg Hx     High Blood Pressure Neg Hx        HomeMedications:  Prior to Admission medications    Medication Sig Start Date End Date Taking? Authorizing Provider   torsemide (DEMADEX) 20 MG tablet Take 1 tablet by mouth 2 times daily 8/11/20   Wardvarsha NguyenSARAH - CNP   amiodarone (PACERONE) 400 MG tablet Take 1 tablet 3 times daily for 7 days, then 1 tablet 2 times daily for 7 days, then 1 tablet daily for 7 days, then half (0.5) tablet daily 8/5/20   SARAH Cheung - CNP   warfarin (COUMADIN) 4 MG tablet Take 2-4 mg by mouth See Admin Instructions Take 2 mg (4 mg x 0.5 tablet) every Mon; take 4 mg (4 mg x 1 tablet) all other days. Managed by [facility]. Historical Provider, MD   carvedilol (COREG) 12.5 MG tablet TAKE 1 TABLET TWICE DAILY  WITH MEALS 6/30/20   Katiana Eaton MD   allopurinol (ZYLOPRIM) 100 MG tablet TAKE 1 TABLET BY MOUTH DAILY 3/9/20   Hannah Valenzuela MD   atorvastatin (LIPITOR) 40 MG tablet TAKE 1 TABLET DAILY 1/29/20   Hannah Valenzuela MD   lisinopril (PRINIVIL;ZESTRIL) 20 MG tablet TAKE 1 TABLET DAILY  Patient not taking: Reported on 8/14/2020 1/29/20   Hannah Valenzuela MD   Multiple Vitamins-Minerals (THERAPEUTIC MULTIVITAMIN-MINERALS) tablet Take 1 tablet by mouth daily    Historical Provider, MD        Allergies:  Ceclor [cefaclor]     ROS:   Review of Systems   Constitutional: Positive for fatigue. Respiratory: Positive for shortness of breath. Orthopnea   Cardiovascular: Positive for leg swelling. Negative for chest pain and palpitations. Gastrointestinal: Negative. Genitourinary: Negative. Musculoskeletal: Negative. Skin: Positive for wound. BLE   Neurological: Negative. Hematological: Negative. Psychiatric/Behavioral: Negative.         Physical Examination: CL 98 08/20/2020     08/11/2020     08/05/2020    CO2 31 08/20/2020    CO2 23 08/11/2020    CO2 26 08/05/2020    PHOS 2.9 08/20/2020    PHOS 4.5 07/31/2020    BUN 28 08/20/2020    BUN 42 08/11/2020    BUN 60 08/05/2020    CREATININE 1.6 08/20/2020    CREATININE 2.2 08/11/2020    CREATININE 2.0 08/05/2020     BNP:   Lab Results   Component Value Date    PROBNP 4,049 08/20/2020    PROBNP 5,130 08/11/2020    PROBNP 8,820 08/04/2020     Iron Studies:  No components found for: FE,  TIBC,  FERRITIN  Iron Deficiency Anemia:  yes   IV Iron Therapy:  Yes, Aug 2020  2017 ACC/AHA HF Guidelines:   intravenous iron replacement in patients with New York Heart Association (NYHA) class II and III HF and iron deficiency (ferritin <100 ng/ml or 100-300 ng/ml if transferrin saturation <20%), to improve functional status and QoL. Assessment/Plan:    Encounter Diagnoses        Acute combined systolic and diastolic congestive heart failure (HCC) compensated    Essential hypertension, benign controlled    Atrial fibrillation with RVR (HCC) Rate improved with Amio, continue BB, AC, has EP f/u    Dilated cardiomyopathy ([address] Continue BB and ACE    Localized edema improving       Instructions:   1. Medications: no change  2. Labs per nephrology  3. F/u as scheduled      I appreciate the opportunity of cooperating in the care of this individual.    Mikayla Ball CNP, 8/25/2020,9:12 AM    QUALITY MEASURES  1. Tobacco Cessation Counseling: NA  2. Retake of BP if >140/90:   NA  3. Documentation to PCP/referring for new patient:  Sent to PCP at close of office visit  4. CAD patient on anti-platelet: na  5. CAD patient on STATIN therapy:  na  6. Patient with CHF and aFib on anticoagulation:  Yes   7. Patient Education:Yes   8. BB for LVSD :  Yes   9. ACE/ARB for LVSD:  Yes   10.  Left Ventricular Ejection Fraction (LVEF) Assessment:  Yes

## 2020-08-26 PROBLEM — I48.0 PAROXYSMAL A-FIB (HCC): Status: RESOLVED | Noted: 2020-01-01 | Resolved: 2020-01-01

## 2020-08-26 NOTE — PROGRESS NOTES
Serosanguinous 08/26/20 1009   Odor None 08/26/20 1009   Ingenio%Wound Bed 100 08/26/20 1009   Red%Wound Bed 0 08/26/20 1009   Yellow%Wound Bed 0 08/26/20 1009   Black%Wound Bed 0 08/26/20 1009   Purple%Wound Bed 0 08/26/20 1009   Other%Wound Bed 0 08/20/20 0937   Number of days: 13       Wound 08/13/20 Leg Left;Medial;Distal #2 (Active)   Wound Image   08/13/20 0914   Wound Venous 08/26/20 1009   Wound Cleansed Rinsed/Irrigated with saline 08/26/20 1030   Wound Length (cm) 1 cm 08/26/20 1009   Wound Width (cm) 1 cm 08/26/20 1009   Wound Depth (cm) 0.1 cm 08/26/20 1009   Wound Surface Area (cm^2) 1 cm^2 08/26/20 1009   Change in Wound Size % (l*w) 58.33 08/26/20 1009   Wound Volume (cm^3) 0.1 cm^3 08/26/20 1009   Wound Healing % 58 08/26/20 1009   Post-Procedure Length (cm) 1 cm 08/26/20 1030   Post-Procedure Width (cm) 1 cm 08/26/20 1030   Post-Procedure Depth (cm) 0.1 cm 08/26/20 1030   Post-Procedure Surface Area (cm^2) 1 cm^2 08/26/20 1030   Post-Procedure Volume (cm^3) 0.1 cm^3 08/26/20 1030   Wound Assessment Bleeding 08/26/20 1030   Drainage Amount Moderate 08/26/20 1030   Drainage Description Serosanguinous 08/26/20 1009   Odor None 08/26/20 1009   Patsy-wound Assessment Dry;Clean 08/26/20 1009   Ingenio%Wound Bed 100 08/26/20 1009   Red%Wound Bed 0 08/26/20 1009   Yellow%Wound Bed 0 08/26/20 1009   Black%Wound Bed 0 08/26/20 1009   Purple%Wound Bed 0 08/20/20 0937   Other%Wound Bed 0 08/20/20 0937   Number of days: 13       Total Surface Area Debrided:  4 sq cm     Bleeding:  Minimal    Hemostasis Achieved:  by pressure    Procedural Pain:  1  / 10     Post Procedural Pain:  2 / 10     Response to treatment:  Well tolerated by patient. Assessment:     Wound looks stable. (improved, worse or stable)    Patient tolerated procedure well and was given proper instruction. The nature of the patient's condition was explained in depth.  The patient was informed that their compliance to the treatment plan is paramount to successful healing and prevention of further ulceration and/or infection       Plan:     Treatment Plan: With each dressing change, rinse wounds with 0.9% Saline. (May use wound wash or soft contact solution. Both can be purchased at a local drug store). If unable to obtain saline, may use a gentle soap and water. Dressing care: Collagen, 4 x 4's, Unna boot- these will be changed at your next visit unless they slide down or cause pain. If they slide, gets wet, or cause pain please call the wound care center, you may need to come in to be re-wrapped. Keep your appointment with Dr Boston Velazco. [address]  Soledad Ennis MD, FACS  8/26/2020  10:48 AM

## 2020-08-28 NOTE — CARE COORDINATION
Brittanie [facility] Transitions Follow Up Call    2020    Patient: Roberto Borja  Patient : 1940   MRN: [id number]  Reason for Admission: Afib with RVR  Discharge Date: 20 RARS: Readmission Risk Score: 17         Spoke with: Enedelia Montanez WIfe    Care Transitions Subsequent and Final Call    Subsequent and Final Calls  Do you have any ongoing symptoms?:  No  Have your medications changed?:  No  Do you have any questions related to your medications?:  No  Do you currently have any active services?:  No  Do you have any needs or concerns that I can assist you with?:  No  Identified Barriers:  None  Care Transitions Interventions  Other Interventions: Follow Up: Patient's wife reports that he is doing \"much better\". He denies any SOB, chest pain, fever, and/or cough. He followed up with cardiology 20. There have been no changes in his medications. CTN will continue with outreach follow up calls.     Future Appointments   Date Time Provider Lana Mathias   2020 10:45 AM [facility] ANTICOAGULATION CLINIC [facility]   2020  9:00 AM Nils Kelley MD [facility]   2020 11:00 AM Carli Chow APRN - CNP [facility] Cardio [facility]   10/27/2020  2:30 PM Judy Plummer MD [facility]   2020  9:30 AM Hima Randolph MD [facility]   12/15/2020  9:15 AM Nohemy Cruz MD [facility]       Mervat Castelan RN

## 2020-08-31 NOTE — PROGRESS NOTES
Mr. Jose Soares is a 70 y.o. y/o male with history of Heart Valve Replacement, aortic valve, about 1997 by Jose Champion at [facility]   He presents today for anticoagulation monitoring and adjustment. Pertinent AFib, PMH: HTN, CHF, Chronic Kidney disease(moderate)    Patient Reported Findings:  Yes     No  [x]   []       Patient verifies current dosing regimen as listed- follows AVS calendar---> pt presents to apt alone, doesn't know dosing---> follows AVS   []   [x]       S/S bleeding/bruising/swelling/SOB-denies   []   [x]       Blood in urine or stool- denies   []   [x]       Procedures scheduled in the future at this time  []   [x]       Missed Dose - denies   []   [x]       Extra Dose- denies   [x]   []       Change in medications pt states that he is no longer taking digoxin---> allopurinol dose dec to 100mg daily ---> inc lasix and coreg, stop cardizem, then dc lasix and changed to torsemide, taking celebrex ---->started amiodarone outpatient with 400 mg TID x 7 days (today is last day of TID, 8/11), starting 8/12 400 mg BID x 7 days, 400 mg qd x 7 days, then 200 mg qd. Inc torsemide---> taking amiodarone 200 mg qd  [x]   []       Change in health/diet/appetite- Overall his appetite is poor, he states that he \"hasn't been eating much of anything lately\". He states that he continues to drink Ensure every day. Has not been eating much/missed a few ensures. --> a few salads a week. No boost or ensure---> states not many salads recently but a lot of green beans, not doing ensure    []   [x]       Change in alcohol use does not drink  []   [x]       Change in activity  []   [x]       Hospital admission- pt was in hosptial 7/29-8/5 for a fib. Was started on diltiazem and amiodarone drip. was held first 2 days of admission then resumed on 8/1 with home dose.  INR on d/c 2.7 was started on amiodarone outpatient with 400 mg TID x 7 days, 400 mg BID x 7 days, 400 mg qd x 7 days, then 200 mg qd.  []   [x] Emergency department visit  []   [x]       Other complaints    Clinical Outcomes:  Yes     No  []   [x]       Major bleeding event  []   [x]       Thromboembolic event    Patient states that he gets brand Coumadin from mail order. States \"warfarin just doesn't work for [employer] Duration of warfarin Therapy: indefinite  INR Range:  2.5-3.5     **they have stopped making Brand Coumadin so patient is going to have to switch over to generic warfarin, explained this to him and daughter at apt today. Also switched tablet strength to 2mg warfarin tablets and went over the switch with patient and daughter. INR is 2.1 today   Patient started high dose amiodarone taper. 400mg TID ending at last apt then starting 8/12 400 mg BID x 7 days then 400 mg qd x 7 days, then 200 mg qd. --> currently taking 200 mg amiodarone   INR dropped largely with last dose adjustment and as on lower dose of amiodarone so dose was increased. Since barely increased, will increase again    Take 3mg tonight then increase dose to 1 mg on Sundays only and 2 mg all other days of the week (8.3%). Encouraged to maintain a consistency of vegetables/salads. Recheck INR in 10 days, 9/9 dt holiday and granddaughter needing later morning apts    Referring cardiologist is Dr. Tapan Herrera.    INR (no units)   Date Value   08/31/2020 2.1   08/24/2020 2   08/17/2020 3.3   08/11/2020 3.6   08/05/2020 2.76 (H)   08/04/2020 2.12 (H)   08/03/2020 1.79 (H)   08/02/2020 1.66 (H)       CLINICAL PHARMACY CONSULT: MED RECONCILIATION/REVIEW ADDENDUM    For Pharmacy Admin Tracking Only    PHSO: Yes  Total # of Interventions Recommended: 1  - Increased Dose #: 1  - Maintenance Safety Lab Monitoring #: 1  Total Interventions Accepted: 1  Time Spent (min): [address], PharmD

## 2020-09-01 NOTE — TELEPHONE ENCOUNTER
Warfarin prescription phoned into [facility] to [facility] under Dr. Katiana Eaton  Warfarin 2 mg tabs  Take 1 mg (2 mg x 0.5) every Sun; 2 mg (2 mg x 1) all other days   30 days   2 refills  Swati Agarwal, TariqD, [facility]    Coming tomorrow morning before wound care.

## 2020-09-02 NOTE — PROGRESS NOTES
Serosanguinous 08/26/20 1009   Odor None 08/26/20 1009   Hi-Nella%Wound Bed 100 08/26/20 1009   Red%Wound Bed 0 08/26/20 1009   Yellow%Wound Bed 0 08/26/20 1009   Black%Wound Bed 0 08/26/20 1009   Purple%Wound Bed 0 08/26/20 1009   Other%Wound Bed 0 08/20/20 0937   Number of days: 20       Total Surface Area Debrided:  0.06 sq cm     Bleeding:  Minimal    Hemostasis Achieved:  by pressure    Procedural Pain:  1  / 10     Post Procedural Pain:  2 / 10     Response to treatment:  Well tolerated by patient. Assessment:     Wound looks improved. (improved, worse or stable)    Patient tolerated procedure well and was given proper instruction. The nature of the patient's condition was explained in depth. The patient was informed that their compliance to the treatment plan is paramount to successful healing and prevention of further ulceration and/or infection       Plan:     Treatment Plan: With each dressing change, rinse wounds with 0.9% Saline. (May use wound wash or soft contact solution. Both can be purchased at a local drug store). If unable to obtain saline, may use a gentle soap and water. Dressing care: Collagen, 4 x 4's, Unna boot- these will be changed at your next visit unless they slide down or cause pain. If they slide, gets wet, or cause pain please call the wound care center, you may need to come in to be re-wrapped. Keep your appointment with Dr Shae Chinchilla. [address] Laws 6  Alma Robles MD, FACS  9/2/2020  2:30 PM

## 2020-09-02 NOTE — PLAN OF CARE
Compression Application   Below Knee    NAME:  Anai Tobin  YOB: 1940  MEDICAL RECORD NUMBER:  [id number]  DATE:  9/2/2020       Applied moisturizing agent to dry skin as needed. Appied primary and secondary dressing as ordered     Applied  Unna Boot wrap from toes to knee overlapping each time. Applied cast padding and coban from toes to below the knee. Instructed patient/caregiver to keep dressing dry and intact. DO NOT REMOVE DRESSING. Instructed pt/family/caregiver to report excessive draining, loose bandage, wet dressing, severe pain or tingling in toes. Applied Compression dressing below the knee to Bilateral lower leg(s)      Compression wrap(s) were applied per  Guidelines.      Electronically signed by Simona Marrero RN on 9/2/2020 at 9:40 AM

## 2020-09-02 NOTE — PLAN OF CARE
Discharge instructions given. Patient verbalized understanding. Return to [address] [place] [address],[address] in 1 week for nurse visit for unna boot change  Has appt with Dr Nida Rubin in office on 9/17/20 (f/u from hospital).  Will call to schedule here after he sees Dr Nida Rubin & he determines if wound care is still needed

## 2020-09-04 NOTE — CARE COORDINATION
Final attempt for follow up outreach call, no answer. CTN left  with contact information and request for return call. CTN will remain available.

## 2020-09-09 NOTE — PROGRESS NOTES
Mr. Jose Soares is a 68 y.o. y/o male with history of Heart Valve Replacement, aortic valve, about 1997 by Jose Champion at [facility]   He presents today for anticoagulation monitoring and adjustment. Pertinent AFib, PMH: HTN, CHF, Chronic Kidney disease(moderate)    Patient Reported Findings:  Yes     No  [x]   []       Patient verifies current dosing regimen as listed- follows AVS calendar---> pt presents to apt alone, doesn't know dosing---> follows AVS ---> Grand daughter confirms he is following AVS.   []   [x]       S/S bleeding/bruising/swelling/SOB-denies   []   [x]       Blood in urine or stool- denies   []   [x]       Procedures scheduled in the future at this time  []   [x]       Missed Dose - denies   []   [x]       Extra Dose- denies   []   [x]       Change in medications pt states that he is no longer taking digoxin---> allopurinol dose dec to 100mg daily ---> inc lasix and coreg, stop cardizem, then dc lasix and changed to torsemide, taking celebrex ---->started amiodarone outpatient with 400 mg TID x 7 days (today is last day of TID, 8/11), starting 8/12 400 mg BID x 7 days, 400 mg qd x 7 days, then 200 mg qd. Inc torsemide---> taking amiodarone 200 mg qd---> report none. [x]   []       Change in health/diet/appetite- Overall his appetite is poor, he states that he \"hasn't been eating much of anything lately\". He states that he continues to drink Ensure every day. Has not been eating much/missed a few ensures. --> a few salads a week. No boost or ensure---> states not many salads recently but a lot of green beans, not doing ensure  ---> Eating greens 2-3 times per week. No NVD. []   [x]       Change in alcohol use does not drink  []   [x]       Change in activity  []   [x]       Hospital admission- pt was in hosptial 7/29-8/5 for a fib. Was started on diltiazem and amiodarone drip. was held first 2 days of admission then resumed on 8/1 with home dose.  INR on d/c 2.7 was started on amiodarone outpatient with 400 mg TID x 7 days, 400 mg BID x 7 days, 400 mg qd x 7 days, then 200 mg qd. ---> None recently. []   [x]       Emergency department visit  []   [x]       Other complaints    Clinical Outcomes:  Yes     No  []   [x]       Major bleeding event  []   [x]       Thromboembolic event    Duration of warfarin Therapy: indefinite  INR Range:  2.5-3.5     **they have stopped making Brand Coumadin so patient is going to have to switch over to generic warfarin, explained this to him and daughter at apt today. Also switched tablet strength to 2mg warfarin tablets and went over the switch with patient and daughter. INR is 2.0 today   Patient currently taking 200 mg amiodarone. Dose was lowered to accommodate this change. Since INR has been consistently low, boosting with 3 mg today and increasing dose again. Take 3mg tonight then increase dose to 2 mg daily. (7.7%) increase  Encouraged to maintain a consistency of vegetables/salads. Recheck INR in 1 week, 9/16     Referring cardiologist is Dr. Colleen Kay.    INR (no units)   Date Value   08/31/2020 2.1   08/24/2020 2   08/17/2020 3.3   08/11/2020 3.6   08/05/2020 2.76 (H)   08/04/2020 2.12 (H)   08/03/2020 1.79 (H)   08/02/2020 1.66 (H)     CLINICAL PHARMACY CONSULT: MED RECONCILIATION/REVIEW ADDENDUM    For Pharmacy Admin Tracking Only    PHSO: Yes  Total # of Interventions Recommended: 2  - Increased Dose #: 2  - Maintenance Safety Lab Monitoring #: 1  Total Interventions Accepted: 2  Time Spent (min): 1001 Avelino Arvizu, PharmD

## 2020-09-09 NOTE — PLAN OF CARE
Compression Application   Below Knee    NAME:  Farhad Chandler  YOB: 1940  MEDICAL RECORD NUMBER:  [id number]  DATE:  9/9/2020       Applied moisturizing agent to dry skin as needed. Appied primary and secondary dressing as ordered     Applied  Unna Boot wrap from toes to knee overlapping each time. Applied cast padding and coban from toes to below the knee. Instructed patient/caregiver to keep dressing dry and intact. DO NOT REMOVE DRESSING. Instructed pt/family/caregiver to report excessive draining, loose bandage, wet dressing, severe pain or tingling in toes. Applied Compression dressing below the knee to Bilateral lower leg(s)      Compression wrap(s) were applied per  Guidelines.      Electronically signed by Kenia Aguilar RN on 9/9/2020 at 11:34 AM

## 2020-09-16 NOTE — PROGRESS NOTES
Mr. Abby Ramsay is a 68 y.o. y/o male with history of Heart Valve Replacement, aortic valve, about 1997 by Erika Ferguson at [facility]   He presents today for anticoagulation monitoring and adjustment. Pertinent AFib, PMH: HTN, CHF, Chronic Kidney disease(moderate)    Patient Reported Findings:  Yes     No  [x]   []       Patient verifies current dosing regimen as listed- follows AVS calendar---> pt presents to apt alone, doesn't know dosing---> follows AVS ---> Grand daughter confirms he is following AVS.   []   [x]       S/S bleeding/bruising/swelling/SOB-denies   []   [x]       Blood in urine or stool- denies   []   [x]       Procedures scheduled in the future at this time  []   [x]       Missed Dose - denies   []   [x]       Extra Dose- denies   []   [x]       Change in medications pt states that he is no longer taking digoxin---> allopurinol dose dec to 100mg daily ---> inc lasix and coreg, stop cardizem, then dc lasix and changed to torsemide, taking celebrex ---->started amiodarone outpatient with 400 mg TID x 7 days (today is last day of TID, 8/11), starting 8/12 400 mg BID x 7 days, 400 mg qd x 7 days, then 200 mg qd. Inc torsemide---> taking amiodarone 200 mg qd---> report none. [x]   []       Change in health/diet/appetite- Overall his appetite is poor, he states that he \"hasn't been eating much of anything lately\". He states that he continues to drink Ensure every day. Has not been eating much/missed a few ensures. --> a few salads a week. No boost or ensure---> states not many salads recently but a lot of green beans, not doing ensure  ---> Eating greens 2-3 times per week. No NVD. []   [x]       Change in alcohol use does not drink  []   [x]       Change in activity  []   [x]       Hospital admission- pt was in hosptial 7/29-8/5 for a fib. Was started on diltiazem and amiodarone drip. was held first 2 days of admission then resumed on 8/1 with home dose.  INR on d/c 2.7 was started on

## 2020-09-23 NOTE — TELEPHONE ENCOUNTER
Disp  Refills  Start  End     allopurinol (ZYLOPRIM) 100 MG tablet  90 tablet  3  3/9/2020      Sig: TAKE 1 TABLET BY MOUTH DAILY        Chitra Camp, [address] [facility] [phone] - f [phone]      Please advise

## 2020-09-25 NOTE — PLAN OF CARE
Compression [address] for [employer]:     [place] [address] f: [phone] f: [phone] p: [phone] [email or web address]      Ordering Center: [facility]  [phone]  WOUND CARE Dept: [facility] NUMBER     Patient Information:      Bridger HAYWARD [address] [address]   [phone]   : 1940  AGE: 68 y.o.      GENDER: male   TODAYS DATE:  2020    Insurance:      PRIMARY INSURANCE:  Plan: MEDICARE PART A AND B  Coverage: MEDICARE  Effective Date: 2015  Group Number: [unfilled]  Subscriber Number: [id number] - (Medicare)    SECONDARY INSURANCE:  Plan: UNITED HEALTHCARE - CHOICE PLU  Coverage: UNITED HEALTHCARE  Effective Date: 2015  [unfilled]    [unfilled]     [unfilled]   [unfilled]     Patient Information:      Problem List Items Addressed This Visit     Venous stasis ulcer of right ankle with fat layer exposed without varicose veins (HCC) - Primary    Relevant Medications    lidocaine (LMX) 4 % cream    Other Relevant Orders    Supply: Wound Cleanser    Supply: Patsy Wound    Supply: Wound Dressings    Supply: Pack Wound    Supply: Edema Control          Wound 20 Leg Anterior;Right #1 (Active)   Wound Image   20 1000   Wound Cleansed Rinsed/Irrigated with saline 20 1000   Wound Length (cm) 3.3 cm 20 1000   Wound Width (cm) 1.8 cm 20 1000   Wound Depth (cm) 0.1 cm 20 1000   Wound Surface Area (cm^2) 5.94 cm^2 20 1000   Wound Volume (cm^3) 0.59 cm^3 20 1000   Post-Procedure Length (cm) 3.3 cm 20 1015   Post-Procedure Width (cm) 1.8 cm 20 1015   Post-Procedure Depth (cm) 0.1 cm 20 1015   Post-Procedure Surface Area (cm^2) 5.94 cm^2 20 1015   Post-Procedure Volume (cm^3) 0.59 cm^3 20 1015   Wound Assessment Bleeding 20 1015   Drainage Amount Moderate 09/25/20 1000   Drainage Description Serosanguinous 09/25/20 1000   Odor None 09/25/20 1000   Patsy-wound Assessment Excoriated 09/25/20 1000   [place]%Wound Bed 0 09/25/20 1000   Red%Wound Bed 100 09/25/20 1000   Yellow%Wound Bed 0 09/25/20 1000   Black%Wound Bed 0 09/25/20 1000   Purple%Wound Bed 0 09/25/20 1000   Other%Wound Bed 0 09/25/20 1000   Number of days: 0       Wound 09/25/20 Leg Medial;Right #2 (Active)   Wound Image   09/25/20 1000   Wound Cleansed Rinsed/Irrigated with saline 09/25/20 1000   Wound Length (cm) 5 cm 09/25/20 1000   Wound Width (cm) 4.5 cm 09/25/20 1000   Wound Depth (cm) 0.1 cm 09/25/20 1000   Wound Surface Area (cm^2) 22.5 cm^2 09/25/20 1000   Wound Volume (cm^3) 2.25 cm^3 09/25/20 1000   Post-Procedure Length (cm) 5 cm 09/25/20 1015   Post-Procedure Width (cm) 4.5 cm 09/25/20 1015   Post-Procedure Depth (cm) 0.1 cm 09/25/20 1015   Post-Procedure Surface Area (cm^2) 22.5 cm^2 09/25/20 1015   Post-Procedure Volume (cm^3) 2.25 cm^3 09/25/20 1015   Wound Assessment Bleeding 09/25/20 1015   Drainage Amount Moderate 09/25/20 1000   Drainage Description Purulent;Serosanguinous 09/25/20 1000   Patsy-wound Assessment Edema; Excoriated;Fragile 09/25/20 1000   [place]%Wound Bed 0 09/25/20 1000   Red%Wound Bed 90 09/25/20 1000   Yellow%Wound Bed 10 09/25/20 1000   Black%Wound Bed 0 09/25/20 1000   Purple%Wound Bed 0 09/25/20 1000   Other%Wound Bed 0 09/25/20 1000   Number of days: 0       Wound 09/25/20 Leg Anterior; Left #3 (Active)   Wound Image   09/25/20 1000   Wound Venous 09/25/20 1000   Wound Cleansed Wound cleanser 09/25/20 1000   Wound Length (cm) 6.5 cm 09/25/20 1000   Wound Width (cm) 2 cm 09/25/20 1000   Wound Depth (cm) 0.1 cm 09/25/20 1000   Wound Surface Area (cm^2) 13 cm^2 09/25/20 1000   Wound Volume (cm^3) 1.3 cm^3 09/25/20 1000   Post-Procedure Length (cm) 6.5 cm 09/25/20 1015   Post-Procedure Width (cm) 2 cm 09/25/20 1015   Post-Procedure Depth (cm) 0.1 cm 09/25/20 1015 Post-Procedure Surface Area (cm^2) 13 cm^2 09/25/20 1015   Post-Procedure Volume (cm^3) 1.3 cm^3 09/25/20 1015   Wound Assessment Bleeding 09/25/20 1015   Drainage Amount Moderate 09/25/20 1000   Drainage Description Serosanguinous; Yellow 09/25/20 1000   Odor None 09/25/20 1000   Patsy-wound Assessment Excoriated;Fragile 09/25/20 1000   [place]%Wound Bed 20 09/25/20 1000   Red%Wound Bed 0 09/25/20 1000   Yellow%Wound Bed 80 09/25/20 1000   Black%Wound Bed 0 09/25/20 1000   Purple%Wound Bed 0 09/25/20 1000   Other%Wound Bed 0 09/25/20 1000   Number of days: 0       Right Leg Measurements: (ALL measurements are in cm)  Right Leg Edema Point of Measurement  Great toe to forefoot: 11 cm(Knee to heel, 46 cm)  Heel to ankle: 11 cm  Heel to calf: 31  Leg circumference: 38 cm  Ankle circumference: 25.5 cm  Foot circumference: 24 cm  Compression Therapy: Unna boot       Left Leg Measurements: (ALL measurements are in cm)  Left Leg Edema Point of Measurement  Great toe to forefoot: 11 cm  Heel to ankle: 11 cm  Heel to calf: 31  Leg circumference: 35 cm  Ankle circumference: 25 cm  Foot circumference: 24 cm  Compression Therapy: Unna boot    Supplies Requested :     Medicare Requirements  Patient must have a qualifying Active Venus Ulcer if ordering Bilateral Compression Wounds MUST be present on both legs for Medicare Coverage. The patient can Not be on home health or have had a Medicare part A stay in the past 24 hours. Patient Wound(s) Debrided: [x] Yes if yes please add date 9/25/2020  [] No    Debribement Type: Excisional/Sharp    Patient currently being seen by Home Health: [] Yes   [x] No     Compression Type:  Other Ready Wraps bilaterally    Provider Information:      PROVIDER'S NAME:  NPI: Thelma Pathak  [id number]

## 2020-09-25 NOTE — PROGRESS NOTES
Christine   Progress Note and Procedure Note      Blake Parada  AGE: 68 y.o. GENDER: male  : 1940  TODAY'S DATE:  2020    Subjective:     Chief Complaint   Patient presents with    Wound Infection     right and left lower leg         HISTORY of PRESENT ILLNESS HPI     Blake Parada is a 68 y.o. male who presents today for wound evaluation. History of Wound: Patient admits to long history of swelling and wounds in the leg along with hyperkeratosis. He recently healed and then the wound started back up again when he went into the compression stockings the next day. His legs are swollen and are much bigger than they were when he was discharged from the wound care center. He admits to discomfort in the legs where the wounds are.   Wound Pain:  intermittent  Severity:  3 / 10   Wound Type:  venous  Modifying Factors:  edema, lymphedema and decreased tissue oxygenation  Associated Signs/Symptoms:  edema, drainage and pain        PAST MEDICAL HISTORY        Diagnosis Date    Acute combined systolic and diastolic (congestive) hrt fail (HCC)     Acute kidney injury ([address]     Arrhythmia     Atrial fibrillation (HCC)     Carotid artery stenosis     CKD (chronic kidney disease)     Dilated cardiomyopathy (HCC)     Hyperlipidemia     Hypertension     IGT (impaired glucose tolerance)     Meningioma (HCC)     Valvular disease        PAST SURGICAL HISTORY    Past Surgical History:   Procedure Laterality Date    AORTIC VALVE REPLACEMENT      CARPAL TUNNEL RELEASE Left     COLONOSCOPY  2010    SKIN BIOPSY         FAMILY HISTORY    Family History   Problem Relation Age of Onset    Heart Disease Paternal Grandfather     Hypertension Other     High Cholesterol Neg Hx     High Blood Pressure Neg Hx        SOCIAL HISTORY    Social History     Tobacco Use    Smoking status: Former Smoker     Packs/day: 1.00     Years: 25.00     Pack years: 25.00     Types: Cigarettes Varicosities Present bilateral lower extremity. Neuro: Neurologic status normal bilateral with epicritic Present , proprioceptive Present, vibratory sensationPresent and protopathicPresent. DTRs Present bilateral Achilles. There were no reproducible neuritic symptoms on exam bilateral feet/ankles. Derm: Ulceration to bilateral legs. Ecchymosis Absent  bilateral feet/foot. Musculoskeletal: Pain with debridement of wounds 5/5 muscle strength in/eversion and dorsi/plantarflexion bilateral feet. No gross instability noted. Assessment:     Patient Active Problem List   Diagnosis    Essential hypertension, benign    Atrial fibrillation with RVR ([facility]    Heart valve replaced by other means    Hyperlipidemia other unspecified.      Aortic regurgitation    Stage 3 chronic kidney disease (HCC)    Hypertrophy of prostate without urinary obstruction and other lower urinary tract symptoms (LUTS)    Peptic ulcer    Gout    Generalized osteoarthrosis, involving multiple sites    IGT (impaired glucose tolerance)    Lung nodule    Chronic combined systolic and diastolic CHF (congestive heart failure) (HCC)    Meningioma (HCC)    Bilateral carotid artery stenosis    Atherosclerosis of abdominal aorta (HCC)    Labyrinthine vertigo    RACIEL (acute kidney injury) ([facility]    S/P AVR    Cardiomyopathy (HCC)    SOB (shortness of breath)    Localized edema    Acute combined systolic and diastolic congestive heart failure (HCC)    Venous stasis ulcer of right ankle with fat layer exposed without varicose veins (HCC)    Venous stasis ulcer of left calf with fat layer exposed without varicose veins (HCC)       Procedure Note    Performed by: Stef Hurtado DPM    Consent obtained: Yes    Time out taken:  Yes    Pain Control: Anesthetic  Anesthetic: 4% Lidocaine Cream     Debridement:Excisional Debridement    Using curette the wound was sharply debrided    down through and including the removal of epidermis, dermis and subcutaneous tissue.         Devitalized Tissue Debrided:  fibrin, biofilm, slough, necrotic/eschar and exudate    Pre Debridement Measurements:  Are located in the Wound Documentation Flow Sheet    Post  Debridement Measurements:  Wound 09/25/20 Leg Anterior;Right #1 (Active)   Wound Image   09/25/20 1000   Wound Cleansed Rinsed/Irrigated with saline 09/25/20 1000   Wound Length (cm) 3.3 cm 09/25/20 1000   Wound Width (cm) 1.8 cm 09/25/20 1000   Wound Depth (cm) 0.1 cm 09/25/20 1000   Wound Surface Area (cm^2) 5.94 cm^2 09/25/20 1000   Wound Volume (cm^3) 0.59 cm^3 09/25/20 1000   Post-Procedure Length (cm) 3.3 cm 09/25/20 1015   Post-Procedure Width (cm) 1.8 cm 09/25/20 1015   Post-Procedure Depth (cm) 0.1 cm 09/25/20 1015   Post-Procedure Surface Area (cm^2) 5.94 cm^2 09/25/20 1015   Post-Procedure Volume (cm^3) 0.59 cm^3 09/25/20 1015   Wound Assessment Bleeding 09/25/20 1015   Drainage Amount Moderate 09/25/20 1000   Drainage Description Serosanguinous 09/25/20 1000   Odor None 09/25/20 1000   Patsy-wound Assessment Excoriated 09/25/20 1000   [place]%Wound Bed 0 09/25/20 1000   Red%Wound Bed 100 09/25/20 1000   Yellow%Wound Bed 0 09/25/20 1000   Black%Wound Bed 0 09/25/20 1000   Purple%Wound Bed 0 09/25/20 1000   Other%Wound Bed 0 09/25/20 1000   Number of days: 0       Wound 09/25/20 Leg Medial;Right #2 (Active)   Wound Image   09/25/20 1000   Wound Cleansed Rinsed/Irrigated with saline 09/25/20 1000   Wound Length (cm) 5 cm 09/25/20 1000   Wound Width (cm) 4.5 cm 09/25/20 1000   Wound Depth (cm) 0.1 cm 09/25/20 1000   Wound Surface Area (cm^2) 22.5 cm^2 09/25/20 1000   Wound Volume (cm^3) 2.25 cm^3 09/25/20 1000   Post-Procedure Length (cm) 5 cm 09/25/20 1015   Post-Procedure Width (cm) 4.5 cm 09/25/20 1015   Post-Procedure Depth (cm) 0.1 cm 09/25/20 1015   Post-Procedure Surface Area (cm^2) 22.5 cm^2 09/25/20 1015   Post-Procedure Volume (cm^3) 2.25 cm^3 09/25/20 1015   Wound Assessment debrided of all nonviable soft tissue into the subcutaneous and including the subcutaneous tissue  Compression wraps  Patient would benefit from multilayer compression device once healed these will be ordered follow-up in 1 week    The nature of the patient's condition was explained in depth.  The patient was informed that their compliance to the treatment plan is paramount to successful healing and prevention of further ulceration and/or infection     Discharge Treatment  Leave dressings intact keep legs elevated at all times and not active    Written Patient Discharge Instructions Given            Electronically signed by Britta Habermann, DPM on 9/25/2020 at 10:55 AM

## 2020-09-30 NOTE — PROGRESS NOTES
was started on amiodarone outpatient with 400 mg TID x 7 days, 400 mg BID x 7 days, 400 mg qd x 7 days, then 200 mg qd. ---> None recently. []   [x]       Emergency department visit  []   [x]       Other complaints    Clinical Outcomes:  Yes     No  []   [x]       Major bleeding event  []   [x]       Thromboembolic event    Duration of warfarin Therapy: indefinite  INR Range:  2.5-3.5     **they have stopped making Brand Coumadin so patient is going to have to switch over to generic warfarin, explained this to him and daughter at apt today. Also switched tablet strength to 2mg warfarin tablets and went over the switch with patient and daughter. INR is 2.8 today   Continue weekly dose of 2 mg daily   Encouraged to maintain a consistency of vegetables/salads. Refill called in OPP  Recheck INR in 4 week, 10/28    Referring cardiologist is Dr. Tosha Masters.    INR (no units)   Date Value   09/30/2020 2.8   09/16/2020 2.7   09/09/2020 2.0   08/31/2020 2.1   08/05/2020 2.76 (H)   08/04/2020 2.12 (H)   08/03/2020 1.79 (H)   08/02/2020 1.66 (H)     CLINICAL PHARMACY CONSULT: MED RECONCILIATION/REVIEW ADDENDUM    For Pharmacy Admin Tracking Only    PHSO: Yes  Total # of Interventions Recommended: 1  - Refills Provided #: 1  - Maintenance Safety Lab Monitoring #: 1  Total Interventions Accepted: 1  Time Spent (min): [address], PharmD

## 2020-09-30 NOTE — PROGRESS NOTES
Compression Application   Below Knee    NAME:  Sandie Jones  YOB: 1940  MEDICAL RECORD NUMBER:  [id number]  DATE:  9/30/2020       Applied moisturizing agent to dry skin as needed. Appied primary and secondary dressing as ordered     Applied  Unna Boot wrap from toes to knee overlapping each time. Applied cast padding and coban from toes to below the knee. Instructed patient/caregiver to keep dressing dry and intact. DO NOT REMOVE DRESSING. Instructed pt/family/caregiver to report excessive draining, loose bandage, wet dressing, severe pain or tingling in toes. Applied Compression dressing below the knee to Bilateral lower leg(s)   Applied Cutimed and ABD pad to wound   Compression wrap(s) were applied per  Guidelines.      Electronically signed by Jina Duran RN on 9/30/2020 at 2:33 PM

## 2020-09-30 NOTE — PLAN OF CARE
Discharge instructions given. Patient verbalized understanding. Return to [facility] in 1 week. Called/faxed orders to [facility]- only received 1 compression wrap, supposed to get 2.

## 2020-09-30 NOTE — PROGRESS NOTES
[facility]  Progress Note and Procedure Note      rTini Bloom  AGE: 68 y.o. GENDER: male  : 1940  TODAY'S DATE:  2020    Subjective:     No chief complaint on file. HISTORY of PRESENT ILLNESS HPI     Trini Bloom is a 68 y.o. male who presents today for wound evaluation. History of Wound: Patient admits to long history of swelling and wounds in the leg along with hyperkeratosis. He recently healed and then the wound started back up again when he went into the compression stockings the next day. The swelling in his legs have significantly decrease since his last visit. He has no other complaints at this time. He would like to continue treatment.   Wound Pain:  intermittent  Severity:  3 / 10   Wound Type:  venous  Modifying Factors:  edema, lymphedema and decreased tissue oxygenation  Associated Signs/Symptoms:  edema, drainage and pain        PAST MEDICAL HISTORY        Diagnosis Date    Acute combined systolic and diastolic (congestive) hrt fail (HCC)     Acute kidney injury ([address]     Arrhythmia     Atrial fibrillation (HCC)     Carotid artery stenosis     CKD (chronic kidney disease)     Dilated cardiomyopathy (HCC)     Hyperlipidemia     Hypertension     IGT (impaired glucose tolerance)     Meningioma (HCC)     Valvular disease        PAST SURGICAL HISTORY    Past Surgical History:   Procedure Laterality Date    AORTIC VALVE REPLACEMENT      CARPAL TUNNEL RELEASE Left     COLONOSCOPY  2010    SKIN BIOPSY         FAMILY HISTORY    Family History   Problem Relation Age of Onset    Heart Disease Paternal Grandfather     Hypertension Other     High Cholesterol Neg Hx     High Blood Pressure Neg Hx        SOCIAL HISTORY    Social History     Tobacco Use    Smoking status: Former Smoker     Packs/day: 1.00     Years: 25.00     Pack years: 25.00     Types: Cigarettes     Last attempt to quit: 1993     Years since quittin.7    Smokeless tobacco: Former User     Types: Chew     Quit date: 1/1/1982    Tobacco comment: quit 25 yrs ago   Substance Use Topics    Alcohol use: No     Alcohol/week: 0.0 standard drinks    Drug use: No       ALLERGIES    Allergies   Allergen Reactions    Ceclor [Cefaclor] Other (See Comments)     CNS side effects, slurred speech. Lips and tounge swell. MEDICATIONS    Current Outpatient Medications on File Prior to Encounter   Medication Sig Dispense Refill    carvedilol (COREG) 12.5 MG tablet TAKE 1 TABLET TWICE DAILY  WITH MEALS 180 tablet 3    allopurinol (ZYLOPRIM) 100 MG tablet TAKE 1 TABLET BY MOUTH DAILY 90 tablet 3    amiodarone (CORDARONE) 200 MG tablet Take 1 tablet by mouth daily 90 tablet 3    torsemide (DEMADEX) 20 MG tablet Take 1 tablet by mouth 2 times daily 60 tablet 3    warfarin (COUMADIN) 4 MG tablet Take 2-4 mg by mouth See Admin Instructions Take 2 mg (4 mg x 0.5 tablet) every Mon; take 4 mg (4 mg x 1 tablet) all other days. Managed by [facility].  atorvastatin (LIPITOR) 40 MG tablet TAKE 1 TABLET DAILY 90 tablet 3    Multiple Vitamins-Minerals (THERAPEUTIC MULTIVITAMIN-MINERALS) tablet Take 1 tablet by mouth daily       No current facility-administered medications on file prior to encounter. REVIEW OF SYSTEMS    Pertinent items are noted in HPI. Objective:      /73   Pulse 97   Temp 97.1 °F (36.2 °C) (Tympanic)   Resp 16     PHYSICAL EXAM    Vascular: Vascular status Impaired  palpable pedal pulses, right DP1/4 and PT1/4, left DP1/4 and PT1/4. CFT 3 seconds digits 1 to 5 bilateral.  Hair growthAbsent  both lower extremities and feet. Skin temperature is warm to warm from pretibial area to distal digits bilateral.  Exam is negative for rubor, pallor, cyanosis or signs of acute vascular compromise bilaterally. Exam is positive for edema bilateral lower extremity. Varicosities Present bilateral lower extremity.    Neuro: Neurologic status slough, necrotic/eschar and exudate    Pre Debridement Measurements:  Are located in the Wound Documentation Flow Sheet    Wound Care Documentation:  Wound 09/25/20 Leg Anterior;Right #1 (Active)   Wound Image   09/25/20 1000   Wound Venous 09/30/20 1326   Wound Cleansed Wound cleanser 09/30/20 1326   Wound Length (cm) 0 cm 09/30/20 1326   Wound Width (cm) 0 cm 09/30/20 1326   Wound Depth (cm) 0 cm 09/30/20 1326   Wound Surface Area (cm^2) 0 cm^2 09/30/20 1326   Change in Wound Size % (l*w) 100 09/30/20 1326   Wound Volume (cm^3) 0 cm^3 09/30/20 1326   Wound Healing % 100 09/30/20 1326   Post-Procedure Length (cm) 3.3 cm 09/25/20 1015   Post-Procedure Width (cm) 1.8 cm 09/25/20 1015   Post-Procedure Depth (cm) 0.1 cm 09/25/20 1015   Post-Procedure Surface Area (cm^2) 5.94 cm^2 09/25/20 1015   Post-Procedure Volume (cm^3) 0.59 cm^3 09/25/20 1015   Wound Assessment [place] 09/30/20 1326   Drainage Amount None 09/30/20 1326   Drainage Description Serosanguinous 09/25/20 1000   Odor None 09/30/20 1326   Patsy-wound Assessment Excoriated 09/25/20 1000   [place]%Wound Bed 100 09/30/20 1326   Red%Wound Bed 0 09/30/20 1326   Yellow%Wound Bed 0 09/30/20 1326   Black%Wound Bed 0 09/30/20 1326   Purple%Wound Bed 0 09/30/20 1326   Other%Wound Bed 0 09/30/20 1326   Number of days: 5       Wound 09/25/20 Leg Medial;Right #2 (Active)   Wound Image   09/25/20 1000   Wound Venous 09/30/20 1326   Wound Cleansed Rinsed/Irrigated with saline 09/30/20 1359   Wound Length (cm) 2.3 cm 09/30/20 1326   Wound Width (cm) 3 cm 09/30/20 1326   Wound Depth (cm) 0.1 cm 09/30/20 1326   Wound Surface Area (cm^2) 6.9 cm^2 09/30/20 1326   Change in Wound Size % (l*w) 69.33 09/30/20 1326   Wound Volume (cm^3) 0.69 cm^3 09/30/20 1326   Wound Healing % 69 09/30/20 1326   Post-Procedure Length (cm) 2.3 cm 09/30/20 1359   Post-Procedure Width (cm) 3 cm 09/30/20 1359   Post-Procedure Depth (cm) 0.1 cm 09/30/20 1359   Post-Procedure Surface Area (cm^2) 6.9 cm^2 Plan:   Patient examined and evaluated  Recurrent venous stasis wounds  Keep legs elevated at all times and not active  Wounds debrided of all nonviable soft tissue into the subcutaneous and including the subcutaneous tissue  Compression wraps for 1-2 more weeks   patient would benefit from multilayer compression device once healed these will receive in the mail but the other was not. Awaiting both wraps  Follow-up in 1 week    The nature of the patient's condition was explained in depth.  The patient was informed that their compliance to the treatment plan is paramount to successful healing and prevention of further ulceration and/or infection     Discharge Treatment  Leave dressings intact keep legs elevated at all times and not active    Written Patient Discharge Instructions Given            Electronically signed by Samara Pollack DPM on 9/30/2020 at 3:46 PM

## 2020-10-01 NOTE — PROGRESS NOTES
Spoke with [facility] regarding only one compression stocking was sent to pt & order was for bilateral. States they will send out other compression stocking today.

## 2020-10-08 NOTE — PLAN OF CARE
Discharge instructions given. Patient verbalized understanding.   Return to [facility] as needed  Wounds healed

## 2020-10-08 NOTE — PROGRESS NOTES
Christine Topete  Progress Note      Kvng Cruz  AGE: 68 y.o. GENDER: male  : 1940  TODAY'S DATE:  10/8/2020    Subjective:     Chief Complaint   Patient presents with    Wound Check     bilateral legs F/U         HISTORY of PRESENT ILLNESS HPI     Kvng Cruz is a 68 y.o. male who presents today for wound evaluation. History of Wound:   History of congestive heart failure and chronic kidney disease-seen at the congestive heart failure clinic earlier this week with worsening lower extremity edema. States previous skin breakdown has healed with his own measures and diuresis. His daughter brings him from [place] and this is his first known open wound. No infectious symptoms. Right greater than left pain.     Wound Pain:  intermittent  Severity:  4 / 10   Wound Type:  venous  Modifying Factors:  edema and venous stasis  Associated Signs/Symptoms:  edema, erythema, drainage and pain        PAST MEDICAL HISTORY        Diagnosis Date    Acute combined systolic and diastolic (congestive) hrt fail (HCC)     Acute kidney injury ([address]     Arrhythmia     Atrial fibrillation (HCC)     Carotid artery stenosis     CKD (chronic kidney disease)     Dilated cardiomyopathy (HCC)     Hyperlipidemia     Hypertension     IGT (impaired glucose tolerance)     Meningioma (HCC)     Valvular disease        PAST SURGICAL HISTORY    Past Surgical History:   Procedure Laterality Date    AORTIC VALVE REPLACEMENT      CARPAL TUNNEL RELEASE Left     COLONOSCOPY  2010    SKIN BIOPSY         FAMILY HISTORY    Family History   Problem Relation Age of Onset    Heart Disease Paternal Grandfather     Hypertension Other     High Cholesterol Neg Hx     High Blood Pressure Neg Hx        SOCIAL HISTORY    Social History     Tobacco Use    Smoking status: Former Smoker     Packs/day: 1.00     Years: 25.00     Pack years: 25.00     Types: Cigarettes     Last attempt to quit: 1993     Years since quittin.7    Smokeless tobacco: Former User     Types: Chew     Quit date: 1982    Tobacco comment: quit 25 yrs ago   Substance Use Topics    Alcohol use: No     Alcohol/week: 0.0 standard drinks    Drug use: No       ALLERGIES    Allergies   Allergen Reactions    Ceclor [Cefaclor] Other (See Comments)     CNS side effects, slurred speech. Lips and tounge swell. MEDICATIONS    Current Outpatient Medications on File Prior to Encounter   Medication Sig Dispense Refill    carvedilol (COREG) 12.5 MG tablet TAKE 1 TABLET TWICE DAILY  WITH MEALS 180 tablet 3    allopurinol (ZYLOPRIM) 100 MG tablet TAKE 1 TABLET BY MOUTH DAILY 90 tablet 3    amiodarone (CORDARONE) 200 MG tablet Take 1 tablet by mouth daily 90 tablet 3    torsemide (DEMADEX) 20 MG tablet Take 1 tablet by mouth 2 times daily 60 tablet 3    warfarin (COUMADIN) 4 MG tablet Take 2-4 mg by mouth See Admin Instructions Take 2 mg (4 mg x 0.5 tablet) every Mon; take 4 mg (4 mg x 1 tablet) all other days. Managed by [facility].  atorvastatin (LIPITOR) 40 MG tablet TAKE 1 TABLET DAILY 90 tablet 3    Multiple Vitamins-Minerals (THERAPEUTIC MULTIVITAMIN-MINERALS) tablet Take 1 tablet by mouth daily       No current facility-administered medications on file prior to encounter. REVIEW OF SYSTEMS    Pertinent items are noted in HPI.       Objective:      /84   Pulse 94   Temp 98.6 °F (37 °C) (Tympanic)   Wt 163 lb (73.9 kg)   BMI 23.39 kg/m²     PHYSICAL EXAM    General Appearance: alert and oriented to person, place and time, well-developed and well-nourished, in no acute distress  Skin: warm and dry  Head: normocephalic and atraumatic  Eyes: extraocular eye movements intact and conjunctivae normal  Extremities: no cyanosis and no clubbing  Musculoskeletal: normal range of motion, no joint swelling, deformity or tenderness      Assessment:     Patient Active Problem List   Diagnosis    Essential hypertension, benign    Atrial fibrillation with RVR (HCC)    Heart valve replaced by other means    Hyperlipidemia other unspecified.      Aortic regurgitation    Stage 3 chronic kidney disease    Hypertrophy of prostate without urinary obstruction and other lower urinary tract symptoms (LUTS)    Peptic ulcer    Gout    Generalized osteoarthrosis, involving multiple sites    IGT (impaired glucose tolerance)    Lung nodule    Chronic combined systolic and diastolic CHF (congestive heart failure) (HCC)    Meningioma (HCC)    Bilateral carotid artery stenosis    Atherosclerosis of abdominal aorta (HCC)    Labyrinthine vertigo    RACIEL (acute kidney injury) ([address]    S/P AVR    Cardiomyopathy ([address]    SOB (shortness of breath)    Localized edema    Acute combined systolic and diastolic congestive heart failure (HCC)    Venous stasis ulcer of right ankle with fat layer exposed without varicose veins (HCC)    Venous stasis ulcer of left calf with fat layer exposed without varicose veins (HCC)      Wound #: 1 and 2  and 3  Wound Care Documentation:  Wound 09/25/20 Leg Anterior;Right #1 (Active)   Wound Image   09/25/20 1000   Wound Etiology Venous 10/08/20 1116   Wound Cleansed Cleansed with saline 10/08/20 1116   Wound Length (cm) 0 cm 10/08/20 1116   Wound Width (cm) 0 cm 10/08/20 1116   Wound Depth (cm) 0 cm 10/08/20 1116   Wound Surface Area (cm^2) 0 cm^2 10/08/20 1116   Change in Wound Size % (l*w) 100 10/08/20 1116   Wound Volume (cm^3) 0 cm^3 10/08/20 1116   Wound Healing % 100 10/08/20 1116   Post-Procedure Length (cm) 3.3 cm 09/25/20 1015   Post-Procedure Width (cm) 1.8 cm 09/25/20 1015   Post-Procedure Depth (cm) 0.1 cm 09/25/20 1015   Post-Procedure Surface Area (cm^2) 5.94 cm^2 09/25/20 1015   Post-Procedure Volume (cm^3) 0.59 cm^3 09/25/20 1015   Wound Assessment Epithelialization 10/08/20 1116   Drainage Amount None 10/08/20 1116   Odor None 10/08/20 1116   Number of days: 13       Wound 09/25/20 Leg Medial;Right #2 (Active)   Wound Image   09/25/20 1000   Wound Etiology Venous 10/08/20 1116   Wound Cleansed Wound cleanser 10/08/20 1116   Wound Length (cm) 0 cm 10/08/20 1116   Wound Width (cm) 0 cm 10/08/20 1116   Wound Depth (cm) 0 cm 10/08/20 1116   Wound Surface Area (cm^2) 0 cm^2 10/08/20 1116   Change in Wound Size % (l*w) 100 10/08/20 1116   Wound Volume (cm^3) 0 cm^3 10/08/20 1116   Wound Healing % 100 10/08/20 1116   Post-Procedure Length (cm) 2.3 cm 09/30/20 1359   Post-Procedure Width (cm) 3 cm 09/30/20 1359   Post-Procedure Depth (cm) 0.1 cm 09/30/20 1359   Post-Procedure Surface Area (cm^2) 6.9 cm^2 09/30/20 1359   Post-Procedure Volume (cm^3) 0.69 cm^3 09/30/20 1359   Wound Assessment Epithelialization 10/08/20 1116   Drainage Amount None 10/08/20 1116   Odor None 10/08/20 1116   Number of days: 13       Wound 09/25/20 Leg Anterior; Left #3 (Active)   Wound Image   09/25/20 1000   Wound Etiology Venous 10/08/20 1116   Wound Cleansed Wound cleanser 10/08/20 1116   Wound Length (cm) 0 cm 10/08/20 1116   Wound Width (cm) 0 cm 10/08/20 1116   Wound Depth (cm) 0 cm 10/08/20 1116   Wound Surface Area (cm^2) 0 cm^2 10/08/20 1116   Change in Wound Size % (l*w) 100 10/08/20 1116   Wound Volume (cm^3) 0 cm^3 10/08/20 1116   Wound Healing % 100 10/08/20 1116   Post-Procedure Length (cm) 6.5 cm 09/25/20 1015   Post-Procedure Width (cm) 2 cm 09/25/20 1015   Post-Procedure Depth (cm) 0.1 cm 09/25/20 1015   Post-Procedure Surface Area (cm^2) 13 cm^2 09/25/20 1015   Post-Procedure Volume (cm^3) 1.3 cm^3 09/25/20 1015   Wound Assessment Epithelialization 10/08/20 1116   Drainage Amount None 10/08/20 1116   Odor None 10/08/20 1116   Number of days: 13          Plan:     The nature of the patient's condition was explained in depth.  The patient was informed that their compliance to the treatment plan is paramount to successful healing and prevention of further ulceration and/or infection     Discharge Treatment Written Patient Discharge Instructions Given  Wounds healed at today's visit. New home compression stockings applied. Thank you for allowing me to participate in the care of your patient. Please do not hesitate to call.      Cece Liu D.O., [facility] [place]  Interventional Cardiology     o: [phone]  [address] [place], [place] [address] [address], [address] [place] [address]

## 2020-10-14 NOTE — PROGRESS NOTES
of days: 0       Wound 10/14/20 Leg Left; Lower; Anterior #2 (Active)   Wound Image   10/14/20 0829   Wound Cleansed Cleansed with saline 10/14/20 0841   Wound Length (cm) 10.3 cm 10/14/20 0829   Wound Width (cm) 12.5 cm 10/14/20 0829   Wound Depth (cm) 0.1 cm 10/14/20 0829   Wound Surface Area (cm^2) 128.75 cm^2 10/14/20 0829   Wound Volume (cm^3) 12.88 cm^3 10/14/20 0829   Post-Procedure Length (cm) 10.3 cm 10/14/20 0841   Post-Procedure Width (cm) 12.5 cm 10/14/20 0841   Post-Procedure Depth (cm) 0.1 cm 10/14/20 0841   Post-Procedure Surface Area (cm^2) 128.75 cm^2 10/14/20 0841   Post-Procedure Volume (cm^3) 12.88 cm^3 10/14/20 0841   Wound Assessment Bleeding 10/14/20 0841   Drainage Amount Moderate 10/14/20 0841   Drainage Description Purulent 10/14/20 0829   Odor None 10/14/20 0829   Patsy-wound Assessment Edematous; Excoriated 10/14/20 0829   Number of days: 0       Total Surface Area Debrided:  86.875 sq cm     Bleeding:  Minimal    Hemostasis Achieved:  by pressure    Procedural Pain:  2  / 10     Post Procedural Pain:  2 / 10     Response to treatment:  Well tolerated by patient. Assessment:     Wound looks improved. (improved, worse or stable)    Patient tolerated procedure well and was given proper instruction. The nature of the patient's condition was explained in depth. The patient was informed that their compliance to the treatment plan is paramount to successful healing and prevention of further ulceration and/or infection       Plan:     Treatment Plan: With each dressing change, rinse wounds with 0.9% Saline. (May use wound wash or soft contact solution. Both can be purchased at a local drug store). If unable to obtain saline, may use a gentle soap and water.     Dressing care: Ammonium lactate to feet. Cutimed pad, abd, Unna boot, 6 inch ace wraps-  these will be changed at your next visit unless they slide down or cause pain.  If they slide, gets wet, or cause pain please call the wound [facility], you may need to come in to be re-wrapped. [address]  Sundar Martinez MD, FACS  10/14/2020  8:56 AM

## 2020-10-14 NOTE — PLAN OF CARE
Discharge instructions given. Patient verbalized understanding. Return to [address] [place] [address],[address] in 1 week.   Venous studies  ordered

## 2020-10-14 NOTE — PROGRESS NOTES
Compression Application   Below Knee    NAME:  Brennon Velazco  YOB: 1940  MEDICAL RECORD NUMBER:  [id number]  DATE:  10/14/2020       Applied moisturizing agent to dry skin as needed. Appied primary and secondary dressing as ordered     Applied  Unna Boot wrap from toes to knee overlapping each time. Applied cast padding and coban from toes to below the knee. Instructed patient/caregiver to keep dressing dry and intact. DO NOT REMOVE DRESSING. Instructed pt/family/caregiver to report excessive draining, loose bandage, wet dressing, severe pain or tingling in toes. Applied Compression dressing below the knee to Bilateral lower leg(s)      Compression wrap(s) were applied per  Guidelines.      Electronically signed by Santino Nielsen LPN on 56/75/5036 at 9:18 AM

## 2020-10-20 NOTE — TELEPHONE ENCOUNTER
----- Message from [employer] sent at 10/20/2020  1:06 PM EDT -----  Subject: Message to Provider    QUESTIONS  Information for Provider? pt needs to be scheduled for a flu shot. please   f/u  ---------------------------------------------------------------------------  --------------  CALL BACK INFO  What is the best way for the office to contact you? OK to leave message on   voicemail  Preferred Call Back Phone Number? [phone]  ---------------------------------------------------------------------------  --------------  SCRIPT ANSWERS  Relationship to Patient?  Self

## 2020-10-22 NOTE — PLAN OF CARE
Discharge instructions given. Patient verbalized understanding. Return to [facility] in 1 week.   Referral to vascular surgeon

## 2020-10-22 NOTE — PROGRESS NOTES
Compression Application   Below Knee    NAME:  Brittaney Perrin  YOB: 1940  MEDICAL RECORD NUMBER:  [id number]  DATE:  10/22/2020       Applied moisturizing agent to dry skin as needed. Appied primary and secondary dressing as ordered     Applied  Unna Boot wrap from toes to knee overlapping each time. Applied cast padding and coban from toes to below the knee. Instructed patient/caregiver to keep dressing dry and intact. DO NOT REMOVE DRESSING. Instructed pt/family/caregiver to report excessive draining, loose bandage, wet dressing, severe pain or tingling in toes. Applied Compression dressing below the knee to Bilateral lower leg(s)      Compression wrap(s) were applied per  Guidelines.      Electronically signed by Juan Pablo Prajapati LPN on 23/24/4685 at 11:57 AM
Treatment      Written Patient Discharge Instructions Given     Wound: Right lower leg, Left lower leg      With each dressing change, rinse wounds with 0.9% Saline. (May use wound wash or soft contact solution. Both can be purchased at a local drug store). If unable to obtain saline, may use a gentle soap and water.     Dressing care: Ammonium lactate to feet. Cutimed pad, abd, Unna boot, 6 inch ace wraps-  these will be changed at your next visit unless they slide down or cause pain. If they slide, gets wet, or cause pain please call the wound care center, you may need to come in to be re-wrapped.      Venous studies scheduled 11/16/20. Dr. Bernardo Leal of [employer]  [address] [place] [address], [address] [place] [address]     [phone]        Thank you for allowing me to participate in the care of your patient. Please do not hesitate to call.      Sheldon Whitfield D.O., [facility]  Interventional Cardiology     o: [phone]  [facility] [address], [address] [facility]

## 2020-10-27 PROBLEM — E87.6 HYPOKALEMIA: Status: ACTIVE | Noted: 2020-01-01

## 2020-10-28 NOTE — PROGRESS NOTES
Mr. Angela Shields is a 68 y.o. y/o male with history of Heart Valve Replacement, aortic valve, about 1997 by Bisi Merritt at [facility]   He presents today for anticoagulation monitoring and adjustment. Pertinent AFib, PMH: HTN, CHF, Chronic Kidney disease(moderate)    Patient Reported Findings:  Yes     No  [x]   []       Patient verifies current dosing regimen as listed- follows AVS calendar---> pt presents to apt alone, doesn't know dosing---> follows AVS ---> Grand daughter confirms he is following AVS.   []   [x]       S/S bleeding/bruising/swelling/SOB-denies   []   [x]       Blood in urine or stool- denies   []   [x]       Procedures scheduled in the future at this time  []   [x]       Missed Dose - denies   []   [x]       Extra Dose- denies   [x]   []       Change in medications pt states that he is no longer taking digoxin---> allopurinol dose dec to 100mg daily ---> inc lasix and coreg, stop cardizem, then dc lasix and changed to torsemide, taking celebrex ---->started amiodarone outpatient with 400 mg TID x 7 days (today is last day of TID, 8/11), starting 8/12 400 mg BID x 7 days, 400 mg qd x 7 days, then 200 mg qd. Inc torsemide---> taking amiodarone 200 mg qd---> report none. --> torsemide increased for edema, potassium added  []   [x]       Change in health/diet/appetite- Overall his appetite is poor, he states that he \"hasn't been eating much of anything lately\". He states that he continues to drink Ensure every day. Has not been eating much/missed a few ensures. --> a few salads a week. No boost or ensure---> states not many salads recently but a lot of green beans, not doing ensure  ---> Eating greens 2-3 times per week. No NVD. --> no changes, no NVD  []   [x]       Change in alcohol use does not drink  []   [x]       Change in activity  []   [x]       Hospital admission- pt was in hosptial 7/29-8/5 for a fib.  Was started on diltiazem and amiodarone drip. was held first 2 days of admission then resumed on 8/1 with home dose. INR on d/c 2.7 was started on amiodarone outpatient with 400 mg TID x 7 days, 400 mg BID x 7 days, 400 mg qd x 7 days, then 200 mg qd. ---> None recently. []   [x]       Emergency department visit  []   [x]       Other complaints    Clinical Outcomes:  Yes     No  []   [x]       Major bleeding event  []   [x]       Thromboembolic event    Duration of warfarin Therapy: indefinite  INR Range:  2.5-3.5     **they have stopped making Brand Coumadin so patient is going to have to switch over to generic warfarin, explained this to him and daughter at apt today. Also switched tablet strength to 2mg warfarin tablets and went over the switch with patient and daughter. INR is 2.4 today   Continue weekly dose of 2 mg daily   Encouraged to maintain a consistency of vegetables/salads. Refill called in [facility]PP for 90 days supply  Recheck INR in 4 week, 11/25    Referring cardiologist is Dr. Sophie Lovelace.    INR (no units)   Date Value   10/28/2020 2.4   09/30/2020 2.8   09/16/2020 2.7   09/09/2020 2.0   08/05/2020 2.76 (H)   08/04/2020 2.12 (H)   08/03/2020 1.79 (H)   08/02/2020 1.66 (H)     CLINICAL PHARMACY CONSULT: MED RECONCILIATION/REVIEW ADDENDUM    For Pharmacy Admin Tracking Only    PHSO: Yes  Total # of Interventions Recommended: 1  - Refills Provided #: 1  - Maintenance Safety Lab Monitoring #: 1  Total Interventions Accepted: 1  Time Spent (min): [address], PharmD

## 2020-10-28 NOTE — PROGRESS NOTES
[address] Progress Note    Daron Meza  AGE: 68 y.o. GENDER: male    : 1940  TODAY'S DATE: 10/28/2020    Subjective:     Chief Complaint   Patient presents with    Wound Check     right and left lower extremity         History of Present Illness     Daron Meza presents today for wound evaluation. History of Wound: 68year old male with venous wound on right leg, closed but thin skin    Wound Type:  venous  Wound Location: right side  leg  Wound Pain:  mild  Severity:  2 / 10   Timing: resolved  Modifying Factors:  edema, venous stasis and CHF  Associated Signs/Symptoms:  edema and erythema  Other significant symptoms or pertinent wound history: as above.      Past Medical History:   Diagnosis Date    Acute combined systolic and diastolic (congestive) hrt fail ([address]     [facility]     Arrhythmia     Atrial fibrillation (HCC)     Carotid artery stenosis     CKD (chronic kidney disease)     Dilated cardiomyopathy (HCC)     Hyperlipidemia     Hypertension     IGT (impaired glucose tolerance)     Meningioma (HCC)     Valvular disease        Past Surgical History:   Procedure Laterality Date    AORTIC VALVE REPLACEMENT      CARPAL TUNNEL RELEASE Left     COLONOSCOPY      SKIN BIOPSY         Current Outpatient Medications   Medication Sig Dispense Refill    potassium chloride (KLOR-CON M) 20 MEQ extended release tablet Take 2 tablets by mouth daily 60 tablet 1    torsemide (DEMADEX) 20 MG tablet Take 2 tablets by mouth 2 times daily for 7 days Take 2 tabs twice a day for 1 week then back to 1 tab twice a day 74 tablet 1    carvedilol (COREG) 12.5 MG tablet TAKE 1 TABLET TWICE DAILY  WITH MEALS 180 tablet 3    allopurinol (ZYLOPRIM) 100 MG tablet TAKE 1 TABLET BY MOUTH DAILY 90 tablet 3    amiodarone (CORDARONE) 200 MG tablet Take 1 tablet by mouth daily 90 tablet 3    warfarin (COUMADIN) 4 MG tablet Take 2-4 mg by mouth See Admin Instructions Take 2 mg (4 mg x 0.5 tablet) every Mon; take 4 mg (4 mg x 1 tablet) all other days. Managed by [facility].  atorvastatin (LIPITOR) 40 MG tablet TAKE 1 TABLET DAILY 90 tablet 3    Multiple Vitamins-Minerals (THERAPEUTIC MULTIVITAMIN-MINERALS) tablet Take 1 tablet by mouth daily       Current Facility-Administered Medications   Medication Dose Route Frequency Provider Last Rate Last Dose    lidocaine (LMX) 4 % cream   Topical Once Gris Jeffries MD            Allergies   Allergen Reactions    Ceclor [Cefaclor] Other (See Comments)     CNS side effects, slurred speech. Lips and tounge swell. REVIEW OF SYSTEMS    Pertinent items from the review of systems are discussed in the HPI; the remainder of the ROS was reviewed and is negative. Objective:      /83   Pulse 95   Temp 97.5 °F (36.4 °C) (Tympanic)   Resp 16     PHYSICAL EXAM    General Appearance: alert and oriented to person, place and time, well developed and well- nourished, in no acute distress  Skin: warm and dry, no rash or erythema  Head: normocephalic and atraumatic  Bilateral lower leg venous wounds healed but with erythema and thin skin      Assessment:     Patient Active Problem List   Diagnosis    Essential hypertension, benign    Atrial fibrillation with RVR ([address]    Heart valve replaced by other means    Hyperlipidemia other unspecified.      Aortic regurgitation    Stage 3 chronic kidney disease    Hypertrophy of prostate without urinary obstruction and other lower urinary tract symptoms (LUTS)    Peptic ulcer    Gout    Generalized osteoarthrosis, involving multiple sites    IGT (impaired glucose tolerance)    Lung nodule    Chronic combined systolic and diastolic CHF (congestive heart failure) (HCC)    Meningioma (HCC)    Bilateral carotid artery stenosis    Atherosclerosis of abdominal aorta (HCC)    Labyrinthine vertigo    RACIEL (acute kidney injury) ([address]    S/P AVR    Cardiomyopathy ([address]    SOB (shortness of breath)    Localized edema    Acute combined systolic and diastolic congestive heart failure (HCC)    Venous stasis ulcer of right ankle with fat layer exposed without varicose veins (HCC)    Venous stasis ulcer of left calf with fat layer exposed without varicose veins (HCC)    Hypokalemia       Wound 10/14/20 Leg Lower;Right #1 (Active)   Wound Image   10/14/20 0829   Wound Etiology Venous 10/28/20 1025   Wound Cleansed Wound cleanser 10/28/20 1025   Dressing/Treatment ABD;Cutimed/Sorbact; Other (comment) 10/22/20 1156   Wound Length (cm) 0 cm 10/28/20 1025   Wound Width (cm) 0 cm 10/28/20 1025   Wound Depth (cm) 0 cm 10/28/20 1025   Wound Surface Area (cm^2) 0 cm^2 10/28/20 1025   Change in Wound Size % (l*w) 100 10/28/20 1025   Wound Volume (cm^3) 0 cm^3 10/28/20 1025   Wound Healing % 100 10/28/20 1025   Post-Procedure Length (cm) 3 cm 10/22/20 1112   Post-Procedure Width (cm) 4.5 cm 10/22/20 1112   Post-Procedure Depth (cm) 0.1 cm 10/22/20 1112   Post-Procedure Surface Area (cm^2) 13.5 cm^2 10/22/20 1112   Post-Procedure Volume (cm^3) 1.35 cm^3 10/22/20 1112   Wound Assessment Epithelialization 10/28/20 1025   Drainage Amount None 10/28/20 1025   Drainage Description Serosanguinous 10/22/20 1045   Odor None 10/28/20 1025   Patsy-wound Assessment Fragile 10/28/20 1025   Margins Attached edges 10/22/20 1045   Number of days: 14       Wound 10/14/20 Leg Left; Lower; Anterior #2 (Active)   Wound Image   10/14/20 0829   Wound Etiology Venous 10/28/20 1025   Wound Cleansed Wound cleanser 10/28/20 1025   Dressing/Treatment ABD;Collagen; Other (comment) 10/22/20 1157   Wound Length (cm) 0 cm 10/28/20 1025   Wound Width (cm) 0 cm 10/28/20 1025   Wound Depth (cm) 0 cm 10/28/20 1025   Wound Surface Area (cm^2) 0 cm^2 10/28/20 1025   Change in Wound Size % (l*w) 100 10/28/20 1025   Wound Volume (cm^3) 0 cm^3 10/28/20 1025   Wound Healing % 100 10/28/20 1025   Post-Procedure Vascular testing is scheduled, please bring supplies to replace your dressing after testing is done. The vascular department does not stock supplies.      Wound: Right lower leg, Left lower leg      With each dressing change, rinse wounds with 0.9% Saline. (May use wound wash or soft contact solution. Both can be purchased at a local drug store). If unable to obtain saline, may use a gentle soap and water.     Dressing care: Ammonium lactate to feet. Amanda maradiaga, 6 inch ace wraps-  these will be changed at your next visit unless they slide down or cause pain. If they slide, gets wet, or cause pain please call the wound care center, you may need to come in to be re-wrapped.      Dr. Vinita Issa of [place] [address]. [address]    [phone]    Dr Jerone Cooks, [facility]     [address]  Phone: [phone]  Fax: [phone]     Compression Wraps  Location: Right & left lower leg below knee  Type: Unna boot     Your doctor has ordered compression therapy for your wound. Compression bandages reduce the swelling, or edema, in your legs and prevent it from returning. The wound care staff will apply your compression wrap. It must be removed and re-applied at least weekly. As the swelling decreases, the boot no longer provides adequate compression and you need a new one. Once applied, you need to know how to take care of your compression wrap.      The boot must stay dry. Do not get it wet in the shower or tub. You may do a partial bath, or you can cover the boot with a large plastic bag, secured at the top, so that no water can get in.     Avoid standing in one place for long periods of time.  If you must  one place, shift your weight and change positions often.     If you have CHF, consult your doctor before following the next two recommendations for leg elevation.      When sitting, elevate your legs on pillows, or put blocks under the foot of your bed. Your legs should be higher than your heart.     If your boot becomes painful, or you notice an increase in swelling in your toes, numbness or tingling, or purple color to your toes, remove the wrap and call the [address]. If it is after hours, call your doctor for instructions or go to the nearest emergency room.      Important dietary reminders:  1. Increase Protein intake for optimal wound healing  2. No added salt  3. If diabetic, good glucose control     Follow up with Dr Eliel Petersen In 1 week in the wound care center.      Call [phone] for any questions or concerns.      Your  is [facility] Information: Should you experience any significant changes in your wound(s) or have questions about your wound care, please contact the [address] [phone] Monday  - Thursday 8:00 am - 4:00 pm and Friday 8:00 am - 1:00pm. If you need help with your wound outside these hours and cannot wait until we are again available, contact your PCP or go to the hospital emergency room.      PLEASE NOTE: IF YOU ARE UNABLE TO OBTAIN WOUND SUPPLIES, CONTINUE TO USE THE SUPPLIES YOU HAVE AVAILABLE UNTIL YOU ARE ABLE TO [facility]. IT IS MOST IMPORTANT TO KEEP THE WOUND COVERED AT ALL TIMES. Cheko Laws 6  Eliel Petersen MD, FACS  10/28/2020  11:02 AM

## 2020-11-05 NOTE — PROGRESS NOTES
Christine Topete        Progress Note      Chelita Gallo  AGE: 68 y.o. GENDER: male  : 1940  TODAY'S DATE:  2020    Subjective:     Chief Complaint   Patient presents with    Wound Check     right and left lower leg         HISTORY of PRESENT ILLNESS HPI     Chelita Gallo is a 68 y.o. male who presents today for wound evaluation. History of congestive heart failure, chronic kidney disease, and venous stasis. His daughter brings him from [place]. No infectious symptoms. Failed two different types of compression stockings. Wound Pain:  intermittent  Severity:  3/ 10   Wound Type:  venous  Modifying Factors:  edema and venous stasis  Associated Signs/Symptoms:  edema, erythema, drainage and pain    PAST MEDICAL HISTORY        Diagnosis Date    Acute combined systolic and diastolic (congestive) hrt fail (HCC)     Acute kidney injury ([address]     Arrhythmia     Atrial fibrillation (HCC)     Carotid artery stenosis     CKD (chronic kidney disease)     Dilated cardiomyopathy (HCC)     Hyperlipidemia     Hypertension     IGT (impaired glucose tolerance)     Meningioma (HCC)     Valvular disease        PAST SURGICAL HISTORY    Past Surgical History:   Procedure Laterality Date    AORTIC VALVE REPLACEMENT      CARPAL TUNNEL RELEASE Left     COLONOSCOPY  2010    SKIN BIOPSY         FAMILY HISTORY    Family History   Problem Relation Age of Onset    Heart Disease Paternal Grandfather     Hypertension Other     High Cholesterol Neg Hx     High Blood Pressure Neg Hx        SOCIAL HISTORY    Social History     Tobacco Use    Smoking status: Former Smoker     Packs/day: 1.00     Years: 25.00     Pack years: 25.00     Types: Cigarettes     Last attempt to quit: 1993     Years since quittin.8    Smokeless tobacco: Former User     Types: Chew     Quit date: 1982    Tobacco comment: quit 25 yrs ago   Substance Use Topics    Alcohol use:  No Alcohol/week: 0.0 standard drinks    Drug use: No       ALLERGIES    Allergies   Allergen Reactions    Ceclor [Cefaclor] Other (See Comments)     CNS side effects, slurred speech. Lips and tounge swell. MEDICATIONS    Current Outpatient Medications on File Prior to Encounter   Medication Sig Dispense Refill    potassium chloride (KLOR-CON M) 20 MEQ extended release tablet TAKE 2 TABLETS BY MOUTH DAILY 180 tablet 1    torsemide (DEMADEX) 20 MG tablet Take 1 tablet by mouth 2 times daily 180 tablet 1    carvedilol (COREG) 12.5 MG tablet TAKE 1 TABLET TWICE DAILY  WITH MEALS 180 tablet 3    allopurinol (ZYLOPRIM) 100 MG tablet TAKE 1 TABLET BY MOUTH DAILY 90 tablet 3    amiodarone (CORDARONE) 200 MG tablet Take 1 tablet by mouth daily 90 tablet 3    warfarin (COUMADIN) 4 MG tablet Take 2-4 mg by mouth See Admin Instructions Take 2 mg (4 mg x 0.5 tablet) every Mon; take 4 mg (4 mg x 1 tablet) all other days. Managed by [facility].  atorvastatin (LIPITOR) 40 MG tablet TAKE 1 TABLET DAILY 90 tablet 3    Multiple Vitamins-Minerals (THERAPEUTIC MULTIVITAMIN-MINERALS) tablet Take 1 tablet by mouth daily       No current facility-administered medications on file prior to encounter. REVIEW OF SYSTEMS    Pertinent items are noted in HPI.       Objective:      /66   Pulse 88   Temp 97.9 °F (36.6 °C) (Tympanic)   Resp 16     PHYSICAL EXAM    General Appearance: alert and oriented to person, place and time, well-developed and well-nourished, in no acute distress  Skin: warm and dry  Head: normocephalic and atraumatic  Eyes: extraocular eye movements intact and conjunctivae normal  Extremities: no cyanosis and no clubbing  Musculoskeletal: normal range of motion, no joint swelling, deformity or tenderness      Assessment:     Patient Active Problem List   Diagnosis    Essential hypertension, benign    Atrial fibrillation with RVR ([address]    Heart valve replaced by other means    Hyperlipidemia other unspecified.  Aortic regurgitation    Stage 3 chronic kidney disease    Hypertrophy of prostate without urinary obstruction and other lower urinary tract symptoms (LUTS)    Peptic ulcer    Gout    Generalized osteoarthrosis, involving multiple sites    IGT (impaired glucose tolerance)    Lung nodule    Chronic combined systolic and diastolic CHF (congestive heart failure) (HCC)    Meningioma (HCC)    Bilateral carotid artery stenosis    Atherosclerosis of abdominal aorta (HCC)    Labyrinthine vertigo    RACIEL (acute kidney injury) ([address]    S/P AVR    Cardiomyopathy (HCC)    SOB (shortness of breath)    Localized edema    Acute combined systolic and diastolic congestive heart failure (HCC)    Venous stasis ulcer of right ankle with fat layer exposed without varicose veins (HCC)    Venous stasis ulcer of left calf with fat layer exposed without varicose veins (HCC)    Hypokalemia        Wound #: 2    Wound Care Documentation:  Wound 10/14/20 Leg Left; Lower; Anterior #2 (Active)   Wound Image   10/14/20 0829   Wound Etiology Venous 11/05/20 1048   Wound Cleansed Wound cleanser 10/28/20 1025   Dressing/Treatment ABD;Collagen; Other (comment) 10/22/20 1157   Wound Length (cm) 0 cm 11/05/20 1048   Wound Width (cm) 0 cm 11/05/20 1048   Wound Depth (cm) 0 cm 11/05/20 1048   Wound Surface Area (cm^2) 0 cm^2 11/05/20 1048   Change in Wound Size % (l*w) 100 11/05/20 1048   Wound Volume (cm^3) 0 cm^3 11/05/20 1048   Wound Healing % 100 11/05/20 1048   Post-Procedure Length (cm) 2 cm 10/22/20 1112   Post-Procedure Width (cm) 1 cm 10/22/20 1112   Post-Procedure Depth (cm) 0.1 cm 10/22/20 1112   Post-Procedure Surface Area (cm^2) 2 cm^2 10/22/20 1112   Post-Procedure Volume (cm^3) 0.2 cm^3 10/22/20 1112   Wound Assessment Epithelialization 11/05/20 1048   Drainage Amount None 10/28/20 1025   Drainage Description Serosanguinous 10/22/20 1045   Odor None 11/05/20 1048   Patsy-wound Assessment

## 2020-11-05 NOTE — PLAN OF CARE
Discharge instructions given. Patient verbalized understanding.   Return to [facility] as needed  Wounds healed  Instructed to wear 2 layers tubi  daily since unable to wear regular compression stockings

## 2020-11-09 NOTE — PROGRESS NOTES
Compression Application   Below Knee    NAME:  Dieudonne Chase  YOB: 1940  MEDICAL RECORD NUMBER:  [id number]  DATE:  11/9/2020       Applied moisturizing agent to dry skin as needed. Appied primary and secondary dressing as ordered     Applied  Unna Boot wrap from toes to knee overlapping each time. Applied cast padding and coban from toes to below the knee. Instructed patient/caregiver to keep dressing dry and intact. DO NOT REMOVE DRESSING. Instructed pt/family/caregiver to report excessive draining, loose bandage, wet dressing, severe pain or tingling in toes. Applied Compression dressing below the knee to Bilateral lower leg(s)      Compression wrap(s) were applied per  Guidelines.      Electronically signed by Mariposa Hayes RN on 11/9/2020 at 3:14 PM

## 2020-11-09 NOTE — PROGRESS NOTES
Christine Topete  Progress Note and Procedure Note      Tracey Little  AGE: 68 y.o. GENDER: male  : 1940  TODAY'S DATE:  2020    Subjective:     Chief Complaint   Patient presents with    Wound Check     bilateral legs F/U         HISTORY of PRESENT ILLNESS HPI     Tracey Little is a 68 y.o. male who presents today for wound evaluation. History of Wound: History of congestive heart failure, chronic kidney disease, and venous stasis. His daughter brings him from [place]. No infectious symptoms. Failed two different types of compression. States legs blistered and broke open over the past 2 days.    Wound Pain:  intermittent, mild  Severity:  3 / 10   Wound Type:  venous  Modifying Factors:  edema and venous stasis  Associated Signs/Symptoms:  edema, erythema and drainage        PAST MEDICAL HISTORY        Diagnosis Date    Acute combined systolic and diastolic (congestive) hrt fail (HCC)     Acute kidney injury ([address]     Arrhythmia     Atrial fibrillation (HCC)     Carotid artery stenosis     CKD (chronic kidney disease)     Dilated cardiomyopathy (HCC)     Hyperlipidemia     Hypertension     IGT (impaired glucose tolerance)     Meningioma (HCC)     Valvular disease        PAST SURGICAL HISTORY    Past Surgical History:   Procedure Laterality Date    AORTIC VALVE REPLACEMENT      CARPAL TUNNEL RELEASE Left     COLONOSCOPY  2010    SKIN BIOPSY         FAMILY HISTORY    Family History   Problem Relation Age of Onset    Heart Disease Paternal Grandfather     Hypertension Other     High Cholesterol Neg Hx     High Blood Pressure Neg Hx        SOCIAL HISTORY    Social History     Tobacco Use    Smoking status: Former Smoker     Packs/day: 1.00     Years: 25.00     Pack years: 25.00     Types: Cigarettes     Last attempt to quit: 1993     Years since quittin.8    Smokeless tobacco: Former User     Types: Chew     Quit date: 1982    Tobacco comment: quit 25 yrs ago   Substance Use Topics    Alcohol use: No     Alcohol/week: 0.0 standard drinks    Drug use: No       ALLERGIES    Allergies   Allergen Reactions    Ceclor [Cefaclor] Other (See Comments)     CNS side effects, slurred speech. Lips and tounge swell. MEDICATIONS    Current Outpatient Medications on File Prior to Encounter   Medication Sig Dispense Refill    potassium chloride (KLOR-CON M) 20 MEQ extended release tablet TAKE 2 TABLETS BY MOUTH DAILY 180 tablet 1    torsemide (DEMADEX) 20 MG tablet Take 1 tablet by mouth 2 times daily 180 tablet 1    carvedilol (COREG) 12.5 MG tablet TAKE 1 TABLET TWICE DAILY  WITH MEALS 180 tablet 3    allopurinol (ZYLOPRIM) 100 MG tablet TAKE 1 TABLET BY MOUTH DAILY 90 tablet 3    amiodarone (CORDARONE) 200 MG tablet Take 1 tablet by mouth daily 90 tablet 3    warfarin (COUMADIN) 4 MG tablet Take 2-4 mg by mouth See Admin Instructions Take 2 mg (4 mg x 0.5 tablet) every Mon; take 4 mg (4 mg x 1 tablet) all other days. Managed by [facility].  atorvastatin (LIPITOR) 40 MG tablet TAKE 1 TABLET DAILY 90 tablet 3    Multiple Vitamins-Minerals (THERAPEUTIC MULTIVITAMIN-MINERALS) tablet Take 1 tablet by mouth daily       No current facility-administered medications on file prior to encounter. REVIEW OF SYSTEMS    Pertinent items are noted in HPI. Objective:      /66   Pulse 91   Temp 99.2 °F (37.3 °C) (Tympanic)   Wt 161 lb (73 kg)   BMI 23.78 kg/m²     PHYSICAL EXAM    Vascular: Vascular status Impaired  palpable pedal pulses, right DP1/4 and PT1/4, left DP1/4 and PT1/4. CFT 3 seconds digits 1 to 5 bilateral.  Hair growthAbsent  both lower extremities and feet. Skin temperature is warm to warm from pretibial area to distal digits bilateral.  Exam is negative for rubor, pallor, cyanosis or signs of acute vascular compromise bilaterally. Exam is positive for edema bilateral lower extremity.   Varicosities Present 11/09/20 1426   Wound Volume (cm^3) 5.7 cm^3 11/09/20 1426   Wound Assessment Fluid filled blister;Ruptured blister 11/09/20 1426   Drainage Amount Moderate 11/09/20 1426   Drainage Description Serous 11/09/20 1426   Odor None 11/09/20 1426   Patsy-wound Assessment Edematous; Excoriated 11/09/20 1426   Margins Defined edges 11/09/20 1426   Wound Thickness Description not for Pressure Injury Partial thickness 11/09/20 1426   Number of days: 0       Wound 11/09/20 Leg Left #2 (Active)   Wound Image   11/09/20 1426   Wound Etiology Venous 11/09/20 1426   Wound Cleansed Cleansed with saline 11/09/20 1426   Wound Length (cm) 8 cm 11/09/20 1426   Wound Width (cm) 10.5 cm 11/09/20 1426   Wound Depth (cm) 0.1 cm 11/09/20 1426   Wound Surface Area (cm^2) 84 cm^2 11/09/20 1426   Wound Volume (cm^3) 8.4 cm^3 11/09/20 1426   Wound Assessment Fluid filled blister;Ruptured blister 11/09/20 1426   Drainage Amount Moderate 11/09/20 1426   Drainage Description Serous 11/09/20 1426   Odor None 11/09/20 1426   Patsy-wound Assessment Blisters;Edematous; Excoriated 11/09/20 1426   Margins Defined edges 11/09/20 1426   Wound Thickness Description not for Pressure Injury Partial thickness 11/09/20 1426   Number of days: 0                     Plan:     The nature of the patient's condition was explained in depth. The patient was informed that their compliance to the treatment plan is paramount to successful healing and prevention of further ulceration and/or infection     Discharge Treatment       Patient examined and evaluated  Recurrent venous stasis wounds  Keep legs elevated at all times and not active  Abx and steroid ointment with b/l unna boots  Patient would benefit from multilayer compression device once healed    The nature of the patient's condition was explained in depth.  The patient was informed that their compliance to the treatment plan is paramount to successful healing and prevention of further ulceration and/or infection    Discharge Treatment  Leave dressings intact keep legs elevated at all times and not active    Written Patient Discharge Instructions Given            Electronically signed by Hunter Alex DPM on 11/9/2020 at 2:44 PM

## 2020-11-16 NOTE — PROGRESS NOTES
Christine   Progress Note and Procedure Note      Radu Chadwick  AGE: 68 y.o. GENDER: male  : 1940  TODAY'S DATE:  2020    Subjective:     Chief Complaint   Patient presents with    Wound Check     BLE Wounds         HISTORY of PRESENT ILLNESS HPI     Radu Chadwick is a 68 y.o. male who presents today for wound evaluation. History of Wound: History of congestive heart failure, chronic kidney disease, and venous stasis. His daughter brings him from [place]. No infectious symptoms.  Failed two different types of compression. States legs blistered and broke open over the past 2 days. Tolerated unna boots well with no issues.    Wound Pain:  intermittent, mild  Severity:  2 / 10   Wound Type:  venous  Modifying Factors:  edema and venous stasis  Associated Signs/Symptoms:  edema and drainage        PAST MEDICAL HISTORY        Diagnosis Date    Acute combined systolic and diastolic (congestive) hrt fail (HCC)     Acute kidney injury ([address]     Arrhythmia     Atrial fibrillation (HCC)     Carotid artery stenosis     CKD (chronic kidney disease)     Dilated cardiomyopathy (HCC)     Hyperlipidemia     Hypertension     IGT (impaired glucose tolerance)     Meningioma (HCC)     Valvular disease        PAST SURGICAL HISTORY    Past Surgical History:   Procedure Laterality Date    AORTIC VALVE REPLACEMENT      CARPAL TUNNEL RELEASE Left     COLONOSCOPY  2010    SKIN BIOPSY         FAMILY HISTORY    Family History   Problem Relation Age of Onset    Heart Disease Paternal Grandfather     Hypertension Other     High Cholesterol Neg Hx     High Blood Pressure Neg Hx        SOCIAL HISTORY    Social History     Tobacco Use    Smoking status: Former Smoker     Packs/day: 1.00     Years: 25.00     Pack years: 25.00     Types: Cigarettes     Last attempt to quit: 1993     Years since quittin.8    Smokeless tobacco: Former User     Types: Chew     Quit date: status normal bilateral with epicritic Present , proprioceptive Present, vibratory sensationPresent and protopathicPresent.  DTRs Present bilateral Achilles. There were no reproducible neuritic symptoms on exam bilateral feet/ankles. Derm: Ulceration to bilateral legs.  Ecchymosis Absent  bilateral feet/foot.    Musculoskeletal: Pain with debridement of wounds 5/5 muscle strength in/eversion and dorsi/plantarflexion bilateral feet.  No gross instability noted. Assessment:     Patient Active Problem List   Diagnosis    Essential hypertension, benign    Atrial fibrillation with RVR ([facility]    Heart valve replaced by other means    Hyperlipidemia other unspecified.      Aortic regurgitation    Stage 3 chronic kidney disease    Hypertrophy of prostate without urinary obstruction and other lower urinary tract symptoms (LUTS)    Peptic ulcer    Gout    Generalized osteoarthrosis, involving multiple sites    IGT (impaired glucose tolerance)    Lung nodule    Chronic combined systolic and diastolic CHF (congestive heart failure) (HCC)    Meningioma (HCC)    Bilateral carotid artery stenosis    Atherosclerosis of abdominal aorta (HCC)    Labyrinthine vertigo    RACIEL (acute kidney injury) ([facility]    S/P AVR    Cardiomyopathy (HCC)    SOB (shortness of breath)    Localized edema    Acute combined systolic and diastolic congestive heart failure (HCC)    Venous stasis ulcer of right ankle with fat layer exposed without varicose veins (HCC)    Venous stasis ulcer of left calf with fat layer exposed without varicose veins (HCC)    Hypokalemia           Post  Debridement Measurements:  Wound 11/09/20 Leg Right #1 (Active)   Wound Image   11/09/20 1426   Wound Etiology Venous 11/16/20 1532   Wound Cleansed Wound cleanser 11/16/20 1532   Wound Length (cm) 7 cm 11/16/20 1532   Wound Width (cm) 5.5 cm 11/16/20 1532   Wound Depth (cm) 0.1 cm 11/16/20 1532   Wound Surface Area (cm^2) 38.5 cm^2 11/16/20 1532 Change in Wound Size % (l*w) 32.46 11/16/20 1532   Wound Volume (cm^3) 3.85 cm^3 11/16/20 1532   Wound Healing % 32 11/16/20 1532   Wound Assessment Granulation tissue 11/16/20 1532   Drainage Amount Moderate 11/16/20 1532   Drainage Description Serosanguinous 11/16/20 1532   Odor None 11/16/20 1532   Patsy-wound Assessment Excoriated 11/16/20 1532   Margins Attached edges; Undefined edges 11/16/20 1532   Wound Thickness Description not for Pressure Injury Partial thickness 11/16/20 1532   Number of days: 7       Wound 11/09/20 Leg Left #2 (Active)   Wound Image   11/09/20 1426   Wound Etiology Venous 11/16/20 1532   Wound Cleansed Wound cleanser 11/16/20 1532   Wound Length (cm) 2 cm 11/16/20 1532   Wound Width (cm) 2.5 cm 11/16/20 1532   Wound Depth (cm) 0.1 cm 11/16/20 1532   Wound Surface Area (cm^2) 5 cm^2 11/16/20 1532   Change in Wound Size % (l*w) 94.05 11/16/20 1532   Wound Volume (cm^3) 0.5 cm^3 11/16/20 1532   Wound Healing % 94 11/16/20 1532   Wound Assessment Granulation tissue 11/16/20 1532   Drainage Amount Moderate 11/16/20 1532   Drainage Description Serosanguinous 11/16/20 1532   Odor None 11/16/20 1532   Patsy-wound Assessment Excoriated 11/16/20 1532   Margins Defined edges; Attached edges 11/16/20 1532   Wound Thickness Description not for Pressure Injury Partial thickness 11/16/20 1532   Number of days: 7                 Plan:     The nature of the patient's condition was explained in depth. The patient was informed that their compliance to the treatment plan is paramount to successful healing and prevention of further ulceration and/or infection     Discharge Treatment       With each dressing change, rinse wounds with 0.9% Saline. (May use wound wash or soft contact solution. Both can be purchased at a local drug store). If unable to obtain saline, may use a gentle soap and water.      Dressing care: Mix antibiotic ointment and steroid ointment, apply to both legs, adaptic, dry dressing, bilateral unna boots     Written Patient Discharge Instructions Given            Electronically signed by Ayesha Zuñiga DPM on 11/16/2020 at 3:44 PM

## 2020-11-16 NOTE — PROGRESS NOTES
Compression Application   Below Knee    NAME:  Dieudonne Chase  YOB: 1940  MEDICAL RECORD NUMBER:  [id number]  DATE:  11/16/2020       Applied moisturizing agent to dry skin as needed. Appied primary and secondary dressing as ordered     Applied  Unna Boot wrap from toes to knee overlapping each time. Applied cast padding and coban from toes to below the knee. Instructed patient/caregiver to keep dressing dry and intact. DO NOT REMOVE DRESSING. Instructed pt/family/caregiver to report excessive draining, loose bandage, wet dressing, severe pain or tingling in toes. Applied Compression dressing below the knee to Bilateral lower leg(s)      Compression wrap(s) were applied per  Guidelines.      Electronically signed by Myra Rangel LPN on 44/[id number]/9959 at 4:16 PM

## 2020-11-16 NOTE — PLAN OF CARE
Discharge instructions given. Patient verbalized understanding. Return to [facility] in 1 week.   Bilateral unna boots

## 2020-11-24 NOTE — PROGRESS NOTES
MEDICATIONS    Current Outpatient Medications on File Prior to Encounter   Medication Sig Dispense Refill    potassium chloride (KLOR-CON M) 20 MEQ extended release tablet TAKE 2 TABLETS BY MOUTH DAILY 180 tablet 1    torsemide (DEMADEX) 20 MG tablet Take 1 tablet by mouth 2 times daily 180 tablet 1    carvedilol (COREG) 12.5 MG tablet TAKE 1 TABLET TWICE DAILY  WITH MEALS 180 tablet 3    allopurinol (ZYLOPRIM) 100 MG tablet TAKE 1 TABLET BY MOUTH DAILY 90 tablet 3    amiodarone (CORDARONE) 200 MG tablet Take 1 tablet by mouth daily 90 tablet 3    warfarin (COUMADIN) 4 MG tablet Take 2-4 mg by mouth See Admin Instructions Take 2 mg (4 mg x 0.5 tablet) every Mon; take 4 mg (4 mg x 1 tablet) all other days. Managed by [facility].  atorvastatin (LIPITOR) 40 MG tablet TAKE 1 TABLET DAILY 90 tablet 3    Multiple Vitamins-Minerals (THERAPEUTIC MULTIVITAMIN-MINERALS) tablet Take 1 tablet by mouth daily       No current facility-administered medications on file prior to encounter.         REVIEW OF SYSTEMS    Constitutional: negative  Eyes: negative  Ears, nose, mouth, throat, and face: negative  Behavioral/Psych: negative      Objective:      /65   Pulse 101   Temp 97.5 °F (36.4 °C) (Infrared)   Resp 16     PHYSICAL EXAM    General Appearance: alert and oriented to person, place and time, well-developed and well-nourished, in no acute distress  Skin: warm and dry, no rash or erythema  Head: normocephalic and atraumatic  Neck: neck supple and non tender without mass, no thyromegaly or thyroid nodules, no cervical lymphadenopathy   Abdomen: soft, non-tender, non-distended, normal bowel sounds, no masses or organomegaly  Bilateral lower extremity swelling under good control  Small open ulceration on the posterior aspect of the right lower leg    Assessment:     Patient Active Problem List   Diagnosis    Essential hypertension, benign    Atrial fibrillation with RVR ([address]    Heart valve replaced by other means    Hyperlipidemia other unspecified.      Aortic regurgitation    Stage 3 chronic kidney disease    Hypertrophy of prostate without urinary obstruction and other lower urinary tract symptoms (LUTS)    Peptic ulcer    Gout    Generalized osteoarthrosis, involving multiple sites    IGT (impaired glucose tolerance)    Lung nodule    Chronic combined systolic and diastolic CHF (congestive heart failure) (HCC)    Meningioma (HCC)    Bilateral carotid artery stenosis    Atherosclerosis of abdominal aorta (HCC)    Labyrinthine vertigo    RACIEL (acute kidney injury) ([address]    S/P AVR    Cardiomyopathy (HCC)    SOB (shortness of breath)    Localized edema    Acute combined systolic and diastolic congestive heart failure (HCC)    Venous stasis ulcer of right ankle with fat layer exposed without varicose veins (HCC)    Venous stasis ulcer of left calf with fat layer exposed without varicose veins (HCC)    Hypokalemia       Wound 11/09/20 Leg Right #1 (Active)   Wound Image   11/09/20 1426   Wound Etiology Venous 11/24/20 1033   Wound Cleansed Wound cleanser 11/24/20 1033   Wound Length (cm) 1.5 cm 11/24/20 1033   Wound Width (cm) 1.3 cm 11/24/20 1033   Wound Depth (cm) 0.1 cm 11/24/20 1033   Wound Surface Area (cm^2) 1.95 cm^2 11/24/20 1033   Change in Wound Size % (l*w) 96.58 11/24/20 1033   Wound Volume (cm^3) 0.2 cm^3 11/24/20 1033   Wound Healing % 96 11/24/20 1033   Wound Assessment Pale granulation tissue 11/24/20 1033   Drainage Amount Moderate 11/24/20 1033   Drainage Description Purulent;Yellow 11/24/20 1033   Odor None 11/24/20 1033   Patsy-wound Assessment Fragile 11/24/20 1033   Margins Attached edges 11/24/20 1033   Wound Thickness Description not for Pressure Injury Partial thickness 11/24/20 1033   Number of days: 14       Wound 11/09/20 Leg Left #2 (Active)   Wound Image   11/09/20 1426   Wound Etiology Venous 11/24/20 1033   Wound Cleansed Wound cleanser 11/24/20 1033 Wound Length (cm) 0 cm 11/24/20 1033   Wound Width (cm) 0 cm 11/24/20 1033   Wound Depth (cm) 0 cm 11/24/20 1033   Wound Surface Area (cm^2) 0 cm^2 11/24/20 1033   Change in Wound Size % (l*w) 100 11/24/20 1033   Wound Volume (cm^3) 0 cm^3 11/24/20 1033   Wound Healing % 100 11/24/20 1033   Wound Assessment Epithelialization 11/24/20 1033   Drainage Amount Small 11/24/20 1033   Drainage Description Brown 11/24/20 1033   Odor None 11/24/20 1033   Patsy-wound Assessment Excoriated;Fragile 11/24/20 1033   Margins Defined edges; Attached edges 11/16/20 1532   Wound Thickness Description not for Pressure Injury Partial thickness 11/16/20 1532   Number of days: 15    68-year-old male with venous stasis who is typically seen by Dr. Virginia Durand. He is treated for bilateral venous ulcers with Unna boots. The ulcers typically heal but he has had difficulty with recurrence. He had venous  studies last week and is scheduled to see Dr. Andrés Valerio on on December 16 for consideration for possible  ablation. For now, he was placed in bilateral Unna boots. Plan:     Follow-up with Dr. Virginia Durand next week.   Discharge Treatment          Written Patient Discharge Instructions Given            Electronically signed by Shivani Burks MD on 11/24/2020 at 10:46 AM

## 2020-11-24 NOTE — PLAN OF CARE
Discharge instructions given. Patient verbalized understanding. Return to [facility] in 1 week.   Appointment with Dr Yossi Beckwith 12/16/20

## 2020-12-02 NOTE — PROGRESS NOTES
Mr. Jose Manuel Anguiano is a 68 y.o. y/o male with history of Heart Valve Replacement, aortic valve, about 1997 by Drea Montelongo at [address]   He presents today for anticoagulation monitoring and adjustment. Pertinent AFib, PMH: HTN, CHF, Chronic Kidney disease(moderate)    Patient Reported Findings:  Yes     No  [x]   []       Patient verifies current dosing regimen as listed- follows AVS calendar---> pt presents to apt alone, doesn't know dosing---> follows AVS ---> Grand daughter confirms he is following AVS. ---> Follows AVS  []   [x]       S/S bleeding/bruising/swelling/SOB-denies   []   [x]       Blood in urine or stool- denies   []   [x]       Procedures scheduled in the future at this time  []   [x]       Missed Dose - denies   []   [x]       Extra Dose- denies   []   [x]       Change in medications  report none. --> torsemide increased for edema, potassium added ---> No changes. []   [x]       Change in health/diet/appetite- Overall his appetite is poor, he states that he \"hasn't been eating much of anything lately\". He states that he continues to drink Ensure every day. Has not been eating much/missed a few ensures. --> a few salads a week. No boost or ensure---> states not many salads recently but a lot of green beans, not doing ensure  ---> Eating greens 2-3 times per week. No NVD. --> no changes, no NVD ---> 3 servings of greens per week. No NVD. []   [x]       Change in alcohol use does not drink  []   [x]       Change in activity  []   [x]       Hospital admission- pt was in hosptial 7/29-8/5 for a fib. Was started on diltiazem and amiodarone drip. was held first 2 days of admission then resumed on 8/1 with home dose. INR on d/c 2.7 was started on amiodarone outpatient with 400 mg TID x 7 days, 400 mg BID x 7 days, 400 mg qd x 7 days, then 200 mg qd. ---> None recently.     []   [x]       Emergency department visit  []   [x]       Other complaints    Clinical Outcomes:  Yes     No  []   [x] Major bleeding event  []   [x]       Thromboembolic event    Duration of warfarin Therapy: indefinite  INR Range:  2.5-3.5     **they have stopped making Brand Coumadin so patient is going to have to switch over to generic warfarin, explained this to him and daughter at apt today. Also switched tablet strength to 2mg warfarin tablets and went over the switch with patient and daughter. INR is 3.1 today   Continue weekly dose of 2 mg daily   Encouraged to maintain a consistency of vegetables/salads  Recheck INR in 4 week, 12/30    Referring cardiologist is Dr. Evgeny Alonso.    INR (no units)   Date Value   10/28/2020 2.4   09/30/2020 2.8   09/16/2020 2.7   09/09/2020 2.0   08/05/2020 2.76 (H)   08/04/2020 2.12 (H)   08/03/2020 1.79 (H)   08/02/2020 1.66 (H)     CLINICAL PHARMACY CONSULT: MED RECONCILIATION/REVIEW ADDENDUM    For Pharmacy Admin Tracking Only    PHSO: Yes  Total # of Interventions Recommended: 0  - Maintenance Safety Lab Monitoring #: 1  Total Interventions Accepted: 0  Time Spent (min): 1001 Avelino Arvizu, PharmD

## 2020-12-03 NOTE — PROGRESS NOTES
Compression Application   Below Knee    NAME:  Tita Paulino  YOB: 1940  MEDICAL RECORD NUMBER:  [id number]  DATE:  12/3/2020       Applied moisturizing agent to dry skin as needed. Appied primary and secondary dressing as ordered     Applied  Unna Boot wrap from toes to knee overlapping each time. Applied cast padding and coban from toes to below the knee. Instructed patient/caregiver to keep dressing dry and intact. DO NOT REMOVE DRESSING. Instructed pt/family/caregiver to report excessive draining, loose bandage, wet dressing, severe pain or tingling in toes. Applied Compression dressing below the knee to Bilateral lower leg(s)      Compression wrap(s) were applied per  Guidelines.   Applied adaptic over legs   Electronically signed by Viviane Esteban RN on 12/3/2020 at 10:59 AM

## 2020-12-03 NOTE — PLAN OF CARE
Discharge instructions given. Patient verbalized understanding. Return to [facility] in 1 week.   Appointment with Dr Dewey Chapman 12/16

## 2020-12-03 NOTE — PROGRESS NOTES
Christine Lind        Progress Note      Tracey Little  AGE: 68 y.o. GENDER: male  : 1940  TODAY'S DATE:  12/3/2020    Subjective:     Chief Complaint   Patient presents with    Wound Check     bilateral legs F/U         HISTORY of PRESENT ILLNESS HPI     Tracey Little is a 68 y.o. male who presents today for wound evaluation. History of congestive heart failure, chronic kidney disease, and venous stasis. His daughter brings him from [place]. No infectious symptoms. Failed two different types of compression stockings. PAST MEDICAL HISTORY        Diagnosis Date    Acute combined systolic and diastolic (congestive) hrt fail ([address]     Acute kidney injury ([address]     Arrhythmia     Atrial fibrillation ([address]     Carotid artery stenosis     CKD (chronic kidney disease)     Dilated cardiomyopathy (HCC)     Hyperlipidemia     Hypertension     IGT (impaired glucose tolerance)     Meningioma (HCC)     Valvular disease        PAST SURGICAL HISTORY    Past Surgical History:   Procedure Laterality Date    AORTIC VALVE REPLACEMENT      CARPAL TUNNEL RELEASE Left     COLONOSCOPY  2010    SKIN BIOPSY         FAMILY HISTORY    Family History   Problem Relation Age of Onset    Heart Disease Paternal Grandfather     Hypertension Other     High Cholesterol Neg Hx     High Blood Pressure Neg Hx        SOCIAL HISTORY    Social History     Tobacco Use    Smoking status: Former Smoker     Packs/day: 1.00     Years: 25.00     Pack years: 25.00     Types: Cigarettes     Last attempt to quit: 1993     Years since quittin.9    Smokeless tobacco: Former User     Types: Chew     Quit date: 1982    Tobacco comment: quit 25 yrs ago   Substance Use Topics    Alcohol use: No     Alcohol/week: 0.0 standard drinks    Drug use: No       ALLERGIES    Allergies   Allergen Reactions    Ceclor [Cefaclor] Other (See Comments)     CNS side effects, slurred speech.  Lips and svitlana quiroga. MEDICATIONS    Current Outpatient Medications on File Prior to Encounter   Medication Sig Dispense Refill    potassium chloride (KLOR-CON M) 20 MEQ extended release tablet TAKE 2 TABLETS BY MOUTH DAILY 180 tablet 1    torsemide (DEMADEX) 20 MG tablet Take 1 tablet by mouth 2 times daily 180 tablet 1    carvedilol (COREG) 12.5 MG tablet TAKE 1 TABLET TWICE DAILY  WITH MEALS 180 tablet 3    allopurinol (ZYLOPRIM) 100 MG tablet TAKE 1 TABLET BY MOUTH DAILY 90 tablet 3    amiodarone (CORDARONE) 200 MG tablet Take 1 tablet by mouth daily 90 tablet 3    warfarin (COUMADIN) 4 MG tablet Take 2-4 mg by mouth See Admin Instructions Take 2 mg (4 mg x 0.5 tablet) every Mon; take 4 mg (4 mg x 1 tablet) all other days. Managed by [facility].  atorvastatin (LIPITOR) 40 MG tablet TAKE 1 TABLET DAILY 90 tablet 3    Multiple Vitamins-Minerals (THERAPEUTIC MULTIVITAMIN-MINERALS) tablet Take 1 tablet by mouth daily       No current facility-administered medications on file prior to encounter. REVIEW OF SYSTEMS    Pertinent items are noted in HPI. Objective:      /60   Pulse 104   Temp 97.3 °F (36.3 °C) (Tympanic)   Wt 161 lb (73 kg)   BMI 23.78 kg/m²     PHYSICAL EXAM    General Appearance: alert and oriented to person, place and time, well-developed and well-nourished, in no acute distress  Skin: warm and dry  Head: normocephalic and atraumatic  Eyes: extraocular eye movements intact and conjunctivae normal  Extremities: no cyanosis and no clubbing  Musculoskeletal: normal range of motion, no joint swelling, deformity or tenderness      Assessment:     Patient Active Problem List   Diagnosis    Essential hypertension, benign    Atrial fibrillation with RVR ([address]    Heart valve replaced by other means    Hyperlipidemia other unspecified.      Aortic regurgitation    Stage 3 chronic kidney disease    Hypertrophy of prostate without urinary obstruction and other lower urinary tract symptoms (LUTS)    Peptic ulcer    Gout    Generalized osteoarthrosis, involving multiple sites    IGT (impaired glucose tolerance)    Lung nodule    Chronic combined systolic and diastolic CHF (congestive heart failure) (HCC)    Meningioma (HCC)    Bilateral carotid artery stenosis    Atherosclerosis of abdominal aorta (HCC)    Labyrinthine vertigo    [facility]) [facility]    S/P AVR    Cardiomyopathy (HCC)    SOB (shortness of breath)    Localized edema    Acute combined systolic and diastolic congestive heart failure (HCC)    Venous stasis ulcer of right ankle with fat layer exposed without varicose veins (HCC)    Venous stasis ulcer of left calf with fat layer exposed without varicose veins (HCC)    Hypokalemia        Wound #: 2  Wound Care Documentation:  Wound 11/09/20 Leg Right #1 (Active)   Wound Image   12/03/20 1011   Wound Etiology Venous 12/03/20 1011   Wound Cleansed Wound cleanser 12/03/20 1011   Wound Length (cm) 0 cm 12/03/20 1011   Wound Width (cm) 0 cm 12/03/20 1011   Wound Depth (cm) 0 cm 12/03/20 1011   Wound Surface Area (cm^2) 0 cm^2 12/03/20 1011   Change in Wound Size % (l*w) 100 12/03/20 1011   Wound Volume (cm^3) 0 cm^3 12/03/20 1011   Wound Healing % 100 12/03/20 1011   Wound Assessment Granulation tissue 12/03/20 1011   Drainage Amount None 12/03/20 1011   Drainage Description Purulent;Yellow 11/24/20 1033   Odor None 12/03/20 1011   Patsy-wound Assessment Fragile 11/24/20 1033   Margins Attached edges 11/24/20 1033   Wound Thickness Description not for Pressure Injury Partial thickness 11/24/20 1033   Number of days: 23       Wound 11/09/20 Leg Left #2 (Active)   Wound Image   11/09/20 1426   Wound Etiology Venous 11/24/20 1033   Wound Cleansed Wound cleanser 11/24/20 1033   Wound Length (cm) 0 cm 11/24/20 1033   Wound Width (cm) 0 cm 11/24/20 1033   Wound Depth (cm) 0 cm 11/24/20 1033   Wound Surface Area (cm^2) 0 cm^2 11/24/20 1033   Change in Wound Size % (l*w) 100 11/24/20 1033   Wound Volume (cm^3) 0 cm^3 11/24/20 1033   Wound Healing % 100 11/24/20 1033   Wound Assessment Epithelialization 11/24/20 1033   Drainage Amount Small 11/24/20 1033   Drainage Description Brown 11/24/20 1033   Odor None 11/24/20 1033   Patsy-wound Assessment Excoriated;Fragile 11/24/20 1033   Margins Defined edges; Attached edges 11/16/20 1532   Wound Thickness Description not for Pressure Injury Partial thickness 11/16/20 1532   Number of days: 23         Plan:     The nature of the patient's condition was explained in depth. The patient was informed that their compliance to the treatment plan is paramount to successful healing and prevention of further ulceration and/or infection     Discharge Treatment      Written Patient Discharge Instructions Given   To prevent Venous Wounds from recurring again:  · Elevate your legs at least parallel to the ground while sitting. · Wear your prescription support stockings everyday and remove at night while you sleep. · Replace your stockings every 4-6 months so that your legs continue to get the support they need. · Inspect your legs and feet daily and report any increased swelling, unusual redness or new wounds to your physician. · Wash your legs and feet regularly with mild soap and water and moisturize daily. · Continue to use compression pumps if prescribed by your physician. · Avoid overeating. Extra weight adds pressure on the veins in your legs. · Limit salty foods as they promote swelling or fluid retention in your legs.     Call the [facility] [address] if your wound reopens, if new wounds occur, or if you have any other questions about your follow up care [phone].       Right & Left lower legs Unna boots until Vascular surgery appointment on 11/16/20. Thank you for allowing me to participate in the care of your patient. Please do not hesitate to call.      Dexter Hall D.O ([place])., [facility]  Interventional Cardiology o: [phone]  [address] [facility], [address] [place] [address]

## 2020-12-07 NOTE — PROGRESS NOTES
Christine Topete  Progress Note and Procedure Note      Jose Manuel Anguiano  AGE: 68 y.o. GENDER: male  : 1940  TODAY'S DATE:  2020    Subjective:     Chief Complaint   Patient presents with    Wound Check     left and right lower extremity         HISTORY of PRESENT ILLNESS HPI     Jose Manuel Anguiano is a 68 y.o. male who presents today for wound evaluation. History of Wound: History of congestive heart failure, chronic kidney disease, and venous stasis. His daughter brings him from [place]. No infectious symptoms.  Failed two different types of compression. States compression socks caused him to itch to b/l legs.    Wound Pain:  mild  Severity:  2 / 10   Wound Type:  venous  Modifying Factors:  edema and venous stasis  Associated Signs/Symptoms:  edema        PAST MEDICAL HISTORY        Diagnosis Date    Acute combined systolic and diastolic (congestive) hrt fail (HCC)     Acute kidney injury ([address]     Arrhythmia     Atrial fibrillation (HCC)     Carotid artery stenosis     CKD (chronic kidney disease)     Dilated cardiomyopathy (HCC)     Hyperlipidemia     Hypertension     IGT (impaired glucose tolerance)     Meningioma (HCC)     Valvular disease        PAST SURGICAL HISTORY    Past Surgical History:   Procedure Laterality Date    AORTIC VALVE REPLACEMENT      CARPAL TUNNEL RELEASE Left     COLONOSCOPY  2010    SKIN BIOPSY         FAMILY HISTORY    Family History   Problem Relation Age of Onset    Heart Disease Paternal Grandfather     Hypertension Other     High Cholesterol Neg Hx     High Blood Pressure Neg Hx        SOCIAL HISTORY    Social History     Tobacco Use    Smoking status: Former Smoker     Packs/day: 1.00     Years: 25.00     Pack years: 25.00     Types: Cigarettes     Last attempt to quit: 1993     Years since quittin.9    Smokeless tobacco: Former User     Types: Chew     Quit date: 1982    Tobacco comment: quit 25 yrs ago Substance Use Topics    Alcohol use: No     Alcohol/week: 0.0 standard drinks    Drug use: No       ALLERGIES    Allergies   Allergen Reactions    Ceclor [Cefaclor] Other (See Comments)     CNS side effects, slurred speech. Lips and tounge swell. MEDICATIONS    Current Outpatient Medications on File Prior to Encounter   Medication Sig Dispense Refill    potassium chloride (KLOR-CON M) 20 MEQ extended release tablet TAKE 2 TABLETS BY MOUTH DAILY 180 tablet 1    torsemide (DEMADEX) 20 MG tablet Take 1 tablet by mouth 2 times daily 180 tablet 1    carvedilol (COREG) 12.5 MG tablet TAKE 1 TABLET TWICE DAILY  WITH MEALS 180 tablet 3    allopurinol (ZYLOPRIM) 100 MG tablet TAKE 1 TABLET BY MOUTH DAILY 90 tablet 3    amiodarone (CORDARONE) 200 MG tablet Take 1 tablet by mouth daily 90 tablet 3    warfarin (COUMADIN) 4 MG tablet Take 2-4 mg by mouth See Admin Instructions Take 2 mg (4 mg x 0.5 tablet) every Mon; take 4 mg (4 mg x 1 tablet) all other days. Managed by [facility].  atorvastatin (LIPITOR) 40 MG tablet TAKE 1 TABLET DAILY 90 tablet 3    Multiple Vitamins-Minerals (THERAPEUTIC MULTIVITAMIN-MINERALS) tablet Take 1 tablet by mouth daily       No current facility-administered medications on file prior to encounter. REVIEW OF SYSTEMS    Pertinent items are noted in HPI. Objective:      /74   Pulse 89   Temp 97.3 °F (36.3 °C) (Tympanic)   Resp 16     PHYSICAL EXAM    Vascular: Vascular status Impaired  palpable pedal pulses, right DP1/4 and PT1/4, left DP1/4 and PT1/4.  CFT 3 seconds digits 1 to 5 bilateral.  Hair growthAbsent  both lower extremities and feet.  Skin temperature is warm to warm from pretibial area to distal digits bilateral.  Exam is negative for rubor, pallor, cyanosis or signs of acute vascular compromise bilaterally.  Exam is positive for edema bilateral lower extremity.  Varicosities Present bilateral lower extremity.    Neuro: Neurologic status contact solution. Both can be purchased at a local drug store). If unable to obtain saline, may use a gentle soap and water.       Dressing care: Mix antibiotic ointment and steroid ointment, apply to both legs,6 inch ace wrap from the toes to knees bilaterally.      Written Patient Discharge Instructions Given            Electronically signed by Ignacio Neville DPM on 12/7/2020 at 1:44 PM

## 2020-12-07 NOTE — PLAN OF CARE
Discharge instructions given. Patient verbalized understanding. Return to [facility] in 2 weeks. No supplies ordered.

## 2020-12-15 NOTE — PROGRESS NOTES
[address]   Cardiac Follow Up     Referring Provider:  Allan Morales MD     Chief Complaint   Patient presents with    Follow-up     4 month    Congestive Heart Failure    Hypertension    Hyperlipidemia      History of Present Illness:   Mr. Irlanda Birch is seen today for a follow up visit; he has a history of AVR ([address]), Atrial Fibrillation, hypertension, and hyperlipidemia. He was admitted from the office 7/29-8/5/20 for AF/RVR, CHF exacerbation. Hospital Course: admitted to hospital with acute a fib with RVR.  Patient started on Dilt drip and then amiodarone drip along with oral amiodarone patient heart rate was under better control and will follow up with EP as outpatient.  Patient had acute on chronic systolic heart failure was initially dry with IV Lasix transition to oral torsemide.  Patient also had RACIEL on CKD 3 which improved with diuresis.     He continues to be seen in the wound clinic, for bilateral leg blisters, states they weep at times. His biggest complaint is no stamina. He denies chest pain, exertional chest pressure/discomfort, early satiety, syncope. Past Medical History:   has a past medical history of Acute combined systolic and diastolic (congestive) hrt fail ([address] Acute kidney injury ([address] Arrhythmia, Atrial fibrillation ([address] Carotid artery stenosis, CKD (chronic kidney disease), Dilated cardiomyopathy ([address] Hyperlipidemia, Hypertension, IGT (impaired glucose tolerance), Meningioma ([address] and Valvular disease. Surgical History:   has a past surgical history that includes Aortic valve replacement; Carpal tunnel release (Left); skin biopsy; and Colonoscopy (2010). Social History:   reports that he quit smoking about 27 years ago. His smoking use included cigarettes. He has a 25.00 pack-year smoking history. He quit smokeless tobacco use about 38 years ago. His smokeless tobacco use included chew.  He reports that he does not drink alcohol or use gait, balance, coordination, mood, affect, memory, mentation, behavior. · Psychiatric: No anxiety, no depression. · Endocrine: No malaise, fatigue or temperature intolerance. No excessive thirst, fluid intake, or urination. No tremor. · Hematologic/Lymphatic: No abnormal bruising or bleeding, blood clots or swollen lymph nodes. · Allergic/Immunologic: No nasal congestion or hives. Physical Examination:    Vitals:    12/15/20 0904   BP: 128/66   Pulse: 80   Resp: 20   SpO2: 99%        Constitutional and General Appearance: appears chronically ill  Skin:good turgor,intact without lesions  HEENT: EOMI ,normal  Neck:no JVD    Respiratory:  · Normal excursion and expansion without use of accessory muscles  · Resp Auscultation: Normal breath sounds without dullness  Cardiovascular:  · The apical impulses not displaced  · Normal prosthetic heart sounds, irregular heart rhythm. No murmur  · Cervical veins are not engorged  · The carotid upstroke is normal in amplitude and contour without delay or bruit  · Peripheral pulses are symmetrical and full  · There is no clubbing, cyanosis of the extremities. · BLE edema- legs in [place] from yesterday's visit in wound clinic reviewed    · Femoral Arteries: 2+ and equal  · Pedal Pulses: 2+ and equal   Abdomen:  · No masses or tenderness  · Liver/Spleen: No Abnormalities Noted  Neurological/Psychiatric:  · Alert and oriented in all spheres  · Moves all extremities well  · Exhibits normal gait balance and coordination  · No abnormalities of mood, affect, memory, mentation, or behavior are noted    Assessment:    1. Heart valve replaced by other means/[place] AVR: Echo at next OV   ECHO 2017>  well-seated prosthetic valve    2. Atrial fibrillation:. On Coumadin with INRs managed through [facility]  Increase carvedilol (COREG) to 25 MG tablet 2x daily    3. Essential hypertension, benign: stable Blood pressure 128/66, pulse 80, resp.  rate 20, height 5' 9\" (1.753 m), weight 165 lb (74.8 kg), SpO2 99 %. 4. Hyperlipidemia: On Lipitor 40 mg daily     5. Chronic combined systolic and diastolic CHF (congestive heart failure) ([address]  Compensated on exam today. PLAN:  Cardiac test and lab results personally reviewed by me during this office visit and discussed. He has become much more frail  Increase carvedilol (COREG) to 25 MG tablet 2x daily   Continue risk factor modifications. Call for any change in symptoms, call to report any changes in shortness of breath or development of chest pain with activity. Return for regular follow up in 6 months with Echo. I appreciate the opportunity of cooperating in the care of this individual.    Ina Rosen. Yesenia Choi M.D., [facility]    Patient's problem list, medications, allergies, past medical, surgical, social and family histories were reviewed and updated as appropriate. Scribe's attestation: This note was scribed in the presence of Dr. Yesenia Choi MD, by Manoj Guillen RN. The scribe's documentation has been prepared under my direction and personally reviewed by me in its entirety. I confirm that the note above accurately reflects all work, treatment, procedures, and medical decision making performed by me.

## 2020-12-16 NOTE — PROGRESS NOTES
Subjective:      Patient ID: Daron Meza is a 68 y.o. male. HPI Referral from Julia BENAVIDESM from [facility] for evaluation of bilateral ankle and calf ulcerations associated with swelling. This problem has been managed at the wound care center and approximately a week ago the wounds were considered healed. Subsequently the patient and his family continue with compression wraps for management. Now the patient has new blistering wounds bilaterally with recurrence of his edema. This has been an ongoing problem for some time. Work-up through the wound care center was performed the venous reflux study which demonstrate limited superficial reflux and had no evidence of deep reflux. No history of documented DVT or SVT. Past Medical History:   Diagnosis Date    Acute combined systolic and diastolic (congestive) hrt fail ([address]     Acute kidney injury ([address]     Arrhythmia     Atrial fibrillation (HCC)     Carotid artery stenosis     CKD (chronic kidney disease)     Dilated cardiomyopathy (HCC)     Hyperlipidemia     Hypertension     IGT (impaired glucose tolerance)     Meningioma (HCC)     Valvular disease      Past Surgical History:   Procedure Laterality Date    AORTIC VALVE REPLACEMENT      CARPAL TUNNEL RELEASE Left     COLONOSCOPY  2010    SKIN BIOPSY       Allergies   Allergen Reactions    Ceclor [Cefaclor] Other (See Comments)     CNS side effects, slurred speech. Lips and tounge swell.      Current Outpatient Medications   Medication Sig Dispense Refill    carvedilol (COREG) 25 MG tablet Take 1 tablet by mouth 2 times daily 180 tablet 1    potassium chloride (KLOR-CON M) 20 MEQ extended release tablet TAKE 2 TABLETS BY MOUTH DAILY 180 tablet 1    torsemide (DEMADEX) 20 MG tablet Take 1 tablet by mouth 2 times daily 180 tablet 1    allopurinol (ZYLOPRIM) 100 MG tablet TAKE 1 TABLET BY MOUTH DAILY 90 tablet 3    amiodarone (CORDARONE) 200 MG tablet Take 1 tablet by mouth daily 90 tablet 3    warfarin (COUMADIN) 4 MG tablet Take 2-4 mg by mouth See Admin Instructions Take 2 mg (4 mg x 0.5 tablet) every Mon; take 4 mg (4 mg x 1 tablet) all other days. Managed by [facility].  atorvastatin (LIPITOR) 40 MG tablet TAKE 1 TABLET DAILY 90 tablet 3    Multiple Vitamins-Minerals (THERAPEUTIC MULTIVITAMIN-MINERALS) tablet Take 1 tablet by mouth daily       No current facility-administered medications for this visit.       Social History     Socioeconomic History    Marital status:      Spouse name: Not on file    Number of children: Not on file    Years of education: Not on file    Highest education level: Not on file   Occupational History    Not on file   Social Needs    Financial resource strain: Not on file    Food insecurity     Worry: Not on file     Inability: Not on file    Transportation needs     Medical: Not on file     Non-medical: Not on file   Tobacco Use    Smoking status: Former Smoker     Packs/day: 1.00     Years: 25.00     Pack years: 25.00     Types: Cigarettes     Quit date: 1993     Years since quittin.9    Smokeless tobacco: Current User     Types: Chew     Last attempt to quit: 1982    Tobacco comment: quit 25 yrs ago   Substance and Sexual Activity    Alcohol use: No     Alcohol/week: 0.0 standard drinks    Drug use: No    Sexual activity: Yes     Partners: Female     Comment:    Lifestyle    Physical activity     Days per week: Not on file     Minutes per session: Not on file    Stress: Not on file   Relationships    Social connections     Talks on phone: Not on file     Gets together: Not on file     Attends [employer] service: Not on file     Active member of club or organization: Not on file     Attends meetings of clubs or organizations: Not on file     Relationship status: Not on file    Intimate partner violence     Fear of current or ex partner: Not on file     Emotionally abused: Not on file     Physically abused: Not on file     Forced sexual activity: Not on file   Other Topics Concern    Not on file   Social History Narrative    Not on file     Family History   Problem Relation Age of Onset    Heart Disease Paternal Grandfather     Hypertension Other     High Cholesterol Neg Hx     High Blood Pressure Neg Hx          Review of Systems   Cardiovascular: Positive for leg swelling. All other systems reviewed and are negative. Episodes of congestive heart failure requiring admission. EF 30% with valvular regurgitation at 3 sites excluding the aortic valve which was replaced 20 years ago. Objective:   Physical Exam  Vitals signs and nursing note reviewed. Exam conducted with a chaperone present (son). Constitutional:       Comments: Disheveled   HENT:      Head: Normocephalic and atraumatic. Nose: Nose normal.      Mouth/Throat:      Mouth: Mucous membranes are moist.      Pharynx: Oropharynx is clear. Eyes:      Extraocular Movements: Extraocular movements intact. Conjunctiva/sclera: Conjunctivae normal.   Cardiovascular:      Rate and Rhythm: Rhythm irregular. Heart sounds: Murmur (Mechanical valvular sound) present. Pulmonary:      Breath sounds: Rales (bibasilar to mid-field) present. Abdominal:      General: Abdomen is flat. Bowel sounds are normal.      Palpations: Abdomen is soft. There is no mass. Hernia: No hernia is present. Musculoskeletal:      Right lower leg: Edema (2-3+ pitting into thigh) present. Left lower leg: Edema (2-3+ piting into thigh) present. Skin:     Capillary Refill: Capillary refill takes more than 3 seconds. Comments: Blistering small wounds B caves and shins with some hemosiderin discoloration   Neurological:      General: No focal deficit present. Mental Status: He is alert and oriented to person, place, and time.    Psychiatric:         Mood and Affect: Mood normal.         Behavior: Behavior normal.         Thought Content: Thought content normal.         Judgment: Judgment normal.         Pulses:   R bruit  L bruit   2   carotid 2    2   brachial 2    2   radial 2    2   femoral 2    2   popliteal 2    0   posterior tibial 0    1   dorsalis pedis 0    na   bypass graft na        R CLEM L   1+ DP NC   1+ PT NC    LUIGI        Venous duplex scan/reflux study 11/16/2020  Impressions   Right Impression   Limited/difficult exam due to wounds and wet dressings and pt unable to   perform valsalva. Duplex sonography of the right lower extremity venous system shows no evidence   of deep venous thrombosis. Venous Doppler evaluation shows normal phasic flow and augmentation of the   right common femoral, deep femoral, femoral and popliteal veins. There is no evidence of clinically significant deep venous reflux in the right   lower extremity. Clinically significant venous reflux (greater than 0.5seconds duration with   the patient standing) is noted in the right GSV zone 7 and SSV zone 5. The maximum diameter of the right above knee GSV is 4.0mm. The right GSV becomes extrafascial at zone 4 and comes back into the fascia at   zone 6. There is evidence of partially occluding, chronic superficial venous   thrombosis involving the right SSV zone 5-6.       Left Impression   Limited/difficult exam due to wounds and wet dressings and pt unable to   perform valsalva. Duplex sonography of the left lower extremity venous system shows no evidence   of deep venous thrombosis. Venous Doppler evaluation shows normal phasic flow and augmentation of the   left common femoral, deep femoral, femoral and popliteal veins. There is no evidence of clinically significant deep venous reflux in the left   lower extremity. There is no evidence of clinically significant venous reflux involving the   left sapheno-femoral junction or the great saphenous vein (GSV). The maximum diameter of the left above knee GSV is 2.2mm.    The left GSV becomes +------------------------+------+------+------------+   ! GSV Calf                !Phasic! Yes   !4           !   +------------------------+------+------+------------+   ! SSV                     !Phasic! Yes   !4           !   +------------------------+------+------+------------+       Left Doppler Measurements   +------------------------+------+------+------------+   ! Location                !Signal!Reflux! Reflux (sec)! +------------------------+------+------+------------+   ! Sapheno Femoral Junction! Phasic! No    !            !   +------------------------+------+------+------------+   ! GSV Thigh               !Phasic! No    !            !   +------------------------+------+------+------------+   ! Femoral                 !Phasic! No    !            !   +------------------------+------+------+------------+   ! Common Femoral          !Phasic! No    !            !   +------------------------+------+------+------------+   ! Deep Femoral            !Phasic! No    !            !   +------------------------+------+------+------------+   ! Popliteal               !Phasic! No    !            !   +------------------------+------+------+------------+   ! SSV                     !Phasic! No    !            !   +------------------------+------+------+------------+     ECHO 6/30/2020  Summary   Normal left ventricular size. Concentric left ventricular hypertrophy. Decreased left ventricular systolic function with inferoseptal hypokinesis. Estimated EF 30%. E/e'=28. Indeterminate diastolic function. A-fib. Mild thickening of anterior leaflet of mitral valve that appears to prolapse   mildly. The maximum mitral valve pressure gradient is 12 mmHg and the mean pressure   gradient is 5 mmHg. Moderately severe mitral regurgitation with posteriorly directed jet. The left atrium is dilated. A mechanical aortic valve appears well seated with a maximum gradient of 35   mmHg and a mean gradient of 22 mmHg.    Trivial aortic regurgitation. The ascending aorta is mildly dilated. 4.0cm. Normal right ventricular size and mildly decreased in function. Moderate tricuspid regurgitation directed anteriorly. PASP 46mmHg. The right atrium is dilated. Trivial pulmonic regurgitation. IVC size is normal (<2.1 cm) but collapses < 50% with respiration consistent   with elevated RA pressure (8 mmHg). Assessment:      Edema BLE with recurrent ulceration - multifactorial (hypoalbuminemia, chronic dependency, severe multivalvular regurgitation, CRI, cardiomyopathy, +/- CVI). Plan:      Management in this case should revolve primarily around compression therapy beginning first with constant compression in the form of Unna boots followed by fitting for appropriate knee-high compression stockings. Best option would likely be custom made with zippers. Addition of compressive pump would not be ideal considering history of CHF. This patient's tenuous fluid status and tendency towards mild renal insufficiency precludes aggressive diuresis. This was discussed with Dr. Farhad White. Very limited superficial venous insufficiency does not warrant intervention. Will discuss management with the wound care staff and see the patient back again only as needed.

## 2020-12-16 NOTE — Clinical Note
Minor Man saw Mr. Franca Neville in the office today for evaluation of his legs. His swelling has recurred with blistering wounds. It is my feeling that this problem is multifactorial and should be managed with establishment of good edema control and maintenance of compressive therapy long-term. I can identify no vascular intervention that will preclude these problems if his edema is left uncontrolled. I will speak to you personally regards to this. If you have a questions please feel free to contact me.     Marce Rodriguez

## 2020-12-21 NOTE — PROGRESS NOTES
Compression Application   Below Knee    NAME:  Umm Pascal  YOB: 1940  MEDICAL RECORD NUMBER:  [id number]  DATE:  12/21/2020       Applied moisturizing agent to dry skin as needed. Appied primary and secondary dressing as ordered     Applied  Unna Boot wrap from toes to knee overlapping each time. Applied cast padding and coban from toes to below the knee. Instructed patient/caregiver to keep dressing dry and intact. DO NOT REMOVE DRESSING. Instructed pt/family/caregiver to report excessive draining, loose bandage, wet dressing, severe pain or tingling in toes. Applied Compression dressing below the knee to Bilateral lower leg(s)      Compression wrap(s) were applied per  Guidelines.      Electronically signed by Russell Luna LPN on [id number]/27/5061 at 2:43 PM

## 2020-12-28 NOTE — PROGRESS NOTES
[facility]  Progress Note and Procedure Note      Andry Brown  AGE: 68 y.o. GENDER: male  : 1940  TODAY'S DATE:  2020    Subjective:     Chief Complaint   Patient presents with    Wound Check     right and left lower extremity         HISTORY of PRESENT ILLNESS HPI     Andry Brown is a 68 y.o. male who presents today for wound evaluation. History of Wound: History of congestive heart failure, chronic kidney disease, and venous stasis. His daughter brings him from [place]. No infectious symptoms.  Failed two different types of compression. States compression socks caused him to itch to b/l legs. Saw vascular and no plans for intervention at this time.    Wound Pain:  intermittent  Severity:  1 / 10   Wound Type:  venous  Modifying Factors:  edema and venous stasis  Associated Signs/Symptoms:  edema        PAST MEDICAL HISTORY        Diagnosis Date    Acute combined systolic and diastolic (congestive) hrt fail (HCC)     [facility] [address]     Arrhythmia     Atrial fibrillation (HCC)     Carotid artery stenosis     CKD (chronic kidney disease)     Dilated cardiomyopathy (HCC)     Hyperlipidemia     Hypertension     IGT (impaired glucose tolerance)     Meningioma (HCC)     Valvular disease        PAST SURGICAL HISTORY    Past Surgical History:   Procedure Laterality Date    AORTIC VALVE REPLACEMENT      CARPAL TUNNEL RELEASE Left     COLONOSCOPY  2010    SKIN BIOPSY         FAMILY HISTORY    Family History   Problem Relation Age of Onset    Heart Disease Paternal Grandfather     Hypertension Other     High Cholesterol Neg Hx     High Blood Pressure Neg Hx        SOCIAL HISTORY    Social History     Tobacco Use    Smoking status: Former Smoker     Packs/day: 1.00     Years: 25.00     Pack years: 25.00     Types: Cigarettes     Quit date: 1993     Years since quittin.0    Smokeless tobacco: Current User     Types: Chew Last attempt to quit: 1/1/1982    Tobacco comment: quit 25 yrs ago   Substance Use Topics    Alcohol use: No     Alcohol/week: 0.0 standard drinks    Drug use: No       ALLERGIES    Allergies   Allergen Reactions    Ceclor [Cefaclor] Other (See Comments)     CNS side effects, slurred speech. Lips and tounge swell. MEDICATIONS    Current Outpatient Medications on File Prior to Encounter   Medication Sig Dispense Refill    carvedilol (COREG) 25 MG tablet Take 1 tablet by mouth 2 times daily 180 tablet 1    potassium chloride (KLOR-CON M) 20 MEQ extended release tablet TAKE 2 TABLETS BY MOUTH DAILY 180 tablet 1    torsemide (DEMADEX) 20 MG tablet Take 1 tablet by mouth 2 times daily 180 tablet 1    allopurinol (ZYLOPRIM) 100 MG tablet TAKE 1 TABLET BY MOUTH DAILY 90 tablet 3    amiodarone (CORDARONE) 200 MG tablet Take 1 tablet by mouth daily 90 tablet 3    warfarin (COUMADIN) 4 MG tablet Take 2-4 mg by mouth See Admin Instructions Take 2 mg (4 mg x 0.5 tablet) every Mon; take 4 mg (4 mg x 1 tablet) all other days. Managed by [facility].  atorvastatin (LIPITOR) 40 MG tablet TAKE 1 TABLET DAILY 90 tablet 3    Multiple Vitamins-Minerals (THERAPEUTIC MULTIVITAMIN-MINERALS) tablet Take 1 tablet by mouth daily       No current facility-administered medications on file prior to encounter. REVIEW OF SYSTEMS    Pertinent items are noted in HPI.       Objective:      /65   Pulse 86   Temp 97 °F (36.1 °C) (Tympanic)   Resp 18     PHYSICAL EXAM Vascular: Vascular status Impaired  palpable pedal pulses, right DP1/4 and PT1/4, left DP1/4 and PT1/4.  CFT 3 seconds digits 1 to 5 bilateral.  Hair growthAbsent  both lower extremities and feet.  Skin temperature is warm to warm from pretibial area to distal digits bilateral.  Exam is negative for rubor, pallor, cyanosis or signs of acute vascular compromise bilaterally.  Exam is positive for edema bilateral lower extremity.  Varicosities Present bilateral lower extremity. Neuro: Neurologic status normal bilateral with epicritic Present , proprioceptive Present, vibratory sensationPresent and protopathicPresent.  DTRs Present bilateral Achilles. There were no reproducible neuritic symptoms on exam bilateral feet/ankles. Derm: Ulceration to bilateral legs granular today.  Ecchymosis Absent  bilateral feet/foot.    Musculoskeletal: Pain with debridement of wounds 5/5 muscle strength in/eversion and dorsi/plantarflexion bilateral feet.  No gross instability noted. Assessment:     Patient Active Problem List   Diagnosis    Essential hypertension, benign    Atrial fibrillation with RVR ([facility]    Heart valve replaced by other means    Hyperlipidemia other unspecified.      Aortic regurgitation    Stage 3 chronic kidney disease    Hypertrophy of prostate without urinary obstruction and other lower urinary tract symptoms (LUTS)    Peptic ulcer    Gout    Generalized osteoarthrosis, involving multiple sites    IGT (impaired glucose tolerance)    Lung nodule    Chronic combined systolic and diastolic CHF (congestive heart failure) (HCC)    Meningioma (HCC)    Bilateral carotid artery stenosis    Atherosclerosis of abdominal aorta (HCC)    Labyrinthine vertigo    RACIEL (acute kidney injury) ([address]    S/P AVR    Cardiomyopathy (HCC)    SOB (shortness of breath)    Localized edema    Acute combined systolic and diastolic congestive heart failure ([address]  Venous stasis ulcer of right ankle with fat layer exposed without varicose veins (HCC)    Venous stasis ulcer of left calf with fat layer exposed without varicose veins (HCC)    Hypokalemia         Post  Debridement Measurements:  Wound 12/21/20 Leg Lower; Left #1 (Active)   Wound Image   12/21/20 1349   Wound Etiology Venous 12/28/20 1333   Wound Cleansed Cleansed with saline 12/21/20 1345   Wound Length (cm) 0.5 cm 12/28/20 1333   Wound Width (cm) 1.5 cm 12/28/20 1333   Wound Depth (cm) 0.1 cm 12/28/20 1333   Wound Surface Area (cm^2) 0.75 cm^2 12/28/20 1333   Change in Wound Size % (l*w) 0 12/28/20 1333   Wound Volume (cm^3) 0.08 cm^3 12/28/20 1333   Wound Healing % 0 12/28/20 1333   Post-Procedure Length (cm) 2 cm 12/21/20 1403   Post-Procedure Width (cm) 9 cm 12/21/20 1403   Post-Procedure Depth (cm) 0.1 cm 12/21/20 1403   Post-Procedure Surface Area (cm^2) 18 cm^2 12/21/20 1403   Post-Procedure Volume (cm^3) 1.8 cm^3 12/21/20 1403   Wound Assessment Epithelialization 12/28/20 1333   Drainage Amount Moderate 12/28/20 1333   Drainage Description Serous 12/28/20 1333   Odor None 12/28/20 1333   Patsy-wound Assessment Edematous 12/28/20 1333   Margins Flat/open edges 12/28/20 1333   Number of days: 7       Wound 12/21/20 Leg Right; Anterior #2 (Active)   Wound Image   12/21/20 1349   Wound Etiology Venous 12/28/20 1333   Wound Cleansed Cleansed with saline 12/28/20 1333   Wound Length (cm) 0.2 cm 12/28/20 1333   Wound Width (cm) 0.2 cm 12/28/20 1333   Wound Depth (cm) 0.1 cm 12/28/20 1333   Wound Surface Area (cm^2) 0.04 cm^2 12/28/20 1333   Change in Wound Size % (l*w) 99.81 12/28/20 1333   Wound Volume (cm^3) 0 cm^3 12/28/20 1333   Wound Healing % 100 12/28/20 1333   Wound Assessment Granulation tissue 12/28/20 1333   Drainage Amount Small 12/28/20 1333   Drainage Description Serous 12/28/20 1333   Odor None 12/28/20 1333   Patsy-wound Assessment Edematous 12/28/20 1333   Number of days: 7 Plan:     The nature of the patient's condition was explained in depth. The patient was informed that their compliance to the treatment plan is paramount to successful healing and prevention of further ulceration and/or infection     Discharge Treatment       With each dressing change, rinse wounds with 0.9% Saline. (May use wound wash or soft contact solution. Both can be purchased at a local drug store). If unable to obtain saline, may use a gentle soap and water.       Dressing care: to bilateral legs-antibiotic ointment, Adaptic to all open wound, 4x4, dry dressing, ace wrap from toes to knees, size G tube  to keep in place.     Written Patient Discharge Instructions Given            Electronically signed by Ayesha Zuñiga DPM on 12/28/2020 at 1:54 PM

## 2020-12-28 NOTE — PROGRESS NOTES
Mr. Kvng Cruz is a 68 y.o. y/o male with history of Heart Valve Replacement, aortic valve, about 1997 by Madeleine Gomez at [facility]   He presents today for anticoagulation monitoring and adjustment. Pertinent AFib, PMH: HTN, CHF, Chronic Kidney disease(moderate)    Patient Reported Findings:  Yes     No  [x]   []       Patient verifies current dosing regimen as listed- follows AVS calendar---> pt presents to apt alone, doesn't know dosing---> follows AVS ---> Grand daughter confirms he is following AVS. ---> Follows AVS  []   [x]       S/S bleeding/bruising/swelling/SOB-denies   []   [x]       Blood in urine or stool- denies   []   [x]       Procedures scheduled in the future at this time  []   [x]       Missed Dose - denies   []   [x]       Extra Dose- denies   [x]   []       Change in medications  report none. --> torsemide increased for edema, potassium added ---> increased coreg. Holding potassium  [x]   []       Change in health/diet/appetite- Overall his appetite is poor, he states that he \"hasn't been eating much of anything lately\". He states that he continues to drink Ensure every day. Has not been eating much/missed a few ensures. --> a few salads a week. No boost or ensure---> states not many salads recently but a lot of green beans, not doing ensure  ---> Eating greens 2-3 times per week. No NVD. --> no changes, no NVD ---> 3 servings of greens per week. No NVD. --> eats green beans but no vit k    []   [x]       Change in alcohol use does not drink  []   [x]       Change in activity  []   [x]       Hospital admission- pt was in hosptial 7/29-8/5 for a fib. Was started on diltiazem and amiodarone drip. was held first 2 days of admission then resumed on 8/1 with home dose. INR on d/c 2.7 was started on amiodarone outpatient with 400 mg TID x 7 days, 400 mg BID x 7 days, 400 mg qd x 7 days, then 200 mg qd. ---> None recently.     []   [x]       Emergency department visit  []   [x]       Other complaints    Clinical Outcomes:  Yes     No  []   [x]       Major bleeding event  []   [x]       Thromboembolic event    Duration of warfarin Therapy: indefinite  INR Range:  2.5-3.5     **they have stopped making Brand Coumadin so patient is going to have to switch over to generic warfarin, explained this to him and daughter at apt today. Also switched tablet strength to 2mg warfarin tablets and went over the switch with patient and daughter. INR is 4 today   Decrease weekly dose to 1 mg on Mon and 2 mg all other days of the week   Encouraged to maintain a consistency of vegetables/salads  Recheck INR in 2 weeks, 1/11/21    Referring cardiologist is Dr. Marlee Hernández.    INR (no units)   Date Value   12/28/2020 4   12/02/2020 3.1   10/28/2020 2.4   09/30/2020 2.8   08/05/2020 2.76 (H)   08/04/2020 2.12 (H)   08/03/2020 1.79 (H)   08/02/2020 1.66 (H)     CLINICAL PHARMACY CONSULT: MED RECONCILIATION/REVIEW ADDENDUM    For Pharmacy Admin Tracking Only    PHSO: Yes  Total # of Interventions Recommended: 1  - Decreased Dose #: 1  - Maintenance Safety Lab Monitoring #: 1  Total Interventions Accepted: 1  Time Spent (min): 15    Marilyn Morales, TariqD

## 2020-12-28 NOTE — PLAN OF CARE
Discharge instructions given. Patient verbalized understanding. Return to [facility] in 1 week.   Called/faxed orders to  [facility]

## 2020-12-29 NOTE — TELEPHONE ENCOUNTER
Medication:   Requested Prescriptions      No prescriptions requested or ordered in this encounter          Patient Phone Number: [phone] (home)     Last appt: 6/11/2020   Next appt: Visit date not found    Last OARRS: No flowsheet data found.   PDMP Monitoring:    Last PDMP Abeba Allen as Reviewed [facility]):  Review User Review Instant Review Result          Preferred Pharmacy:   [place] [address] [facility] [address], [address] [place] [id number]  Phone: [phone] Fax: [phone]    [facility] [address], [address] [facility] [phone] - F [phone]  [address] [place] [address]  Phone: [phone] Fax: [phone]

## 2021-01-01 ENCOUNTER — APPOINTMENT (OUTPATIENT)
Dept: CT IMAGING | Age: 81
DRG: 291 | End: 2021-01-01
Payer: MEDICARE

## 2021-01-01 ENCOUNTER — APPOINTMENT (OUTPATIENT)
Dept: ULTRASOUND IMAGING | Age: 81
DRG: 291 | End: 2021-01-01
Payer: MEDICARE

## 2021-01-01 ENCOUNTER — HOSPITAL ENCOUNTER (OUTPATIENT)
Dept: WOUND CARE | Age: 81
Discharge: HOME | DRG: 291 | End: 2021-01-11
Payer: MEDICARE

## 2021-01-01 ENCOUNTER — HOSPITAL ENCOUNTER (INPATIENT)
Age: 81
LOS: 9 days | DRG: 291 | End: 2021-01-20
Attending: STUDENT IN AN ORGANIZED HEALTH CARE EDUCATION/TRAINING PROGRAM | Admitting: FAMILY MEDICINE
Payer: MEDICARE

## 2021-01-01 ENCOUNTER — TELEPHONE (OUTPATIENT)
Dept: FAMILY MEDICINE CLINIC | Age: 81
End: 2021-01-01

## 2021-01-01 ENCOUNTER — TELEPHONE (OUTPATIENT)
Dept: CARDIOLOGY CLINIC | Age: 81
End: 2021-01-01

## 2021-01-01 ENCOUNTER — APPOINTMENT (OUTPATIENT)
Dept: GENERAL RADIOLOGY | Age: 81
DRG: 291 | End: 2021-01-01
Payer: MEDICARE

## 2021-01-01 VITALS
OXYGEN SATURATION: 74 % | SYSTOLIC BLOOD PRESSURE: 50 MMHG | TEMPERATURE: 84.9 F | HEIGHT: 68 IN | BODY MASS INDEX: 27.86 KG/M2 | WEIGHT: 183.8 LBS | DIASTOLIC BLOOD PRESSURE: 40 MMHG | HEART RATE: 46 BPM | RESPIRATION RATE: 12 BRPM

## 2021-01-01 VITALS
HEART RATE: 81 BPM | SYSTOLIC BLOOD PRESSURE: 107 MMHG | BODY MASS INDEX: 25.68 KG/M2 | WEIGHT: 179 LBS | DIASTOLIC BLOOD PRESSURE: 61 MMHG | RESPIRATION RATE: 18 BRPM

## 2021-01-01 DIAGNOSIS — I50.9 ACUTE ON CHRONIC CONGESTIVE HEART FAILURE, UNSPECIFIED HEART FAILURE TYPE (HCC): Primary | ICD-10-CM

## 2021-01-01 DIAGNOSIS — N50.89 SWELLING OF THE TESTICLES: Primary | ICD-10-CM

## 2021-01-01 DIAGNOSIS — N18.9 ACUTE RENAL FAILURE SUPERIMPOSED ON CHRONIC KIDNEY DISEASE, UNSPECIFIED CKD STAGE, UNSPECIFIED ACUTE RENAL FAILURE TYPE (HCC): ICD-10-CM

## 2021-01-01 DIAGNOSIS — N17.9 ACUTE RENAL FAILURE SUPERIMPOSED ON CHRONIC KIDNEY DISEASE, UNSPECIFIED CKD STAGE, UNSPECIFIED ACUTE RENAL FAILURE TYPE (HCC): ICD-10-CM

## 2021-01-01 DIAGNOSIS — L97.312 VENOUS STASIS ULCER OF RIGHT ANKLE WITH FAT LAYER EXPOSED WITHOUT VARICOSE VEINS (HCC): Primary | ICD-10-CM

## 2021-01-01 DIAGNOSIS — I87.2 VENOUS STASIS ULCER OF RIGHT ANKLE WITH FAT LAYER EXPOSED WITHOUT VARICOSE VEINS (HCC): Primary | ICD-10-CM

## 2021-01-01 LAB
ALBUMIN SERPL-MCNC: 2.7 G/DL (ref 3.4–5)
ALBUMIN SERPL-MCNC: 2.9 G/DL (ref 3.4–5)
ALBUMIN SERPL-MCNC: 3 G/DL (ref 3.4–5)
ALBUMIN SERPL-MCNC: 3.1 G/DL (ref 3.4–5)
ALBUMIN SERPL-MCNC: 3.3 G/DL (ref 3.4–5)
ALP BLD-CCNC: 100 U/L (ref 40–129)
ALP BLD-CCNC: 125 U/L (ref 40–129)
ALT SERPL-CCNC: 22 U/L (ref 10–40)
ALT SERPL-CCNC: 36 U/L (ref 10–40)
ANION GAP SERPL CALCULATED.3IONS-SCNC: 10 MMOL/L (ref 3–16)
ANION GAP SERPL CALCULATED.3IONS-SCNC: 7 MMOL/L (ref 3–16)
ANION GAP SERPL CALCULATED.3IONS-SCNC: 8 MMOL/L (ref 3–16)
ANION GAP SERPL CALCULATED.3IONS-SCNC: 9 MMOL/L (ref 3–16)
APTT: 46.8 SEC (ref 24.2–36.2)
AST SERPL-CCNC: 33 U/L (ref 15–37)
AST SERPL-CCNC: 74 U/L (ref 15–37)
BASE EXCESS ARTERIAL: 4 (ref -3–3)
BASOPHILS ABSOLUTE: 0 K/UL (ref 0–0.2)
BASOPHILS RELATIVE PERCENT: 0.1 %
BASOPHILS RELATIVE PERCENT: 0.2 %
BASOPHILS RELATIVE PERCENT: 0.3 %
BASOPHILS RELATIVE PERCENT: 0.4 %
BASOPHILS RELATIVE PERCENT: 0.4 %
BASOPHILS RELATIVE PERCENT: 0.8 %
BILIRUB SERPL-MCNC: 1.3 MG/DL (ref 0–1)
BILIRUB SERPL-MCNC: 1.7 MG/DL (ref 0–1)
BILIRUBIN DIRECT: 0.7 MG/DL (ref 0–0.3)
BILIRUBIN DIRECT: 0.9 MG/DL (ref 0–0.3)
BILIRUBIN URINE: NEGATIVE
BILIRUBIN, INDIRECT: 0.6 MG/DL (ref 0–1)
BILIRUBIN, INDIRECT: 0.8 MG/DL (ref 0–1)
BLOOD, URINE: ABNORMAL
BLOOD, URINE: ABNORMAL
BLOOD, URINE: NEGATIVE
BUN BLDV-MCNC: 50 MG/DL (ref 7–20)
BUN BLDV-MCNC: 50 MG/DL (ref 7–20)
BUN BLDV-MCNC: 53 MG/DL (ref 7–20)
BUN BLDV-MCNC: 58 MG/DL (ref 7–20)
BUN BLDV-MCNC: 60 MG/DL (ref 7–20)
BUN BLDV-MCNC: 60 MG/DL (ref 7–20)
BUN BLDV-MCNC: 66 MG/DL (ref 7–20)
BUN BLDV-MCNC: 71 MG/DL (ref 7–20)
BUN BLDV-MCNC: 71 MG/DL (ref 7–20)
CALCIUM SERPL-MCNC: 8.5 MG/DL (ref 8.3–10.6)
CALCIUM SERPL-MCNC: 8.6 MG/DL (ref 8.3–10.6)
CALCIUM SERPL-MCNC: 8.7 MG/DL (ref 8.3–10.6)
CALCIUM SERPL-MCNC: 8.8 MG/DL (ref 8.3–10.6)
CALCIUM SERPL-MCNC: 8.8 MG/DL (ref 8.3–10.6)
CALCIUM SERPL-MCNC: 8.9 MG/DL (ref 8.3–10.6)
CALCIUM SERPL-MCNC: 9 MG/DL (ref 8.3–10.6)
CHLORIDE BLD-SCNC: 101 MMOL/L (ref 99–110)
CHLORIDE BLD-SCNC: 102 MMOL/L (ref 99–110)
CHLORIDE BLD-SCNC: 102 MMOL/L (ref 99–110)
CHLORIDE BLD-SCNC: 103 MMOL/L (ref 99–110)
CHLORIDE BLD-SCNC: 104 MMOL/L (ref 99–110)
CHLORIDE BLD-SCNC: 104 MMOL/L (ref 99–110)
CHLORIDE BLD-SCNC: 105 MMOL/L (ref 99–110)
CHLORIDE BLD-SCNC: 106 MMOL/L (ref 99–110)
CHLORIDE BLD-SCNC: 106 MMOL/L (ref 99–110)
CHLORIDE URINE RANDOM: 39 MMOL/L
CLARITY: CLEAR
CO2: 29 MMOL/L (ref 21–32)
CO2: 29 MMOL/L (ref 21–32)
CO2: 30 MMOL/L (ref 21–32)
CO2: 31 MMOL/L (ref 21–32)
CO2: 32 MMOL/L (ref 21–32)
CO2: 33 MMOL/L (ref 21–32)
CO2: 34 MMOL/L (ref 21–32)
COLOR: YELLOW
COMMENT UA: ABNORMAL
CREAT SERPL-MCNC: 2.4 MG/DL (ref 0.8–1.3)
CREAT SERPL-MCNC: 2.4 MG/DL (ref 0.8–1.3)
CREAT SERPL-MCNC: 2.5 MG/DL (ref 0.8–1.3)
CREAT SERPL-MCNC: 2.6 MG/DL (ref 0.8–1.3)
CREAT SERPL-MCNC: 2.7 MG/DL (ref 0.8–1.3)
CREAT SERPL-MCNC: 2.8 MG/DL (ref 0.8–1.3)
CREAT SERPL-MCNC: 3.1 MG/DL (ref 0.8–1.3)
CREAT SERPL-MCNC: 3.2 MG/DL (ref 0.8–1.3)
CREAT SERPL-MCNC: 3.3 MG/DL (ref 0.8–1.3)
CREATININE URINE: 95.7 MG/DL (ref 39–259)
CRYSTALS, UA: ABNORMAL /HPF
EKG ATRIAL RATE: 163 BPM
EKG DIAGNOSIS: NORMAL
EKG Q-T INTERVAL: 442 MS
EKG QRS DURATION: 114 MS
EKG QTC CALCULATION (BAZETT): 516 MS
EKG R AXIS: 62 DEGREES
EKG T AXIS: 165 DEGREES
EKG VENTRICULAR RATE: 82 BPM
EOSINOPHILS ABSOLUTE: 0 K/UL (ref 0–0.6)
EOSINOPHILS ABSOLUTE: 0.1 K/UL (ref 0–0.6)
EOSINOPHILS ABSOLUTE: 0.1 K/UL (ref 0–0.6)
EOSINOPHILS RELATIVE PERCENT: 0.3 %
EOSINOPHILS RELATIVE PERCENT: 0.3 %
EOSINOPHILS RELATIVE PERCENT: 0.6 %
EOSINOPHILS RELATIVE PERCENT: 0.7 %
EOSINOPHILS RELATIVE PERCENT: 0.8 %
EOSINOPHILS RELATIVE PERCENT: 1.1 %
EPITHELIAL CELLS, UA: 0 /HPF (ref 0–5)
EPITHELIAL CELLS, UA: 1 /HPF (ref 0–5)
EPITHELIAL CELLS, UA: 2 /HPF (ref 0–5)
GFR AFRICAN AMERICAN: 22
GFR AFRICAN AMERICAN: 23
GFR AFRICAN AMERICAN: 24
GFR AFRICAN AMERICAN: 27
GFR AFRICAN AMERICAN: 28
GFR AFRICAN AMERICAN: 29
GFR AFRICAN AMERICAN: 30
GFR AFRICAN AMERICAN: 32
GFR AFRICAN AMERICAN: 32
GFR NON-AFRICAN AMERICAN: 18
GFR NON-AFRICAN AMERICAN: 19
GFR NON-AFRICAN AMERICAN: 20
GFR NON-AFRICAN AMERICAN: 22
GFR NON-AFRICAN AMERICAN: 23
GFR NON-AFRICAN AMERICAN: 24
GFR NON-AFRICAN AMERICAN: 25
GFR NON-AFRICAN AMERICAN: 26
GFR NON-AFRICAN AMERICAN: 26
GLUCOSE BLD-MCNC: 102 MG/DL (ref 70–99)
GLUCOSE BLD-MCNC: 107 MG/DL (ref 70–99)
GLUCOSE BLD-MCNC: 136 MG/DL (ref 70–99)
GLUCOSE BLD-MCNC: 137 MG/DL (ref 70–99)
GLUCOSE BLD-MCNC: 137 MG/DL (ref 70–99)
GLUCOSE BLD-MCNC: 165 MG/DL (ref 70–99)
GLUCOSE BLD-MCNC: 167 MG/DL (ref 70–99)
GLUCOSE BLD-MCNC: 95 MG/DL (ref 70–99)
GLUCOSE BLD-MCNC: 98 MG/DL (ref 70–99)
GLUCOSE URINE: NEGATIVE MG/DL
HCO3 ARTERIAL: 32.5 MMOL/L (ref 21–29)
HCT VFR BLD CALC: 25.9 % (ref 40.5–52.5)
HCT VFR BLD CALC: 26 % (ref 40.5–52.5)
HCT VFR BLD CALC: 26 % (ref 40.5–52.5)
HCT VFR BLD CALC: 26.3 % (ref 40.5–52.5)
HCT VFR BLD CALC: 27.1 % (ref 40.5–52.5)
HCT VFR BLD CALC: 27.2 % (ref 40.5–52.5)
HCT VFR BLD CALC: 27.3 % (ref 40.5–52.5)
HCT VFR BLD CALC: 27.4 % (ref 40.5–52.5)
HCT VFR BLD CALC: 27.4 % (ref 40.5–52.5)
HCT VFR BLD CALC: 27.9 % (ref 40.5–52.5)
HEMOGLOBIN: 7.9 G/DL (ref 13.5–17.5)
HEMOGLOBIN: 8.1 G/DL (ref 13.5–17.5)
HEMOGLOBIN: 8.2 G/DL (ref 13.5–17.5)
HEMOGLOBIN: 8.2 G/DL (ref 13.5–17.5)
HEMOGLOBIN: 8.3 G/DL (ref 13.5–17.5)
HEMOGLOBIN: 8.4 G/DL (ref 13.5–17.5)
HEMOGLOBIN: 8.4 G/DL (ref 13.5–17.5)
HEMOGLOBIN: 8.5 G/DL (ref 13.5–17.5)
HEMOGLOBIN: 8.6 G/DL (ref 13.5–17.5)
HEMOGLOBIN: 8.8 G/DL (ref 13.5–17.5)
HYALINE CASTS: 2 /LPF (ref 0–8)
HYALINE CASTS: ABNORMAL /LPF (ref 0–2)
HYALINE CASTS: ABNORMAL /LPF (ref 0–2)
INR BLD: 2.49 (ref 0.86–1.14)
INR BLD: 2.54 (ref 0.86–1.14)
INR BLD: 3.23 (ref 0.86–1.14)
INR BLD: 3.3 (ref 0.86–1.14)
INR BLD: 3.88 (ref 0.86–1.14)
INR BLD: 4.12 (ref 0.86–1.14)
INR BLD: 4.26 (ref 0.86–1.14)
INR BLD: 4.44 (ref 0.86–1.14)
INR BLD: 5.05 (ref 0.86–1.14)
IRON SATURATION: 11 % (ref 20–50)
IRON: 30 UG/DL (ref 59–158)
KETONES, URINE: NEGATIVE MG/DL
LEUKOCYTE ESTERASE, URINE: NEGATIVE
LV EF: 38 %
LVEF MODALITY: NORMAL
LYMPHOCYTES ABSOLUTE: 0.3 K/UL (ref 1–5.1)
LYMPHOCYTES ABSOLUTE: 0.4 K/UL (ref 1–5.1)
LYMPHOCYTES ABSOLUTE: 0.4 K/UL (ref 1–5.1)
LYMPHOCYTES ABSOLUTE: 0.7 K/UL (ref 1–5.1)
LYMPHOCYTES ABSOLUTE: 0.8 K/UL (ref 1–5.1)
LYMPHOCYTES ABSOLUTE: 0.9 K/UL (ref 1–5.1)
LYMPHOCYTES RELATIVE PERCENT: 11.7 %
LYMPHOCYTES RELATIVE PERCENT: 12.2 %
LYMPHOCYTES RELATIVE PERCENT: 13.2 %
LYMPHOCYTES RELATIVE PERCENT: 13.3 %
LYMPHOCYTES RELATIVE PERCENT: 13.8 %
LYMPHOCYTES RELATIVE PERCENT: 7 %
LYMPHOCYTES RELATIVE PERCENT: 8.2 %
LYMPHOCYTES RELATIVE PERCENT: 9.4 %
MAGNESIUM: 2.8 MG/DL (ref 1.8–2.4)
MCH RBC QN AUTO: 28.3 PG (ref 26–34)
MCH RBC QN AUTO: 28.4 PG (ref 26–34)
MCH RBC QN AUTO: 28.5 PG (ref 26–34)
MCH RBC QN AUTO: 28.7 PG (ref 26–34)
MCH RBC QN AUTO: 28.7 PG (ref 26–34)
MCH RBC QN AUTO: 28.9 PG (ref 26–34)
MCH RBC QN AUTO: 28.9 PG (ref 26–34)
MCHC RBC AUTO-ENTMCNC: 30.3 G/DL (ref 31–36)
MCHC RBC AUTO-ENTMCNC: 30.5 G/DL (ref 31–36)
MCHC RBC AUTO-ENTMCNC: 30.7 G/DL (ref 31–36)
MCHC RBC AUTO-ENTMCNC: 30.9 G/DL (ref 31–36)
MCHC RBC AUTO-ENTMCNC: 31.2 G/DL (ref 31–36)
MCHC RBC AUTO-ENTMCNC: 31.3 G/DL (ref 31–36)
MCHC RBC AUTO-ENTMCNC: 31.3 G/DL (ref 31–36)
MCHC RBC AUTO-ENTMCNC: 31.6 G/DL (ref 31–36)
MCHC RBC AUTO-ENTMCNC: 31.7 G/DL (ref 31–36)
MCV RBC AUTO: 90.1 FL (ref 80–100)
MCV RBC AUTO: 90.9 FL (ref 80–100)
MCV RBC AUTO: 91.7 FL (ref 80–100)
MCV RBC AUTO: 91.8 FL (ref 80–100)
MCV RBC AUTO: 92.2 FL (ref 80–100)
MCV RBC AUTO: 92.3 FL (ref 80–100)
MCV RBC AUTO: 92.6 FL (ref 80–100)
MCV RBC AUTO: 92.7 FL (ref 80–100)
MCV RBC AUTO: 94 FL (ref 80–100)
MICROSCOPIC EXAMINATION: YES
MONOCYTES ABSOLUTE: 0.4 K/UL (ref 0–1.3)
MONOCYTES ABSOLUTE: 0.4 K/UL (ref 0–1.3)
MONOCYTES ABSOLUTE: 0.5 K/UL (ref 0–1.3)
MONOCYTES ABSOLUTE: 0.6 K/UL (ref 0–1.3)
MONOCYTES ABSOLUTE: 0.7 K/UL (ref 0–1.3)
MONOCYTES ABSOLUTE: 0.8 K/UL (ref 0–1.3)
MONOCYTES RELATIVE PERCENT: 10.9 %
MONOCYTES RELATIVE PERCENT: 11.5 %
MONOCYTES RELATIVE PERCENT: 12.1 %
MONOCYTES RELATIVE PERCENT: 12.2 %
MONOCYTES RELATIVE PERCENT: 12.9 %
MONOCYTES RELATIVE PERCENT: 9.2 %
MONOCYTES RELATIVE PERCENT: 9.4 %
MONOCYTES RELATIVE PERCENT: 9.5 %
NEUTROPHILS ABSOLUTE: 3.3 K/UL (ref 1.7–7.7)
NEUTROPHILS ABSOLUTE: 3.7 K/UL (ref 1.7–7.7)
NEUTROPHILS ABSOLUTE: 4.1 K/UL (ref 1.7–7.7)
NEUTROPHILS ABSOLUTE: 4.2 K/UL (ref 1.7–7.7)
NEUTROPHILS ABSOLUTE: 4.2 K/UL (ref 1.7–7.7)
NEUTROPHILS ABSOLUTE: 4.5 K/UL (ref 1.7–7.7)
NEUTROPHILS ABSOLUTE: 4.5 K/UL (ref 1.7–7.7)
NEUTROPHILS ABSOLUTE: 4.8 K/UL (ref 1.7–7.7)
NEUTROPHILS RELATIVE PERCENT: 72.8 %
NEUTROPHILS RELATIVE PERCENT: 72.9 %
NEUTROPHILS RELATIVE PERCENT: 74.1 %
NEUTROPHILS RELATIVE PERCENT: 74.3 %
NEUTROPHILS RELATIVE PERCENT: 75.7 %
NEUTROPHILS RELATIVE PERCENT: 80.7 %
NEUTROPHILS RELATIVE PERCENT: 81.9 %
NEUTROPHILS RELATIVE PERCENT: 82.9 %
NITRITE, URINE: NEGATIVE
O2 SAT, ARTERIAL: 81 % (ref 93–100)
PCO2 ARTERIAL: 89.9 MM HG (ref 35–45)
PDW BLD-RTO: 20 % (ref 12.4–15.4)
PDW BLD-RTO: 20.5 % (ref 12.4–15.4)
PDW BLD-RTO: 20.5 % (ref 12.4–15.4)
PDW BLD-RTO: 20.9 % (ref 12.4–15.4)
PDW BLD-RTO: 21 % (ref 12.4–15.4)
PDW BLD-RTO: 21.1 % (ref 12.4–15.4)
PDW BLD-RTO: 21.3 % (ref 12.4–15.4)
PDW BLD-RTO: 21.4 % (ref 12.4–15.4)
PDW BLD-RTO: 21.4 % (ref 12.4–15.4)
PERFORMED ON: ABNORMAL
PH ARTERIAL: 7.17 (ref 7.35–7.45)
PH UA: 5.5 (ref 5–8)
PH UA: 6 (ref 5–8)
PH UA: 7 (ref 5–8)
PHOSPHORUS: 3 MG/DL (ref 2.5–4.9)
PHOSPHORUS: 3.3 MG/DL (ref 2.5–4.9)
PHOSPHORUS: 3.6 MG/DL (ref 2.5–4.9)
PHOSPHORUS: 4.3 MG/DL (ref 2.5–4.9)
PHOSPHORUS: 4.8 MG/DL (ref 2.5–4.9)
PHOSPHORUS: 5.1 MG/DL (ref 2.5–4.9)
PHOSPHORUS: 5.4 MG/DL (ref 2.5–4.9)
PLATELET # BLD: 107 K/UL (ref 135–450)
PLATELET # BLD: 119 K/UL (ref 135–450)
PLATELET # BLD: 126 K/UL (ref 135–450)
PLATELET # BLD: 131 K/UL (ref 135–450)
PLATELET # BLD: 135 K/UL (ref 135–450)
PLATELET # BLD: 136 K/UL (ref 135–450)
PLATELET # BLD: 142 K/UL (ref 135–450)
PLATELET # BLD: 147 K/UL (ref 135–450)
PLATELET # BLD: 91 K/UL (ref 135–450)
PLATELET SLIDE REVIEW: ABNORMAL
PMV BLD AUTO: 8.3 FL (ref 5–10.5)
PMV BLD AUTO: 8.5 FL (ref 5–10.5)
PMV BLD AUTO: 8.5 FL (ref 5–10.5)
PMV BLD AUTO: 8.6 FL (ref 5–10.5)
PMV BLD AUTO: 8.9 FL (ref 5–10.5)
PMV BLD AUTO: 9 FL (ref 5–10.5)
PMV BLD AUTO: 9.2 FL (ref 5–10.5)
PO2 ARTERIAL: 59.4 MM HG (ref 75–108)
POC SAMPLE TYPE: ABNORMAL
POTASSIUM SERPL-SCNC: 3.6 MMOL/L (ref 3.5–5.1)
POTASSIUM SERPL-SCNC: 3.7 MMOL/L (ref 3.5–5.1)
POTASSIUM SERPL-SCNC: 3.8 MMOL/L (ref 3.5–5.1)
POTASSIUM SERPL-SCNC: 3.8 MMOL/L (ref 3.5–5.1)
POTASSIUM SERPL-SCNC: 3.9 MMOL/L (ref 3.5–5.1)
POTASSIUM SERPL-SCNC: 4 MMOL/L (ref 3.5–5.1)
POTASSIUM SERPL-SCNC: 4.1 MMOL/L (ref 3.5–5.1)
POTASSIUM SERPL-SCNC: 4.5 MMOL/L (ref 3.5–5.1)
POTASSIUM SERPL-SCNC: 5.1 MMOL/L (ref 3.5–5.1)
POTASSIUM, UR: 34.1 MMOL/L
PRO-BNP: 4978 PG/ML (ref 0–449)
PRO-BNP: 6739 PG/ML (ref 0–449)
PROTEIN UA: 30 MG/DL
PROTHROMBIN TIME: 29.1 SEC (ref 10–13.2)
PROTHROMBIN TIME: 29.8 SEC (ref 10–13.2)
PROTHROMBIN TIME: 37.9 SEC (ref 10–13.2)
PROTHROMBIN TIME: 38.7 SEC (ref 10–13.2)
PROTHROMBIN TIME: 45.6 SEC (ref 10–13.2)
PROTHROMBIN TIME: 48.5 SEC (ref 10–13.2)
PROTHROMBIN TIME: 50.2 SEC (ref 10–13.2)
PROTHROMBIN TIME: 52.3 SEC (ref 10–13.2)
PROTHROMBIN TIME: 59.5 SEC (ref 10–13.2)
RBC # BLD: 2.84 M/UL (ref 4.2–5.9)
RBC # BLD: 2.86 M/UL (ref 4.2–5.9)
RBC # BLD: 2.89 M/UL (ref 4.2–5.9)
RBC # BLD: 2.9 M/UL (ref 4.2–5.9)
RBC # BLD: 2.94 M/UL (ref 4.2–5.9)
RBC # BLD: 2.96 M/UL (ref 4.2–5.9)
RBC # BLD: 2.96 M/UL (ref 4.2–5.9)
RBC # BLD: 2.99 M/UL (ref 4.2–5.9)
RBC # BLD: 3.03 M/UL (ref 4.2–5.9)
RBC UA: 1 /HPF (ref 0–4)
RBC UA: 1 /HPF (ref 0–4)
RBC UA: 2 /HPF (ref 0–4)
SLIDE REVIEW: ABNORMAL
SODIUM BLD-SCNC: 140 MMOL/L (ref 136–145)
SODIUM BLD-SCNC: 141 MMOL/L (ref 136–145)
SODIUM BLD-SCNC: 142 MMOL/L (ref 136–145)
SODIUM BLD-SCNC: 142 MMOL/L (ref 136–145)
SODIUM BLD-SCNC: 143 MMOL/L (ref 136–145)
SODIUM BLD-SCNC: 144 MMOL/L (ref 136–145)
SODIUM BLD-SCNC: 146 MMOL/L (ref 136–145)
SODIUM BLD-SCNC: 146 MMOL/L (ref 136–145)
SODIUM BLD-SCNC: 148 MMOL/L (ref 136–145)
SODIUM BLD-SCNC: 149 MMOL/L (ref 136–145)
SODIUM URINE: 45 MMOL/L
SPECIFIC GRAVITY UA: 1.01 (ref 1–1.03)
T4 FREE: 1.5 NG/DL (ref 0.9–1.8)
TCO2 ARTERIAL: 35 MMOL/L
TOTAL IRON BINDING CAPACITY: 268 UG/DL (ref 260–445)
TOTAL PROTEIN: 6.7 G/DL (ref 6.4–8.2)
TOTAL PROTEIN: 7.2 G/DL (ref 6.4–8.2)
TROPONIN: 0.02 NG/ML
TSH REFLEX: 17.04 UIU/ML (ref 0.27–4.2)
UREA NITROGEN, UR: 437 MG/DL (ref 800–1666)
URINE REFLEX TO CULTURE: ABNORMAL
URINE TYPE: ABNORMAL
UROBILINOGEN, URINE: 1 E.U./DL
WBC # BLD: 4 K/UL (ref 4–11)
WBC # BLD: 4.6 K/UL (ref 4–11)
WBC # BLD: 5 K/UL (ref 4–11)
WBC # BLD: 5.1 K/UL (ref 4–11)
WBC # BLD: 5.5 K/UL (ref 4–11)
WBC # BLD: 5.7 K/UL (ref 4–11)
WBC # BLD: 6.2 K/UL (ref 4–11)
WBC # BLD: 6.2 K/UL (ref 4–11)
WBC # BLD: 6.4 K/UL (ref 4–11)
WBC UA: 1 /HPF (ref 0–5)
WBC UA: 1 /HPF (ref 0–5)
WBC UA: 3 /HPF (ref 0–5)

## 2021-01-01 PROCEDURE — 6370000000 HC RX 637 (ALT 250 FOR IP): Performed by: PHYSICIAN ASSISTANT

## 2021-01-01 PROCEDURE — 6370000000 HC RX 637 (ALT 250 FOR IP): Performed by: INTERNAL MEDICINE

## 2021-01-01 PROCEDURE — 6370000000 HC RX 637 (ALT 250 FOR IP): Performed by: FAMILY MEDICINE

## 2021-01-01 PROCEDURE — 99233 SBSQ HOSP IP/OBS HIGH 50: CPT | Performed by: NURSE PRACTITIONER

## 2021-01-01 PROCEDURE — 36415 COLL VENOUS BLD VENIPUNCTURE: CPT

## 2021-01-01 PROCEDURE — 94761 N-INVAS EAR/PLS OXIMETRY MLT: CPT

## 2021-01-01 PROCEDURE — 97162 PT EVAL MOD COMPLEX 30 MIN: CPT

## 2021-01-01 PROCEDURE — 80048 BASIC METABOLIC PNL TOTAL CA: CPT

## 2021-01-01 PROCEDURE — 84133 ASSAY OF URINE POTASSIUM: CPT

## 2021-01-01 PROCEDURE — 82436 ASSAY OF URINE CHLORIDE: CPT

## 2021-01-01 PROCEDURE — 80069 RENAL FUNCTION PANEL: CPT

## 2021-01-01 PROCEDURE — 85025 COMPLETE CBC W/AUTO DIFF WBC: CPT

## 2021-01-01 PROCEDURE — 99233 SBSQ HOSP IP/OBS HIGH 50: CPT | Performed by: PSYCHIATRY & NEUROLOGY

## 2021-01-01 PROCEDURE — 6360000002 HC RX W HCPCS: Performed by: HOSPITALIST

## 2021-01-01 PROCEDURE — 95816 EEG AWAKE AND DROWSY: CPT

## 2021-01-01 PROCEDURE — 94640 AIRWAY INHALATION TREATMENT: CPT

## 2021-01-01 PROCEDURE — 99232 SBSQ HOSP IP/OBS MODERATE 35: CPT | Performed by: CLINICAL NURSE SPECIALIST

## 2021-01-01 PROCEDURE — 2580000003 HC RX 258: Performed by: FAMILY MEDICINE

## 2021-01-01 PROCEDURE — 2700000000 HC OXYGEN THERAPY PER DAY

## 2021-01-01 PROCEDURE — 85610 PROTHROMBIN TIME: CPT

## 2021-01-01 PROCEDURE — 93306 TTE W/DOPPLER COMPLETE: CPT

## 2021-01-01 PROCEDURE — 2060000000 HC ICU INTERMEDIATE R&B

## 2021-01-01 PROCEDURE — 71045 X-RAY EXAM CHEST 1 VIEW: CPT

## 2021-01-01 PROCEDURE — 51701 INSERT BLADDER CATHETER: CPT

## 2021-01-01 PROCEDURE — 2580000003 HC RX 258: Performed by: INTERNAL MEDICINE

## 2021-01-01 PROCEDURE — 99283 EMERGENCY DEPT VISIT LOW MDM: CPT

## 2021-01-01 PROCEDURE — 83880 ASSAY OF NATRIURETIC PEPTIDE: CPT

## 2021-01-01 PROCEDURE — 6360000002 HC RX W HCPCS: Performed by: FAMILY MEDICINE

## 2021-01-01 PROCEDURE — 6370000000 HC RX 637 (ALT 250 FOR IP): Performed by: HOSPITALIST

## 2021-01-01 PROCEDURE — 83540 ASSAY OF IRON: CPT

## 2021-01-01 PROCEDURE — 97166 OT EVAL MOD COMPLEX 45 MIN: CPT

## 2021-01-01 PROCEDURE — 76770 US EXAM ABDO BACK WALL COMP: CPT

## 2021-01-01 PROCEDURE — 6360000002 HC RX W HCPCS: Performed by: PHYSICIAN ASSISTANT

## 2021-01-01 PROCEDURE — 97535 SELF CARE MNGMENT TRAINING: CPT

## 2021-01-01 PROCEDURE — 81001 URINALYSIS AUTO W/SCOPE: CPT

## 2021-01-01 PROCEDURE — 84540 ASSAY OF URINE/UREA-N: CPT

## 2021-01-01 PROCEDURE — 51798 US URINE CAPACITY MEASURE: CPT

## 2021-01-01 PROCEDURE — 70450 CT HEAD/BRAIN W/O DYE: CPT

## 2021-01-01 PROCEDURE — 95816 EEG AWAKE AND DROWSY: CPT | Performed by: PSYCHIATRY & NEUROLOGY

## 2021-01-01 PROCEDURE — 85027 COMPLETE CBC AUTOMATED: CPT

## 2021-01-01 PROCEDURE — 96374 THER/PROPH/DIAG INJ IV PUSH: CPT

## 2021-01-01 PROCEDURE — 83550 IRON BINDING TEST: CPT

## 2021-01-01 PROCEDURE — 84300 ASSAY OF URINE SODIUM: CPT

## 2021-01-01 PROCEDURE — 84484 ASSAY OF TROPONIN QUANT: CPT

## 2021-01-01 PROCEDURE — 99223 1ST HOSP IP/OBS HIGH 75: CPT | Performed by: INTERNAL MEDICINE

## 2021-01-01 PROCEDURE — 84439 ASSAY OF FREE THYROXINE: CPT

## 2021-01-01 PROCEDURE — 93005 ELECTROCARDIOGRAM TRACING: CPT | Performed by: PHYSICIAN ASSISTANT

## 2021-01-01 PROCEDURE — 99222 1ST HOSP IP/OBS MODERATE 55: CPT | Performed by: INTERNAL MEDICINE

## 2021-01-01 PROCEDURE — 74176 CT ABD & PELVIS W/O CONTRAST: CPT

## 2021-01-01 PROCEDURE — 80076 HEPATIC FUNCTION PANEL: CPT

## 2021-01-01 PROCEDURE — 97116 GAIT TRAINING THERAPY: CPT

## 2021-01-01 PROCEDURE — 82803 BLOOD GASES ANY COMBINATION: CPT

## 2021-01-01 PROCEDURE — 85014 HEMATOCRIT: CPT

## 2021-01-01 PROCEDURE — 83735 ASSAY OF MAGNESIUM: CPT

## 2021-01-01 PROCEDURE — 99232 SBSQ HOSP IP/OBS MODERATE 35: CPT | Performed by: NURSE PRACTITIONER

## 2021-01-01 PROCEDURE — 84443 ASSAY THYROID STIM HORMONE: CPT

## 2021-01-01 PROCEDURE — 84295 ASSAY OF SERUM SODIUM: CPT

## 2021-01-01 PROCEDURE — 99223 1ST HOSP IP/OBS HIGH 75: CPT | Performed by: PSYCHIATRY & NEUROLOGY

## 2021-01-01 PROCEDURE — 82570 ASSAY OF URINE CREATININE: CPT

## 2021-01-01 PROCEDURE — 85730 THROMBOPLASTIN TIME PARTIAL: CPT

## 2021-01-01 PROCEDURE — 85018 HEMOGLOBIN: CPT

## 2021-01-01 PROCEDURE — 2500000003 HC RX 250 WO HCPCS: Performed by: INTERNAL MEDICINE

## 2021-01-01 PROCEDURE — 97530 THERAPEUTIC ACTIVITIES: CPT

## 2021-01-01 PROCEDURE — 99213 OFFICE O/P EST LOW 20 MIN: CPT

## 2021-01-01 PROCEDURE — 93010 ELECTROCARDIOGRAM REPORT: CPT | Performed by: INTERNAL MEDICINE

## 2021-01-01 PROCEDURE — 6360000002 HC RX W HCPCS: Performed by: INTERNAL MEDICINE

## 2021-01-01 RX ORDER — GENTAMICIN SULFATE 1 MG/G
OINTMENT TOPICAL ONCE
Status: CANCELLED | OUTPATIENT
Start: 2021-01-01 | End: 2021-01-01

## 2021-01-01 RX ORDER — LORAZEPAM 2 MG/ML
1 INJECTION INTRAMUSCULAR EVERY 6 HOURS PRN
Status: DISCONTINUED | OUTPATIENT
Start: 2021-01-01 | End: 2021-01-01 | Stop reason: HOSPADM

## 2021-01-01 RX ORDER — 0.9 % SODIUM CHLORIDE 0.9 %
500 INTRAVENOUS SOLUTION INTRAVENOUS ONCE
Status: COMPLETED | OUTPATIENT
Start: 2021-01-01 | End: 2021-01-01

## 2021-01-01 RX ORDER — ONDANSETRON 2 MG/ML
4 INJECTION INTRAMUSCULAR; INTRAVENOUS EVERY 6 HOURS PRN
Status: DISCONTINUED | OUTPATIENT
Start: 2021-01-01 | End: 2021-01-01 | Stop reason: HOSPADM

## 2021-01-01 RX ORDER — LIDOCAINE 40 MG/G
CREAM TOPICAL ONCE
Status: DISCONTINUED | OUTPATIENT
Start: 2021-01-01 | End: 2021-01-01 | Stop reason: HOSPADM

## 2021-01-01 RX ORDER — MORPHINE SULFATE 2 MG/ML
2 INJECTION, SOLUTION INTRAMUSCULAR; INTRAVENOUS
Status: DISCONTINUED | OUTPATIENT
Start: 2021-01-01 | End: 2021-01-01 | Stop reason: HOSPADM

## 2021-01-01 RX ORDER — TORSEMIDE 20 MG/1
40 TABLET ORAL EVERY MORNING
Qty: 60 TABLET | Refills: 0 | Status: SHIPPED | OUTPATIENT
Start: 2021-01-01

## 2021-01-01 RX ORDER — WARFARIN SODIUM 1 MG/1
1 TABLET ORAL
Status: COMPLETED | OUTPATIENT
Start: 2021-01-01 | End: 2021-01-01

## 2021-01-01 RX ORDER — CLOBETASOL PROPIONATE 0.5 MG/G
OINTMENT TOPICAL ONCE
Status: CANCELLED | OUTPATIENT
Start: 2021-01-01 | End: 2021-01-01

## 2021-01-01 RX ORDER — WARFARIN SODIUM 2 MG/1
2 TABLET ORAL
Status: DISCONTINUED | OUTPATIENT
Start: 2021-01-01 | End: 2021-01-01

## 2021-01-01 RX ORDER — OLANZAPINE 10 MG/1
10 INJECTION, POWDER, LYOPHILIZED, FOR SOLUTION INTRAMUSCULAR
Status: COMPLETED | OUTPATIENT
Start: 2021-01-01 | End: 2021-01-01

## 2021-01-01 RX ORDER — DEXTROSE MONOHYDRATE 50 MG/ML
INJECTION, SOLUTION INTRAVENOUS CONTINUOUS
Status: DISCONTINUED | OUTPATIENT
Start: 2021-01-01 | End: 2021-01-01

## 2021-01-01 RX ORDER — DIPHENHYDRAMINE HCL 25 MG
25 TABLET ORAL EVERY 6 HOURS PRN
Status: DISCONTINUED | OUTPATIENT
Start: 2021-01-01 | End: 2021-01-01 | Stop reason: HOSPADM

## 2021-01-01 RX ORDER — LIDOCAINE HYDROCHLORIDE 40 MG/ML
SOLUTION TOPICAL ONCE
Status: CANCELLED | OUTPATIENT
Start: 2021-01-01 | End: 2021-01-01

## 2021-01-01 RX ORDER — BETAMETHASONE DIPROPIONATE 0.05 %
OINTMENT (GRAM) TOPICAL ONCE
Status: CANCELLED | OUTPATIENT
Start: 2021-01-01 | End: 2021-01-01

## 2021-01-01 RX ORDER — TORSEMIDE 20 MG/1
20 TABLET ORAL
Status: DISCONTINUED | OUTPATIENT
Start: 2021-01-01 | End: 2021-01-01

## 2021-01-01 RX ORDER — GINSENG 100 MG
CAPSULE ORAL ONCE
Status: CANCELLED | OUTPATIENT
Start: 2021-01-01 | End: 2021-01-01

## 2021-01-01 RX ORDER — LIDOCAINE 50 MG/G
OINTMENT TOPICAL ONCE
Status: CANCELLED | OUTPATIENT
Start: 2021-01-01 | End: 2021-01-01

## 2021-01-01 RX ORDER — CARVEDILOL 3.12 MG/1
3.12 TABLET ORAL 2 TIMES DAILY
Status: DISCONTINUED | OUTPATIENT
Start: 2021-01-01 | End: 2021-01-01 | Stop reason: HOSPADM

## 2021-01-01 RX ORDER — AMIODARONE HYDROCHLORIDE 200 MG/1
200 TABLET ORAL DAILY
Status: DISCONTINUED | OUTPATIENT
Start: 2021-01-01 | End: 2021-01-01

## 2021-01-01 RX ORDER — QUETIAPINE FUMARATE 25 MG/1
12.5 TABLET, FILM COATED ORAL NIGHTLY
Status: DISCONTINUED | OUTPATIENT
Start: 2021-01-01 | End: 2021-01-01

## 2021-01-01 RX ORDER — ACETAMINOPHEN 160 MG
TABLET,DISINTEGRATING ORAL PRN
Status: DISCONTINUED | OUTPATIENT
Start: 2021-01-01 | End: 2021-01-01 | Stop reason: HOSPADM

## 2021-01-01 RX ORDER — WARFARIN SODIUM 1 MG/1
1 TABLET ORAL DAILY
Status: DISCONTINUED | OUTPATIENT
Start: 2021-01-01 | End: 2021-01-01 | Stop reason: CLARIF

## 2021-01-01 RX ORDER — DEXTROSE AND SODIUM CHLORIDE 5; .2 G/100ML; G/100ML
INJECTION, SOLUTION INTRAVENOUS CONTINUOUS
Status: DISCONTINUED | OUTPATIENT
Start: 2021-01-01 | End: 2021-01-01 | Stop reason: HOSPADM

## 2021-01-01 RX ORDER — IPRATROPIUM BROMIDE AND ALBUTEROL SULFATE 2.5; .5 MG/3ML; MG/3ML
1 SOLUTION RESPIRATORY (INHALATION)
Status: DISCONTINUED | OUTPATIENT
Start: 2021-01-01 | End: 2021-01-01 | Stop reason: HOSPADM

## 2021-01-01 RX ORDER — SODIUM CHLORIDE 0.9 % (FLUSH) 0.9 %
10 SYRINGE (ML) INJECTION EVERY 12 HOURS SCHEDULED
Status: DISCONTINUED | OUTPATIENT
Start: 2021-01-01 | End: 2021-01-01 | Stop reason: HOSPADM

## 2021-01-01 RX ORDER — LIDOCAINE HYDROCHLORIDE 20 MG/ML
JELLY TOPICAL ONCE
Status: CANCELLED | OUTPATIENT
Start: 2021-01-01 | End: 2021-01-01

## 2021-01-01 RX ORDER — FUROSEMIDE 10 MG/ML
40 INJECTION INTRAMUSCULAR; INTRAVENOUS 2 TIMES DAILY
Status: DISCONTINUED | OUTPATIENT
Start: 2021-01-01 | End: 2021-01-01

## 2021-01-01 RX ORDER — LIDOCAINE 40 MG/G
CREAM TOPICAL ONCE
Status: CANCELLED | OUTPATIENT
Start: 2021-01-01 | End: 2021-01-01

## 2021-01-01 RX ORDER — TORSEMIDE 20 MG/1
40 TABLET ORAL DAILY
Status: DISCONTINUED | OUTPATIENT
Start: 2021-01-01 | End: 2021-01-01

## 2021-01-01 RX ORDER — QUETIAPINE FUMARATE 25 MG/1
12.5 TABLET, FILM COATED ORAL NIGHTLY
Status: DISCONTINUED | OUTPATIENT
Start: 2021-01-01 | End: 2021-01-01 | Stop reason: HOSPADM

## 2021-01-01 RX ORDER — ACETAMINOPHEN 650 MG/1
650 SUPPOSITORY RECTAL EVERY 6 HOURS PRN
Status: DISCONTINUED | OUTPATIENT
Start: 2021-01-01 | End: 2021-01-01 | Stop reason: HOSPADM

## 2021-01-01 RX ORDER — THIAMINE HYDROCHLORIDE 100 MG/ML
100 INJECTION, SOLUTION INTRAMUSCULAR; INTRAVENOUS ONCE
Status: COMPLETED | OUTPATIENT
Start: 2021-01-01 | End: 2021-01-01

## 2021-01-01 RX ORDER — PROMETHAZINE HYDROCHLORIDE 25 MG/1
12.5 TABLET ORAL EVERY 6 HOURS PRN
Status: DISCONTINUED | OUTPATIENT
Start: 2021-01-01 | End: 2021-01-01 | Stop reason: HOSPADM

## 2021-01-01 RX ORDER — POLYETHYLENE GLYCOL 3350 17 G/17G
17 POWDER, FOR SOLUTION ORAL DAILY PRN
Status: DISCONTINUED | OUTPATIENT
Start: 2021-01-01 | End: 2021-01-01 | Stop reason: HOSPADM

## 2021-01-01 RX ORDER — BACITRACIN, NEOMYCIN, POLYMYXIN B 400; 3.5; 5 [USP'U]/G; MG/G; [USP'U]/G
OINTMENT TOPICAL ONCE
Status: CANCELLED | OUTPATIENT
Start: 2021-01-01 | End: 2021-01-01

## 2021-01-01 RX ORDER — CARVEDILOL 25 MG/1
25 TABLET ORAL 2 TIMES DAILY
Status: DISCONTINUED | OUTPATIENT
Start: 2021-01-01 | End: 2021-01-01

## 2021-01-01 RX ORDER — NICOTINE 21 MG/24HR
1 PATCH, TRANSDERMAL 24 HOURS TRANSDERMAL DAILY
Status: DISCONTINUED | OUTPATIENT
Start: 2021-01-01 | End: 2021-01-01 | Stop reason: HOSPADM

## 2021-01-01 RX ORDER — WARFARIN SODIUM 1 MG/1
1 TABLET ORAL
Status: DISCONTINUED | OUTPATIENT
Start: 2021-01-01 | End: 2021-01-01

## 2021-01-01 RX ORDER — MORPHINE SULFATE 4 MG/ML
4 INJECTION, SOLUTION INTRAMUSCULAR; INTRAVENOUS
Status: DISCONTINUED | OUTPATIENT
Start: 2021-01-01 | End: 2021-01-01 | Stop reason: HOSPADM

## 2021-01-01 RX ORDER — TORSEMIDE 20 MG/1
20 TABLET ORAL EVERY EVENING
Qty: 30 TABLET | Refills: 0 | Status: SHIPPED | OUTPATIENT
Start: 2021-01-01

## 2021-01-01 RX ORDER — FUROSEMIDE 10 MG/ML
40 INJECTION INTRAMUSCULAR; INTRAVENOUS DAILY
Status: DISCONTINUED | OUTPATIENT
Start: 2021-01-01 | End: 2021-01-01

## 2021-01-01 RX ORDER — ATORVASTATIN CALCIUM 40 MG/1
40 TABLET, FILM COATED ORAL NIGHTLY
Status: DISCONTINUED | OUTPATIENT
Start: 2021-01-01 | End: 2021-01-01

## 2021-01-01 RX ORDER — FUROSEMIDE 10 MG/ML
40 INJECTION INTRAMUSCULAR; INTRAVENOUS ONCE
Status: COMPLETED | OUTPATIENT
Start: 2021-01-01 | End: 2021-01-01

## 2021-01-01 RX ORDER — LORAZEPAM 2 MG/ML
1 INJECTION INTRAMUSCULAR ONCE
Status: DISCONTINUED | OUTPATIENT
Start: 2021-01-01 | End: 2021-01-01 | Stop reason: HOSPADM

## 2021-01-01 RX ORDER — ACETAMINOPHEN 325 MG/1
650 TABLET ORAL EVERY 6 HOURS PRN
Status: DISCONTINUED | OUTPATIENT
Start: 2021-01-01 | End: 2021-01-01 | Stop reason: HOSPADM

## 2021-01-01 RX ORDER — BACITRACIN ZINC AND POLYMYXIN B SULFATE 500; 1000 [USP'U]/G; [USP'U]/G
OINTMENT TOPICAL ONCE
Status: CANCELLED | OUTPATIENT
Start: 2021-01-01 | End: 2021-01-01

## 2021-01-01 RX ORDER — SODIUM CHLORIDE 0.9 % (FLUSH) 0.9 %
10 SYRINGE (ML) INJECTION PRN
Status: DISCONTINUED | OUTPATIENT
Start: 2021-01-01 | End: 2021-01-01 | Stop reason: HOSPADM

## 2021-01-01 RX ORDER — SODIUM CHLORIDE 9 MG/ML
INJECTION, SOLUTION INTRAVENOUS ONCE
Status: COMPLETED | OUTPATIENT
Start: 2021-01-01 | End: 2021-01-01

## 2021-01-01 RX ADMIN — CARVEDILOL 25 MG: 25 TABLET, FILM COATED ORAL at 11:35

## 2021-01-01 RX ADMIN — IPRATROPIUM BROMIDE AND ALBUTEROL SULFATE 1 AMPULE: .5; 3 SOLUTION RESPIRATORY (INHALATION) at 16:57

## 2021-01-01 RX ADMIN — CARVEDILOL 25 MG: 25 TABLET, FILM COATED ORAL at 08:09

## 2021-01-01 RX ADMIN — ATORVASTATIN CALCIUM 40 MG: 40 TABLET, FILM COATED ORAL at 21:11

## 2021-01-01 RX ADMIN — Medication 10 ML: at 21:30

## 2021-01-01 RX ADMIN — WARFARIN SODIUM 2 MG: 2 TABLET ORAL at 17:10

## 2021-01-01 RX ADMIN — LORAZEPAM 1 MG: 2 INJECTION INTRAMUSCULAR; INTRAVENOUS at 03:09

## 2021-01-01 RX ADMIN — Medication 10 ML: at 20:10

## 2021-01-01 RX ADMIN — IPRATROPIUM BROMIDE AND ALBUTEROL SULFATE 1 AMPULE: .5; 3 SOLUTION RESPIRATORY (INHALATION) at 07:56

## 2021-01-01 RX ADMIN — Medication 10 ML: at 10:06

## 2021-01-01 RX ADMIN — Medication 10 ML: at 21:34

## 2021-01-01 RX ADMIN — FUROSEMIDE 40 MG: 10 INJECTION, SOLUTION INTRAMUSCULAR; INTRAVENOUS at 18:45

## 2021-01-01 RX ADMIN — IPRATROPIUM BROMIDE AND ALBUTEROL SULFATE 1 AMPULE: .5; 3 SOLUTION RESPIRATORY (INHALATION) at 16:22

## 2021-01-01 RX ADMIN — AMIODARONE HYDROCHLORIDE 200 MG: 200 TABLET ORAL at 09:39

## 2021-01-01 RX ADMIN — Medication 10 ML: at 08:09

## 2021-01-01 RX ADMIN — Medication 1 EACH: at 22:33

## 2021-01-01 RX ADMIN — WARFARIN 1 MG: 1 TABLET ORAL at 19:13

## 2021-01-01 RX ADMIN — DEXTROSE MONOHYDRATE: 50 INJECTION, SOLUTION INTRAVENOUS at 06:07

## 2021-01-01 RX ADMIN — DIPHENHYDRAMINE HYDROCHLORIDE 25 MG: 25 TABLET ORAL at 23:25

## 2021-01-01 RX ADMIN — CARVEDILOL 25 MG: 25 TABLET, FILM COATED ORAL at 23:25

## 2021-01-01 RX ADMIN — CARVEDILOL 25 MG: 25 TABLET, FILM COATED ORAL at 08:24

## 2021-01-01 RX ADMIN — AMIODARONE HYDROCHLORIDE 200 MG: 200 TABLET ORAL at 21:11

## 2021-01-01 RX ADMIN — AMIODARONE HYDROCHLORIDE 200 MG: 200 TABLET ORAL at 08:24

## 2021-01-01 RX ADMIN — Medication 1 EACH: at 00:25

## 2021-01-01 RX ADMIN — IPRATROPIUM BROMIDE AND ALBUTEROL SULFATE 1 AMPULE: .5; 3 SOLUTION RESPIRATORY (INHALATION) at 21:07

## 2021-01-01 RX ADMIN — Medication 10 ML: at 09:39

## 2021-01-01 RX ADMIN — IPRATROPIUM BROMIDE AND ALBUTEROL SULFATE 1 AMPULE: .5; 3 SOLUTION RESPIRATORY (INHALATION) at 16:08

## 2021-01-01 RX ADMIN — IPRATROPIUM BROMIDE AND ALBUTEROL SULFATE 1 AMPULE: .5; 3 SOLUTION RESPIRATORY (INHALATION) at 09:02

## 2021-01-01 RX ADMIN — IPRATROPIUM BROMIDE AND ALBUTEROL SULFATE 1 AMPULE: .5; 3 SOLUTION RESPIRATORY (INHALATION) at 12:16

## 2021-01-01 RX ADMIN — IPRATROPIUM BROMIDE AND ALBUTEROL SULFATE 1 AMPULE: .5; 3 SOLUTION RESPIRATORY (INHALATION) at 08:44

## 2021-01-01 RX ADMIN — CARVEDILOL 25 MG: 25 TABLET, FILM COATED ORAL at 22:06

## 2021-01-01 RX ADMIN — AMIODARONE HYDROCHLORIDE 200 MG: 200 TABLET ORAL at 08:09

## 2021-01-01 RX ADMIN — CARVEDILOL 25 MG: 25 TABLET, FILM COATED ORAL at 21:11

## 2021-01-01 RX ADMIN — Medication 10 ML: at 00:24

## 2021-01-01 RX ADMIN — IPRATROPIUM BROMIDE AND ALBUTEROL SULFATE 1 AMPULE: .5; 3 SOLUTION RESPIRATORY (INHALATION) at 16:28

## 2021-01-01 RX ADMIN — Medication 1 SPRAY: at 21:32

## 2021-01-01 RX ADMIN — DEXTROSE MONOHYDRATE: 50 INJECTION, SOLUTION INTRAVENOUS at 11:53

## 2021-01-01 RX ADMIN — AMIODARONE HYDROCHLORIDE 200 MG: 200 TABLET ORAL at 11:34

## 2021-01-01 RX ADMIN — ATORVASTATIN CALCIUM 40 MG: 40 TABLET, FILM COATED ORAL at 21:34

## 2021-01-01 RX ADMIN — Medication 10 ML: at 22:04

## 2021-01-01 RX ADMIN — FUROSEMIDE 40 MG: 10 INJECTION, SOLUTION INTRAMUSCULAR; INTRAVENOUS at 17:14

## 2021-01-01 RX ADMIN — FUROSEMIDE 40 MG: 10 INJECTION, SOLUTION INTRAMUSCULAR; INTRAVENOUS at 09:39

## 2021-01-01 RX ADMIN — CARVEDILOL 25 MG: 25 TABLET, FILM COATED ORAL at 21:34

## 2021-01-01 RX ADMIN — IPRATROPIUM BROMIDE AND ALBUTEROL SULFATE 1 AMPULE: .5; 3 SOLUTION RESPIRATORY (INHALATION) at 09:11

## 2021-01-01 RX ADMIN — DEXTROSE MONOHYDRATE: 50 INJECTION, SOLUTION INTRAVENOUS at 09:38

## 2021-01-01 RX ADMIN — QUETIAPINE FUMARATE 12.5 MG: 25 TABLET ORAL at 14:30

## 2021-01-01 RX ADMIN — OLANZAPINE 10 MG: 10 INJECTION, POWDER, FOR SOLUTION INTRAMUSCULAR at 18:52

## 2021-01-01 RX ADMIN — IPRATROPIUM BROMIDE AND ALBUTEROL SULFATE 1 AMPULE: .5; 3 SOLUTION RESPIRATORY (INHALATION) at 12:50

## 2021-01-01 RX ADMIN — IPRATROPIUM BROMIDE AND ALBUTEROL SULFATE 1 AMPULE: .5; 3 SOLUTION RESPIRATORY (INHALATION) at 12:27

## 2021-01-01 RX ADMIN — DIPHENHYDRAMINE HYDROCHLORIDE 25 MG: 25 TABLET ORAL at 22:06

## 2021-01-01 RX ADMIN — CARVEDILOL 25 MG: 25 TABLET, FILM COATED ORAL at 10:16

## 2021-01-01 RX ADMIN — QUETIAPINE FUMARATE 12.5 MG: 25 TABLET ORAL at 21:42

## 2021-01-01 RX ADMIN — IPRATROPIUM BROMIDE AND ALBUTEROL SULFATE 1 AMPULE: .5; 3 SOLUTION RESPIRATORY (INHALATION) at 16:35

## 2021-01-01 RX ADMIN — WARFARIN SODIUM 2 MG: 2 TABLET ORAL at 17:20

## 2021-01-01 RX ADMIN — IPRATROPIUM BROMIDE AND ALBUTEROL SULFATE 1 AMPULE: .5; 3 SOLUTION RESPIRATORY (INHALATION) at 20:24

## 2021-01-01 RX ADMIN — AMIODARONE HYDROCHLORIDE 200 MG: 200 TABLET ORAL at 10:16

## 2021-01-01 RX ADMIN — LORAZEPAM 1 MG: 2 INJECTION INTRAMUSCULAR; INTRAVENOUS at 20:08

## 2021-01-01 RX ADMIN — QUETIAPINE FUMARATE 12.5 MG: 25 TABLET ORAL at 23:25

## 2021-01-01 RX ADMIN — Medication 10 ML: at 18:45

## 2021-01-01 RX ADMIN — DIPHENHYDRAMINE HYDROCHLORIDE 25 MG: 25 TABLET ORAL at 02:13

## 2021-01-01 RX ADMIN — SODIUM CHLORIDE 500 ML: 9 INJECTION, SOLUTION INTRAVENOUS at 15:40

## 2021-01-01 RX ADMIN — FUROSEMIDE 40 MG: 10 INJECTION, SOLUTION INTRAMUSCULAR; INTRAVENOUS at 08:24

## 2021-01-01 RX ADMIN — ATORVASTATIN CALCIUM 40 MG: 40 TABLET, FILM COATED ORAL at 22:06

## 2021-01-01 RX ADMIN — IPRATROPIUM BROMIDE AND ALBUTEROL SULFATE 1 AMPULE: .5; 3 SOLUTION RESPIRATORY (INHALATION) at 21:10

## 2021-01-01 RX ADMIN — CARVEDILOL 25 MG: 25 TABLET, FILM COATED ORAL at 22:03

## 2021-01-01 RX ADMIN — ATORVASTATIN CALCIUM 40 MG: 40 TABLET, FILM COATED ORAL at 22:03

## 2021-01-01 RX ADMIN — DEXTROSE AND SODIUM CHLORIDE: 5; 200 INJECTION, SOLUTION INTRAVENOUS at 11:00

## 2021-01-01 RX ADMIN — DIPHENHYDRAMINE HYDROCHLORIDE 25 MG: 25 TABLET ORAL at 02:06

## 2021-01-01 RX ADMIN — DEXTROSE MONOHYDRATE: 50 INJECTION, SOLUTION INTRAVENOUS at 00:23

## 2021-01-01 RX ADMIN — ATORVASTATIN CALCIUM 40 MG: 40 TABLET, FILM COATED ORAL at 21:42

## 2021-01-01 RX ADMIN — IPRATROPIUM BROMIDE AND ALBUTEROL SULFATE 1 AMPULE: .5; 3 SOLUTION RESPIRATORY (INHALATION) at 08:00

## 2021-01-01 RX ADMIN — SODIUM CHLORIDE: 9 INJECTION, SOLUTION INTRAVENOUS at 18:00

## 2021-01-01 RX ADMIN — TORSEMIDE 40 MG: 20 TABLET ORAL at 08:11

## 2021-01-01 RX ADMIN — THIAMINE HYDROCHLORIDE 100 MG: 100 INJECTION, SOLUTION INTRAMUSCULAR; INTRAVENOUS at 09:30

## 2021-01-01 RX ADMIN — Medication 10 ML: at 21:11

## 2021-01-01 RX ADMIN — IPRATROPIUM BROMIDE AND ALBUTEROL SULFATE 1 AMPULE: .5; 3 SOLUTION RESPIRATORY (INHALATION) at 13:39

## 2021-01-01 RX ADMIN — Medication 10 ML: at 11:34

## 2021-01-01 RX ADMIN — Medication 10 ML: at 22:06

## 2021-01-01 RX ADMIN — CARVEDILOL 25 MG: 25 TABLET, FILM COATED ORAL at 23:17

## 2021-01-01 RX ADMIN — CARVEDILOL 25 MG: 25 TABLET, FILM COATED ORAL at 09:39

## 2021-01-01 RX ADMIN — DIPHENHYDRAMINE HYDROCHLORIDE 25 MG: 25 TABLET ORAL at 18:07

## 2021-01-01 RX ADMIN — AMIODARONE HYDROCHLORIDE 200 MG: 200 TABLET ORAL at 09:08

## 2021-01-01 RX ADMIN — IPRATROPIUM BROMIDE AND ALBUTEROL SULFATE 1 AMPULE: .5; 3 SOLUTION RESPIRATORY (INHALATION) at 08:37

## 2021-01-01 RX ADMIN — ATORVASTATIN CALCIUM 40 MG: 40 TABLET, FILM COATED ORAL at 23:17

## 2021-01-01 RX ADMIN — ATORVASTATIN CALCIUM 40 MG: 40 TABLET, FILM COATED ORAL at 23:25

## 2021-01-01 RX ADMIN — IPRATROPIUM BROMIDE AND ALBUTEROL SULFATE 1 AMPULE: .5; 3 SOLUTION RESPIRATORY (INHALATION) at 20:10

## 2021-01-01 RX ADMIN — IPRATROPIUM BROMIDE AND ALBUTEROL SULFATE 1 AMPULE: .5; 3 SOLUTION RESPIRATORY (INHALATION) at 08:57

## 2021-01-01 RX ADMIN — Medication 10 ML: at 21:42

## 2021-01-01 RX ADMIN — Medication 10 ML: at 08:24

## 2021-01-01 RX ADMIN — CARVEDILOL 25 MG: 25 TABLET, FILM COATED ORAL at 21:42

## 2021-01-01 ASSESSMENT — PAIN SCALES - WONG BAKER
WONGBAKER_NUMERICALRESPONSE: 0

## 2021-01-01 ASSESSMENT — PAIN SCALES - GENERAL
PAINLEVEL_OUTOF10: 0

## 2021-01-01 ASSESSMENT — ENCOUNTER SYMPTOMS
SHORTNESS OF BREATH: 1
COUGH: 0
DIARRHEA: 0
RHINORRHEA: 0
VOMITING: 0
ABDOMINAL PAIN: 0
NAUSEA: 0

## 2021-01-04 NOTE — TELEPHONE ENCOUNTER
Patient isn't having any pain per wife, advised \"OK\" for referral to Dr Eunice Barrett. Patient will call for appointment.

## 2021-01-04 NOTE — TELEPHONE ENCOUNTER
Patients wife is calling about getting a referral to a Urologist.  His testicals are swollen      Please advise and give her a callback

## 2021-01-08 NOTE — TELEPHONE ENCOUNTER
Spoke with Mrs. Dustin Meyer, and with Marisela Quiles, he denies shortness of breath, and feels fine. Main concern is the swelling. Reviewed with [facility]:  Advised to follow up with Wound Clinic as planned Monday, and to remove the boots themselves if they become painful to leave on, or if they break skin. Mrs. Dustin Meyer verbalized understanding.

## 2021-01-08 NOTE — TELEPHONE ENCOUNTER
Pt wife calling says the wound clinic has kept her husbands legs wrap and has caused some swelling in his private area and upper legs.  Pt wants to know what she should do? pls call to advise thank you

## 2021-01-11 PROBLEM — I50.9 HEART FAILURE (HCC): Status: ACTIVE | Noted: 2021-01-01

## 2021-01-11 NOTE — ED PROVIDER NOTES
I independently performed a history and physical on Adolfo Pyle. All diagnostic, treatment, and disposition decisions were made by myself in conjunction with the advanced practice provider. Briefly, this is a 68 y.o. male here for weeping from both of his legs. Patient has history of CHF and is on Demadex 20 twice daily. His wife called Dr. Nora Noble office today for worsening edema to both his legs. The patient was seen in wound care clinic today and appeared to be in heart failure laceration. Patient is denying any symptoms to me other than the fact that his legs \"always look swollen like this \". Denying any shortness of breath or chest pain. He denies missing any of his Demadex. On exam pt is resting comfortably  Cardiac RRR, no murmur  Lungs rales throughout, crackles at bases, remains on room air. Significant JVD noted. Massive pitting edema across bilateral calves with significant fluid weeping. EKG  The Ekg interpreted by me in the absence of a cardiologist shows. atrial fibrillation with a rate of 82  Axis is   Normal  QTc is  prolonged  Mild QRS prolongation but less than 120msec, incomplete LBBB  When compared to 7/29/20 6/30/2020 echo reviewed:   Summary   Normal left ventricular size. Concentric left ventricular hypertrophy. Decreased left ventricular systolic function with inferoseptal hypokinesis. Estimated EF 30%. E/e'=28. Indeterminate diastolic function. A-fib. Mild thickening of anterior leaflet of mitral valve that appears to prolapse   mildly. The maximum mitral valve pressure gradient is 12 mmHg and the mean pressure   gradient is 5 mmHg. Moderately severe mitral regurgitation with posteriorly directed jet. The left atrium is dilated. A mechanical aortic valve appears well seated with a maximum gradient of 35   mmHg and a mean gradient of 22 mmHg. Trivial aortic regurgitation. The ascending aorta is mildly dilated. 4.0cm. Narrative:     Performed at:  [facility]  [address], [address]   Phone [phone]   TROPONIN - Abnormal; Notable for the following components:    Troponin 0.02 (*)     All other components within normal limits    Narrative:     Performed at:  [facility] [address], [address]   Phone [phone]   BRAIN NATRIURETIC PEPTIDE - Abnormal; Notable for the following components:    Pro-BNP 6,739 (*)     All other components within normal limits    Narrative:     Performed at:  [facility] [address], [address]   Phone [phone]   URINE RT REFLEX TO CULTURE - Abnormal; Notable for the following components:    Blood, Urine SMALL (*)     Protein, UA 30 (*)     All other components within normal limits    Narrative:     Performed at:  [facility] [address], [address]   Phone [phone]   MAGNESIUM - Abnormal; Notable for the following components:    Magnesium 2.80 (*)     All other components within normal limits    Narrative:     Performed at:  [facility] [address], [address]   Phone [phone]   MICROSCOPIC URINALYSIS   Chronic anemia  Mildly supratherapeutic INR  RACIEL  Mild Troponin elevation  Medications   furosemide (LASIX) injection 40 mg (40 mg Intravenous Given 1/11/21 5424)       Course and MDM: Patient is 70-year-old male with HFrEF presenting to the emergency room in the setting of worsening weeping and bilateral lower extremity edema. On my evaluation he is hemodynamically stable and remains on room air however he has significant JVD, pitting extremity edema and crackles at his bases consistent with volume overload. Work-up today is concerning for RACIEL with mild troponin elevation however he remains chest pain-free with a nonischemic EKG. We will start 40 mg IV Lasix here in the setting of RACIEL. He will require admission for further diuresis. He is agreeable to admission. Patient Referrals:  No follow-up provider specified. Discharge Medications:  New Prescriptions    No medications on file       FINAL IMPRESSION  1. Acute on chronic congestive heart failure, unspecified heart failure type ([address]    2. Acute renal failure superimposed on chronic kidney disease, unspecified CKD stage, unspecified acute renal failure type (HCC)        Blood pressure 114/71, pulse 79, temperature 97.4 °F (36.3 °C), temperature source Oral, resp. rate 16, height 5' 8\" (1.727 m), weight 160 lb (72.6 kg), SpO2 95 %.      For further details of [address] [facility] emergency department encounter, please see documentation by advanced practice provider      Cleo Jeffries MD  01/11/21 6308

## 2021-01-11 NOTE — ED PROVIDER NOTES
[facility]        Pt Name: Kavya Davalos  MRN: [id number]  [place] 1940  Date of evaluation: 1/11/2021  Provider: Estuardo Sumner PA-C  PCP: Isac Olsen MD     I have seen and evaluated this patient with my supervising physician Tiago Bianchi MD.    [facility]       Chief Complaint   Patient presents with    Foot Swelling     pt sent by doctor for bilat leg/foot swelling. pt states it's been going off and on for several years. pt with greg boot wrap on bilat lower extremities, states if he takes them off legs start blistering from the fluid. HISTORY OF PRESENT ILLNESS   (Location, Timing/Onset, Context/Setting, Quality, Duration, Modifying Factors, Severity, Associated Signs and Symptoms)  Note limiting factors. Kavya Davalos is a 68 y.o. male who presents to the emergency department today for evaluation for bilateral leg swelling. The patient has a history of chronic kidney disease, hypertension, hyperlipidemia, CHF, he has a history of A. fib and is anticoagulated on Coumadin. The patient states that intermittently for over a year he has been experiencing swelling to his bilateral lower legs. The patient apparently was at Dr. Marvin Anguiano office today, and was sent to the emergency room for further care and evaluation. The patient states that over the past 2 to 3 days he has had increasing swelling to his bilateral lower legs, and he states that he has been having increasing shortness of breath, particularly at night. Shortness of breath is worse if he tries to lay flat. The patient denies any chest pain. He has gained 7 to 8 pounds. He has not had any fever or chills. He denies any cough or congestion. He denies any abdominal pain, nausea, vomiting or diarrhea. No urinary symptoms. No chest pain.   No other complaints at this time Nursing Notes were all reviewed and agreed with or any disagreements were addressed in the HPI. REVIEW OF SYSTEMS    (2-9 systems for level 4, 10 or more for level 5)     Review of Systems   Constitutional: Negative for activity change, appetite change, chills and fever. HENT: Negative for congestion and rhinorrhea. Respiratory: Positive for shortness of breath. Negative for cough. Cardiovascular: Positive for leg swelling. Negative for chest pain. Gastrointestinal: Negative for abdominal pain, diarrhea, nausea and vomiting. Genitourinary: Negative for difficulty urinating, dysuria and hematuria. Neurological: Negative for weakness. Positives and Pertinent negatives as per HPI. Except as noted above in the ROS, all other systems were reviewed and negative.        PAST MEDICAL HISTORY     Past Medical History:   Diagnosis Date    Acute combined systolic and diastolic (congestive) hrt fail [facility]     Acute kidney injury ([facility]     Arrhythmia     Atrial fibrillation (HCC)     Carotid artery stenosis     CKD (chronic kidney disease)     Dilated cardiomyopathy (HCC)     Hyperlipidemia     Hypertension     IGT (impaired glucose tolerance)     Meningioma (HCC)     Valvular disease          SURGICAL HISTORY     Past Surgical History:   Procedure Laterality Date    AORTIC VALVE REPLACEMENT      CARPAL TUNNEL RELEASE Left     COLONOSCOPY  2010    SKIN BIOPSY           CURRENTMEDICATIONS       Previous Medications    ALLOPURINOL (ZYLOPRIM) 100 MG TABLET    TAKE 1 TABLET BY MOUTH DAILY    AMIODARONE (CORDARONE) 200 MG TABLET    Take 1 tablet by mouth daily    ATORVASTATIN (LIPITOR) 40 MG TABLET    Take 1 tablet by mouth daily    CARVEDILOL (COREG) 25 MG TABLET    Take 1 tablet by mouth 2 times daily    MULTIPLE VITAMINS-MINERALS (THERAPEUTIC MULTIVITAMIN-MINERALS) TABLET    Take 1 tablet by mouth daily POTASSIUM CHLORIDE (KLOR-CON M) 20 MEQ EXTENDED RELEASE TABLET    TAKE 2 TABLETS BY MOUTH DAILY    TORSEMIDE (DEMADEX) 20 MG TABLET    Take 1 tablet by mouth 2 times daily    WARFARIN (COUMADIN) 4 MG TABLET    Take 2-4 mg by mouth See Admin Instructions Take 2 mg (4 mg x 0.5 tablet) every Mon; take 4 mg (4 mg x 1 tablet) all other days. Managed by [facility]. ALLERGIES     Ceclor [cefaclor]    FAMILYHISTORY       Family History   Problem Relation Age of Onset    Heart Disease Paternal Grandfather     Hypertension Other     High Cholesterol Neg Hx     High Blood Pressure Neg Hx           SOCIAL HISTORY       Social History     Tobacco Use    Smoking status: Former Smoker     Packs/day: 1.00     Years: 25.00     Pack years: 25.00     Types: Cigarettes     Quit date: 1993     Years since quittin.0    Smokeless tobacco: Current User     Types: Chew     Last attempt to quit: 1982    Tobacco comment: quit 25 yrs ago   Substance Use Topics    Alcohol use: No     Alcohol/week: 0.0 standard drinks    Drug use: No       SCREENINGS             PHYSICAL EXAM    (up to 7 for level 4, 8 or more for level 5)     ED Triage Vitals [21 1505]   BP Temp Temp Source Pulse Resp SpO2 Height Weight   114/71 97.4 °F (36.3 °C) Oral 79 16 95 % 5' 8\" (1.727 m) 160 lb (72.6 kg)       Physical Exam  Vitals signs and nursing note reviewed. Constitutional:       Appearance: He is well-developed. He is not diaphoretic. HENT:      Head: Normocephalic and atraumatic. Right Ear: External ear normal.      Left Ear: External ear normal.      Nose: Nose normal.   Eyes:      General:         Right eye: No discharge. Left eye: No discharge. Neck:      Musculoskeletal: Normal range of motion and neck supple. Vascular: JVD present. Trachea: No tracheal deviation. Cardiovascular:      Rate and Rhythm: Normal rate. Rhythm irregular. Narrative:     Performed at:  [facility]  [address], [place]   Phone [phone]   APTT - Abnormal; Notable for the following components:    aPTT 46.8 (*)     All other components within normal limits    Narrative:     Performed at:  [facility] [address], [place]   Phone [phone]   TROPONIN - Abnormal; Notable for the following components:    Troponin 0.02 (*)     All other components within normal limits    Narrative:     Performed at:  [facility] [address], [place]   Phone [phone]  - Abnormal; Notable for the following components:    Pro-BNP 6,739 (*)     All other components within normal limits    Narrative:     Performed at:  [facility] [address], [place]   Phone [phone]   URINE RT REFLEX TO CULTURE - Abnormal; Notable for the following components:    Blood, Urine SMALL (*)     Protein, UA 30 (*)     All other components within normal limits    Narrative:     Performed at:  [facility] [address], [place]   Phone [phone]   MAGNESIUM - Abnormal; Notable for the following components:    Magnesium 2.80 (*)     All other components within normal limits    Narrative:     Performed at:  [facility] [address], [place]   Phone [phone]       All other labs were within normal range or not returned as of this dictation. EKG: All EKG's are interpreted by the Emergency Department Physician in the absence of a cardiologist.  Please see their note for interpretation of EKG.       RADIOLOGY: Non-plain film images such as CT, Ultrasound and MRI are read by the radiologist. Plain radiographic images are visualized and preliminarily interpreted by the ED Provider with the below findings:        Interpretation per the Radiologist below, if available at the time of this note:    XR CHEST PORTABLE   Final Result   Findings suggest congestive heart failure           Xr Chest Portable    Result Date: 1/11/2021  EXAMINATION: ONE XRAY VIEW OF THE CHEST 1/11/2021 3:47 pm COMPARISON: 07/30/2020 HISTORY: ORDERING SYSTEM PROVIDED HISTORY: SOB TECHNOLOGIST PROVIDED HISTORY: Reason for exam:->SOB Acuity: Unknown FINDINGS: Status post median sternotomy. Cardiomegaly. Pulmonary vascular congestion. Pulmonary edema. No focal pulmonary consolidation. Findings suggest congestive heart failure           PROCEDURES   Unless otherwise noted below, none     Procedures    CRITICAL CARE TIME   N/A    CONSULTS:  IP CONSULT TO PHARMACY  IP CONSULT TO NEPHROLOGY  IP CONSULT TO CARDIOLOGY      EMERGENCY DEPARTMENT COURSE and DIFFERENTIAL DIAGNOSIS/MDM:   Vitals:    Vitals:    01/11/21 1505   BP: 114/71   Pulse: 79   Resp: 16   Temp: 97.4 °F (36.3 °C)   TempSrc: Oral   SpO2: 95%   Weight: 160 lb (72.6 kg)   Height: 5' 8\" (1.727 m)       Patient was given the following medications:  Medications   furosemide (LASIX) injection 40 mg (40 mg Intravenous Given 1/11/21 1714)           Briefly, this is a 65-year-old male who presents emergency department today for evaluation for leg swelling. The patient states that this has been intermittent for over a year but worse over the past 2 to 3 days he has noticed some lower leg swelling, and some shortness of breath. On physical exam he does have 3+ edema to bilateral lower legs. He does have rales in the left sounding lungs fields. EKG does show A. fib, this is chronic for him, please see procedure note above for additional details. CBC shows no evidence of leukocytosis, stable chronic anemia. BMP does show chronic kidney disease, but acute renal insufficiency on top of this. His INR is 3.88. Troponin was 0.02, likely due to renal function. BNP is over 6000, and x-ray does show CHF, patient was given 40 mg of Lasix. He will need to be admitted for further care and evaluation, hospitalist has agreed for admission. Patient is updated and agreeable plan. Stable for admission    FINAL IMPRESSION      1. Acute on chronic congestive heart failure, unspecified heart failure type ([address]    2. Acute renal failure superimposed on chronic kidney disease, unspecified CKD stage, unspecified acute renal failure type [facility])          DISPOSITION/PLAN   DISPOSITION Admitted 01/11/2021 05:32:23 PM      PATIENT REFERREDTO:  No follow-up provider specified.     DISCHARGE MEDICATIONS:  New Prescriptions    No medications on file       DISCONTINUED MEDICATIONS:  Discontinued Medications    No medications on file              (Please note that portions of this note were completed with a voice recognition program.  Efforts were made to edit the dictations but occasionally words are mis-transcribed.)    Liana Alonso PA-C (electronically signed)            Liana Alonso PA-C  01/11/21 [facility]

## 2021-01-11 NOTE — ED NOTES
Pt  alert and oriented, but confused at times 9son states this is baseline). Placed on [facility] for comfort. Pt undressed, bilateral lower extremities red, with edema, and weeping, black scabbed areas to bottom of bilateral feet. Lung sounds diminished per ausc.  Placed on tele monitor and b/ps cycling       Alma Rosa Hsieh RN  01/11/21 [address], RN  01/11/21 5401

## 2021-01-11 NOTE — PROGRESS NOTES
 Smokeless tobacco: Current User     Types: Chew     Last attempt to quit: 1/1/1982    Tobacco comment: quit 25 yrs ago   Substance Use Topics    Alcohol use: No     Alcohol/week: 0.0 standard drinks    Drug use: No       ALLERGIES    Allergies   Allergen Reactions    Ceclor [Cefaclor] Other (See Comments)     CNS side effects, slurred speech. Lips and tounge swell. MEDICATIONS    Current Outpatient Medications on File Prior to Encounter   Medication Sig Dispense Refill    atorvastatin (LIPITOR) 40 MG tablet Take 1 tablet by mouth daily 90 tablet 3    carvedilol (COREG) 25 MG tablet Take 1 tablet by mouth 2 times daily 180 tablet 1    potassium chloride (KLOR-CON M) 20 MEQ extended release tablet TAKE 2 TABLETS BY MOUTH DAILY 180 tablet 1    torsemide (DEMADEX) 20 MG tablet Take 1 tablet by mouth 2 times daily 180 tablet 1    allopurinol (ZYLOPRIM) 100 MG tablet TAKE 1 TABLET BY MOUTH DAILY 90 tablet 3    amiodarone (CORDARONE) 200 MG tablet Take 1 tablet by mouth daily 90 tablet 3    warfarin (COUMADIN) 4 MG tablet Take 2-4 mg by mouth See Admin Instructions Take 2 mg (4 mg x 0.5 tablet) every Mon; take 4 mg (4 mg x 1 tablet) all other days. Managed by [facility].  Multiple Vitamins-Minerals (THERAPEUTIC MULTIVITAMIN-MINERALS) tablet Take 1 tablet by mouth daily       No current facility-administered medications on file prior to encounter. REVIEW OF SYSTEMS    Pertinent items are noted in HPI.       Objective:      /61   Pulse 81   Resp 18   Wt 179 lb (81.2 kg)   BMI 25.68 kg/m²     PHYSICAL EXAM Vascular: Vascular status Impaired  palpable pedal pulses, right DP1/4 and PT1/4, left DP1/4 and PT1/4.  CFT 3 seconds digits 1 to 5 bilateral.  Hair growthAbsent  both lower extremities and feet.  Skin temperature is warm to warm from pretibial area to distal digits bilateral.  Exam is negative for rubor, pallor, cyanosis or signs of acute vascular compromise bilaterally.  Exam is positive for +3 pitting edema bilateral lower extremity.  Varicosities Present bilateral lower extremity. Neuro: Neurologic status normal bilateral with epicritic Present , proprioceptive Present, vibratory sensationPresent and protopathicPresent.  DTRs Present bilateral Achilles. There were no reproducible neuritic symptoms on exam bilateral feet/ankles. Derm: Ulceration to bilateral legs granular today.  Ecchymosis Absent  bilateral feet/foot.    Musculoskeletal: Pain with debridement of wounds 5/5 muscle strength in/eversion and dorsi/plantarflexion bilateral feet.  No gross instability noted. Assessment:     Patient Active Problem List   Diagnosis    Essential hypertension, benign    Atrial fibrillation with RVR ([address]    Heart valve replaced by other means    Hyperlipidemia other unspecified.      Aortic regurgitation    Stage 3 chronic kidney disease    Hypertrophy of prostate without urinary obstruction and other lower urinary tract symptoms (LUTS)    Peptic ulcer    Gout    Generalized osteoarthrosis, involving multiple sites    IGT (impaired glucose tolerance)    Lung nodule    Chronic combined systolic and diastolic CHF (congestive heart failure) (HCC)    Meningioma (HCC)    Bilateral carotid artery stenosis    Atherosclerosis of abdominal aorta (HCC)    Labyrinthine vertigo    RACIEL (acute kidney injury) ([address]    S/P AVR    Cardiomyopathy (HCC)    SOB (shortness of breath)    Localized edema    Acute combined systolic and diastolic congestive heart failure ([address]  Venous stasis ulcer of right ankle with fat layer exposed without varicose veins (HCC)    Venous stasis ulcer of left calf with fat layer exposed without varicose veins (HCC)    Hypokalemia         Post  Debridement Measurements:  Wound 12/21/20 Leg Lower; Left #1 (Active)   Wound Image   12/21/20 1349   Wound Etiology Venous 01/11/21 1340   Wound Cleansed Cleansed with saline 01/11/21 1340   Wound Length (cm) 0.8 cm 01/11/21 1340   Wound Width (cm) 2 cm 01/11/21 1340   Wound Depth (cm) 0.1 cm 01/11/21 1340   Wound Surface Area (cm^2) 1.6 cm^2 01/11/21 1340   Change in Wound Size % (l*w) -113.33 01/11/21 1340   Wound Volume (cm^3) 0.16 cm^3 01/11/21 1340   Wound Healing % -100 01/11/21 1340   Post-Procedure Length (cm) 2 cm 12/21/20 1403   Post-Procedure Width (cm) 9 cm 12/21/20 1403   Post-Procedure Depth (cm) 0.1 cm 12/21/20 1403   Post-Procedure Surface Area (cm^2) 18 cm^2 12/21/20 1403   Post-Procedure Volume (cm^3) 1.8 cm^3 12/21/20 1403   Wound Assessment Pink/red 01/11/21 1340   Drainage Amount Copious 01/11/21 1340   Drainage Description Serous 01/11/21 1340   Odor None 01/11/21 1340   Patsy-wound Assessment Edematous 01/11/21 1340   Margins Undefined edges 01/11/21 1340   Number of days: 21       Wound 12/21/20 Leg Right; Anterior #2 (Active)   Wound Image   12/21/20 1349   Wound Etiology Venous 01/11/21 1340   Wound Cleansed Cleansed with saline 01/11/21 1340   Wound Length (cm) 0.7 cm 01/11/21 1340   Wound Width (cm) 1.1 cm 01/11/21 1340   Wound Depth (cm) 0.1 cm 01/11/21 1340   Wound Surface Area (cm^2) 0.77 cm^2 01/11/21 1340   Change in Wound Size % (l*w) 96.33 01/11/21 1340   Wound Volume (cm^3) 0.08 cm^3 01/11/21 1340   Wound Healing % 96 01/11/21 1340   Wound Assessment Pink/red 01/11/21 1340   Drainage Amount Copious 01/11/21 1340   Drainage Description Serous 01/11/21 1340   Odor None 01/11/21 1340   Patsy-wound Assessment Edematous 01/11/21 1340   Number of days: 21             Plan: The nature of the patient's condition was explained in depth. The patient was informed that their compliance to the treatment plan is paramount to successful healing and prevention of further ulceration and/or infection     Discharge Treatment       With each dressing change, rinse wounds with 0.9% Saline. (May use wound wash or soft contact solution. Both can be purchased at a local drug store). If unable to obtain saline, may use a gentle soap and water.       Dressing care: to bilateral legs-antibiotic ointment and steroid cream, Adaptic to all open wound, 4x4, dry dressing, ace wrap from toes to knees, size G tube  to keep in place. Discussed with cardiology.  If pt is unable to bee seen by cardiology will have pt got to ED for possible admission for fluid removal.        Written Patient Discharge Instructions Given            Electronically signed by Rona Caal DPM on 1/11/2021 at 1:47 PM

## 2021-01-11 NOTE — PROGRESS NOTES
Clinical Pharmacy Note: Pharmacy to Dose Warfarin    Pharmacy consulted by Dr. Pippa Sweeney to dose warfarin. Adina Vasquez is a 68 y.o. male  is receiving warfarin for indication: Afib. INR Goal Range: 2-3  Prior to admission warfarin dosing regimen: 1 mg every Monday; 2 mg all other days  INR today:   Lab Results   Component Value Date    INR 3.88 01/11/2021       Assessment/Plan:  INR is supratherapeutic on prior to admission dosing regimen. Based on today's assessment, HOLD warfarin. Daily INR is ordered. Pharmacy will continue to monitor and make adjustments to regimen as necessary.      Thank you for the consult,     Thanh Wu, PharmD  Clinical Pharmacist [id number]  1/11/2021

## 2021-01-11 NOTE — PLAN OF CARE
Pt presents with +3 pitting edema up to waist including scrotum. Pt sleepy, son status increased confusion. Spoke to Dr João Morales. Suggest ED.  Pt transported with son via W/C

## 2021-01-11 NOTE — TELEPHONE ENCOUNTER
Mrs. Rosanna Souza called in with concerns about Geralds legs, she did remove his Dori boots and reports his legs are weeping. Advised to go to scheduled wound care appointment this afternoon. She verbalized understanding.

## 2021-01-11 NOTE — TELEPHONE ENCOUNTER
Inez Alvarez calling for Linnea Nelson spoke to pts wife this morning about pts edema. Inez Alvarez states pt has +3 edema up to waist including the scrotum. They don't know what to do for that.  Pls call to advise Thank you

## 2021-01-12 NOTE — H&P
has a past medical history of Acute combined systolic and diastolic (congestive) hrt fail ([facility] Acute kidney injury ([facility] [address] Arrhythmia, Atrial fibrillation ([facility] [address] Carotid artery stenosis, CKD (chronic kidney disease), Dilated cardiomyopathy ([facility] [address] Hyperlipidemia, Hypertension, IGT (impaired glucose tolerance), Meningioma ([facility] [address] and Valvular disease. Past Surgical History:   has a past surgical history that includes Aortic valve replacement; Carpal tunnel release (Left); skin biopsy; and Colonoscopy (2010). Medications:  Current Facility-Administered Medications on File Prior to Encounter   Medication Dose Route Frequency Provider Last Rate Last Admin    lidocaine (LMX) 4 % cream   Topical Once Sharyle Roller, MD         Current Outpatient Medications on File Prior to Encounter   Medication Sig Dispense Refill    atorvastatin (LIPITOR) 40 MG tablet Take 1 tablet by mouth daily 90 tablet 3    carvedilol (COREG) 25 MG tablet Take 1 tablet by mouth 2 times daily 180 tablet 1    potassium chloride (KLOR-CON M) 20 MEQ extended release tablet TAKE 2 TABLETS BY MOUTH DAILY 180 tablet 1    torsemide (DEMADEX) 20 MG tablet Take 1 tablet by mouth 2 times daily 180 tablet 1    allopurinol (ZYLOPRIM) 100 MG tablet TAKE 1 TABLET BY MOUTH DAILY 90 tablet 3    amiodarone (CORDARONE) 200 MG tablet Take 1 tablet by mouth daily 90 tablet 3    warfarin (COUMADIN) 4 MG tablet Take 2-4 mg by mouth See Admin Instructions Take 2 mg (4 mg x 0.5 tablet) every Mon; take 4 mg (4 mg x 1 tablet) all other days. Managed by [facility].  Multiple Vitamins-Minerals (THERAPEUTIC MULTIVITAMIN-MINERALS) tablet Take 1 tablet by mouth daily         Allergies: Allergies   Allergen Reactions    Ceclor [Cefaclor] Other (See Comments)     CNS side effects, slurred speech. Lips and tounge swell.         Social History: reports that he quit smoking about 28 years ago. His smoking use included cigarettes. He has a 25.00 pack-year smoking history. His smokeless tobacco use includes chew. He reports that he does not drink alcohol or use drugs. Family History:  family history includes Heart Disease in his paternal grandfather; Hypertension in an other family member. ,     Physical Exam:  /67   Pulse 79   Temp 98.3 °F (36.8 °C) (Oral)   Resp 16   Ht 5' 8\" (1.727 m)   Wt 175 lb 3.2 oz (79.5 kg)   SpO2 95%   BMI 26.64 kg/m²     General appearance:  Appears comfortable. Well nourished  Eyes: Sclera clear, pupils equal  ENT: Moist mucus membranes, no thrush. Trachea midline. Cardiovascular: Regular rhythm, normal S1, S2. No murmur, gallop, rub. No edema in lower extremities  Respiratory: Clear to auscultation bilaterally, no wheeze, good inspiratory effort  Gastrointestinal: Abdomen soft, non-tender, not distended, normal bowel sounds  Musculoskeletal: No cyanosis in digits, neck supple  Neurology: Cranial nerves grossly intact. Alert and oriented in time, place and person. No speech or motor deficits  Psychiatry: Appropriate affect.  Not agitated  Skin: Warm, dry, normal turgor, no rash    Labs:  CBC:   Lab Results   Component Value Date    WBC 5.5 01/11/2021    RBC 2.94 01/11/2021    HGB 8.5 01/11/2021    HCT 27.1 01/11/2021    MCV 92.3 01/11/2021    MCH 28.9 01/11/2021    MCHC 31.3 01/11/2021    RDW 20.5 01/11/2021     01/11/2021    MPV 8.5 01/11/2021     BMP:    Lab Results   Component Value Date     01/11/2021    K 3.7 01/11/2021    K 3.9 08/05/2020     01/11/2021    CO2 29 01/11/2021    BUN 60 01/11/2021    CREATININE 3.1 01/11/2021    CALCIUM 8.5 01/11/2021    GFRAA 24 01/11/2021    GFRAA >60 12/28/2011    LABGLOM 20 01/11/2021    LABGLOM 61 12/29/2018    GLUCOSE 165 01/11/2021    GLUCOSE 98 03/09/2020       Chest Xray:   EKG:         Problem List  Active Problems:    Heart failure ([address] Resolved Problems:    * No resolved hospital problems. *        Assessment/Plan:         1. CHF - acute on chronic combined  dysfunction. I   eCHO pending  - Diureses with  IV Lasix ( monitor Creatinine level)   - Continue ASA, Statin  -  b- blocker   - Low salt diet Monitoring strict I&Os and daily weights.         Acute on chronic renal failiure  Cr up to 3  Baseline around 2.5  Nephrology consulted   Admit as inpatient. I anticipate hospitalization spanning more than two midnights for investigation and treatment of the above medically necessary diagnoses.       Surinder Cabral MD    1/11/2021 7:15 PM

## 2021-01-12 NOTE — PROGRESS NOTES
Pt noted with exp wheezing o2 luciano LYLE made aware awaiting response Electronically signed by Favio Banda RN on 1/12/2021 at 5:11 AM

## 2021-01-12 NOTE — PROGRESS NOTES
Pt in bed called MD regarding wounds to legs NNO.legs wrapped no c/o pain / discomfort assessment completed, patient denies needs at this time, call light in reach, will continue to monitor.  Electronically signed by Nemo Forde RN on 1/12/2021 at 2:50 AM

## 2021-01-12 NOTE — CONSULTS
MD Megan Sanchez MD Alm Forster, MD               Office: [phone]                      Fax: [phone] [facility]                   NEPHROLOGY INITIAL CONSULT NOTE:     PATIENT NAME: Ban Reese  : 1940  MRN: [id number]  REASON FOR CONSULT: I am asked to see this patient in consultation for my opinion regarding management of Acute Kidney Injury . My recommendations will be communicated by way of shared medical record. PRIMARY CARE PHYSICIAN: Michael Tam MD      RECOMMENDATIONS:   - lasix 40 mg IV for today   - order strict I/O, daily weight,   - add FR 1.2L/day    - check urine lytes, BNP , renal US   - monitor PVR w/ bladder scan for russo insertion need. - no need for dialysis , but at higher risk for decompensation, needing closer monitoring     D/C plan from renal stand point:  - f/u diuresis, needs 2-3 days      IMPRESSION:       RACIEL (on proteinuric CKD-3):    - non-Oligouric  - BL Scr- - recent BL worsened from 1.6 -1.7.  --->2.5-2.4 ---> 3.1 on admission  : Etiology of RACIEL - presumed CRS w/ developed ATN     - other differentials: unlikely  , r/o obstruction   - UA : small blood, 11-20 hyaline cast = pre-renal component / CKD :     Proteinuric CKD-3 - follows w/ Dr Maribell Weber   - next f/u is on 2021   - CKD likely d/to HTN / CR physiology     Systolic CHF - EF 59-93%. - Off ACEI. - dry weight- 161 pounds      Associated problems:   - Azotemia: pre-renal at BUN 60   - Electrolytes: follow   - Volume status: mild hyper-volemia  : BP: no need for tight control   - Acid-Base: stable   - Anemia: mild - follow      H/O HLD - CK WNL. H/O Microscopic Hematuria-SNOW, complements WNL. ANCA neg. UPCR 360mg. H/O Nephrolithiasis- low Na intake and higher fluid intake. Other problems: Management per primary and other consulting teams.    Hospital Problems           Last Modified POA Heart failure ([facility] [address] 1/11/2021 Yes           : other supportive care :   - Check daily renal function panel with electrolytes-phosphorus  - Strict monitoring of I/Os, daily weight  - Renal feeds/diet  - Current medications reviewed. - Nephrotoxic medications have been discontinued. - Dose adjusted and appropriate. - Dose meds for eGFR <15 mL/min/1.73m2 during RACIEL    - Avoid heavy opioids due to renal failure - may use very low dose dilaudid / fentanyl with close monitoring of CNS and respiratory depression. Please refer to the orders. High Complexity. Multiple complex problems. Discussed with patient, and treatment team-    Thank you for allowing me to participate in this patient's care. Please do not hesitate to contact me with any questions/concerns. We will follow along with you. Chris Garcia MD  [facility] [address] [place] [address] [place]: [phone] or [employer]  Fax: [phone]        ========================================================   ========================================================       CHIEF COMPLAINT:   Chief Complaint   Patient presents with    Foot Swelling     pt sent by doctor for bilat leg/foot swelling. pt states it's been going off and on for several years. pt with greg boot wrap on bilat lower extremities, states if he takes them off legs start blistering from the fluid.      History Obtained From:  patient + treatment team + Electronic Medical Records    HPI: Mr. Kan Obrien is a 68 y.o. 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 pt states it's been going off and on for several years. pt with greg boot wrap on bilat lower extremities, states if he takes them off legs start blistering from the fluid.)    Admitted with [facility] [I50.9]   Found to have RACIEL  , so we are called for that. No current active complaints. Patient denied chest pain / dizziness/lightheadedness/syncope/ SOB/  Regarding: RACIEL on CKD  · Duration: acute on chronic  · Location: kidneys  · Severity: Severe   · Timing: continous  · Context (ex: related to condition):  , refer to my assessment / impression. · Modifying factors (ex: medications, interventions):  , refer to my plan / recommendation. · Associated signs & symptoms (ex: edema, SOB): As mentioned above in CC and HPI      Past medical, Surgical, Social, Family medical history reviewed by me.      PAST MEDICAL HISTORY:   Past Medical History:   Diagnosis Date    Acute combined systolic and diastolic [facility]congestive) [facility]rt [facility]     Acute kidney injury ([facility]     Arrhythmia     Atrial fibrillation (HCC)     Carotid artery stenosis     CKD (chronic kidney disease)     Dilated cardiomyopathy (HCC)     Hyperlipidemia     Hypertension     IGT (impaired glucose tolerance)     Meningioma (HCC)     Valvular disease      PAST SURGICAL HISTORY:   Past Surgical History:   Procedure Laterality Date    AORTIC VALVE REPLACEMENT      CARPAL TUNNEL RELEASE Left     COLONOSCOPY  2010    SKIN BIOPSY       FAMILY HISTORY:   Family History   Problem Relation Age of Onset    Heart Disease Paternal Grandfather     Hypertension Other     High Cholesterol Neg Hx     High Blood Pressure Neg Hx      SOCIAL HISTORY:   Social History     Socioeconomic History    Marital status:      Spouse name: None    Number of children: None    Years of education: None    Highest education level: None   Occupational History    None Social Needs    Financial resource strain: None    Food insecurity     Worry: None     Inability: None    Transportation needs     Medical: None     Non-medical: None   Tobacco Use    Smoking status: Former Smoker     Packs/day: 1.00     Years: 25.00     Pack years: 25.00     Types: Cigarettes     Quit date: 1993     Years since quittin.0    Smokeless tobacco: Current User     Types: Chew     Last attempt to quit: 1982    Tobacco comment: quit 25 yrs ago   Substance and Sexual Activity    Alcohol use: No     Alcohol/week: 0.0 standard drinks    Drug use: No    Sexual activity: Yes     Partners: Female     Comment:    Lifestyle    Physical activity     Days per week: None     Minutes per session: None    Stress: None   Relationships    Social connections     Talks on phone: None     Gets together: None     Attends Mandaeism service: None     Active member of club or organization: None     Attends meetings of clubs or organizations: None     Relationship status: None    Intimate partner violence     Fear of current or ex partner: None     Emotionally abused: None     Physically abused: None     Forced sexual activity: None   Other Topics Concern    None   Social History Narrative    None         MEDICATIONS: reviewed by me.    Medications Prior to Admission:  Current Facility-Administered Medications on File Prior to Encounter   Medication Dose Route Frequency Provider Last Rate Last Admin    lidocaine (LMX) 4 % cream   Topical Once Abhishek MD Lenin         Current Outpatient Medications on File Prior to Encounter   Medication Sig Dispense Refill    atorvastatin (LIPITOR) 40 MG tablet Take 1 tablet by mouth daily 90 tablet 3    carvedilol (COREG) 25 MG tablet Take 1 tablet by mouth 2 times daily 180 tablet 1    potassium chloride (KLOR-CON M) 20 MEQ extended release tablet TAKE 2 TABLETS BY MOUTH DAILY 180 tablet 1  torsemide (DEMADEX) 20 MG tablet Take 1 tablet by mouth 2 times daily 180 tablet 1    allopurinol (ZYLOPRIM) 100 MG tablet TAKE 1 TABLET BY MOUTH DAILY 90 tablet 3    amiodarone (CORDARONE) 200 MG tablet Take 1 tablet by mouth daily 90 tablet 3    warfarin (COUMADIN) 4 MG tablet Take 2-4 mg by mouth See Admin Instructions Take 2 mg (4 mg x 0.5 tablet) every Mon; take 4 mg (4 mg x 1 tablet) all other days. Managed by [facility].  Multiple Vitamins-Minerals (THERAPEUTIC MULTIVITAMIN-MINERALS) tablet Take 1 tablet by mouth daily           Current Facility-Administered Medications:     amiodarone (CORDARONE) tablet 200 mg, 200 mg, Oral, Daily, Andi Hagen MD    atorvastatin (LIPITOR) tablet 40 mg, 40 mg, Oral, Nightly, Andi Hagen MD    carvedilol (COREG) tablet 25 mg, 25 mg, Oral, BID, Andi Hagen MD  [facility]  [START ON 1/12/2021] furosemide (LASIX) injection 40 mg, 40 mg, Intravenous, Daily, Andi Hagen MD    sodium chloride flush 0.9 % injection 10 mL, 10 mL, Intravenous, 2 times per day, Andi Hagen MD    sodium chloride flush 0.9 % injection 10 mL, 10 mL, Intravenous, PRN, Andi Hagen MD    promethazine (PHENERGAN) tablet 12.5 mg, 12.5 mg, Oral, Q6H PRN **OR** ondansetron (ZOFRAN) injection 4 mg, 4 mg, Intravenous, Q6H PRN, Andi Hagen MD    polyethylene glycol (GLYCOLAX) packet 17 g, 17 g, Oral, Daily PRN, Andi Hagen MD    acetaminophen (TYLENOL) tablet 650 mg, 650 mg, Oral, Q6H PRN **OR** acetaminophen (TYLENOL) suppository 650 mg, 650 mg, Rectal, Q6H PRN, Andi Hagen MD    warfarin (COUMADIN) daily dosing (placeholder), , Other, RX Placeholder, Andi Hagen MD    Facility-Administered Medications Ordered in Other Encounters:     lidocaine (LMX) 4 % cream, , Topical, Once, Vinie Severance, MD      Allergies reviewed by me: Ceclor [cefaclor]    REVIEW OF SYSTEMS:  As mentioned in chief complaint and HPI , Subjective   All other 10-point review of systems: negative. PHYSICAL EXAM:  Recent vital signs and recent I/Os reviewed by me. Wt Readings from Last 3 Encounters:   01/11/21 175 lb 3.2 oz (79.5 kg)   01/11/21 179 lb (81.2 kg)   12/21/20 169 lb (76.7 kg)     BP Readings from Last 3 Encounters:   01/11/21 119/72   01/11/21 107/61   12/28/20 103/65     Patient Vitals for the past 24 hrs:   BP Temp Temp src Pulse Resp SpO2 Height Weight   01/11/21 2000 119/72 97.6 °F (36.4 °C) Oral 89 16 96 %     01/11/21 1900 134/67 98.3 °F (36.8 °C) Oral 79 16 95 % 5' 8\" (1.727 m) 175 lb 3.2 oz (79.5 kg)   01/11/21 1830 (!) 122/53 97.9 °F (36.6 °C) Infrared 81 13 97 %     01/11/21 1806    81 19 98 %     01/11/21 1800 125/73   79 16      01/11/21 1730 128/61   77 12 95 %     01/11/21 1505 114/71 97.4 °F (36.3 °C) Oral 79 16 95 % 5' 8\" (1.727 m) 160 lb (72.6 kg)       Intake/Output Summary (Last 24 hours) at 1/11/2021 2012  Last data filed at 1/11/2021 1909  Gross per 24 hour   Intake    Output 300 ml   Net -300 ml         Physical Exam  Vitals signs reviewed. Constitutional:       General: He is sleeping. He is not in acute distress. Appearance: He is ill-appearing. Comments: Obese body habitus   HENT:      Head: Normocephalic and atraumatic. Right Ear: External ear normal.      Left Ear: External ear normal.      Mouth/Throat:      Mouth: Mucous membranes are not dry. Eyes:      General: No scleral icterus. Conjunctiva/sclera: Conjunctivae normal.   Neck:      Musculoskeletal: Neck supple. Thyroid: No thyroid mass. Vascular: No JVD. Trachea: Trachea normal.   Cardiovascular:      Rate and Rhythm: Normal rate and regular rhythm. Pulmonary:      Effort: Pulmonary effort is normal.      Breath sounds: Normal breath sounds. Abdominal:      General: Bowel sounds are normal. There is distension. Palpations: Abdomen is soft. Musculoskeletal:         General: No deformity. Right lower leg: Edema present. Left lower leg: Edema present. Skin:     General: Skin is warm and dry. Neurological:      Mental Status: He is oriented to person, place, and time and easily aroused. Psychiatric:         Behavior: Behavior normal.              DATA:  Diagnostic tests reviewed by me for today's visit:    Recent Labs     01/11/21  1626   WBC 5.5   HCT 27.1*        Iron Saturation:  No components found for: PERCENTFE  FERRITIN:    Lab Results   Component Value Date    FERRITIN 67.1 08/04/2020     IRON:    Lab Results   Component Value Date    IRON 21 08/04/2020     TIBC:    Lab Results   Component Value Date    TIBC 304 08/04/2020       Recent Labs     01/11/21  1626      K 3.7      CO2 29   BUN 60*   CREATININE 3.1*     Recent Labs     01/11/21  1626   CALCIUM 8.5   MG 2.80*     No results for input(s): PH, PCO2, PO2 in the last 72 hours.     Invalid input(s): Savannah Major    ABG:  No results found for: PH, PCO2, PO2, HCO3, BE, THGB, TCO2, O2SAT  VBG:  No results found for: PHVEN, [id number], BEVEN, K3ADAGXZ    LDH:  No results found for: LDH  Uric Acid:    Lab Results   Component Value Date    LABURIC 4.7 07/25/2019       PT/INR:    Lab Results   Component Value Date    PROTIME 45.6 01/11/2021    PROTIME 48.3 08/31/2018    INR 3.88 01/11/2021     Warfarin PT/INR:  No components found for: [place]  PTT:    Lab Results   Component Value Date    APTT 46.8 01/11/2021   [APTT}  Last 3 Troponin:    Lab Results   Component Value Date    TROPONINI 0.02 01/11/2021    TROPONINI 0.03 07/30/2020    TROPONINI 0.03 07/30/2020       U/A:    Lab Results   Component Value Date    COLORU YELLOW 01/11/2021    PROTEINU 30 01/11/2021    PHUR 5.5 01/11/2021    LABCAST 10-20 Hyaline 04/23/2017    WBCUA 1 01/11/2021    RBCUA 2 01/11/2021    YEAST 0 12/09/2019    BACTERIA Rare 07/30/2020    CLARITYU Clear 01/11/2021    SPECGRAV 1.013 01/11/2021    LEUKOCYTESUR Negative 01/11/2021 UROBILINOGEN 1.0 01/11/2021    BILIRUBINUR Negative 01/11/2021    BLOODU SMALL 01/11/2021    GLUCOSEU Negative 01/11/2021     Microalbumen/Creatinine ratio:  No components found for: RUCREAT  24 Hour Urine for Protein:  No components found for: RAWUPRO, UHRS3, [id number], UTV3  24 Hour Urine for Creatinine Clearance:  No components found for: CREAT4, UHRS10, UTV10  Urine Toxicology:  No components found for: Ladene Griffon, IBENZO, ICOCAINE, IMARTHC, IOPIATES, IPHENCYC    HgBA1c:    Lab Results   Component Value Date    LABA1C 6.3 07/25/2019     RPR:  No results found for: RPR  HIV:  No results found for: HIV  SNOW:    Lab Results   Component Value Date    SNOW Negative 08/20/2020     RF:  No results found for: RF  DSDNA:  No components found for: DNA  AMYLASE:  No results found for: AMYLASE  LIPASE:  No results found for: LIPASE  Fibrinogen Level:  No components found for: FIB       BELOW MENTIONED RADIOLOGY STUDY RESULTS BY ME:    Xr Chest Portable    Result Date: 1/11/2021  EXAMINATION: ONE XRAY VIEW OF THE CHEST 1/11/2021 3:47 pm COMPARISON: 07/30/2020 HISTORY: ORDERING SYSTEM PROVIDED HISTORY: SOB TECHNOLOGIST PROVIDED HISTORY: Reason for exam:->SOB Acuity: Unknown FINDINGS: Status post median sternotomy. Cardiomegaly. Pulmonary vascular congestion. Pulmonary edema. No focal pulmonary consolidation.      Findings suggest congestive heart failure

## 2021-01-12 NOTE — PROGRESS NOTES
[facility] PROGRESS NOTE    1/12/2021 8:43 AM        Name: Kim Valerio . Admitted: 1/11/2021  Primary Care Provider: Bakari Flowers MD (Tel: [phone])                        Subjective:  . No acute events overnight. Resting well. Pain control. Diet ok. Labs reviewed  Denies any chest pain sob. Reviewed interval ancillary notes    Current Medications      diphenhydrAMINE (BENADRYL) tablet 25 mg, Q6H PRN      ipratropium-albuterol (DUONEB) nebulizer solution 1 ampule, Q4H WA      amiodarone (CORDARONE) tablet 200 mg, Daily      atorvastatin (LIPITOR) tablet 40 mg, Nightly      carvedilol (COREG) tablet 25 mg, BID      furosemide (LASIX) injection 40 mg, Daily      sodium chloride flush 0.9 % injection 10 mL, 2 times per day      sodium chloride flush 0.9 % injection 10 mL, PRN      promethazine (PHENERGAN) tablet 12.5 mg, Q6H PRN    Or      ondansetron (ZOFRAN) injection 4 mg, Q6H PRN      polyethylene glycol (GLYCOLAX) packet 17 g, Daily PRN      acetaminophen (TYLENOL) tablet 650 mg, Q6H PRN    Or      acetaminophen (TYLENOL) suppository 650 mg, Q6H PRN      warfarin (COUMADIN) daily dosing (placeholder), RX Placeholder        Objective:  /75   Pulse 83   Temp 97.3 °F (36.3 °C) (Axillary)   Resp 20   Ht 5' 8\" (1.727 m)   Wt 175 lb 3.2 oz (79.5 kg)   SpO2 96%   BMI 26.64 kg/m²     Intake/Output Summary (Last 24 hours) at 1/12/2021 0843  Last data filed at 1/11/2021 1909  Gross per 24 hour   Intake    Output 300 ml   Net -300 ml      Wt Readings from Last 3 Encounters:   01/11/21 175 lb 3.2 oz (79.5 kg)   01/11/21 179 lb (81.2 kg)   12/21/20 169 lb (76.7 kg)       General appearance:  Appears comfortable  Eyes: Sclera clear. Pupils equal.  ENT: Moist oral mucosa. Trachea midline, no adenopathy. Cardiovascular: Regular rhythm, normal S1, S2. No murmur. No edema in lower extremities  Respiratory: Not using accessory muscles. Good inspiratory effort. Clear to auscultation bilaterally, no wheeze or crackles. GI: Abdomen soft, no tenderness, not distended, normal bowel sounds  Musculoskeletal: No cyanosis in digits, neck supple  Neurology: CN 2-12 grossly intact. No speech or motor deficits  Psych: Normal affect. Alert and oriented in time, place and person  Skin: Warm, dry, normal turgor    Labs and Tests:  CBC:   Recent Labs     01/11/21  1626 01/12/21  0508   WBC 5.5 5.0   HGB 8.5* 8.2*    136     BMP:    Recent Labs     01/11/21  1626 01/12/21  0508    141   K 3.7 3.6    102   CO2 29 30   BUN 60* 60*   CREATININE 3.1* 3.2*   GLUCOSE 165* 107*     Hepatic:   Recent Labs     01/11/21  1626   AST 33   ALT 22   BILITOT 1.3*   ALKPHOS 125       Discussed care with family and patient             Spent 30  minutes with patient and family at bedside and on unit reviewing medical records and labs, spent greater than 50% time counseling patient and family on diagnosis and plan   Problem List  Active Problems:    Heart failure ([facility] [address]  Resolved Problems:    * No resolved hospital problems. *       Assessment & Plan:   1. Acute on chronic combined CHF  -Improving. Continue IV Lasix for now  -Echocardiogram has been ordered.  -Weekend and output.  -Low-salt diet.   -Monitor renal function creatinine is 3.2 about the same as yesterday  -Nephrology has been consulted for the recommendation    Acute on chronic renal failure  -Creatinine is 3.2 baseline around 2.5  -With nephrology recommendation continue IV diuresis for now  -Replace electrolyte      Diet: DIET CARDIAC;  Code:Full Code  DVT PPX lovenox       Enzo Dyer MD   1/12/2021 8:43 AM

## 2021-01-12 NOTE — CONSULTS
[facility]  [phone]        Reason for Consultation/Chief Complaint: \" Shortness of breath, leg swelling. \"    History of Present Illness:  Yasemin Ling is a 68 y.o. patient who presented to the hospital with complaints of progressive shortness of breath and leg swelling. He denies chest discomfort. Past Medical History:   has a past medical history of Acute combined systolic and diastolic (congestive) hrt fail ([address] Acute kidney injury ([address] Arrhythmia, Atrial fibrillation ([address] Carotid artery stenosis, CKD (chronic kidney disease), Dilated cardiomyopathy ([address] Hyperlipidemia, Hypertension, IGT (impaired glucose tolerance), Meningioma ([address] and Valvular disease. Surgical History:   has a past surgical history that includes Aortic valve replacement; Carpal tunnel release (Left); skin biopsy; and Colonoscopy (2010). Social History:   reports that he quit smoking about 28 years ago. His smoking use included cigarettes. He has a 25.00 pack-year smoking history. His smokeless tobacco use includes chew. He reports that he does not drink alcohol or use drugs. Family History:  family history includes Heart Disease in his paternal grandfather; Hypertension in an other family member. Home Medications:  Were reviewed and are listed in nursing record. and/or listed below  Prior to Admission medications    Medication Sig Start Date End Date Taking?  Authorizing Provider   atorvastatin (LIPITOR) 40 MG tablet Take 1 tablet by mouth daily 12/29/20  Yes Jonathan Trent MD   carvedilol (COREG) 25 MG tablet Take 1 tablet by mouth 2 times daily 12/15/20  Yes Yanni Cummings MD   potassium chloride (KLOR-CON M) 20 MEQ extended release tablet TAKE 2 TABLETS BY MOUTH DAILY 10/29/20  Yes Joy Perez MD   torsemide (DEMADEX) 20 MG tablet Take 1 tablet by mouth 2 times daily 10/29/20  Yes Joy Perez MD allopurinol (ZYLOPRIM) 100 MG tablet TAKE 1 TABLET BY MOUTH DAILY 9/23/20  Yes Pedro Sparks MD   amiodarone (CORDARONE) 200 MG tablet Take 1 tablet by mouth daily 8/26/20  Yes SARAH Wilson CNP   warfarin (COUMADIN) 4 MG tablet Take 2-4 mg by mouth See Admin Instructions Take 2 mg (4 mg x 0.5 tablet) every Mon; take 4 mg (4 mg x 1 tablet) all other days. Managed by [facility]. Yes Historical Provider, MD   Multiple Vitamins-Minerals (THERAPEUTIC MULTIVITAMIN-MINERALS) tablet Take 1 tablet by mouth daily   Yes Historical Provider, MD        Allergies:  Ceclor [cefaclor]     Review of Systems:   A complete review of systems has been reviewed and updated today and is negative except as noted in the history of present illness. Physical Examination:    Vitals:    01/12/21 0911   BP:    Pulse:    Resp: 18   Temp:    SpO2: 94%    Weight: 175 lb 3.2 oz (79.5 kg)         General Appearance:  Alert, cooperative, no distress, appears stated age   Head:  Normocephalic, without obvious abnormality, atraumatic   Eyes:  EOMI, conjunctiva/corneas clear       Nose: Nares normal   Throat: Lips normal   Neck: Supple, symmetrical, trachea midline,  no carotid bruit or JVD       Lungs:   Clear to auscultation bilaterally, respirations unlabored   Chest Wall:  No tenderness or deformity   Heart:  Irregularly irregular S1, S2 normal, 2/6 systolic murmur, no rub or gallop   Abdomen:   Soft, non-tender, bowel sounds active all four quadrants,  no masses, no organomegaly           Extremities: Extremities normal, atraumatic, no cyanosis.   3+ edema   Pulses: Diminished and symmetric   Skin: Skin color, texture, turgor normal, no rashes or lesions   Pysch: Normal mood and affect   Neurologic: Normal gross motor and sensory exam.         Labs  CBC:   Lab Results   Component Value Date    WBC 5.0 01/12/2021    RBC 2.86 01/12/2021    HGB 8.2 01/12/2021    HCT 26.0 01/12/2021    MCV 90.9 01/12/2021 RDW 20.0 01/12/2021     01/12/2021     CMP:    Lab Results   Component Value Date     01/12/2021    K 3.6 01/12/2021    K 3.9 08/05/2020     01/12/2021    CO2 30 01/12/2021    BUN 60 01/12/2021    CREATININE 3.2 01/12/2021    GFRAA 23 01/12/2021    GFRAA >60 12/28/2011    AGRATIO 0.8 07/25/2019    LABGLOM 19 01/12/2021    LABGLOM 61 12/29/2018    GLUCOSE 107 01/12/2021    GLUCOSE 98 03/09/2020    PROT 7.2 01/11/2021    PROT 7.8 12/28/2011    CALCIUM 8.7 01/12/2021    BILITOT 1.3 01/11/2021    BILITOT 0.9 07/25/2019    ALKPHOS 125 01/11/2021    AST 33 01/11/2021    ALT 22 01/11/2021     LIPIDS: No components found for: TOTAL CHOLESTEROL,  HDL,  LDL,  TRIGLYCERIDES  PT/INR:  No results found for: PTINR  Lab Results   Component Value Date    CKTOTAL 86 08/20/2020    TROPONINI 0.02 (H) 01/11/2021       EKG:  I have reviewed EKG with the following interpretation:  Impression:    11-JAN-2021 16:04:18 [facility] ROUTINE RECORD  Atrial fibrillation  Incomplete left bundle branch block  T wave abnormality, consider lateral ischemia or digitalis effect  Prolonged QT  Abnormal ECG    Imaging/Procedures:   Echo 6/30/2020:   Summary   Normal left ventricular size. Concentric left ventricular hypertrophy. Decreased left ventricular systolic function with inferoseptal hypokinesis. Estimated EF 30%. E/e'=28. Indeterminate diastolic function. A-fib. Mild thickening of anterior leaflet of mitral valve that appears to prolapse   mildly. The maximum mitral valve pressure gradient is 12 mmHg and the mean pressure gradient is 5 mmHg. Moderately severe mitral regurgitation with posteriorly directed jet. The left atrium is dilated. A mechanical aortic valve appears well seated with a maximum gradient of 35 mmHg and a mean gradient of 22 mmHg. Trivial aortic regurgitation. The ascending aorta is mildly dilated. 4.0cm. Normal right ventricular size and mildly decreased in function.

## 2021-01-12 NOTE — PROGRESS NOTES
Clinical Pharmacy Note: Pharmacy to Dose Warfarin    Pharmacy consulted by to dose warfarin. Laurita Acuna is a 68 y.o. male  is receiving warfarin for indication: mechanical aortic valve and AFib. INR Goal Range: 2.5-3.5  Prior to admission warfarin dosing regimen: 1mg every Mon, 2mg all other days    INR today:   Lab Results   Component Value Date    INR 4.44 01/12/2021       Assessment/Plan:  INR is supratherapeutic on prior to admission dosing regimen. Possible concomitant drug-drug interactions include: amiodarone   Based on today's assessment, hold warfarin this evening. Daily INR is ordered. Pharmacy will continue to monitor and make adjustments to regimen as necessary.      Thank you for the consult,     Alona Kevin, PharmD  1/12/2021 11:03 AM

## 2021-01-13 NOTE — PROGRESS NOTES
Pt titrated to 3 L oxygen to maintain oxygen saturation > 90%\      Medical status given to Roland Alcantara (pt calls her Ines Gitelman) at [phone]; pt does require help dialing wife's number and answering telephone. Wife expressed concerned for pt's mental status. Wife stated \"Sadiq has been hearing and seeing things that aren't there since this summer. He thought someone was living above us and there was noone there. \" Pt would appreciate a consult r/t to pt's mental status. Jannave sent to hospitalist - will mention at RN hand-off to f/u.     Pt goes by [employer]"

## 2021-01-13 NOTE — PROGRESS NOTES
Pt has been unable to sleep; nicotine patch placed at 0330, pt did nod off to sleep & awakened orientated only to self at 0400; pt was able to be reorientated to place, situation and time; pt remains uneasy; RN remains at bedside while pt tries to fall back asleep.

## 2021-01-13 NOTE — PROGRESS NOTES
Pharmacy to Dose Warfarin    Pharmacy consulted to dose warfarin for mechanical aortic valve and afib. INR Goal: 2.5-3.5    INR today: 4.26    Assessment/Plan:  - INR supratherapeutic, slight trend down today  - Hold warfarin tonight    Pharmacy will continue to follow.     Chris Juarez, PharmD  1/13/2021 2:24 PM

## 2021-01-13 NOTE — PROGRESS NOTES
MD Kimi Milner MD Curry Garden, MD               Office: [phone]                      Fax: [phone]             [facility]                   NEPHROLOGY INPATIENT PROGRESS NOTE:     PATIENT NAME: Tiki Hollingsworth  : 1940  MRN: [id number]       RECOMMENDATIONS:   - lasix - increase to BID IV , as tolerating - so to push more   - order strict I/O, daily weight,   - add edFR 1.2L/day    - check urine lytes, BNP , renal US   - monitor PVR w/ bladder scan for russo insertion need. - no need for dialysis , but at higher risk for decompensation, needing closer monitoring     D/C plan from renal stand point:  - f/u diuresis, likely tomorrow, if fluid overload / RACIEL better   - then f/u w/ Dr Umang Buchanan on     D/W Dr Michelle Appiah:       RACIEL (on proteinuric CKD-3):    - non-Oligouric  - BL Scr- - recent BL worsened from 1.6 -1.7.  --->2.5-2.4 ---> 3.1 on admission  : Etiology of RACIEL - presumed CRS w/ developed ATN     - other differentials: unlikely  , r/o obstruction   - UA : small blood, 11-20 hyaline cast = pre-renal component / CKD :     Proteinuric CKD-3 - follows w/ Dr Umang Buchanan   - next f/u is on 2021   - CKD likely d/to HTN / CR physiology     Systolic CHF - EF 28-91%. - Off ACEI. - dry weight- 161 pounds      Associated problems:   - Azotemia: pre-renal at BUN 60   - Electrolytes: follow   - Volume status: mild hyper-volemia  : BP: no need for tight control   - Acid-Base: stable   - Anemia: mild - follow      H/O HLD - CK WNL. H/O Microscopic Hematuria-SNOW, complements WNL. ANCA neg. UPCR 360mg. H/O Nephrolithiasis- low Na intake and higher fluid intake. Other problems: Management per primary and other consulting teams.    Hospital Problems           Last Modified POA    Essential hypertension, benign (Chronic) 2021 Yes    Atrial fibrillation with RVR ([address] 2021 Yes Stage 3 chronic kidney disease 1/12/2021 Yes    Aortic regurgitation (Chronic) 1/12/2021 Yes    Chronic combined systolic and diastolic CHF (congestive heart failure) ([facility] [address] 1/12/2021 Yes    Heart failure ([facility] [address] 1/11/2021 Yes           : other supportive care :   - Check daily renal function panel with electrolytes-phosphorus  - Strict monitoring of I/Os, daily weight  - Renal feeds/diet  - Current medications reviewed. - Nephrotoxic medications have been discontinued. - Dose adjusted and appropriate. - Dose meds for eGFR <15 mL/min/1.73m2 during RACIEL    - Avoid heavy opioids due to renal failure - may use very low dose dilaudid / fentanyl with close monitoring of CNS and respiratory depression. Please refer to the orders. High Complexity. Multiple complex problems. Discussed with patient, and treatment team-    Thank you for allowing me to participate in this patient's care. Please do not hesitate to contact me with any questions/concerns. We will follow along with you. Justino Castellanos MD  [facility] [id number] [place] [address] [place]: [phone] or Via [employer]  Fax: [phone]        ========================================================   ========================================================       Subjective:  Seen resting in bed  Some c/o SOB  Leg edema not improving      Past medical, Surgical, Social, Family medical history reviewed by me. MEDICATIONS: reviewed by me. Medications Prior to Admission:  No current facility-administered medications on file prior to encounter.       Current Outpatient Medications on File Prior to Encounter   Medication Sig Dispense Refill    atorvastatin (LIPITOR) 40 MG tablet Take 1 tablet by mouth daily 90 tablet 3    carvedilol (COREG) 25 MG tablet Take 1 tablet by mouth 2 times daily 180 tablet 1    potassium chloride (KLOR-CON M) 20 MEQ extended release tablet TAKE 2 TABLETS BY MOUTH DAILY 180 tablet 1  torsemide (DEMADEX) 20 MG tablet Take 1 tablet by mouth 2 times daily 180 tablet 1    allopurinol (ZYLOPRIM) 100 MG tablet TAKE 1 TABLET BY MOUTH DAILY 90 tablet 3    amiodarone (CORDARONE) 200 MG tablet Take 1 tablet by mouth daily 90 tablet 3    warfarin (COUMADIN) 4 MG tablet Take 2-4 mg by mouth See Admin Instructions Take 2 mg (4 mg x 0.5 tablet) every Mon; take 4 mg (4 mg x 1 tablet) all other days. Managed by [facility].       Multiple Vitamins-Minerals (THERAPEUTIC MULTIVITAMIN-MINERALS) tablet Take 1 tablet by mouth daily           Current Facility-Administered Medications:     nicotine (NICODERM CQ) 21 MG/24HR 1 patch, 1 patch, Transdermal, Daily, Giovany Lindsay PA-C, 1 patch at 01/13/21 0324    diphenhydrAMINE (BENADRYL) tablet 25 mg, 25 mg, Oral, Q6H PRN, Giovany Lindsay PA-C    ipratropium-albuterol (DUONEB) nebulizer solution 1 ampule, 1 ampule, Inhalation, Q4H WA, Jarvis Dunn MD, 1 ampule at 01/13/21 0756    amiodarone (CORDARONE) tablet 200 mg, 200 mg, Oral, Daily, Joseph Gregg MD, 200 mg at 01/13/21 0824    atorvastatin (LIPITOR) tablet 40 mg, 40 mg, Oral, Nightly, Joseph Gregg MD, 40 mg at 01/12/21 2203    carvedilol (COREG) tablet 25 mg, 25 mg, Oral, BID, Jsoeph Gregg MD, 25 mg at 01/13/21 0824    furosemide (LASIX) injection 40 mg, 40 mg, Intravenous, Daily, Joseph Gregg MD, 40 mg at 01/13/21 0824    sodium chloride flush 0.9 % injection 10 mL, 10 mL, Intravenous, 2 times per day, Joseph Gregg MD, 10 mL at 01/13/21 0824    sodium chloride flush 0.9 % injection 10 mL, 10 mL, Intravenous, PRN, Joseph Gregg MD    promethazine (PHENERGAN) tablet 12.5 mg, 12.5 mg, Oral, Q6H PRN **OR** ondansetron (ZOFRAN) injection 4 mg, 4 mg, Intravenous, Q6H PRN, Joseph Gregg MD    polyethylene glycol (GLYCOLAX) packet 17 g, 17 g, Oral, Daily PRN, Joseph Gregg MD   acetaminophen (TYLENOL) tablet 650 mg, 650 mg, Oral, Q6H PRN **OR** acetaminophen (TYLENOL) suppository 650 mg, 650 mg, Rectal, Q6H PRN, Joseph Gregg MD    warfarin (COUMADIN) daily dosing (placeholder), , Other, RX Placeholder, Joseph Gregg MD, Given at 01/11/21 4036      REVIEW OF SYSTEMS:  As mentioned in chief complaint and HPI , Subjective     PHYSICAL EXAM:  Recent vital signs and recent I/Os reviewed by me. Wt Readings from Last 3 Encounters:   01/13/21 175 lb 3.2 oz (79.5 kg)   01/11/21 179 lb (81.2 kg)   12/21/20 169 lb (76.7 kg)     BP Readings from Last 3 Encounters:   01/13/21 119/65   01/11/21 107/61   12/28/20 103/65     Patient Vitals for the past 24 hrs:   BP Temp Temp src Pulse Resp SpO2 Weight   01/13/21 0812 119/65 97.5 °F (36.4 °C) Oral 110 18 90 % 175 lb 3.2 oz (79.5 kg)   01/13/21 0756     18 97 %    01/13/21 0325 116/65 97.5 °F (36.4 °C) Oral 83 18 93 %    01/12/21 2010     20 96 %    01/12/21 1945 120/64 97.7 °F (36.5 °C) Oral 96 18 96 %    01/12/21 1600 116/64 98.3 °F (36.8 °C) Axillary 79 18 98 %    01/12/21 1245 111/63 98 °F (36.7 °C) Axillary 81  94 %        Intake/Output Summary (Last 24 hours) at 1/13/2021 1014  Last data filed at 1/13/2021 1001  Gross per 24 hour   Intake 950 ml   Output 1325 ml   Net -375 ml         Physical Exam  Vitals signs reviewed. Constitutional:       General: He is sleeping. He is not in acute distress. Appearance: He is ill-appearing. Comments: Obese body habitus   HENT:      Head: Normocephalic and atraumatic. Right Ear: External ear normal.      Left Ear: External ear normal.      Mouth/Throat:      Mouth: Mucous membranes are not dry. Eyes:      General: No scleral icterus. Conjunctiva/sclera: Conjunctivae normal.   Neck:      Musculoskeletal: Neck supple. Thyroid: No thyroid mass. Vascular: No JVD.       Trachea: Trachea normal.   Cardiovascular: Rate and Rhythm: Normal rate and regular rhythm. Pulmonary:      Effort: Pulmonary effort is normal.      Breath sounds: Normal breath sounds. Abdominal:      General: Bowel sounds are normal. There is distension. Palpations: Abdomen is soft. Musculoskeletal:         General: No deformity. Right lower leg: Edema present. Left lower leg: Edema present. Skin:     General: Skin is warm and dry. Neurological:      Mental Status: He is oriented to person, place, and time and easily aroused. Psychiatric:         Behavior: Behavior normal.              DATA:  Diagnostic tests reviewed by me for today's visit:    Recent Labs     01/11/21 1626 01/12/21  0508 01/13/21  0548   WBC 5.5 5.0 5.7   HCT 27.1* 26.0* 26.0*    136 131*     Iron Saturation:  No components found for: PERCENTFE  FERRITIN:    Lab Results   Component Value Date    FERRITIN 67.1 08/04/2020     IRON:    Lab Results   Component Value Date    IRON 21 08/04/2020     TIBC:    Lab Results   Component Value Date    TIBC 304 08/04/2020       Recent Labs     01/11/21  1626 01/12/21  0508 01/13/21  0548    141 143   K 3.7 3.6 3.8    102 104   CO2 29 30 31   BUN 60* 60* 53*   CREATININE 3.1* 3.2* 2.7*     Recent Labs     01/11/21 1626 01/12/21  0508 01/13/21  0548   CALCIUM 8.5 8.7 8.7   MG 2.80*  --   --    PHOS  --   --  3.6     No results for input(s): PH, PCO2, PO2 in the last 72 hours.     Invalid input(s): Nehemiah Hinders    ABG:  No results found for: PH, PCO2, PO2, HCO3, BE, THGB, TCO2, O2SAT  VBG:  No results found for: PHVEN, XVN7CIR, BEVEN, Y2DPTSLB    LDH:  No results found for: LDH  Uric Acid:    Lab Results   Component Value Date    LABURIC 4.7 07/25/2019       PT/INR:    Lab Results   Component Value Date    PROTIME 50.2 01/13/2021    PROTIME 48.3 08/31/2018    INR 4.26 01/13/2021     Warfarin PT/INR:  No components found for: PTPATWAR, PTINRWAR  PTT:    Lab Results   Component Value Date APTT 46.8 01/11/2021   [APTT}  Last 3 Troponin:    Lab Results   Component Value Date    TROPONINI 0.02 01/11/2021    TROPONINI 0.03 07/30/2020    TROPONINI 0.03 07/30/2020       U/A:    Lab Results   Component Value Date    COLORU YELLOW 01/11/2021    PROTEINU 30 01/11/2021    PHUR 5.5 01/11/2021    LABCAST 10-20 Hyaline 04/23/2017    WBCUA 1 01/11/2021    RBCUA 2 01/11/2021    YEAST 0 12/09/2019    BACTERIA Rare 07/30/2020    CLARITYU Clear 01/11/2021    SPECGRAV 1.013 01/11/2021    LEUKOCYTESUR Negative 01/11/2021    UROBILINOGEN 1.0 01/11/2021    BILIRUBINUR Negative 01/11/2021    BLOODU SMALL 01/11/2021    GLUCOSEU Negative 01/11/2021     Microalbumen/Creatinine ratio:  No components found for: RUCREAT  24 Hour Urine for Protein:  No components found for: RAWUPRO, UHRS3, XCAO41XV, UTV3  24 Hour Urine for Creatinine Clearance:  No components found for: CREAT4, UHRS10, UTV10  Urine Toxicology:  No components found for: Marylee Druze, IBENZO, ICOCAINE, IMARTHC, IOPIATES, IPHENCYC    HgBA1c:    Lab Results   Component Value Date    LABA1C 6.3 07/25/2019     RPR:  No results found for: RPR  HIV:  No results found for: HIV  SNOW:    Lab Results   Component Value Date    SNOW Negative 08/20/2020     RF:  No results found for: RF  DSDNA:  No components found for: DNA  AMYLASE:  No results found for: AMYLASE  LIPASE:  No results found for: LIPASE  Fibrinogen Level:  No components found for: FIB       BELOW MENTIONED RADIOLOGY STUDY RESULTS BY ME:    Xr Chest Portable    Result Date: 1/11/2021  EXAMINATION: ONE XRAY VIEW OF THE CHEST 1/11/2021 3:47 pm COMPARISON: 07/30/2020 HISTORY: ORDERING SYSTEM PROVIDED HISTORY: SOB TECHNOLOGIST PROVIDED HISTORY: Reason for exam:->SOB Acuity: Unknown FINDINGS: Status post median sternotomy. Cardiomegaly. Pulmonary vascular congestion. Pulmonary edema. No focal pulmonary consolidation.      Findings suggest congestive heart failure

## 2021-01-13 NOTE — PROGRESS NOTES
[facility] PROGRESS NOTE    1/13/2021 2:45 PM        Name: Imtiaz Newby . Admitted: 1/11/2021  Primary Care Provider: Marikay Hodgkins, MD (Tel: [phone])                        Subjective:  . No acute events overnight. Resting well. Pain control. Diet ok. Labs reviewed  Denies any chest pain sob. Reviewed interval ancillary notes    Current Medications      nicotine (NICODERM CQ) 21 MG/24HR 1 patch, Daily      furosemide (LASIX) injection 40 mg, BID      diphenhydrAMINE (BENADRYL) tablet 25 mg, Q6H PRN      ipratropium-albuterol (DUONEB) nebulizer solution 1 ampule, Q4H WA      amiodarone (CORDARONE) tablet 200 mg, Daily      atorvastatin (LIPITOR) tablet 40 mg, Nightly      carvedilol (COREG) tablet 25 mg, BID      sodium chloride flush 0.9 % injection 10 mL, 2 times per day      sodium chloride flush 0.9 % injection 10 mL, PRN      promethazine (PHENERGAN) tablet 12.5 mg, Q6H PRN    Or      ondansetron (ZOFRAN) injection 4 mg, Q6H PRN      polyethylene glycol (GLYCOLAX) packet 17 g, Daily PRN      acetaminophen (TYLENOL) tablet 650 mg, Q6H PRN    Or      acetaminophen (TYLENOL) suppository 650 mg, Q6H PRN      warfarin (COUMADIN) daily dosing (placeholder), RX Placeholder        Objective:  /62   Pulse 88   Temp 97.7 °F (36.5 °C) (Oral)   Resp 18   Ht 5' 8\" (1.727 m)   Wt 175 lb 3.2 oz (79.5 kg)   SpO2 95%   BMI 26.64 kg/m²     Intake/Output Summary (Last 24 hours) at 1/13/2021 1445  Last data filed at 1/13/2021 1257  Gross per 24 hour   Intake 1070 ml   Output 1675 ml   Net -605 ml      Wt Readings from Last 3 Encounters:   01/13/21 175 lb 3.2 oz (79.5 kg)   01/11/21 179 lb (81.2 kg)   12/21/20 169 lb (76.7 kg)       General appearance:  Appears comfortable  Eyes: Sclera clear.  Pupils equal. Code:Full Code  DVT PPX lovenox       Rolly Pena MD   1/13/2021 2:45 PM

## 2021-01-13 NOTE — PROGRESS NOTES
Lucrecia sent to hospitalist requesting nicotine patch; pt has chewing tobacco and agreed not use, if he had nicotine patch

## 2021-01-13 NOTE — CONSULTS
[address]  Advanced CHF/Pulmonary Hypertension   Cardiac Evaluation      Em Hernandez  YOB: 1940    Requesting PHysician:  Dr. Danny Thomas      Chief Complaint   Patient presents with    Foot Swelling     pt sent by doctor for bilat leg/foot swelling. pt states it's been going off and on for several years. pt with greg boot wrap on bilat lower extremities, states if he takes them off legs start blistering from the fluid. History of Present Illness:  Adelia Hooper is a 67 yo male who presents to the ED for evaluation of bilateral leg swelling. He has a history of chronic kidney disease, hypertension, hyperlipidemia, HF. He also has a history of atrial fibrillation and is anticoagulated on warfarin. Over the past year, he has had increased swelling to both of his legs. Over the past week, he has had increased swelling up to his scrotum. He has gained 7-8 pounds. Denies chest pain. He has had increasing PND and orthopnea. Denies cough or congestion. No abdominal pain, nausea, vomiting, or diarrhea. WE are consulted for management of his HF. He has chronic kidney failure with creatinine in the 2 range. His creatinine today is 3.2. Family has noticed that he has been more confused. Pertinent labs:  BNP 6739 (was 4049 on 8/20/20)  BUN/Cre 53/2.7 (baseline Cre around 2.5  H/H 8.2/26.0    Meds prior to admission:  Lipitor 40 qd  Carvedilol 25 bid  KCl 40 qd  Torsemide 20 bid  Amiodarone 200 qd  Warfarin    Echo:  1/12/21:   -Normal left ventricular size.   -Concentric left ventricular hypertrophy.   -Decreased left ventricular systolic function with inferoseptal hypokinesis. -Estimated EF 35-40%. -E/e'=27.   -Indeterminate diastolic function. -Mild thickening of anterior leaflet of mitral valve that appears to   prolapse mildly.   -The maximum mitral valve pressure gradient is 14mmHg and the mean pressure   gradient is 5 mmHg. -Moderately mitral regurgitation with posteriorly directed jet. -A mechanical aortic valve appears well seated. -The left atrium is dilated. -The right atrium is dilated. -Severe tricuspid regurgitation directed anteriorly. -PASP 36mmHg.   -Trivial pulmonic regurgitation present. I/O negative 500 cc       Allergies   Allergen Reactions    Ceclor [Cefaclor] Other (See Comments)     CNS side effects, slurred speech. Lips and tounge swell.      Current Facility-Administered Medications   Medication Dose Route Frequency Provider Last Rate Last Admin    nicotine (NICODERM CQ) 21 MG/24HR 1 patch  1 patch Transdermal Daily Karri Faustin PA-C   1 patch at 01/13/21 0324    furosemide (LASIX) injection 40 mg  40 mg Intravenous BID Hanna Newberry MD        diphenhydrAMINE (BENADRYL) tablet 25 mg  25 mg Oral Q6H PRN Karri Faustin PA-C        ipratropium-albuterol (DUONEB) nebulizer solution 1 ampule  1 ampule Inhalation Q4H LATRICE Raymond MD   1 ampule at 01/13/21 0756    amiodarone (CORDARONE) tablet 200 mg  200 mg Oral Daily Nery Otoole MD   200 mg at 01/13/21 0824    atorvastatin (LIPITOR) tablet 40 mg  40 mg Oral Nightly Nery Otoole MD   40 mg at 01/12/21 2203    carvedilol (COREG) tablet 25 mg  25 mg Oral BID Nery Otoole MD   25 mg at 01/13/21 0824    sodium chloride flush 0.9 % injection 10 mL  10 mL Intravenous 2 times per day Nery Otoole MD   10 mL at 01/13/21 0824    sodium chloride flush 0.9 % injection 10 mL  10 mL Intravenous PRN Nery Otoole MD        promethazine (PHENERGAN) tablet 12.5 mg  12.5 mg Oral Q6H PRN Nery Otoole MD        Or    ondansetron (ZOFRAN) injection 4 mg  4 mg Intravenous Q6H PRN Nery Otoole MD        polyethylene glycol (GLYCOLAX) packet 17 g  17 g Oral Daily PRN Nery Otoole MD        acetaminophen (TYLENOL) tablet 650 mg  650 mg Oral Q6H PRN Nery Otoole MD        Or  acetaminophen (TYLENOL) suppository 650 mg  650 mg Rectal Q6H PRN Jose Stern MD        warfarin (COUMADIN) daily dosing (placeholder)   Other Matheus Bolaños MD   Given at 21 9210       Past Medical History:   Diagnosis Date    Acute combined systolic and diastolic [facility]congestive[facility]rt fail [facility] [address]     Acute kidney injury ([facility] [address]     Arrhythmia     Atrial fibrillation ([facility] [address] [facility]     Carotid artery stenosis     CKD (chronic kidney disease)     Dilated cardiomyopathy (HCC)     Hyperlipidemia     Hypertension     IGT (impaired glucose tolerance)     Meningioma (HCC)     Valvular disease      Past Surgical History:   Procedure Laterality Date    AORTIC VALVE REPLACEMENT      CARPAL TUNNEL RELEASE Left     COLONOSCOPY  2010    SKIN BIOPSY       Family History   Problem Relation Age of Onset    Heart Disease Paternal Grandfather     Hypertension Other     High Cholesterol Neg Hx     High Blood Pressure Neg Hx      Social History     Socioeconomic History    Marital status:      Spouse name: Not on file    Number of children: Not on file    Years of education: Not on file    Highest education level: Not on file   Occupational History    Not on file   Social Needs    Financial resource strain: Not on file    Food insecurity     Worry: Not on file     Inability: Not on file    Transportation needs     Medical: Not on file     Non-medical: Not on file   Tobacco Use    Smoking status: Former Smoker     Packs/day: 1.00     Years: 25.00     Pack years: 25.00     Types: Cigarettes     Quit date: 1993     Years since quittin.0    Smokeless tobacco: Current User     Types: Chew     Last attempt to quit: 1982    Tobacco comment: quit 25 yrs ago   Substance and Sexual Activity    Alcohol use: No     Alcohol/week: 0.0 standard drinks    Drug use: No    Sexual activity: Yes     Partners: Female     Comment:    Lifestyle    Physical activity Days per week: Not on file     Minutes per session: Not on file    Stress: Not on file   Relationships    Social connections     Talks on phone: Not on file     Gets together: Not on file     Attends Muslim service: Not on file     Active member of club or organization: Not on file     Attends meetings of clubs or organizations: Not on file     Relationship status: Not on file    Intimate partner violence     Fear of current or ex partner: Not on file     Emotionally abused: Not on file     Physically abused: Not on file     Forced sexual activity: Not on file   Other Topics Concern    Not on file   Social History Narrative    Not on file       Review of Systems:   · Constitutional: there has been no unanticipated weight loss. There's been no change in energy level, sleep pattern, or activity level. · Eyes: No visual changes or diplopia. No scleral icterus. · ENT: No Headaches, hearing loss or vertigo. No mouth sores or sore throat. · Cardiovascular: Reviewed in HPI  · Respiratory: No cough or wheezing, no sputum production. No hematemesis. · Gastrointestinal: No abdominal pain, appetite loss, blood in stools. No change in bowel or bladder habits. · Genitourinary: No dysuria, trouble voiding, or hematuria. · Musculoskeletal:  No gait disturbance, weakness or joint complaints. · Integumentary: No rash or pruritis. · Neurological: No headache, diplopia, change in muscle strength, numbness or tingling. No change in gait, balance, coordination, mood, affect, memory, mentation, behavior. · Psychiatric: No anxiety, no depression. · Endocrine: No malaise, fatigue or temperature intolerance. No excessive thirst, fluid intake, or urination. No tremor. · Hematologic/Lymphatic: No abnormal bruising or bleeding, blood clots or swollen lymph nodes. · Allergic/Immunologic: No nasal congestion or hives.     Physical Examination:    Vitals:    01/13/21 1217 01/13/21 0756 01/13/21 4563 01/13/21 1143 BP: 116/65  119/65 109/62   Pulse: 83  110 88   Resp: 18 18 18 18   Temp: 97.5 °F (36.4 °C)  97.5 °F (36.4 °C) 97.7 °F (36.5 °C)   TempSrc: Oral  Oral Oral   SpO2: 93% 97% 90% 98%   Weight:   175 lb 3.2 oz (79.5 kg)    Height:         Body mass index is 26.64 kg/m². Wt Readings from Last 3 Encounters:   01/13/21 175 lb 3.2 oz (79.5 kg)   01/11/21 179 lb (81.2 kg)   12/21/20 169 lb (76.7 kg)     BP Readings from Last 3 Encounters:   01/13/21 109/62   01/11/21 107/61   12/28/20 103/65     Constitutional and General Appearance:   Chronically ill appearing  HEENT:  NC/AT  [place]  No problems with hearing  Skin:  Warm, dry  Respiratory:  · Normal excursion and expansion without use of accessory muscles  · Resp Auscultation: Normal breath sounds without dullness  Cardiovascular:  · The apical impulses not displaced  · Heart tones are crisp and normal  · Cervical veins are not engorged  · The carotid upstroke is normal in amplitude and contour without delay or bruit  · JVP less than 8 cm H2O  RRR with nl S1 and S2 without m,r,g  · Peripheral pulses are symmetrical and full  · There is no clubbing, cyanosis of the extremities. · 2-3+ bilateral edema up to scrotum  · Femoral Arteries: 2+ and equal  · Pedal Pulses: 2+ and equal   Neck:  · No thyromegaly  Abdomen:  · No masses or tenderness  · Liver/Spleen: No Abnormalities Noted  Neurological/Psychiatric:  · Alert and oriented in all spheres  · Moves all extremities well  · Exhibits normal gait balance and coordination  · No abnormalities of mood, affect, memory, mentation, or behavior are noted    Labs were reviewed including labs from other hospital systems through [facility]. Cardiac testing was reviewed including echos, nuclear scans, cardiac catheterization, including from other hospital systems through [facility]. Assessment:    1.  Acute on chronic combined systolic and diastolic congestive heart failure, unspecified heart failure type ([address]

## 2021-01-13 NOTE — CARE COORDINATION
Discharge Planning Assessment    SW/discharge planner spoke with patient's spouse to discuss reason for admission, current living situation, and potential needs at the time of discharge    Demographics/Insurance verified Yes/No: Yes/Medicare/[employer]    Current type of dwellin story home with 3 to 4 steps to enter. Patient from ECF/SW confirmed with: N/A    Living arrangements: Patient lives with spouse. Level of function/Support: Patient independent with ADLs and IADLs prior to admission to [facility]. Lines (IV antibiotics, PICC, Dialysis): IV diuresis    TUBES (Chest Tube, NG, CORPAK):    DIET: DIET CARDIAC    WOUNDS (Wound Vac/Wound Care, HHC RN)   Venous stasis ulcer of right ankle with fat layer exposed without varicose veins (HCC)     Venous stasis ulcer of left calf with fat layer exposed without varicose veins (HCC)    Hypokalemia     Patient followed at the [id number] [place] [address] clinic. PCP: Anatoly Rahman MD     Last Visit to PCP: Not Sure    DME: Sarah De La O    Active with any community resources/agencies/skilled home care: None    Medication compliance issues:None    Financial issues that could impact healthcare: None    Tentative discharge plan: To home with follow- up in wound care clinic    Discussed and provided facilities of choice if transition to a skilled nursing facility is required at the time of discharge: None      Discussed with patient and/or family that on the day of discharge home tentative time of discharge will be between 10 AM and noon. Transportation at the time of discharge: Family will provide transportation to home.     Electronically signed by JULIETH Cobb on 2021 at 9:13 AM

## 2021-01-13 NOTE — PROGRESS NOTES
Nutrition Note    CHF diet education    Pt declined CHF diet education. RD left handouts at bedside, pt accepted.   CHF 1 min    Electronically signed by Kenny Del Cid RD, CNSC, LD on 1/13/21 at 11:20 AM EST    Contact: [phone]

## 2021-01-14 NOTE — PROGRESS NOTES
Attempted to visit patient for heart failure teaching. Patient seeing dogs under his chair and birds in his room. Will re-visit when able.

## 2021-01-14 NOTE — PROGRESS NOTES
Updated pt's wife Yolis Kent on plan of care. She states that pt started hallucinating back in August here in the hospital, but that it was mild and thought to be due to his kidney's. She states that for the last 3-4 weeks the pt has been hallucinating all of the time at home seeing people that are not there in their home. The pt's son now states that he does not sleep at night at all, is up and down all night. He naps off and on all day. I did ask them both about drinking alcohol. His wife states that he stopped drinking 20 years ago, and really didn't drink much after their children were born. To monitor.   Leslie Morales RN

## 2021-01-14 NOTE — PROGRESS NOTES
Multiple attempts to contact  for this pt to let them know that the pt will not be discharged today.   Leanne Jay RN

## 2021-01-14 NOTE — PROGRESS NOTES
Pt was found covered in blood from his peripheral IV that he removed. Telemetry was off and pt had taken off his hospital gown. He continues to become more agitated and more confused.   Philomena Ace RN

## 2021-01-14 NOTE — PROGRESS NOTES
Occupational Therapy   Occupational Therapy Initial Assessment  Date: 2021   Patient Name: Alejandra Leggett  MRN: [id number]     : 1940    Date of Service: 2021    Discharge Recommendations: Alejandra Leggett scored a 13/24 on the AM-PAC ADL Inpatient form. Current research shows that an AM-PAC score of 17 or less is typically not associated with a discharge to the patient's home setting. Based on the patient's AM-PAC score and their current ADL deficits, it is recommended that the patient have 3-5 sessions per week of Occupational Therapy at d/c to increase the patient's independence. Please see assessment section for further patient specific details. Do not recommend patient to go home at this time, patient reports he drives and does own cooking. If patient discharges prior to next session this note will serve as a discharge summary. Please see below for the latest assessment towards goals. OT Equipment Recommendations  Equipment Needed: Yes  Mobility Devices: Christopher Ly: Rolling    Assessment   Performance deficits / Impairments: Decreased functional mobility ; Decreased safe awareness;Decreased cognition  Assessment: Patient is not at baseline level following admission to hospital with heart failure. Patient is unsafe to go home at this time, during documentation after evaluation the bed alarm was going off and the PT went in and found patient confused and seeing dogs under his jacket. Notifed nurse. Treatment Diagnosis: Debility and decreased ADLs due to heart failure  Prognosis: Fair  Decision Making: Medium Complexity  History: Lives with wife, indep ADLs, indep homemaking, reports bad balance and has fallen several times in last 6 months, chart reports wife stated patient has been having periods of auditory and visual hallucinations.   Exam: Indep bed mobility, refusing ADLs, CGA with RW, improved with RW (patient usually does not use anything but a cane PRN) Assistance / Modification: PATSY  OT Education: OT Role;Plan of Care;ADL Adaptive Strategies;Transfer Training  Patient Education: Patient with decreased safety awareness, need further reinforcement. Barriers to Learning: Cognition and hearing  REQUIRES OT FOLLOW UP: Yes  Activity Tolerance  Activity Tolerance: Patient Tolerated treatment well  Activity Tolerance: Patient did c/o SOB and fatigue after ambulation, sats 83% on room air, recovered only to 88% after several minutes. Placed on 2L of O2 NC  Safety Devices  Safety Devices in place: Yes  Type of devices: All fall risk precautions in place; Left in bed;Nurse notified;Call light within reach; Patient at risk for falls; Bed alarm in place(Recommend safety camera in room due to confusion and decreased safety awareness)  Restraints  Initially in place: No           Patient Diagnosis(es): The primary encounter diagnosis was Acute on chronic congestive heart failure, unspecified heart failure type ([facility] [address] A diagnosis of Acute renal failure superimposed on chronic kidney disease, unspecified CKD stage, unspecified acute renal failure type [facility]) was also pertinent to this visit. has a past medical history of Acute combined systolic and diastolic (congestive) hrt fail ([address] Acute kidney injury ([address] Arrhythmia, Atrial fibrillation ([address] Carotid artery stenosis, CKD (chronic kidney disease), Dilated cardiomyopathy ([address] Hyperlipidemia, Hypertension, IGT (impaired glucose tolerance), Meningioma ([address] and Valvular disease. has a past surgical history that includes Aortic valve replacement; Carpal tunnel release (Left); skin biopsy; and Colonoscopy (2010).     Treatment Diagnosis: Debility and decreased ADLs due to heart failure      Restrictions  Restrictions/Precautions  Restrictions/Precautions: Fall Risk(1200 fluid restriction)  Required Braces or Orthoses?: No  Position Activity Restriction Other position/activity restrictions: Miri Palacios is a 68 y.o. male who presents to the emergency department today for evaluation for bilateral leg swelling. The patient has a history of chronic kidney disease, hypertension, hyperlipidemia, CHF, he has a history of A. fib and is anticoagulated on Coumadin. The patient states that intermittently for over a year he has been experiencing swelling to his bilateral lower legs. The patient apparently was at Dr. Analy Kitchen office today, and was sent to the emergency room for further care and evaluation. The patient states that over the past 2 to 3 days he has had increasing swelling to his bilateral lower legs, and he states that he has been having increasing shortness of breath, particularly at night. Shortness of breath is worse if he tries to lay flat. The patient denies any chest pain. He has gained 7 to 8 pounds. He has not had any fever or chills. He denies any cough or congestion. He denies any abdominal pain, nausea, vomiting or diarrhea. No urinary symptoms. No chest pain. No other complaints at this time    Subjective   General  Chart Reviewed: Yes  Family / Caregiver Present: No  Diagnosis: Heart failure  Subjective  Subjective: Patient supine in bed, pleasant and cooperative. Vital Signs  Oxygen Therapy  SpO2: 90 %  Pulse Oximeter Device Mode: Intermittent  Pulse Oximeter Device Location: Finger  O2 Device: 2L    Patient c/o SOB and fatigue after ambulation, sats 83% and needed several minutes to improve to 88%.  Placed patient on 2L NC with sats above 90%  Social/Functional History  Social/Functional History  Lives With: Spouse  Type of Home: House  Home Layout: Able to Live on Main level with bedroom/bathroom  Home Access: Stairs to enter with rails  Entrance Stairs - Number of Steps: 2 SHEA  Bathroom Shower/Tub: Tub/Shower unit  [employer]: Grab bars in shower, Shower chair  Home Equipment: Cane  ADL Assistance: Independent Homemaking Responsibilities: Yes(shares with spouse)  Ambulation Assistance: Independent(PRN use of SPC)  Active : Yes  Additional Comments: 2-3 recent falls reported       Objective        Orientation  Overall Orientation Status: Within Functional Limits     Balance  Sitting Balance: Supervision  Standing Balance: Contact guard assistance  Standing Balance  Time: @ 5 minutes  Activity: 150 feet, 75 with RW and 75 without RW, improved gait with RW  Comment: See PT benedicto for details. Patient on room air, c/o fatigue and SOB, sats 83% and needed several minutes to improve to 88%. Applied 2L of O2 NC which improved to low 90s, notified nurse Parag  Functional Mobility  Functional Mobility Comments: Notified nurse Parag that floor next to bed on the right side is extremely slippery (nothing appears to be on the floor). Toilet Transfers  Toilet - Technique: Ambulating  Equipment Used: Standard toilet  Toilet Transfer: Contact guard assistance  ADL  Feeding: Independent  Grooming: Setup  LE Dressing: Maximum assistance  Additional Comments: Patient adamantly refused ADLs, wearing sweatpants which are very tight due to LE edema  Tone RUE  RUE Tone: Normotonic  Tone LUE  LUE Tone: Normotonic  Quality of Movement Other  Comment: Patient reports has twitches in his arms that \"just happen\", did not observe during evaluation. Transfers  Transfer Comments: SBA with RW, CGA without RW  Vision - Basic Assessment  Prior Vision: Wears glasses only for reading  Visual History: No significant visual history  Patient Visual Report: No visual complaint reported. Cognition  Overall Cognitive Status: Exceptions  Arousal/Alertness: Appropriate responses to stimuli  Following Commands:  Follows one step commands with repetition  Attention Span: Appears intact  Memory: Decreased short term memory  Safety Judgement: Decreased awareness of need for assistance  Problem Solving: Decreased awareness of errors Insights: Decreased awareness of deficits  Initiation: Does not require cues  Sequencing: Requires cues for some  Cognition Comment: Chart reports family has reported visual hallucinations and periods of confusion. Patient also reports he drives and performs cooking. No dx of dementia however appears to have cognitive and safety deficits. While documenting patient the bed alarm was going off, the PT walked in and patient confused and seeing dogs. Notified nurse-- see PT notes for details. Perception  Overall Perceptual Status: WFL     Sensation  Overall Sensation Status: WFL        LUE AROM (degrees)  LUE AROM : WFL  Left Hand AROM (degrees)  Left Hand AROM: WFL  RUE AROM (degrees)  RUE AROM : WFL  Right Hand AROM (degrees)  Right Hand AROM: WFL  LUE Strength  Gross LUE Strength: WFL  RUE Strength  Gross RUE Strength: WFL                   Plan   Plan  Times per week: 3-5  Current Treatment Recommendations: Patient/Caregiver Education & Training, Equipment Evaluation, Education, & procurement, Endurance Training, Safety Education & Training, Self-Care / ADL  Plan Comment: Recommend safety camera in room-- nurse in agreement.     G-Code     OutComes Score                                                  AM-PAC Score        AM-[facility] Inpatient Daily Activity Raw Score: 13 (01/14/21 0954)  AM-PAC Inpatient ADL T-Scale Score : 32.03 (01/14/21 0954)  ADL Inpatient CMS 0-100% Score: 63.03 (01/14/21 0954)  ADL Inpatient CMS G-Code Modifier : CL (01/14/21 8161)    Goals  Short term goals  Time Frame for Short term goals: Until discharge  Short term goal 1: Setup bathing  Short term goal 2: Setup dressing  Short term goal 3: Modified indep functional mobility  Short term goal 4: Modified indep functional transfers  Long term goals  Time Frame for Long term goals : STGs= LTGs  Patient Goals   Patient goals : Go home with wife       Therapy Time   Individual Concurrent Group Co-treatment   Time In 0901

## 2021-01-14 NOTE — PLAN OF CARE
Problem: Falls - Risk of:  Goal: Will remain free from falls  Description: Will remain free from falls  Outcome: Ongoing   Patient in bed with wheels locked in lowest position with alarm on, call light and belongings within reach.   Vss no acute distress at this time  Vitals:    01/13/21 2345   BP: 125/79   Pulse: 97   Resp: 18   Temp: 98.6 °F (37 °C)   SpO2: 94%

## 2021-01-14 NOTE — DISCHARGE INSTR - COC
Continuity of Care Form    Patient Name: Zuleika Eaton   :  1940  MRN:  [id number]    Admit date:  2021  Discharge date:  ***    Code Status Order: Full Code   Advance Directives:     Admitting Physician:  Griselda aBrraza MD  PCP: Karen Crenshaw MD    Discharging Nurse: [facility] Unit/Room#: 9QP-5716/8932-99  Discharging Unit Phone Number: ***    Emergency Contact:   Extended Emergency Contact Information  Primary Emergency Contact: Cayetano Hlil, [address] [facility] Phone: [phone]  Relation: Child  Preferred language: English   needed? No  Secondary Emergency Contact: Kyra Lares  Address: [address] Phone: [phone]  Relation: Spouse    Past Surgical History:  Past Surgical History:   Procedure Laterality Date    AORTIC VALVE REPLACEMENT      CARPAL TUNNEL RELEASE Left     COLONOSCOPY  2010    SKIN BIOPSY         Immunization History:   Immunization History   Administered Date(s) Administered    Influenza Vaccine, unspecified formulation 2005    Influenza Virus Vaccine 2012    Influenza, High Dose (Fluzone 65 yrs and older) 2013, 10/09/2014, 10/05/2015, 2016, 10/06/2017, 2018    Influenza, Triv, inactivated, subunit, adjuvanted, IM (Fluad 65 yrs and older) 10/10/2019    Pneumococcal Conjugate 13-valent ([id number]) 2014    Pneumococcal Polysaccharide ([id number]) 1997, 2003, 2017    Td, unspecified formulation 2001       Active Problems:  Patient Active Problem List   Diagnosis Code    Essential hypertension, benign I10    Atrial fibrillation with RVR (HCC) I48.91    Heart valve replaced by other means Z95.4    Hyperlipidemia other unspecified.   E78.5    Aortic regurgitation I35.1    Stage 3 chronic kidney disease N18.30    Hypertrophy of prostate without urinary obstruction and other lower urinary tract symptoms (LUTS) N40.0  Peptic ulcer K27.9    Gout M10.9    Generalized osteoarthrosis, involving multiple sites M15.9    IGT (impaired glucose tolerance) R73.02    Lung nodule R91.1    Chronic combined systolic and diastolic CHF (congestive heart failure) ([facility]) I50.42    Meningioma (HCC) D32.9    Bilateral carotid artery stenosis I65.23    Atherosclerosis of abdominal aorta ([facility]) I70.0    Labyrinthine vertigo H81.09    RACIEL (acute kidney injury) ([facility]) N17.9    S/P AVR Z95.2    Cardiomyopathy ([facility]) I42.9    SOB (shortness of breath) R06.02    Localized edema R60.0    Acute combined systolic and diastolic congestive heart failure (HCC) I50.41    Acute on chronic congestive heart failure (HCC) I50.9    Venous stasis ulcer of right ankle with fat layer exposed without varicose veins (HCC) I87.2, L97.312    Venous stasis ulcer of left calf with fat layer exposed without varicose veins ([facility]) I87.2, L97.222    Hypokalemia E87.6    Heart failure (HCC) I50.9    Acute kidney injury superimposed on chronic kidney disease (HCC) N17.9, N18.9    Hypoalbuminemia E88.09    Chronic atrial fibrillation ([facility]) I48.20    Proteinuria R80.9    Encephalopathy, metabolic W82.41    Acute encephalopathy G93.40    Hypernatremia E87.0       Isolation/Infection:   Isolation          No Isolation        Patient Infection Status     None to display          Nurse Assessment:  Last Vital Signs: /63   Pulse 76   Temp 96.8 °F (36 °C) (Temporal)   Resp 18   Ht 5' 8\" (1.727 m)   Wt 176 lb 1.6 oz (79.9 kg)   SpO2 97%   BMI 26.78 kg/m²     Last documented pain score (0-10 scale): Pain Level: 0  Last Weight:   Wt Readings from Last 1 Encounters:   01/18/21 176 lb 1.6 oz (79.9 kg)     Mental Status:  {IP PT MENTAL STATUS:20030:::0}    IV Access:  { DELPHINE IV ACCESS:173049889:::0}    Nursing Mobility/ADLs:  Walking   {P DME ADLs:430955497:::0}  Transfer  {P DME ADLs:969865588:::0}  Bathing  {CHP DME ADLs:767928710:::0} Dressing  {CHP DME ADLs:506139871:::0}  Toileting  {CHP DME ADLs:241305257:::0}  Feeding  {CHP DME ADLs:728847300:::0}  Med Admin  {CHP DME ADLs:570477279:::0}  Med Delivery   { DELPHINE MED Delivery:582320750:::0}    Wound Care Documentation and Therapy:  Wound 12/21/20 Leg Lower; Left #1 (Active)   Wound Image   12/21/20 1349   Wound Etiology Venous 01/11/21 1340   Dressing Status New dressing applied 01/18/21 1108   Wound Cleansed Cleansed with saline 01/16/21 2315   Dressing/Treatment Petroleum impregnated gauze; Roll gauze 01/18/21 1108   Dressing Change Due 01/17/21 01/17/21 1530   Wound Length (cm) 0.8 cm 01/11/21 1340   Wound Width (cm) 2 cm 01/11/21 1340   Wound Depth (cm) 0.1 cm 01/11/21 1340   Wound Surface Area (cm^2) 1.6 cm^2 01/11/21 1340   Change in Wound Size % (l*w) -113.33 01/11/21 1340   Wound Volume (cm^3) 0.16 cm^3 01/11/21 1340   Wound Healing % -100 01/11/21 1340   Post-Procedure Length (cm) 2 cm 12/21/20 1403   Post-Procedure Width (cm) 9 cm 12/21/20 1403   Post-Procedure Depth (cm) 0.1 cm 12/21/20 1403   Post-Procedure Surface Area (cm^2) 18 cm^2 12/21/20 1403   Post-Procedure Volume (cm^3) 1.8 cm^3 12/21/20 1403   Wound Assessment Ruptured blister;Slough;Bleeding 01/18/21 1108   Drainage Amount Other (Comment) 01/18/21 1108   Drainage Description Serosanguinous; Yellow 01/18/21 1108   Odor None 01/15/21 1516   Patsy-wound Assessment Blisters;Edematous 01/18/21 1108   Margins Undefined edges 01/11/21 1340   Number of days: 27       Wound 12/21/20 Leg Right; Anterior #2 (Active)   Wound Image   12/21/20 1349   Wound Etiology Venous 01/11/21 1340   Dressing Status Clean;Dry; Intact 01/18/21 1108   Wound Cleansed Cleansed with saline 01/16/21 2315   Dressing/Treatment Petroleum impregnated gauze; Roll gauze 01/18/21 1108   Dressing Change Due 01/17/21 01/17/21 1530   Wound Length (cm) 0.7 cm 01/11/21 1340   Wound Width (cm) 1.1 cm 01/11/21 1340   Wound Depth (cm) 0.1 cm 01/11/21 1340 Wound Surface Area (cm^2) 0.77 cm^2 01/11/21 1340   Change in Wound Size % (l*w) 96.33 01/11/21 1340   Wound Volume (cm^3) 0.08 cm^3 01/11/21 1340   Wound Healing % 96 01/11/21 1340   Wound Assessment Slough;Ruptured blister;Bleeding 01/18/21 1108   Drainage Amount Moderate 01/18/21 1108   Drainage Description Serosanguinous; Yellow 01/18/21 1108   Odor None 01/15/21 1516   Patsy-wound Assessment Blisters;Edematous 01/18/21 1108   Number of days: 27       Wound 01/12/21 Pretibial Right red weeping area (Active)   Wound Image   01/15/21 1516   Wound Etiology Venous 01/15/21 1516   Dressing Status Clean;Dry; Intact 01/18/21 1108   Wound Cleansed Cleansed with saline 01/16/21 2315   Dressing/Treatment Petroleum impregnated gauze; Roll gauze 01/18/21 1108   Dressing Change Due 01/17/21 01/18/21 1108   Wound Length (cm) 10 cm 01/15/21 1516   Wound Assessment Slough;Ruptured blister;Bleeding 01/18/21 1108   Drainage Amount Moderate 01/18/21 1108   Drainage Description Serosanguinous; Yellow 01/18/21 1108   Odor None 01/18/21 1108   Patsy-wound Assessment Blisters;Edematous 01/18/21 1108   Number of days: 6       Wound 01/12/21 Pretibial Left red areas (Active)   Wound Image   01/15/21 1516   Wound Etiology Venous 01/15/21 1516   Dressing Status New dressing applied 01/18/21 1108   Wound Cleansed Cleansed with saline 01/16/21 2315   Dressing/Treatment Petroleum impregnated gauze; Roll gauze 01/18/21 1108   Dressing Change Due 01/17/21 01/18/21 1108   Wound Length (cm) 10 cm 01/15/21 1516   Wound Width (cm) 8.5 cm 01/15/21 1516   Wound Surface Area (cm^2) 85 cm^2 01/15/21 1516   Wound Assessment Ruptured blister;Slough;Bleeding 01/18/21 1108   Drainage Amount Moderate 01/18/21 1108   Drainage Description Serosanguinous; Yellow 01/18/21 1108   Odor None 01/18/21 1108   Patsy-wound Assessment Blisters;Edematous 01/18/21 1108   Number of days: 6        Elimination:  Continence:   · Bowel: {YES / VY:20231}  · Bladder: {YES / YA:39540} Urinary Catheter: {Urinary Catheter:218996797:::0}   Colostomy/Ileostomy/Ileal Conduit: {YES / GY:13212}       Date of Last BM: ***    Intake/Output Summary (Last 24 hours) at 2021 1200  Last data filed at 2021 1101  Gross per 24 hour   Intake 240 ml   Output 825 ml   Net -585 ml     I/O last 3 completed shifts: In: 240 [P.O.:240]  Out: -     Safety Concerns:     [address] [employer] Safety Concerns:610488277:::0}    Impairments/Disabilities:      [address] [employer] Impairments/Disabilities:729848402:::0}    Nutrition Therapy:  Current Nutrition Therapy:   [address] [employer] Diet List:400706707:::0}    Routes of Feeding: {CHP DME Other Feedings:065897529:::0}  Liquids: {Slp liquid thickness:52621}  Daily Fluid Restriction: {CHP DME Yes amt example:842184360:::0}  Last Modified Barium Swallow with Video (Video Swallowing Test): {Done Not Done EIJK:373263455:::6}    Treatments at the Time of Hospital Discharge:   Respiratory Treatments: ***  Oxygen Therapy:  {Therapy; copd oxygen:36813:::0}  Ventilator:    {[facility] Vent List:400532985:::0}    Rehab Therapies: {THERAPEUTIC INTERVENTION:2203087037}  Weight Bearing Status/Restrictions: [employer] Weight Bearin:::0}  Other Medical Equipment (for information only, NOT a DME order):  {EQUIPMENT:730322944}  Other Treatments: ***    Patient's personal belongings (please select all that are sent with patient):  {CHP DME Belongings:106965350:::0}    RN SIGNATURE:  {Esignature:028390570:::0}    CASE MANAGEMENT/SOCIAL WORK SECTION    Inpatient Status Date: ***    Readmission Risk Assessment Score:  Readmission Risk              Risk of Unplanned Readmission:        30           Discharging to Facility/ [facility] [place] can accept.   Agency   Name: [facility] will call for Appointment  Phone: [phone]  Fax: [phone]    Dialysis Facility (if applicable)   · Name:  · Address:  · Dialysis Schedule:  · Phone:  · Fax: / signature: Electronically signed by PEGGY Aguilera on 1/18/21 at 11:59 AM EST    PHYSICIAN SECTION    Prognosis: Good    Condition at Discharge: Stable    Rehab Potential (if transferring to Rehab): Good    Recommended Labs or Other Treatments After Discharge:     Physician Certification: I certify the above information and transfer of Kavitha Rosado  is necessary for the continuing treatment of the diagnosis listed and that he requires Home Care for greater 30 days.      Update Admission H&P: No change in H&P    PHYSICIAN SIGNATURE:  Electronically signed by Moe White MD on 1/14/21 at 7:33 AM EST

## 2021-01-14 NOTE — PROGRESS NOTES
Chronic combined systolic and diastolic CHF (congestive heart failure) ([facility] 1/12/2021 Yes    Heart failure ([facility] [address] 1/11/2021 Yes    Acute renal failure superimposed on chronic kidney disease ([facility] 1/13/2021 Yes    Hypoalbuminemia 1/13/2021 Yes           : other supportive care :   - Check daily renal function panel with electrolytes-phosphorus  - Strict monitoring of I/Os, daily weight  - Renal feeds/diet  - Current medications reviewed. - Nephrotoxic medications have been discontinued. - Dose adjusted and appropriate. - Dose meds for eGFR <15 mL/min/1.73m2 during RACIEL    - Avoid heavy opioids due to renal failure - may use very low dose dilaudid / fentanyl with close monitoring of CNS and respiratory depression. Please refer to the orders. High Complexity. Multiple complex problems. Discussed with patient, and treatment team-    Thank you for allowing me to participate in this patient's care. Please do not hesitate to contact me with any questions/concerns. We will follow along with you. Deborah Mae MD  [facility] [address] [place] [address] [place]: [phone] or [facility]  Fax: [phone]        ========================================================   ========================================================       Subjective:  Seen resting in bed more confused today,  unable to communicate fully  Blood pressure is stable, no temperature elevated, heart rate slightly higher normal side,  Saturating well on 2 L nasal cannula    Past medical, Surgical, Social, Family medical history reviewed by me. MEDICATIONS: reviewed by me. Medications Prior to Admission:  No current facility-administered medications on file prior to encounter.       Current Outpatient Medications on File Prior to Encounter   Medication Sig Dispense Refill    atorvastatin (LIPITOR) 40 MG tablet Take 1 tablet by mouth daily 90 tablet 3  carvedilol (COREG) 25 MG tablet Take 1 tablet by mouth 2 times daily 180 tablet 1    potassium chloride (KLOR-CON M) 20 MEQ extended release tablet TAKE 2 TABLETS BY MOUTH DAILY 180 tablet 1    torsemide (DEMADEX) 20 MG tablet Take 1 tablet by mouth 2 times daily 180 tablet 1    allopurinol (ZYLOPRIM) 100 MG tablet TAKE 1 TABLET BY MOUTH DAILY 90 tablet 3    amiodarone (CORDARONE) 200 MG tablet Take 1 tablet by mouth daily 90 tablet 3    warfarin (COUMADIN) 4 MG tablet Take 2-4 mg by mouth See Admin Instructions Take 2 mg (4 mg x 0.5 tablet) every Mon; take 4 mg (4 mg x 1 tablet) all other days. Managed by [facility].       Multiple Vitamins-Minerals (THERAPEUTIC MULTIVITAMIN-MINERALS) tablet Take 1 tablet by mouth daily           Current Facility-Administered Medications:     torsemide (DEMADEX) tablet 40 mg, 40 mg, Oral, Daily, Ej Adams MD    torsemide (DEMADEX) tablet 20 mg, 20 mg, Oral, Daily, Ej Adams MD    nicotine (NICODERM CQ) 21 MG/24HR 1 patch, 1 patch, Transdermal, Daily, Lisset Lee PA-C, 1 patch at 01/13/21 0324    sodium chloride (OCEAN, BABY AYR) 0.65 % nasal spray 1 spray, 1 spray, Each Nostril, PRN, Lisset Lee PA-C, 1 spray at 01/13/21 2132    diphenhydrAMINE (BENADRYL) tablet 25 mg, 25 mg, Oral, Q6H PRN, Daisy Uerña PA-C, 25 mg at 01/14/21 0206    ipratropium-albuterol (DUONEB) nebulizer solution 1 ampule, 1 ampule, Inhalation, Q4H WA, Maulik Alanis MD, 1 ampule at 01/13/21 2024    amiodarone (CORDARONE) tablet 200 mg, 200 mg, Oral, Daily, Yessy Tanner MD, 200 mg at 01/13/21 0824    atorvastatin (LIPITOR) tablet 40 mg, 40 mg, Oral, Nightly, Yessy Tanner MD, 40 mg at 01/13/21 2134    carvedilol (COREG) tablet 25 mg, 25 mg, Oral, BID, Yessy Tanner MD, 25 mg at 01/13/21 2134    sodium chloride flush 0.9 % injection 10 mL, 10 mL, Intravenous, 2 times per day, Saad De Luna MD, 10 mL at 01/13/21 2134   sodium chloride flush 0.9 % injection 10 mL, 10 mL, Intravenous, PRN, Darcie Wolf MD, 10 mL at 01/13/21 1845    promethazine (PHENERGAN) tablet 12.5 mg, 12.5 mg, Oral, Q6H PRN **OR** ondansetron (ZOFRAN) injection 4 mg, 4 mg, Intravenous, Q6H PRN, Darcie Wolf MD    polyethylene glycol (GLYCOLAX) packet 17 g, 17 g, Oral, Daily PRN, Darcie Wolf MD    acetaminophen (TYLENOL) tablet 650 mg, 650 mg, Oral, Q6H PRN **OR** acetaminophen (TYLENOL) suppository 650 mg, 650 mg, Rectal, Q6H PRN, Darcie Wolf MD    warfarin (COUMADIN) daily dosing (placeholder), , Other, RX Placeholder, Darcie Wolf MD, Given at 01/11/21 1536      REVIEW OF SYSTEMS:  As mentioned in chief complaint and HPI , Subjective     PHYSICAL EXAM:  Recent vital signs and recent I/Os reviewed by me. Wt Readings from Last 3 Encounters:   01/14/21 175 lb 4.8 oz (79.5 kg)   01/11/21 179 lb (81.2 kg)   12/21/20 169 lb (76.7 kg)     BP Readings from Last 3 Encounters:   01/14/21 107/65   01/11/21 107/61   12/28/20 103/65     Patient Vitals for the past 24 hrs:   BP Temp Temp src Pulse Resp SpO2 Weight   01/14/21 0754 107/65 97.6 °F (36.4 °C) Oral 94 18 95 %    01/14/21 0509 119/72 97.9 °F (36.6 °C) Oral 94 18 90 % 175 lb 4.8 oz (79.5 kg)   01/13/21 2345 125/79 98.6 °F (37 °C) Oral 97 18 94 %    01/13/21 2026      93 %    01/13/21 2000 129/64 98.1 °F (36.7 °C) Oral 95 18 90 %    01/13/21 1635 114/83 98 °F (36.7 °C) Oral 91 18 96 %    01/13/21 1628     18 96 %    01/13/21 1233     18 95 %    01/13/21 1143 109/62 97.7 °F (36.5 °C) Oral 88 18 98 %    01/13/21 0812 119/65 97.5 °F (36.4 °C) Oral 110 18 90 % 175 lb 3.2 oz (79.5 kg)       Intake/Output Summary (Last 24 hours) at 1/14/2021 0805  Last data filed at 1/14/2021 0801  Gross per 24 hour   Intake 850 ml   Output 1725 ml   Net -875 ml         Physical Exam  Vitals signs reviewed. Constitutional:       General: He is sleeping. He is not in acute distress. Appearance: He is ill-appearing. Comments: Obese body habitus   HENT:      Head: Normocephalic and atraumatic. Right Ear: External ear normal.      Left Ear: External ear normal.      Mouth/Throat:      Mouth: Mucous membranes are not dry. Eyes:      General: No scleral icterus. Conjunctiva/sclera: Conjunctivae normal.   Neck:      Musculoskeletal: Neck supple. Thyroid: No thyroid mass. Vascular: No JVD. Trachea: Trachea normal.   Cardiovascular:      Rate and Rhythm: Normal rate and regular rhythm. Pulmonary:      Effort: Pulmonary effort is AB-normal.      Breath sounds: Diminished breath sounds at bases . Abdominal:      General: Bowel sounds are normal. There is distension. Palpations: Abdomen is soft. Musculoskeletal:         General: No deformity. Right lower leg: Edema present. Left lower leg: Edema present. Skin:     General: Skin is warm and dry. Neurological:      Mental Status: Disoriented, sleepy but easily aroused.   To loud voice  Psychiatric:         Behavior: Behavior normal.              DATA:  Diagnostic tests reviewed by me for today's visit:    Recent Labs     01/11/21 1626 01/12/21  0508 01/13/21  0548 01/14/21  0456   WBC 5.5 5.0 5.7 6.2   HCT 27.1* 26.0* 26.0* 27.2*    136 131* 142     Iron Saturation:  No components found for: PERCENTFE  FERRITIN:    Lab Results   Component Value Date    FERRITIN 67.1 08/04/2020     IRON:    Lab Results   Component Value Date    IRON 30 01/13/2021     TIBC:    Lab Results   Component Value Date    TIBC 268 01/13/2021       Recent Labs     01/11/21 1626 01/12/21  0508 01/13/21  0548 01/14/21  0456    141 143 144   K 3.7 3.6 3.8 4.0    102 104 104   CO2 29 30 31 33*   BUN 60* 60* 53* 50*   CREATININE 3.1* 3.2* 2.7* 2.5*     Recent Labs     01/11/21  1626 01/12/21  0508 01/13/21  0548 01/14/21  0456   CALCIUM 8.5 8.7 8.7 8.6   MG 2.80*  --   --   --    PHOS  --   --  3.6 3.0 No results for input(s): PH, PCO2, PO2 in the last 72 hours.     Invalid input(s): Savannah Major    ABG:  No results found for: PH, PCO2, PO2, HCO3, BE, THGB, TCO2, O2SAT  VBG:  No results found for: PHVEN, SSC0NJR, BEVEN, [id number]    LDH:  No results found for: LDH  Uric Acid:    Lab Results   Component Value Date    LABURIC 4.7 07/25/2019       PT/INR:    Lab Results   Component Value Date    PROTIME 37.9 01/14/2021    PROTIME 48.3 08/31/2018    INR 3.23 01/14/2021     Warfarin PT/INR:  No components found for: [place] Morn  PTT:    Lab Results   Component Value Date    APTT 46.8 01/11/2021   [APTT}  Last 3 Troponin:    Lab Results   Component Value Date    TROPONINI 0.02 01/11/2021    TROPONINI 0.03 07/30/2020    TROPONINI 0.03 07/30/2020       U/A:    Lab Results   Component Value Date    COLORU YELLOW 01/11/2021    PROTEINU 30 01/11/2021    PHUR 5.5 01/11/2021    LABCAST 10-20 Hyaline 04/23/2017    WBCUA 1 01/11/2021    RBCUA 2 01/11/2021    YEAST 0 12/09/2019    BACTERIA Rare 07/30/2020    CLARITYU Clear 01/11/2021    SPECGRAV 1.013 01/11/2021    LEUKOCYTESUR Negative 01/11/2021    UROBILINOGEN 1.0 01/11/2021    BILIRUBINUR Negative 01/11/2021    BLOODU SMALL 01/11/2021    GLUCOSEU Negative 01/11/2021     Microalbumen/Creatinine ratio:  No components found for: RUCREAT  24 Hour Urine for Protein:  No components found for: RAWUPRO, UHRS3, DCWC17NN, UTV3  24 Hour Urine for Creatinine Clearance:  No components found for: CREAT4, UHRS10, UTV10  Urine Toxicology:  No components found for: Evelene Ground, IBENZO, ICOCAINE, IMARTHC, IOPIATES, IPHENCYC    HgBA1c:    Lab Results   Component Value Date    LABA1C 6.3 07/25/2019     RPR:  No results found for: RPR  HIV:  No results found for: HIV  SNOW:    Lab Results   Component Value Date    SNOW Negative 08/20/2020     RF:  No results found for: RF  DSDNA:  No components found for: DNA  AMYLASE:  No results found for: AMYLASE

## 2021-01-14 NOTE — PROGRESS NOTES
[address]   Daily Progress Note      Admit Date:  1/11/2021    HPI:    Mr. Kathleen Roem is a 68year old male with history of CKD (Dr Lamine Stoner), HTN, HLD, AF (coumadin)    He is admitted with bilateral leg swelling and swelling in his scrotum. He gained 7-8 pounds. He also was having some increased confusion. Subjective:  Patient is being seen for acute on chronic combined s/dHF. There were no acute overnight cardiac events. Creat 2.5 potassium 4.0 probnp 6739->4978 albumin 3.1 weight is same 175  As I enter the room, he is trying to get out of bed and alarm going off. He states he needs to get his clothes on because a dog is hiding under them. He continues on 1.5 L of oxygen. Objective:   /65   Pulse 94   Temp 97.6 °F (36.4 °C) (Oral)   Resp 16   Ht 5' 8\" (1.727 m)   Wt 175 lb 4.8 oz (79.5 kg)   SpO2 90%   BMI 26.65 kg/m²       Intake/Output Summary (Last 24 hours) at 1/14/2021 0929  Last data filed at 1/14/2021 3065  Gross per 24 hour   Intake 840 ml   Output 1725 ml   Net -885 ml          Physical Exam:  General:  Awake, alert, oriented in NAD with confusion  Skin:  Warm and dry. No unusual bruising or rash  Neck:  Supple. No JVD or carotid bruit appreciated  Chest:  Normal effort.   Clear to auscultation, no wheezes/rhonchi/rales  Cardiovascular:  RRR, S1/S2, no murmur/gallop/rub  Abdomen:  Soft, nontender, +bowel sounds  Extremities:  Bilateral lower ankle to knee edema  Neurological: No focal deficits  Psychological: Normal mood and affect      Medications:    torsemide  40 mg Oral Daily    torsemide  20 mg Oral Dinner    nicotine  1 patch Transdermal Daily    ipratropium-albuterol  1 ampule Inhalation Q4H WA    amiodarone  200 mg Oral Daily    atorvastatin  40 mg Oral Nightly    carvedilol  25 mg Oral BID    sodium chloride flush  10 mL Intravenous 2 times per day    warfarin (COUMADIN) daily dosing (placeholder)   Other RX Placeholder         Lab Data:  CBC: Recent Labs     01/12/21  0508 01/13/21  0548 01/14/21  0456   WBC 5.0 5.7 6.2   HGB 8.2* 8.2* 8.4*    131* 142     BMP:    Recent Labs     01/12/21  0508 01/13/21  0548 01/14/21  0456    143 144   K 3.6 3.8 4.0   CO2 30 31 33*   BUN 60* 53* 50*   CREATININE 3.2* 2.7* 2.5*     INR:    Recent Labs     01/12/21  0935 01/13/21  0548 01/14/21  0456   INR 4.44* 4.26* 3.23*     BNP:    Recent Labs     01/11/21  1626 01/13/21  0548   PROBNP 6,739* 4,978*         Diagnostics:  Echo:  1/12/21:   -Normal left ventricular size.   -Concentric left ventricular hypertrophy.   -Decreased left ventricular systolic function with inferoseptal hypokinesis.   -Estimated EF 35-40%.  -E/e'=27.   -Indeterminate diastolic function.   -Mild thickening of anterior leaflet of mitral valve that appears to   prolapse mildly.   -The maximum mitral valve pressure gradient is 14mmHg and the mean pressure   gradient is 5 mmHg.   -Moderately mitral regurgitation with posteriorly directed jet.   -A mechanical aortic valve appears well seated.   -The left atrium is dilated.   -The right atrium is dilated.   -Severe tricuspid regurgitation directed anteriorly.   -PASP 36mmHg.   -Trivial pulmonic regurgitation present    Assessment:    1. Acute on chronic combined systolic and diastolic heart failure, no ace/arb/aldosterone antagonist due to ckd  2. Acute on chronic kidney disease  3. Anemia  4. Chronic atrial fibrillation on coumadin and amiodarone  5. Hypoalbuminemia and proteinuria    Plan:    1. Nephrology following  2. Continue coreg 25 mg bid  3. Continue torsemide 40 mg in am and 20 mg in pm    Patient is very confused this morning and trying to get out of bed to change clothes because dogs are in his room. Discussed with bedside nurse. hospitalist checking CT head    Discussed with patient who is agreeable with plan of care. Thank you for allowing me to participate in the care of your patient. Gloria Son, CNS

## 2021-01-14 NOTE — PROGRESS NOTES
Pt worked with therapy and his oxygen saturation dropped to 82% while up ambulating. Per their report the patient was visibly short of breath. After 5-6 minutes of rest he still did not recover and was placed on 2L. It also appears that the patient was placed back on oxygen last night at bedtime.   Message sent to MD. Monserrat Robin RN

## 2021-01-14 NOTE — CARE COORDINATION
Consult to Home Care needs noted. SW spoke with patient's spouse regarding HHC. Spouse/patient agreeable to receiving [place] [id number] services. The Plan for Transition of Care is related to the following treatment goals: To increase patient's independence. The Patient and/or patient representative (patient's spouse) was provided with a choice of provider and agrees   with the discharge plan. [x] Yes [] No    Freedom of choice list was provided with basic dialogue that supports the patient's individualized plan of care/goals, treatment preferences and shares the quality data associated with the providers. [x] Yes [] No    Referral to [facility].     Electronically signed by JULIETH Arias on 1/14/2021 at 8:57 AM

## 2021-01-14 NOTE — PROGRESS NOTES
Pt has now had to be placed in bilateral wrist restraints at this time.   Spoke with Dr. Farideh Corbett who is placing an order for the same and Racheal Leung RN

## 2021-01-14 NOTE — PROGRESS NOTES
Message sent to atul LYLE.  Pt is becoming aggressive while getting out of the bed. Completely unable to follow any direction at all at this time. He crushed my fingers while getting him back into the bed. Await call back.   Greta Miguel RN

## 2021-01-14 NOTE — PROGRESS NOTES
Pt continues to be agitated and disoriented at this time. Multiple attempts to re-orient and to keep the pt in bed. Family updated on plan of care.   Dustin Craig RN

## 2021-01-14 NOTE — PROGRESS NOTES
Physical Therapy    Facility/Department: [address]  Initial Assessment    NAME: Flex Menard  : 1940  MRN: [id number]    Date of Service: 2021    Discharge Recommendations:  Flex Menard scored a 16/24 on the AM-PAC short mobility form. Current research shows that an AM-PAC score of 17 or less is typically not associated with a discharge to the patient's home setting. Based on the patient's AM-PAC score and their current functional mobility deficits, it is recommended that the patient have 3-5 sessions per week of Physical Therapy at d/c to increase the patient's independence. Please see assessment section for further patient specific details. If patient discharges prior to next session this note will serve as a discharge summary. Please see below for the latest assessment towards goals. 3-5 sessions per week   PT Equipment Recommendations  Equipment Needed: Yes  Mobility Devices: Cloteal Saner: Rolling  Other: if patient discharges home recommend RW for safe ambulation    Assessment   Body structures, Functions, Activity limitations: Decreased functional mobility ; Decreased strength;Decreased safe awareness;Decreased endurance;Decreased balance  Assessment: Patient not at baseline function and would benefit from skilled PT to address above deficits and facilitate return to baseline function. Patient with poor safety awareness and presents at significant risk of falls. Strongly recommend 24 hour supervision upon discharge.   Treatment Diagnosis: decreased functional mobility, impaired gait, decreased balance, decreased safety awareness  Prognosis: Good  Decision Making: Medium Complexity  Clinical Presentation: evolving  PT Education: Goals;PT Role;Plan of Care;General Safety  Patient Education: d/c recommendations, equipment recommendations - verbalized understanding but recommned continued reinforcement  Barriers to Learning: cognitive  REQUIRES PT FOLLOW UP: Yes Activity Tolerance  Activity Tolerance: Patient Tolerated treatment well       Patient Diagnosis(es): The primary encounter diagnosis was Acute on chronic congestive heart failure, unspecified heart failure type ([facility] [address] A diagnosis of Acute renal failure superimposed on chronic kidney disease, unspecified CKD stage, unspecified acute renal failure type [facility]) was also pertinent to this visit. has a past medical history of Acute combined systolic and diastolic (congestive) hrt fail ([facility] [address] Acute kidney injury ([facility] [address] Arrhythmia, Atrial fibrillation ([address] Carotid artery stenosis, CKD (chronic kidney disease), Dilated cardiomyopathy ([address] Hyperlipidemia, Hypertension, IGT (impaired glucose tolerance), Meningioma ([facility] [address] and Valvular disease. has a past surgical history that includes Aortic valve replacement; Carpal tunnel release (Left); skin biopsy; and Colonoscopy (2010).     Restrictions  Restrictions/Precautions  Restrictions/Precautions: Fall Risk(1200 fluid restriction)  Required Braces or Orthoses?: No  Position Activity Restriction Other position/activity restrictions: Thais Caballero is a 68 y.o. male who presents to the emergency department today for evaluation for bilateral leg swelling. The patient has a history of chronic kidney disease, hypertension, hyperlipidemia, CHF, he has a history of A. fib and is anticoagulated on Coumadin. The patient states that intermittently for over a year he has been experiencing swelling to his bilateral lower legs. The patient apparently was at Dr. Jose L Barreto office today, and was sent to the emergency room for further care and evaluation. The patient states that over the past 2 to 3 days he has had increasing swelling to his bilateral lower legs, and he states that he has been having increasing shortness of breath, particularly at night. Shortness of breath is worse if he tries to lay flat. The patient denies any chest pain. He has gained 7 to 8 pounds. He has not had any fever or chills. He denies any cough or congestion. He denies any abdominal pain, nausea, vomiting or diarrhea. No urinary symptoms. No chest pain. No other complaints at this time  Vision/Hearing  Vision: Impaired  Vision Exceptions: Wears glasses for reading  Hearing: Exceptions to [facility]  Hearing Exceptions: Hard of hearing/hearing concerns     Subjective  General  Chart Reviewed: Yes  Family / Caregiver Present: No  Diagnosis: heart failure  Follows Commands: Within Functional Limits  General Comment  Comments: supine in bed upon arrival with alarm on  Subjective  Subjective: Denied pain. Initially refusing eval stating \"All those doctors want you to be able to run 5 miles and I am just not doing that! \" Agreeable following explaination of role of acute therapy.   Pain Screening  Patient Currently in Pain: Denies          Orientation  Orientation  Overall Orientation Status: Within Functional Limits  Social/Functional History  Social/Functional History  Lives With: Spouse  Type of Home: House Home Layout: Able to Live on Main level with bedroom/bathroom  Home Access: Stairs to enter with rails  Entrance Stairs - Number of Steps: 2 SHEA  Bathroom Shower/Tub: Tub/Shower unit  [employer]: Grab bars in shower, Shower chair  Home Equipment: Lisa Bridges  ADL Assistance: Independent  Homemaking Responsibilities: Yes(shares with spouse)  Ambulation Assistance: Independent(PRN use of SPC)  Active : Yes  Additional Comments: 2-3 recent falls reported    Objective          AROM RLE (degrees)  RLE AROM: WFL(limited ankle ROM - states due to sores)  AROM LLE (degrees)  LLE AROM : WFL(limited ankle ROM - states due to sores)  Strength Other  Other: unable to formally assess - patient reported LEs too painful for resistance     Sensation  Overall Sensation Status: WFL  Bed mobility  Supine to Sit: Independent  Sit to Supine: Independent  Transfers  Sit to Stand: Contact guard assistance;Minimal Assistance(pt stood from bed, LLE slipped anteriorly and patient sat down quickly - min A provided by PT - floor bybed noted to be very slick per PT, pt performed sit to stand from toilet without difficulty)  Stand to sit: Contact guard assistance  Ambulation 1  Surface: level tile  Device: Rolling Walker  Assistance: Stand by assistance  Quality of Gait: steady gait, forward flexed posture, decreased lashay  Distance: 75'  Comments: Noted to be on RA upon arrival. SOB noted following ambulation. SpO2 82% following ambulation. Cued for pursed lip breathing and provided with 5-6 minute rest break at patient able to recover to 87-88%. Placed on 2L O2 and increased to 93%.  Discussed with nursing  Ambulation 2  Surface - 2: level tile  Device 2: No device  Assistance 2: Contact guard assistance;Minimal assistance  Quality of Gait 2: mildly unsteady, decreased lashay, 1 LOB with min A to correct  Distance: 76' Comments: Following PT departure, returned to patient room x 2 due to alarm going off. On 1st instance, patient sitting EOB digging through trashcan looking for a cup. Very unsafe and close to sliding out of bed. Assisted back into bed with min A. On 2nd occurance, trying to get out of bed to get his clothes (which he was already wearing) and reported seeing puppies under his jacket previously. Instructed to use call lght for any needs and discussed with nursing. Suggested telesitter might be beneficial as patient presents with confusion and significant risk of falls. Balance  Sitting - Static: Good  Sitting - Dynamic: Fair  Standing - Static: Fair  Standing - Dynamic: [address]  Times per week: 3-5  Times per day: Daily  Current Treatment Recommendations: Strengthening, Balance Training, Functional Mobility Training, Transfer Training, Endurance Training, Gait Training, Stair training, Safety Education & Training, Patient/Caregiver Education & Training, Home Exercise Program, Equipment Evaluation, Education, & procurement  Safety Devices  Type of devices: All fall risk precautions in place, Call light within reach, Patient at risk for falls, Left in bed, Nurse notified, Bed alarm in place  Restraints  Initially in place: No    AM-PAC Score  AM-PAC Inpatient Mobility Raw Score : 16 (01/14/21 1008)  AM-PAC Inpatient T-Scale Score : 40.78 (01/14/21 1008)  Mobility Inpatient CMS 0-100% Score: 54.16 (01/14/21 1008)  Mobility Inpatient CMS G-Code Modifier : CK (01/14/21 1008)          Goals  Short term goals  Time Frame for Short term goals:  To be met prior to discharge  Short term goal 1: Sit to/from stand with supervision  Short term goal 2: Ambulate 150' with RW and supervision  Short term goal 3: Navigate up/down 2 steps with rail and CGA       Therapy Time   Individual Concurrent Group Co-treatment   Time In 0928     0901   Time Out 0940     0928   Minutes 12     27 Timed Code Treatment Minutes: 24 Minutes       Feroz Sheridan, PT    Thanks, Feroz Sheridan, PT, DPT [id number]

## 2021-01-15 NOTE — PROGRESS NOTES
Vitals:    01/15/21 0410   BP: 119/75   Pulse: 97   Resp: 18   Temp: 97.7 °F (36.5 °C)   SpO2: 93%   patient continues to be agitated and confuse, dis oriented x 4 , unable to follow  with occasional hallucination in bilateral soft wrist restraint plus left ankle restraint, vss, will continue to monitor

## 2021-01-15 NOTE — CONSULTS
In patient Neurology consult        [facility] Neurology      Merrianne Dakins, MD      Gaviota Sy  1940    Date of Service: 1/15/2021    Referring Physician: Gian Deleon MD    Most of the history was obtained from detailed chart reviewing. The patient is currently confused and unable to provide me with accurate history. Reason for the consult and CC: Acute encephalopathy    HPI:   The patient is a 68y.o.  years old male with multiple medical problems who was admitted few days to the hospital with increased weight gain, leg swelling and other multiple metabolic derangements. The patient was diagnosed with acute CHF exacerbation, acute on chronic kidney disease and hypernatremia. He had sudden onset of confusion yesterday. He is now in restraints. Degree is severe. Duration has been at least over 24 hours. No triggers. Other associated symptom including generalized agitation and not following direction. No witnessed seizure or falling. No high-grade fever. Recent blood test today showed sodium 138 and creatinine of 2.4. White count of 6. CT head yesterday showed no acute findings. The patient is currently in restraints. He is unable to give me any more history. He is awake and alert. No other relieving or aggravating factors. He is on Coumadin.     Family History   Problem Relation Age of Onset    Heart Disease Paternal Grandfather     Hypertension Other     High Cholesterol Neg Hx     High Blood Pressure Neg Hx          Past Medical History:   Diagnosis Date    Acute combined systolic and diastolic (congestive) hrt fail (HCC)     Acute kidney injury ([address]     Arrhythmia     Atrial fibrillation (HCC)     Carotid artery stenosis     CKD (chronic kidney disease)     Dilated cardiomyopathy (HCC)     Hyperlipidemia     Hypertension     IGT (impaired glucose tolerance)     Meningioma (HCC)     Valvular disease      Past Surgical History:   Procedure Laterality Date  AORTIC VALVE REPLACEMENT      CARPAL TUNNEL RELEASE Left     COLONOSCOPY  2010    SKIN BIOPSY       Social History     Tobacco Use    Smoking status: Former Smoker     Packs/day: 1.00     Years: 25.00     Pack years: 25.00     Types: Cigarettes     Quit date: 1993     Years since quittin.0    Smokeless tobacco: Current User     Types: Chew     Last attempt to quit: 1982    Tobacco comment: quit 25 yrs ago   Substance Use Topics    Alcohol use: No     Alcohol/week: 0.0 standard drinks    Drug use: No     Allergies   Allergen Reactions    Ceclor [Cefaclor] Other (See Comments)     CNS side effects, slurred speech. Lips and tounge swell.      Current Facility-Administered Medications   Medication Dose Route Frequency Provider Last Rate Last Admin    QUEtiapine (SEROQUEL) tablet 12.5 mg  12.5 mg Oral Nightly Bronson De Los Santos MD   12.5 mg at 21 1430    [START ON 2021] warfarin (COUMADIN) tablet 1 mg  1 mg Oral Once per day on Twyla Bravo MD        warfarin (COUMADIN) tablet 2 mg  2 mg Oral Once per day on Sun Tue Wed Thu Fri Sat Reyes Hernandez MD   2 mg at 21 1710    nicotine (NICODERM CQ) 21 MG/24HR 1 patch  1 patch Transdermal Daily Jacquelin Cornelius PA-C   1 patch at 01/15/21 1134    sodium chloride (OCEAN, BABY AYR) 0.65 % nasal spray 1 spray  1 spray Each Nostril PRN Jacquelin Cornelius PA-C   1 spray at 21 2132    diphenhydrAMINE (BENADRYL) tablet 25 mg  25 mg Oral Q6H PRN Jacquelin Cornelius PA-C   25 mg at 21 2206    ipratropium-albuterol (DUONEB) nebulizer solution 1 ampule  1 ampule Inhalation Q4H LATRICE Lewis MD   Stopped at 01/15/21 1246    amiodarone (CORDARONE) tablet 200 mg  200 mg Oral Daily Yessy Tanner MD   200 mg at 01/15/21 1134    atorvastatin (LIPITOR) tablet 40 mg  40 mg Oral Nightly Diomedes Mosqueda MD   40 mg at 21 2206    carvedilol (COREG) tablet 25 mg  25 mg Oral BID Diomedes Mosqueda MD   25 mg at 01/15/21 1135 XII: Tongue movements: NT due to confusion  Musculoskeletal:  The patient can move all 4 extremities. No apparent focal weakness. Tone: Normal tone. No rigidity. Reflexes: Symmetric 1+ throughout. Planters: flexor bilaterally. Coordination: NT due to confusion  Sensation: NT due to confusion  Gait/Posture: NT due to confusion    Data:  LABS:   Lab Results   Component Value Date     01/15/2021    K 3.9 01/15/2021    K 3.9 08/05/2020     01/15/2021    CO2 32 01/15/2021    BUN 50 01/15/2021    CREATININE 2.4 01/15/2021    GFRAA 32 01/15/2021    GFRAA >60 12/28/2011    LABGLOM 26 01/15/2021    LABGLOM 61 12/29/2018    GLUCOSE 98 01/15/2021    GLUCOSE 98 03/09/2020    PHOS 3.3 01/15/2021    MG 2.80 01/11/2021    CALCIUM 8.8 01/15/2021     Lab Results   Component Value Date    WBC 6.2 01/15/2021    RBC 2.99 01/15/2021    HGB 8.6 01/15/2021    HCT 27.4 01/15/2021    MCV 91.8 01/15/2021    RDW 21.1 01/15/2021     01/15/2021     Lab Results   Component Value Date    INR 2.49 (H) 01/15/2021    PROTIME 29.1 (H) 01/15/2021       Neuroimaging were independently reviewed by me. Reviewed notes from different physicians  Reviewed lab and blood testing    Impression:  Acute encephalopathy, severe. Likely multifactorial acute metabolic encephalopathy. Acute on chronic kidney disease  Hospital-acquired delirium and less likely new ischemic strokeor breakthrough seizure at this point. Acute on chronic kidney disease  Acute CHF exacerbation  Hypertension with hypotension  Hypernatremia      Recommendation:  Continue current supportive care  Likely multifactorial encephalopathy  Would expect gradual improvement after correction of his metabolic derangement  Agree with Seroquel for now  Continue hydration follow CMP and sodium level  Continue follow kidney function testing on Coumadin and monitor INR  CT head showed no acute findings.   I do not feel MRI would change our management  PT and OT Speech evaluation  Blood pressure monitor  Continue current medication  PPI  Statin  Check TSH and B12 with next blood testing  We will follow. MDM: High due to high risk of hospital-acquired event, risk of more complication and long hospital course. Thank you for referring such patient. If you have any questions regarding my consult note, please don't hesitate to call me. Prerna Castillo MD  [phone]    This dictation was generated by voice recognition computer software.  Although all attempts are made to edit the dictation for accuracy, there may be errors in the  transcription that are not intended

## 2021-01-15 NOTE — PROGRESS NOTES
[facility] HOSPITALISTS PROGRESS NOTE    1/15/2021 8:25 AM        Name: Mary Lou Pena . Admitted: 1/11/2021  Primary Care Provider: Ani Tyler MD (Tel: [phone])                        Subjective:  . Patient is more confused today than yesterday. Unclear etiology.   Was started on Seroquel last night unclear if that are causing symptoms      Reviewed interval ancillary notes    Current Medications      QUEtiapine (SEROQUEL) tablet 12.5 mg, Nightly      [START ON 1/18/2021] warfarin (COUMADIN) tablet 1 mg, Once per day on Mon      warfarin (COUMADIN) tablet 2 mg, Once per day on Sun Tue Wed Thu Fri Sat      nicotine (NICODERM CQ) 21 MG/24HR 1 patch, Daily      sodium chloride (OCEAN, BABY AYR) 0.65 % nasal spray 1 spray, PRN      diphenhydrAMINE (BENADRYL) tablet 25 mg, Q6H PRN      ipratropium-albuterol (DUONEB) nebulizer solution 1 ampule, Q4H WA      amiodarone (CORDARONE) tablet 200 mg, Daily      atorvastatin (LIPITOR) tablet 40 mg, Nightly      carvedilol (COREG) tablet 25 mg, BID      sodium chloride flush 0.9 % injection 10 mL, 2 times per day      sodium chloride flush 0.9 % injection 10 mL, PRN      promethazine (PHENERGAN) tablet 12.5 mg, Q6H PRN    Or      ondansetron (ZOFRAN) injection 4 mg, Q6H PRN      polyethylene glycol (GLYCOLAX) packet 17 g, Daily PRN      acetaminophen (TYLENOL) tablet 650 mg, Q6H PRN    Or      acetaminophen (TYLENOL) suppository 650 mg, Q6H PRN        Objective:  /75   Pulse 97   Temp 97.7 °F (36.5 °C) (Axillary)   Resp 18   Ht 5' 8\" (1.727 m)   Wt 175 lb 4.8 oz (79.5 kg)   SpO2 96%   BMI 26.65 kg/m²     Intake/Output Summary (Last 24 hours) at 1/15/2021 0825  Last data filed at 1/14/2021 1710  Gross per 24 hour   Intake 360 ml   Output 750 ml   Net -390 ml      Wt Readings from Last 3 Encounters: 01/14/21 175 lb 4.8 oz (79.5 kg)   01/11/21 179 lb (81.2 kg)   12/21/20 169 lb (76.7 kg)       General appearance:  Appears comfortable  Eyes: Sclera clear. Pupils equal.  ENT: Moist oral mucosa. Trachea midline, no adenopathy. Cardiovascular: Regular rhythm, normal S1, S2. No murmur. No edema in lower extremities  Respiratory: Not using accessory muscles. Good inspiratory effort. Clear to auscultation bilaterally, no wheeze or crackles. GI: Abdomen soft, no tenderness, not distended, normal bowel sounds  Musculoskeletal: No cyanosis in digits, neck supple  Neurology: CN 2-12 grossly intact. No speech or motor deficits  Psych: Follows some command but not alert oriented. Skin: Warm, dry, normal turgor    Labs and Tests:  CBC:   Recent Labs     01/13/21  0548 01/14/21  0456 01/15/21  0509   WBC 5.7 6.2 6.2   HGB 8.2* 8.4* 8.6*   * 142 147     BMP:    Recent Labs     01/13/21  0548 01/14/21  0456 01/15/21  0509    144 148*   K 3.8 4.0 3.9    104 106   CO2 31 33* 32   BUN 53* 50* 50*   CREATININE 2.7* 2.5* 2.4*   GLUCOSE 95 102* 98     Hepatic: No results for input(s): AST, ALT, ALB, BILITOT, ALKPHOS in the last 72 hours. Discussed care with family and patient             Spent 30  minutes with patient and family at bedside and on unit reviewing medical records and labs, spent greater than 50% time counseling patient and family on diagnosis and plan   Problem List  Active Problems:    Essential hypertension, benign    Atrial fibrillation with RVR ([address]    Stage 3 chronic kidney disease    Aortic regurgitation    Chronic combined systolic and diastolic CHF (congestive heart failure) (HCC)    Heart failure ([address]    Acute kidney injury superimposed on chronic kidney disease ([address]    Hypoalbuminemia    Chronic atrial fibrillation ([address]    Proteinuria  Resolved Problems:    * No resolved hospital problems. *       Assessment & Plan:   1.  Acute encephalopathy - Patient has had acute onset yesterday. Now little worse  -Unclear etiology of its metabolic versus other cause  -This morning is slightly more confused than usual  -His UA CT scan was negative.  -We will repeat checks x-ray to see if he is coming down with any underlying pneumonia  -He is afebrile white counts normal so less likely infectious cause  -Was started on Seroquel last night unclear if he is drowsy from that as well  -We will ask neuro input. Acute on chronic CHF  -Continue per cardiology    Cute on chronic renal failure stable. Probably has underlying dementia as well.     I do not think he is safe to go home he probably will need placement      Diet: DIET CARDIAC; Daily Fluid Restriction: 1200 ml  Code:Full Code  DVT PPX shabbir Thapa MD   1/15/2021 8:25 AM

## 2021-01-15 NOTE — CARE COORDINATION
PT/OT recommending a SNF placement for patient. Patient's spouse originally stating that she did not want patient to discharge to a SNF but wanted patient to discharge to home with [facility]. SW spoke with patient's spouse regarding a SNF placement for the patient. Spouse requesting that patient's son be contacted. SW spoke to patient's son who was at the patient's bedside. Son stating that if the patient is referred to a SNF, the family choices would be [facility] or [facility].   Son stating that he will discussed SNF placement with patient's spouse and other family members and get back with this SW.    Electronically signed by JULIETH Page on 1/15/2021 at 2:34 PM

## 2021-01-15 NOTE — PLAN OF CARE
Problem: Restraint Use - Nonviolent/Non-Self-Destructive Behavior:  Goal: Absence of restraint indications  Description: Absence of restraint indications  Outcome: Ongoing   Criteria for discontinuation of restraint not met at this time

## 2021-01-15 NOTE — PROGRESS NOTES
Left ankle restraint  added to bilateral soft wrist restraint due to patient attempting several times to get up out of bed , lying across the bed with lower part of the body off the bed.  Will continue to monitor

## 2021-01-15 NOTE — PROGRESS NOTES
0509   WBC 5.7 6.2 6.2   HGB 8.2* 8.4* 8.6*   * 142 147     BMP:    Recent Labs     01/13/21  0548 01/14/21  0456 01/15/21  0509    144 148*   K 3.8 4.0 3.9   CO2 31 33* 32   BUN 53* 50* 50*   CREATININE 2.7* 2.5* 2.4*     INR:    Recent Labs     01/13/21  0548 01/14/21  0456 01/15/21  0509   INR 4.26* 3.23* 2.49*     BNP:    Recent Labs     01/13/21  0548   PROBNP 4,978*         Diagnostics:  Echo:  1/12/21:   -Normal left ventricular size.   -Concentric left ventricular hypertrophy.   -Decreased left ventricular systolic function with inferoseptal hypokinesis.   -Estimated EF 35-40%.  -E/e'=27.   -Indeterminate diastolic function.   -Mild thickening of anterior leaflet of mitral valve that appears to   prolapse mildly.   -The maximum mitral valve pressure gradient is 14mmHg and the mean pressure   gradient is 5 mmHg.   -Moderately mitral regurgitation with posteriorly directed jet.   -A mechanical aortic valve appears well seated.   -The left atrium is dilated.   -The right atrium is dilated.   -Severe tricuspid regurgitation directed anteriorly.   -PASP 36mmHg.   -Trivial pulmonic regurgitation present    Assessment:    1. Acute on chronic combined systolic and diastolic heart failure, no ace/arb/aldosterone antagonist due to ckd; on BB  2. Acute on chronic kidney disease  3. Anemia  4. Chronic atrial fibrillation on coumadin and amiodarone  5. Hypoalbuminemia and proteinuria    Plan:    1. Nephrology following  2. Continue coreg 25 mg bid  3. Holding diuretics due to hypernatremia  4. Confusion per primary team    Discussed with patient who is agreeable with plan of care. Thank you for allowing me to participate in the care of your patient.     Wilma Jackson, CNS

## 2021-01-15 NOTE — CARE COORDINATION
[address] [address] Continence Nurse  Consult Note       NAME:  Kim Valerio  MEDICAL RECORD NUMBER:  [id number]  AGE: 68 y.o.    GENDER: male  : 1940  TODAY'S DATE:  1/15/2021    Subjective   Reason for WOCN Evaluation and Assessment:  Wounds bilateral extremities       Kim Valerio is a 68 y.o. male referred by:   [x] Physician  [] Nursing  [] Other:     Wound Identification:  Wound Type: venous  Contributing Factors: edema    Wound History: present on admission  Current Wound Care Treatment:  vaseline guaze and roll guaze    Patient Goal of Care:  [x] Wound Healing  [] Odor Control  [] Palliative Care  [] Pain Control   [] Other:         PAST MEDICAL HISTORY        Diagnosis Date    Acute combined systolic and diastolic [facility]congestive) hrt fail [facility]     Acute kidney injury ([facility]     Arrhythmia     Atrial fibrillation ([facility]     Carotid artery stenosis     CKD (chronic kidney disease)     Dilated cardiomyopathy ([facility]     Hyperlipidemia     Hypertension     IGT (impaired glucose tolerance)     Meningioma ([facility]     Valvular disease        PAST SURGICAL HISTORY    Past Surgical History:   Procedure Laterality Date    AORTIC VALVE REPLACEMENT      CARPAL TUNNEL RELEASE Left     COLONOSCOPY  2010    SKIN BIOPSY         FAMILY HISTORY    Family History   Problem Relation Age of Onset    Heart Disease Paternal Grandfather     Hypertension Other     High Cholesterol Neg Hx     High Blood Pressure Neg Hx        SOCIAL HISTORY    Social History     Tobacco Use    Smoking status: Former Smoker     Packs/day: 1.00     Years: 25.00     Pack years: 25.00     Types: Cigarettes     Quit date: 1993     Years since quittin.0    Smokeless tobacco: Current User     Types: Chew     Last attempt to quit: 1982    Tobacco comment: quit 25 yrs ago   Substance Use Topics    Alcohol use: No     Alcohol/week: 0.0 standard drinks    Drug use: No       ALLERGIES    Allergies  Stage 3 chronic kidney disease N18.30    Hypertrophy of prostate without urinary obstruction and other lower urinary tract symptoms (LUTS) N40.0    Peptic ulcer K27.9    Gout M10.9    Generalized osteoarthrosis, involving multiple sites M15.9    IGT (impaired glucose tolerance) R73.02    Lung nodule R91.1    Chronic combined systolic and diastolic CHF (congestive heart failure) ([facility]) I50.42    Meningioma ([facility]) D32.9    Bilateral carotid artery stenosis I65.23    Atherosclerosis of abdominal aorta ([facility]) I70.0    Labyrinthine vertigo H81.09    RACIEL (acute kidney injury) ([facility]) N17.9    S/P AVR Z95.2    Cardiomyopathy ([facility]) I42.9    SOB (shortness of breath) R06.02    Localized edema R60.0    Acute combined systolic and diastolic congestive heart failure ([facility]) I50.41    Acute on chronic congestive heart failure ([facility]) I50.9    Venous stasis ulcer of right ankle with fat layer exposed without varicose veins ([facility]) I87.2, L97.312    Venous stasis ulcer of left calf with fat layer exposed without varicose veins ([facility]) I87.2, L97.222    Hypokalemia E87.6    Heart failure ([facility]) I50.9    Acute kidney injury superimposed on chronic kidney disease ([facility]) N17.9, N18.9    Hypoalbuminemia E88.09    Chronic atrial fibrillation ([facility]) I48.20    Proteinuria R80.9    Encephalopathy, metabolic N98.80    Acute encephalopathy G93.40    Hypernatremia E87.0       Measurements:  Wound 12/21/20 Leg Lower; Left #1 (Active)   Wound Image   12/21/20 1349   Wound Etiology Venous 01/11/21 1340   Dressing Status New dressing applied 01/15/21 1516   Wound Cleansed Irrigated with saline 01/15/21 1516   Dressing/Treatment Petroleum impregnated gauze; Roll gauze 01/15/21 1516   Wound Length (cm) 0.8 cm 01/11/21 1340   Wound Width (cm) 2 cm 01/11/21 1340   Wound Depth (cm) 0.1 cm 01/11/21 1340   Wound Surface Area (cm^2) 1.6 cm^2 01/11/21 1340   Change in Wound Size % (l*w) -113.33 01/11/21 1340 Wound Volume (cm^3) 0.16 cm^3 01/11/21 1340   Wound Healing % -100 01/11/21 1340   Post-Procedure Length (cm) 2 cm 12/21/20 1403   Post-Procedure Width (cm) 9 cm 12/21/20 1403   Post-Procedure Depth (cm) 0.1 cm 12/21/20 1403   Post-Procedure Surface Area (cm^2) 18 cm^2 12/21/20 1403   Post-Procedure Volume (cm^3) 1.8 cm^3 12/21/20 1403   Wound Assessment Bleeding;Pink/red 01/15/21 1516   Drainage Amount Moderate 01/15/21 1516   Drainage Description Thin;Yellow 01/15/21 1516   Odor None 01/15/21 1516   Patsy-wound Assessment Edematous 01/12/21 0030   Margins Undefined edges 01/11/21 1340   Number of days: 25       Wound 12/21/20 Leg Right; Anterior #2 (Active)   Wound Image   12/21/20 1349   Wound Etiology Venous 01/11/21 1340   Dressing Status New dressing applied 01/15/21 1516   Wound Cleansed Irrigated with saline 01/15/21 1516   Dressing/Treatment Petroleum impregnated gauze; Roll gauze 01/15/21 1516   Wound Length (cm) 0.7 cm 01/11/21 1340   Wound Width (cm) 1.1 cm 01/11/21 1340   Wound Depth (cm) 0.1 cm 01/11/21 1340   Wound Surface Area (cm^2) 0.77 cm^2 01/11/21 1340   Change in Wound Size % (l*w) 96.33 01/11/21 1340   Wound Volume (cm^3) 0.08 cm^3 01/11/21 1340   Wound Healing % 96 01/11/21 1340   Wound Assessment Bleeding;Pink/red;Ruptured blister 01/15/21 1516   Drainage Amount Moderate 01/15/21 1516   Drainage Description Thin;Yellow 01/15/21 1516   Odor None 01/15/21 1516   Patsy-wound Assessment Edematous 01/11/21 1340   Number of days: 25       Wound 01/12/21 Pretibial Right red weeping area (Active)   Wound Image   01/15/21 1516   Wound Etiology Venous 01/15/21 1516   Dressing Status New dressing applied 01/15/21 1516   Wound Cleansed Irrigated with saline 01/15/21 1516   Dressing/Treatment Petroleum impregnated gauze; Roll gauze 01/15/21 1516   Wound Length (cm) 10 cm 01/15/21 1516   Wound Assessment Bleeding;Pink/red;Ruptured blister 01/15/21 1516   Drainage Amount Large 01/15/21 1516 Drainage Description Thin;Yellow 01/15/21 1516   Odor None 01/14/21 1033   Number of days: 3       Wound 01/12/21 Pretibial Left red areas (Active)   Wound Image   01/15/21 1516   Wound Etiology Venous 01/15/21 1516   Dressing Status New dressing applied 01/15/21 1516   Wound Cleansed Irrigated with saline 01/15/21 1516   Dressing/Treatment Petroleum impregnated gauze; Roll gauze 01/15/21 1516   Wound Length (cm) 10 cm 01/15/21 1516   Wound Width (cm) 8.5 cm 01/15/21 1516   Wound Surface Area (cm^2) 85 cm^2 01/15/21 1516   Wound Assessment Bleeding;Pink/red;Ruptured blister 01/15/21 1516   Drainage Amount Large 01/15/21 1516   Drainage Description Thin;Yellow 01/15/21 1516   Odor None 01/14/21 1033   Number of days: 3   Patient admitted for heart failure, also has afib. Patient has venous stasis wounds to bilateral lower extremities. Discussed case with nurse Ari Chau; patient has blisters to lower extremities, these have ruptured, wound beds are red and bleeding. Also patient seen 1/11/21 by Dr Bakari Cruz in wound clinic. Patient also callouses to both feet in the plantar areas. Wounds measured and pictures taken. Will continue to use vaseline guaze and roll guaze. Will place wound care orders          Response to treatment:  Well tolerated by patient. Pain Assessment:  Severity:  0 / 10  Quality of pain: N/A  Wound Pain Timing/Severity: none  Premedicated: No    Plan   Plan of Care: Wound 12/21/20 Leg Lower; Left #1-Dressing/Treatment: Petroleum impregnated gauze, Roll gauze  Wound 12/21/20 Leg Right; Anterior #2-Dressing/Treatment: Petroleum impregnated gauze, Roll gauze  Wound 01/12/21 Pretibial Right red weeping area-Dressing/Treatment: Petroleum impregnated gauze, Roll gauze  Wound 01/12/21 Pretibial Left red areas-Dressing/Treatment: Petroleum impregnated gauze, Roll gauze    Specialty Bed Required : No   [] Low Air Loss   [] Pressure Redistribution  [] Fluid Immersion  [] Bariatric [] Total Pressure Relief  [] Other:     Current Diet: DIET CARDIAC;   Dietician consult:  No    Discharge Plan:  Placement for patient upon discharge: skilled nursing    Patient appropriate for Outpatient [address]: Patient seen in [address]    Referrals:  []   [] [facility]  [] Supplies  [] Other    Patient/Caregiver Teaching:  Level of patient/caregiver understanding able to:   [] Indicates understanding       [] Needs reinforcement  [] Unsuccessful      [] Verbal Understanding  [] Demonstrated understanding       [x] No evidence of learning  [] Refused teaching         [] N/A       Electronically signed by DAMARI Merritt, RN  Wound/Ostomy Careon 1/15/2021 at 3:22 PM

## 2021-01-15 NOTE — PROGRESS NOTES
MD Megan Sanchez MD Alm Forster, MD               Office: [phone]                      Fax: [phone]             [facility]                   NEPHROLOGY INPATIENT PROGRESS NOTE:     PATIENT NAME: Ban Reese  : 1940  MRN: [id number]       RECOMMENDATIONS:   - stop diuretics w/ AMS + hypernatremia   - decrease p.o. intake   - release fluid restriction   -f/u recheck NA for hypernatremia - mild - dehydration   -h/o EtOH abuse, consider thiamin,     D/C Plans: renally stable,  f/u AMS, neuro eval   -Renally stable as fluid overload / RACIEL better   - then f/u w/ Dr Maribell Weber on     D/W Dr Maryjane Rubinstein , nurse. IMPRESSION:       RACIEL (on proteinuric CKD-3):  Better   - non-Oligouric  - BL Scr- - recent BL worsened from 1.6 -1.7.  --->2.5-2.4 ---> 3.1 on admission -> 2.5  .   : Etiology of RACIEL - presumed CRS w/ developed ATN     - other differentials: unlikely  , r/o obstruction   - UA : small blood, 11-20 hyaline cast = pre-renal component / CKD :     Proteinuric CKD-3 - follows w/ Dr Maribell Weber   - next f/u is on 2021   - CKD likely d/to HTN / CR physiology     Systolic CHF - EF 27-06%. - Off ACEI. - dry weight- 161 pounds      Associated problems:   - Azotemia: pre-renal at BUN 60   - Electrolytes: follow   - Volume status: mild hyper-volemia  : BP: no need for tight control   - Acid-Base: stable   - Anemia: mild - follow      H/O HLD - CK WNL. H/O Microscopic Hematuria-SNOW, complements WNL. ANCA neg. UPCR 360mg. H/O Nephrolithiasis- low Na intake and higher fluid intake. Other problems: Management per primary and other consulting teams.    Hospital Problems           Last Modified POA    Essential hypertension, benign (Chronic) 2021 Yes    Atrial fibrillation with RVR ([address] 2021 Yes    Stage 3 chronic kidney disease 2021 Yes    Aortic regurgitation (Chronic) 2021 Yes Chronic combined systolic and diastolic CHF (congestive heart failure) ([facility] 1/12/2021 Yes    Heart failure ([facility] [facility] 1/11/2021 Yes    Acute kidney injury superimposed on chronic kidney disease ([facility] [facility] 1/14/2021 Yes    Hypoalbuminemia 1/13/2021 Yes    Chronic atrial fibrillation ([facility] [facility] 1/14/2021 Yes    Proteinuria 1/14/2021 Yes           : other supportive care :   - Check daily renal function panel with electrolytes-phosphorus  - Strict monitoring of I/Os, daily weight  - Renal feeds/diet  - Current medications reviewed. - Nephrotoxic medications have been discontinued. - Dose adjusted and appropriate. - Dose meds for eGFR <15 mL/min/1.73m2 during RACIEL    - Avoid heavy opioids due to renal failure - may use very low dose dilaudid / fentanyl with close monitoring of CNS and respiratory depression. Please refer to the orders. High Complexity. Multiple complex problems. Discussed with patient, and treatment team- hospitalist,    Thank you for allowing me to participate in this patient's care. Please do not hesitate to contact me with any questions/concerns. We will follow along with you. Ted Tyler MD  [facility] [address] [place]: [phone] or [address]  Fax: [phone]        ========================================================   ========================================================       Subjective:  Seen resting in bed more confused today,  unable to communicate fully  Blood pressure is stable, no temperature elevated, heart rate slightly higher normal side,  Saturating well on 2 L nasal cannula    Past medical, Surgical, Social, Family medical history reviewed by me. MEDICATIONS: reviewed by me. Medications Prior to Admission:  No current facility-administered medications on file prior to encounter.       Current Outpatient Medications on File Prior to Encounter   Medication Sig Dispense Refill  atorvastatin (LIPITOR) 40 MG tablet Take 1 tablet by mouth daily 90 tablet 3    carvedilol (COREG) 25 MG tablet Take 1 tablet by mouth 2 times daily 180 tablet 1    potassium chloride (KLOR-CON M) 20 MEQ extended release tablet TAKE 2 TABLETS BY MOUTH DAILY 180 tablet 1    allopurinol (ZYLOPRIM) 100 MG tablet TAKE 1 TABLET BY MOUTH DAILY 90 tablet 3    amiodarone (CORDARONE) 200 MG tablet Take 1 tablet by mouth daily 90 tablet 3    warfarin (COUMADIN) 4 MG tablet Take 1-2 mg by mouth See Admin Instructions 1 mg every Monday; 2 mg all other days. Managed by [facility].       Multiple Vitamins-Minerals (THERAPEUTIC MULTIVITAMIN-MINERALS) tablet Take 1 tablet by mouth daily           Current Facility-Administered Medications:     QUEtiapine (SEROQUEL) tablet 12.5 mg, 12.5 mg, Oral, Nightly, Bronson De Los Santos MD, 12.5 mg at 01/14/21 1430    [START ON 1/18/2021] warfarin (COUMADIN) tablet 1 mg, 1 mg, Oral, Once per day on Mon, Shabnam Fuller MD    warfarin (COUMADIN) tablet 2 mg, 2 mg, Oral, Once per day on Sun Tue Wed Thu Fri Sat, Shabnam Fuller MD, 2 mg at 01/14/21 1710    nicotine (NICODERM CQ) 21 MG/24HR 1 patch, 1 patch, Transdermal, Daily, [place] AKI Bell, 1 patch at 01/13/21 0324    sodium chloride (OCEAN, BABY AYR) 0.65 % nasal spray 1 spray, 1 spray, Each Nostril, PRN, [place] AKI Bell, 1 spray at 01/13/21 2132    diphenhydrAMINE (BENADRYL) tablet 25 mg, 25 mg, Oral, Q6H PRN, [place] AKI Bell, 25 mg at 01/14/21 2206    ipratropium-albuterol (DUONEB) nebulizer solution 1 ampule, 1 ampule, Inhalation, Q4H WA, Pasquale Pabon MD, 1 ampule at 01/15/21 0800    amiodarone (CORDARONE) tablet 200 mg, 200 mg, Oral, Daily, Yessy Tanner MD, 200 mg at 01/14/21 0809    atorvastatin (LIPITOR) tablet 40 mg, 40 mg, Oral, Nightly, Yessy Tanner MD, 40 mg at 01/14/21 2206    carvedilol (COREG) tablet 25 mg, 25 mg, Oral, BID, Perlita Edmondson MD, 25 mg at 01/14/21 2206   sodium chloride flush 0.9 % injection 10 mL, 10 mL, Intravenous, 2 times per day, Darcie Wolf MD, 10 mL at 01/14/21 2206    sodium chloride flush 0.9 % injection 10 mL, 10 mL, Intravenous, PRN, Darcie Wolf MD, 10 mL at 01/13/21 1845    promethazine (PHENERGAN) tablet 12.5 mg, 12.5 mg, Oral, Q6H PRN **OR** ondansetron (ZOFRAN) injection 4 mg, 4 mg, Intravenous, Q6H PRN, Darcie Wolf MD    polyethylene glycol (GLYCOLAX) packet 17 g, 17 g, Oral, Daily PRN, Darcie Wolf MD    acetaminophen (TYLENOL) tablet 650 mg, 650 mg, Oral, Q6H PRN **OR** acetaminophen (TYLENOL) suppository 650 mg, 650 mg, Rectal, Q6H PRN, Darcie Wolf MD      REVIEW OF SYSTEMS:  As mentioned in chief complaint and HPI , Subjective     PHYSICAL EXAM:  Recent vital signs and recent I/Os reviewed by me. Wt Readings from Last 3 Encounters:   01/14/21 175 lb 4.8 oz (79.5 kg)   01/11/21 179 lb (81.2 kg)   12/21/20 169 lb (76.7 kg)     BP Readings from Last 3 Encounters:   01/15/21 138/71   01/11/21 107/61   12/28/20 103/65     Patient Vitals for the past 24 hrs:   BP Temp Temp src Pulse Resp SpO2   01/15/21 0815 138/71 98.3 °F (36.8 °C) Axillary 97 20    01/15/21 0800     18 96 %   01/15/21 0410 119/75 97.7 °F (36.5 °C) Axillary 97 18 93 %   01/14/21 2318      95 %   01/14/21 2315 125/68 97.4 °F (36.3 °C) Axillary 93 18 (!) 86 %   01/14/21 2113      95 %   01/14/21 2000 (!) 141/73 98 °F (36.7 °C) Axillary 92 20    01/14/21 1511    86     01/14/21 1500 133/79 97.7 °F (36.5 °C) Oral 86 16 95 %   01/14/21 1235 114/66 97.6 °F (36.4 °C) Oral 91 16 97 %   01/14/21 1216     16 95 %       Intake/Output Summary (Last 24 hours) at 1/15/2021 1001  Last data filed at 1/14/2021 1710  Gross per 24 hour   Intake 360 ml   Output 750 ml   Net -390 ml         Physical Exam  Vitals signs reviewed. Constitutional:       General: He is sleeping. He is not in acute distress. Appearance: He is ill-appearing. Comments: Obese body habitus   HENT:      Head: Normocephalic and atraumatic. Right Ear: External ear normal.      Left Ear: External ear normal.      Mouth/Throat:      Mouth: Mucous membranes are not dry. Eyes:      General: No scleral icterus. Conjunctiva/sclera: Conjunctivae normal.   Neck:      Musculoskeletal: Neck supple. Thyroid: No thyroid mass. Vascular: No JVD. Trachea: Trachea normal.   Cardiovascular:      Rate and Rhythm: Normal rate and regular rhythm. Pulmonary:      Effort: Pulmonary effort is AB-normal.      Breath sounds: Diminished breath sounds at bases . Abdominal:      General: Bowel sounds are normal. There is distension. Palpations: Abdomen is soft. Musculoskeletal:         General: No deformity. Right lower leg: Edema present. Left lower leg: Edema present. Skin:     General: Skin is warm and dry. Neurological:      Mental Status: Disoriented, sleepy but easily aroused. To loud voice  Psychiatric:         Behavior: Behavior normal.              DATA:  Diagnostic tests reviewed by me for today's visit:    Recent Labs     01/13/21  0548 01/14/21  0456 01/15/21  0509   WBC 5.7 6.2 6.2   HCT 26.0* 27.2* 27.4*   * 142 147     Iron Saturation:  No components found for: PERCENTFE  FERRITIN:    Lab Results   Component Value Date    FERRITIN 67.1 08/04/2020     IRON:    Lab Results   Component Value Date    IRON 30 01/13/2021     TIBC:    Lab Results   Component Value Date    TIBC 268 01/13/2021       Recent Labs     01/13/21  0548 01/14/21  0456 01/15/21  0509    144 148*   K 3.8 4.0 3.9    104 106   CO2 31 33* 32   BUN 53* 50* 50*   CREATININE 2.7* 2.5* 2.4*     Recent Labs     01/13/21  0548 01/14/21  0456 01/15/21  0509   CALCIUM 8.7 8.6 8.8   PHOS 3.6 3.0 3.3     No results for input(s): PH, PCO2, PO2 in the last 72 hours.     Invalid input(s): Justin Fink ABG:  No results found for: PH, PCO2, PO2, HCO3, BE, THGB, TCO2, O2SAT  VBG:  No results found for: PHVEN, XOC2BAD, BEVEN, W0MMTPYY    LDH:  No results found for: LDH  Uric Acid:    Lab Results   Component Value Date    LABURIC 4.7 07/25/2019       PT/INR:    Lab Results   Component Value Date    PROTIME 29.1 01/15/2021    PROTIME 48.3 08/31/2018    INR 2.49 01/15/2021     Warfarin PT/INR:  No components found for: Christina Lira  PTT:    Lab Results   Component Value Date    APTT 46.8 01/11/2021   [APTT}  Last 3 Troponin:    Lab Results   Component Value Date    TROPONINI 0.02 01/11/2021    TROPONINI 0.03 07/30/2020    TROPONINI 0.03 07/30/2020       U/A:    Lab Results   Component Value Date    COLORU YELLOW 01/14/2021    PROTEINU 30 01/14/2021    PHUR 6.0 01/14/2021    LABCAST 10-20 Hyaline 04/23/2017    WBCUA 1 01/14/2021    RBCUA 1 01/14/2021    YEAST 0 12/09/2019    BACTERIA Rare 07/30/2020    CLARITYU Clear 01/14/2021    SPECGRAV 1.012 01/14/2021    LEUKOCYTESUR Negative 01/14/2021    UROBILINOGEN 1.0 01/14/2021    BILIRUBINUR Negative 01/14/2021    BLOODU SMALL 01/14/2021    GLUCOSEU Negative 01/14/2021     Microalbumen/Creatinine ratio:  No components found for: RUCREAT  24 Hour Urine for Protein:  No components found for: RAWUPRO, UHRS3, LRWG21NO, UTV3  24 Hour Urine for Creatinine Clearance:  No components found for: CREAT4, UHRS10, UTV10  Urine Toxicology:  No components found for: Kelly Kathleen, IBENZO, ICOCAINE, IMARTHC, IOPIATES, IPHENCYC    HgBA1c:    Lab Results   Component Value Date    LABA1C 6.3 07/25/2019     RPR:  No results found for: RPR  HIV:  No results found for: HIV  SNOW:    Lab Results   Component Value Date    SNOW Negative 08/20/2020     RF:  No results found for: RF  DSDNA:  No components found for: DNA  AMYLASE:  No results found for: AMYLASE  LIPASE:  No results found for: LIPASE  Fibrinogen Level:  No components found for: FIB

## 2021-01-15 NOTE — PLAN OF CARE
Dressings on BLE where done this am. Patient changed at this time and pulled up. ROM done and he is doing well still disoriented and mumbling but breathing good and not actively trying to fight restraints. Will continue to check and change. He is also a feed.        Problem: Falls - Risk of:  Goal: Will remain free from falls  Description: Will remain free from falls  Outcome: Ongoing     Problem: Skin Integrity:  Goal: Will show no infection signs and symptoms  Description: Will show no infection signs and symptoms  Outcome: Ongoing  Goal: Absence of new skin breakdown  Description: Absence of new skin breakdown  Outcome: Ongoing     Problem: Restraint Use - Nonviolent/Non-Self-Destructive Behavior:  Goal: Absence of restraint indications  Description: Absence of restraint indications  1/15/2021 0429 by Cindi Moon RN  Outcome: Ongoing  Goal: Absence of restraint-related injury  Description: Absence of restraint-related injury  Outcome: Ongoing

## 2021-01-15 NOTE — PROGRESS NOTES
Patient is doing well. Restraints are off at this time and have been since 1130. He had a large BM, ate a good lunch and is now calm. He also appeared to follow some commands despite still being confused he can do small tasks. He did ambulate to the commode well with encouragement. Restraints off at 1130.

## 2021-01-15 NOTE — PROGRESS NOTES
Pharmacy to Dose Warfarin    Pharmacy consulted to dose warfarin for mech AVR. INR Goal: 2.5-3.5    INR today: 2.49    Assessment/Plan:  - INR just barely subtherapeutic  - Give warfarin 2mg this evening    Pharmacy will continue to follow.     Beba Shen, PharmD  1/15/2021 9:47 AM

## 2021-01-16 NOTE — PROGRESS NOTES
Moderate amount of serosang drainage from bilateral lower leg dressings. vaseline gauze and kerlix changed at this time with assistance of Shyla Michelle RN as pt became agitated at attempted kicking staff. Pt calmed easily after dressings replaced.

## 2021-01-16 NOTE — PROGRESS NOTES
Pt remains disoriented place, situation and time. Pt able to give his correct name. Pt placed on low bed at this time. Bed alarmed, bed pad alarm in place, camera in room and pt directly across from nurses station with door open. Pt states he needs to get out of bed to \"feed the dog\".

## 2021-01-16 NOTE — PLAN OF CARE
Problem: Falls - Risk of:  Goal: Will remain free from falls  Outcome: Ongoing  Note: No evidence of learning with high fall risk education provided to patient. Pt remains impulsive and unable to follow direction once left alone. Pt remains in low bed will fall mats surrounding bed. Problem: Restraint Use - Nonviolent/Non-Self-Destructive Behavior:  Goal: Absence of restraint indications  Outcome: Ongoing  Note: No restraints in place so far this shift.

## 2021-01-16 NOTE — PROGRESS NOTES
Plan:    1. Nephrology following  2. Continue coreg 25 mg bid  3. Holding diuretics due to hypernatremia  4. Confusion : neuro evaluated      involved with discharge planning: family considering SNF and given 3 facilities for options    Discussed with patient who is agreeable with plan of care. Thank you for allowing me to participate in the care of your patient.     SARAH Francis-CNP

## 2021-01-16 NOTE — PROGRESS NOTES
[facility] PROGRESS NOTE    1/16/2021 8:59 AM        Name: Emma Horvath . Admitted: 1/11/2021  Primary Care Provider: Brooklyn Rivera MD (Tel: [phone])                        Subjective:  . Patient is more confused today than yesterday. Unclear etiology.   Was started on Seroquel last night unclear if that are causing symptoms      Reviewed interval ancillary notes    Current Medications      QUEtiapine (SEROQUEL) tablet 12.5 mg, Nightly      [START ON 1/18/2021] warfarin (COUMADIN) tablet 1 mg, Once per day on Mon      warfarin (COUMADIN) tablet 2 mg, Once per day on Sun Tue Wed Thu Fri Sat      nicotine (NICODERM CQ) 21 MG/24HR 1 patch, Daily      sodium chloride (OCEAN, BABY AYR) 0.65 % nasal spray 1 spray, PRN      diphenhydrAMINE (BENADRYL) tablet 25 mg, Q6H PRN      ipratropium-albuterol (DUONEB) nebulizer solution 1 ampule, Q4H WA      amiodarone (CORDARONE) tablet 200 mg, Daily      atorvastatin (LIPITOR) tablet 40 mg, Nightly      carvedilol (COREG) tablet 25 mg, BID      sodium chloride flush 0.9 % injection 10 mL, 2 times per day      sodium chloride flush 0.9 % injection 10 mL, PRN      promethazine (PHENERGAN) tablet 12.5 mg, Q6H PRN    Or      ondansetron (ZOFRAN) injection 4 mg, Q6H PRN      polyethylene glycol (GLYCOLAX) packet 17 g, Daily PRN      acetaminophen (TYLENOL) tablet 650 mg, Q6H PRN    Or      acetaminophen (TYLENOL) suppository 650 mg, Q6H PRN        Objective:  /67   Pulse 88   Temp 98.2 °F (36.8 °C) (Oral)   Resp 22   Ht 5' 8\" (1.727 m)   Wt 169 lb 8 oz (76.9 kg)   SpO2 95%   BMI 25.77 kg/m²     Intake/Output Summary (Last 24 hours) at 1/16/2021 0859  Last data filed at 1/15/2021 1936  Gross per 24 hour   Intake 415 ml   Output 200 ml   Net 215 ml      Wt Readings from Last 3 Encounters: Acute encephalopathy    Hypernatremia  Resolved Problems:    * No resolved hospital problems. *       Assessment & Plan:   1. Acute encephalopathy  --Still very confused but slowly improving. Likely presume this is metabolic  -CT head MRI is negative. Neurology recommendation appreciated  -Patient will likely need placement.  -Unclear etiology of its metabolic versus other cause  - monitor closely over next 24 hours if stable then probably plan for discharge afterwards      Acute on chronic CHF  -Continue per cardiology    Cute on chronic renal failure stable. Probably has underlying dementia as well.     I do not think he is safe to go home he probably will need placement      Diet: DIET CARDIAC;  Code:Full Code  DVT PPX lovenox       Bertha Hill MD   1/16/2021 8:59 AM

## 2021-01-16 NOTE — PROGRESS NOTES
Pharmacy to Dose Warfarin    Pharmacy consulted to dose warfarin for Afib, Heart Valve Replacement. INR Goal: 2.5-3.5    INR today: 2.54    Assessment/Plan:  - INR is therapeutic  - Will continue with 2 mg dose tonight  - Daily INR ordered    Pharmacy will continue to follow.     Thanks,  rBown Mata, PharmD  PGY-1 Pharmacy Resident  [id number]

## 2021-01-16 NOTE — PROGRESS NOTES
Imtiaz Newby  Neurology Follow-up  [facility] Neurology    Date of Service: 1/16/2021    Subjective:   CC: Follow up today regarding: Acute encephalopathy    Events noted. Chart and lab reviewed. The patient is about the same but less agitated. He was awake and alert but cannot follow simple direction. He is moving his arms or legs. No fever overnight. Sodium today 141 and creatinine of 2.4. Continues to be anemic. Other review of system was unremarkable or limited. ROS : A 10-12 system review could not be obtained due to poor cooperation and confusion. family history includes Heart Disease in his paternal grandfather; Hypertension in an other family member.     Past Medical History:   Diagnosis Date    Acute combined systolic and diastolic (congestive) hrt fail ([facility]     Acute kidney injury ([facility]     Arrhythmia     Atrial fibrillation (HCC)     Carotid artery stenosis     CKD (chronic kidney disease)     Dilated cardiomyopathy (HCC)     Hyperlipidemia     Hypertension     IGT (impaired glucose tolerance)     Meningioma (HCC)     Valvular disease      Current Facility-Administered Medications   Medication Dose Route Frequency Provider Last Rate Last Admin    dextrose 5 % solution   Intravenous Continuous Peg Wynn MD 50 mL/hr at 01/16/21 1153 New Bag at 01/16/21 1153    QUEtiapine (SEROQUEL) tablet 12.5 mg  12.5 mg Oral Nightly Bronson De Los Santos MD   12.5 mg at 01/15/21 2142    [START ON 1/18/2021] warfarin (COUMADIN) tablet 1 mg  1 mg Oral Once per day on Manda Golden MD        warfarin (COUMADIN) tablet 2 mg  2 mg Oral Once per day on Sun Tue Wed Thu Fri Sat Everett Kim MD   2 mg at 01/15/21 1720    nicotine (NICODERM CQ) 21 MG/24HR 1 patch  1 patch Transdermal Daily Ene Gerardo PA-C   1 patch at 01/16/21 1016    sodium chloride (OCEAN, BABY AYR) 0.65 % nasal spray 1 spray  1 spray Each Nostril PRN Ene Gerardo PA-C   1 spray at 01/13/21 2132  diphenhydrAMINE (BENADRYL) tablet 25 mg  25 mg Oral Q6H PRN Angie Simpson PA-C   25 mg at 01/16/21 0213    ipratropium-albuterol (DUONEB) nebulizer solution 1 ampule  1 ampule Inhalation Q4H WA Maria Guadalupe Scanlon MD   1 ampule at 01/15/21 1608    amiodarone (CORDARONE) tablet 200 mg  200 mg Oral Daily Liu Carmona MD   200 mg at 01/16/21 1016    atorvastatin (LIPITOR) tablet 40 mg  40 mg Oral Nightly Liu Carmona MD   40 mg at 01/15/21 2142    carvedilol (COREG) tablet 25 mg  25 mg Oral BID Liu Carmona MD   25 mg at 01/16/21 1016    sodium chloride flush 0.9 % injection 10 mL  10 mL Intravenous 2 times per day Liu Carmona MD   10 mL at 01/16/21 1006    sodium chloride flush 0.9 % injection 10 mL  10 mL Intravenous PRN Liu Carmona MD   10 mL at 01/13/21 1845    promethazine (PHENERGAN) tablet 12.5 mg  12.5 mg Oral Q6H PRN Liu Carmona MD        Or    ondansetron (ZOFRAN) injection 4 mg  4 mg Intravenous Q6H PRN Liu Carmona MD        polyethylene glycol (GLYCOLAX) packet 17 g  17 g Oral Daily PRN Liu Carmona MD        acetaminophen (TYLENOL) tablet 650 mg  650 mg Oral Q6H PRN Liu Carmona MD        Or   [place] acetaminophen (TYLENOL) suppository 650 mg  650 mg Rectal Q6H PRN Liu Carmona MD         Allergies   Allergen Reactions    Ceclor [Cefaclor] Other (See Comments)     CNS side effects, slurred speech. Lips and tounge swell. reports that he quit smoking about 28 years ago. His smoking use included cigarettes. He has a 25.00 pack-year smoking history. His smokeless tobacco use includes chew. He reports that he does not drink alcohol or use drugs.          Objective:  Constitutional:   Vitals:    01/16/21 0406 01/16/21 0738 01/16/21 1000 01/16/21 1130   BP: 117/67  (!) 124/58    Pulse: 88  82    Resp: 22  22    Temp: 98.2 °F (36.8 °C)  96 °F (35.6 °C) (!) 48.2 °F (9 °C)   TempSrc: Oral  Temporal    SpO2: 95%  96%    Weight:  169 lb 8 oz (76.9 kg)     Height: Hospital-acquired delirium and less likely new ischemic stroke or breakthrough seizure at this point. Acute on chronic kidney disease  Acute CHF exacerbation  Hypertension with hypotension  Hypernatremia       Recommendation  Continue current supportive care  Continue Coumadin  Monitor INR  Aspiration precaution  Speech evaluation  PT and OT when more awake  Telemetry  Statin  Avoid sedatives  Hydration  Monitor and follow sodium and kidney function testing  If confusion does not improve, I would consider EEG  Discussed with his nurse  We will follow Monday. Roberta Garcia MD   [phone]      This dictation was generated by voice recognition computer software. Although all attempts are made to edit the dictation for accuracy, there may be errors in the transcription that are not intended. Addendum: I had a lengthy discussion with the patient's sons Wisam Haile over to the phone today regarding the possibility of hospital-acquired delirium. He voiced understanding and agreed with our plan. If not better by Monday, would consider MRI brain.

## 2021-01-16 NOTE — PROGRESS NOTES
MD Megan Sanchez MD Alm Forster, MD               Office: [phone]                      Fax: [phone]             [facility]                   NEPHROLOGY INPATIENT PROGRESS NOTE:     PATIENT NAME: Ban Reese  : 1940  MRN: [id number]     Nephrology treatment plan update. Acute kidney injurymoderate to severewith decreased urine outputsevere. BUN is 58 and a creatinine of 2.4. Unchanged. Patient does have a background of chronic kidney disease with a baseline creatinine of 1.9. Borderline hypotension. PS to have hypovolemia. Multiple electrolyte imbalance. Hypernatremia. Most likely related to volume depletion. Begin D5W at 50 mL/h. Continue to monitor urine output. Anemia levels are stable. Medications reviewed. Worsening mentation of concern. Repeat labs in a.m. Not ready for discharge. RECOMMENDATIONS:   - stop diuretics w/ AMS + hypernatremia   - decrease p.o. intake   - release fluid restriction   -f/u recheck NA for hypernatremia - mild - dehydration   -h/o EtOH abuse, consider thiamin,     D/C Plans: renally stable,  f/u AMS, neuro eval   -Renally stable as fluid overload / RACIEL better   - then f/u w/ Dr Maribell Weber on     D/W Dr Maryjane Rubinstein , nurse. IMPRESSION:       RACIEL (on proteinuric CKD-3):  Better   - non-Oligouric  - BL Scr- - recent BL worsened from 1.6 -1.7.  --->2.5-2.4 ---> 3.1 on admission -> 2.5  .   : Etiology of RACIEL - presumed CRS w/ developed ATN     - other differentials: unlikely  , r/o obstruction   - UA : small blood, 11-20 hyaline cast = pre-renal component / CKD :     Proteinuric CKD-3 - follows w/ Dr Maribell Weber   - next f/u is on 2021   - CKD likely d/to HTN / CR physiology     Systolic CHF - EF 57-12%. - Off ACEI.     - dry weight- 161 pounds      Associated problems:   - Azotemia: pre-renal at BUN 60   - Electrolytes: follow - Volume status: mild hyper-volemia  : BP: no need for tight control   - Acid-Base: stable   - Anemia: mild - follow      H/O HLD - CK WNL. H/O Microscopic Hematuria-SNOW, complements WNL. ANCA neg. UPCR 360mg. H/O Nephrolithiasis- low Na intake and higher fluid intake. Other problems: Management per primary and other consulting teams. Hospital Problems           Last Modified POA    Essential hypertension, benign (Chronic) 1/12/2021 Yes    Atrial fibrillation with RVR ([address] 1/12/2021 Yes    Stage 3 chronic kidney disease 1/12/2021 Yes    Aortic regurgitation (Chronic) 1/12/2021 Yes    Chronic combined systolic and diastolic CHF (congestive heart failure) ([address] 1/12/2021 Yes    Acute on chronic congestive heart failure ([address] 1/15/2021 Yes    Heart failure ([address] 1/11/2021 Yes    Acute kidney injury superimposed on chronic kidney disease ([address] 1/14/2021 Yes    Hypoalbuminemia 1/13/2021 Yes    Chronic atrial fibrillation ([address] 1/14/2021 Yes    Proteinuria 1/14/2021 Yes    Encephalopathy, metabolic 4/99/0743 Yes    Acute encephalopathy 1/15/2021 Yes    Hypernatremia 1/15/2021 Yes           : other supportive care :   - Check daily renal function panel with electrolytes-phosphorus  - Strict monitoring of I/Os, daily weight  - Renal feeds/diet  - Current medications reviewed. - Nephrotoxic medications have been discontinued. - Dose adjusted and appropriate. - Dose meds for eGFR <15 mL/min/1.73m2 during RACIEL    - Avoid heavy opioids due to renal failure - may use very low dose dilaudid / fentanyl with close monitoring of CNS and respiratory depression. Please refer to the orders. High Complexity. Multiple complex problems. Discussed with patient, and treatment team- hospitalist,    Thank you for allowing me to participate in this patient's care. Please do not hesitate to contact me with any questions/concerns. We will follow along with you.      Lizabeth Frey MD   nicotine (NICODERM CQ) 21 MG/24HR 1 patch, 1 patch, Transdermal, Daily, Lala Marshall PA-C, 1 patch at 01/16/21 1016    sodium chloride (OCEAN, BABY AYR) 0.65 % nasal spray 1 spray, 1 spray, Each Nostril, PRN, Lala Marshall PA-C, 1 spray at 01/13/21 2132    diphenhydrAMINE (BENADRYL) tablet 25 mg, 25 mg, Oral, Q6H PRN, Lala Marshall PA-C, 25 mg at 01/16/21 0213    ipratropium-albuterol (DUONEB) nebulizer solution 1 ampule, 1 ampule, Inhalation, Q4H WA, Moira Roe MD, 1 ampule at 01/15/21 1608    amiodarone (CORDARONE) tablet 200 mg, 200 mg, Oral, Daily, Jamar Guadalupe MD, 200 mg at 01/16/21 1016    atorvastatin (LIPITOR) tablet 40 mg, 40 mg, Oral, Nightly, Jamar Guadalupe MD, 40 mg at 01/15/21 2142    carvedilol (COREG) tablet 25 mg, 25 mg, Oral, BID, Jamar Guadalupe MD, 25 mg at 01/16/21 1016    sodium chloride flush 0.9 % injection 10 mL, 10 mL, Intravenous, 2 times per day, Jamar Guadalupe MD, 10 mL at 01/16/21 1006    sodium chloride flush 0.9 % injection 10 mL, 10 mL, Intravenous, PRN, Jamar Guadalupe MD, 10 mL at 01/13/21 1845    promethazine (PHENERGAN) tablet 12.5 mg, 12.5 mg, Oral, Q6H PRN **OR** ondansetron (ZOFRAN) injection 4 mg, 4 mg, Intravenous, Q6H PRN, Jamar Guadalupe MD    polyethylene glycol (GLYCOLAX) packet 17 g, 17 g, Oral, Daily PRN, Jamar Guadalupe MD    acetaminophen (TYLENOL) tablet 650 mg, 650 mg, Oral, Q6H PRN **OR** acetaminophen (TYLENOL) suppository 650 mg, 650 mg, Rectal, Q6H PRN, Jamar Guadalupe MD      REVIEW OF SYSTEMS:  As mentioned in chief complaint and HPI , Subjective     PHYSICAL EXAM:  Recent vital signs and recent I/Os reviewed by me.      Wt Readings from Last 3 Encounters:   01/16/21 169 lb 8 oz (76.9 kg)   01/11/21 179 lb (81.2 kg)   12/21/20 169 lb (76.7 kg)     BP Readings from Last 3 Encounters:   01/16/21 (!) 124/58   01/11/21 107/61   12/28/20 103/65     Patient Vitals for the past 24 hrs:   BP Temp Temp src Pulse Resp SpO2 Height Weight 01/16/21 1000 (!) 124/58 96 °F (35.6 °C) Temporal 82 22 96 %     01/16/21 0738        169 lb 8 oz (76.9 kg)   01/16/21 0406 117/67 98.2 °F (36.8 °C) Oral 88 22 95 %     01/16/21 0005 126/71 97.8 °F (36.6 °C) Oral 94 22 98 %     01/15/21 2130 106/61   86  96 %     01/15/21 2030 118/71 98.1 °F (36.7 °C) Oral 78 20 97 %     01/15/21 1615 112/63 98.2 °F (36.8 °C) Oral 90 18 97 %     01/15/21 1530 (!) 107/58 98 °F (36.7 °C) Axillary 88 15 92 % 5' 8\" (1.727 m)    01/15/21 1514    80       01/15/21 1145 (!) 107/58 98.6 °F (37 °C) Oral 86 18 96 %         Intake/Output Summary (Last 24 hours) at 1/16/2021 1038  Last data filed at 1/15/2021 1936  Gross per 24 hour   Intake 415 ml   Output 200 ml   Net 215 ml         Physical Exam  Vitals signs reviewed. Constitutional:       General: He is sleeping. He is not in acute distress. Appearance: He is ill-appearing. Comments: Obese body habitus   HENT:      Head: Normocephalic and atraumatic. Right Ear: External ear normal.      Left Ear: External ear normal.      Mouth/Throat:      Mouth: Mucous membranes are not dry. Eyes:      General: No scleral icterus. Conjunctiva/sclera: Conjunctivae normal.   Neck:      Musculoskeletal: Neck supple. Thyroid: No thyroid mass. Vascular: No JVD. Trachea: Trachea normal.   Cardiovascular:      Rate and Rhythm: Normal rate and regular rhythm. Pulmonary:      Effort: Pulmonary effort is AB-normal.      Breath sounds: Diminished breath sounds at bases . Abdominal:      General: Bowel sounds are normal. There is distension. Palpations: Abdomen is soft. Musculoskeletal:         General: No deformity. Right lower leg: Edema present. Left lower leg: Edema present. Skin:     General: Skin is warm and dry. Neurological:      Mental Status: Disoriented, sleepy but easily aroused.   To loud voice  Psychiatric:         Behavior: Behavior normal.              DATA: Diagnostic tests reviewed by me for today's visit:    Recent Labs     01/14/21  0456 01/15/21  0509 01/16/21  0531   WBC 6.2 6.2 6.4   HCT 27.2* 27.4* 27.9*    147 126*     Iron Saturation:  No components found for: PERCENTFE  FERRITIN:    Lab Results   Component Value Date    FERRITIN 67.1 08/04/2020     IRON:    Lab Results   Component Value Date    IRON 30 01/13/2021     TIBC:    Lab Results   Component Value Date    TIBC 268 01/13/2021       Recent Labs     01/14/21  0456 01/15/21  0509 01/15/21  1519 01/16/21  0531    148* 146* 149*   K 4.0 3.9  --  4.1    106  --  106   CO2 33* 32  --  33*   BUN 50* 50*  --  58*   CREATININE 2.5* 2.4*  --  2.4*     Recent Labs     01/14/21  0456 01/15/21  0509 01/16/21  0531   CALCIUM 8.6 8.8 8.9   PHOS 3.0 3.3 4.3     No results for input(s): PH, PCO2, PO2 in the last 72 hours.     Invalid input(s): Joselito Kick    ABG:  No results found for: PH, PCO2, PO2, HCO3, BE, THGB, TCO2, O2SAT  VBG:  No results found for: PHVEN, TXU5XKP, BEVEN, N9GFZBTJ    LDH:  No results found for: LDH  Uric Acid:    Lab Results   Component Value Date    LABURIC 4.7 07/25/2019       PT/INR:    Lab Results   Component Value Date    PROTIME 29.8 01/16/2021    PROTIME 48.3 08/31/2018    INR 2.54 01/16/2021     Warfarin PT/INR:  No components found for: Patricia Spindle  PTT:    Lab Results   Component Value Date    APTT 46.8 01/11/2021   [APTT}  Last 3 Troponin:    Lab Results   Component Value Date    TROPONINI 0.02 01/11/2021    TROPONINI 0.03 07/30/2020    TROPONINI 0.03 07/30/2020       U/A:    Lab Results   Component Value Date    COLORU YELLOW 01/14/2021    PROTEINU 30 01/14/2021    PHUR 6.0 01/14/2021    LABCAST 10-20 Hyaline 04/23/2017    WBCUA 1 01/14/2021    RBCUA 1 01/14/2021    YEAST 0 12/09/2019    BACTERIA Rare 07/30/2020    CLARITYU Clear 01/14/2021    SPECGRAV 1.012 01/14/2021    LEUKOCYTESUR Negative 01/14/2021    UROBILINOGEN 1.0 01/14/2021 BILIRUBINUR Negative 01/14/2021    BLOODU SMALL 01/14/2021    GLUCOSEU Negative 01/14/2021     Microalbumen/Creatinine ratio:  No components found for: RUCREAT  24 Hour Urine for Protein:  No components found for: RAWUPRO, UHRS3, AXWD80TV, UTV3  24 Hour Urine for Creatinine Clearance:  No components found for: CREAT4, UHRS10, UTV10  Urine Toxicology:  No components found for: Evelene Ground, IBENZO, ICOCAINE, IMARTHC, IOPIATES, IPHENCYC    HgBA1c:    Lab Results   Component Value Date    LABA1C 6.3 07/25/2019     RPR:  No results found for: RPR  HIV:  No results found for: HIV  SNOW:    Lab Results   Component Value Date    SNOW Negative 08/20/2020     RF:  No results found for: RF  DSDNA:  No components found for: DNA  AMYLASE:  No results found for: AMYLASE  LIPASE:  No results found for: LIPASE  Fibrinogen Level:  No components found for: FIB       BELOW MENTIONED RADIOLOGY STUDY RESULTS BY ME:    Xr Chest Portable    Result Date: 1/11/2021  EXAMINATION: ONE XRAY VIEW OF THE CHEST 1/11/2021 3:47 pm COMPARISON: 07/30/2020 HISTORY: ORDERING SYSTEM PROVIDED HISTORY: SOB TECHNOLOGIST PROVIDED HISTORY: Reason for exam:->SOB Acuity: Unknown FINDINGS: Status post median sternotomy. Cardiomegaly. Pulmonary vascular congestion. Pulmonary edema. No focal pulmonary consolidation.      Findings suggest congestive heart failure

## 2021-01-17 NOTE — PROGRESS NOTES
[facility] PROGRESS NOTE    1/17/2021 8:02 AM        Name: Em Hernandez . Admitted: 1/11/2021  Primary Care Provider: Mercedes Carr MD (Tel: [phone])                        Subjective:  . Patient is more confused today than yesterday. Unclear etiology.   Was started on Seroquel last night unclear if that are causing symptoms      Reviewed interval ancillary notes    Current Medications      LORazepam (ATIVAN) injection 1 mg, Once      dextrose 5 % solution, Continuous      QUEtiapine (SEROQUEL) tablet 12.5 mg, Nightly      [START ON 1/18/2021] warfarin (COUMADIN) tablet 1 mg, Once per day on Mon      warfarin (COUMADIN) tablet 2 mg, Once per day on Sun Tue Wed Thu Fri Sat      nicotine (NICODERM CQ) 21 MG/24HR 1 patch, Daily      sodium chloride (OCEAN, BABY AYR) 0.65 % nasal spray 1 spray, PRN      diphenhydrAMINE (BENADRYL) tablet 25 mg, Q6H PRN      ipratropium-albuterol (DUONEB) nebulizer solution 1 ampule, Q4H WA      amiodarone (CORDARONE) tablet 200 mg, Daily      atorvastatin (LIPITOR) tablet 40 mg, Nightly      carvedilol (COREG) tablet 25 mg, BID      sodium chloride flush 0.9 % injection 10 mL, 2 times per day      sodium chloride flush 0.9 % injection 10 mL, PRN      promethazine (PHENERGAN) tablet 12.5 mg, Q6H PRN    Or      ondansetron (ZOFRAN) injection 4 mg, Q6H PRN      polyethylene glycol (GLYCOLAX) packet 17 g, Daily PRN      acetaminophen (TYLENOL) tablet 650 mg, Q6H PRN    Or      acetaminophen (TYLENOL) suppository 650 mg, Q6H PRN        Objective:  /76   Pulse 76   Temp 96 °F (35.6 °C) (Temporal)   Resp 20   Ht 5' 8\" (1.727 m)   Wt 169 lb 8 oz (76.9 kg)   SpO2 99%   BMI 25.77 kg/m²     Intake/Output Summary (Last 24 hours) at 1/17/2021 0802  Last data filed at 1/17/2021 0607  Gross per 24 hour Intake 1027 ml   Output    Net 1027 ml      Wt Readings from Last 3 Encounters:   01/16/21 169 lb 8 oz (76.9 kg)   01/11/21 179 lb (81.2 kg)   12/21/20 169 lb (76.7 kg)       General appearance:  Appears comfortable  Eyes: Sclera clear. Pupils equal.  ENT: Moist oral mucosa. Trachea midline, no adenopathy. Cardiovascular: Regular rhythm, normal S1, S2. No murmur. No edema in lower extremities  Respiratory: Not using accessory muscles. Good inspiratory effort. Clear to auscultation bilaterally, no wheeze or crackles. GI: Abdomen soft, no tenderness, not distended, normal bowel sounds  Musculoskeletal: No cyanosis in digits, neck supple  Neurology: CN 2-12 grossly intact. No speech or motor deficits  Psych: Follows some command but not alert oriented. Skin: Warm, dry, normal turgor    Labs and Tests:  CBC:   Recent Labs     01/15/21  0509 01/16/21  0531 01/17/21  0603   WBC 6.2 6.4 4.6   HGB 8.6* 8.8* 8.4*    126* 119*     BMP:    Recent Labs     01/15/21  0509 01/15/21  1519 01/16/21  0531 01/17/21  0603   * 146* 149* 146*   K 3.9  --  4.1 3.8     --  106 105   CO2 32  --  33* 34*   BUN 50*  --  58* 66*   CREATININE 2.4*  --  2.4* 2.6*   GLUCOSE 98  --  136* 137*     Hepatic: No results for input(s): AST, ALT, ALB, BILITOT, ALKPHOS in the last 72 hours.     Discussed care with family and patient             Spent 30  minutes with patient and family at bedside and on unit reviewing medical records and labs, spent greater than 50% time counseling patient and family on diagnosis and plan   Problem List  Active Problems:    Essential hypertension, benign    Atrial fibrillation with RVR ([address]    Stage 3 chronic kidney disease    Aortic regurgitation    Chronic combined systolic and diastolic CHF (congestive heart failure) (HCC)    Acute on chronic congestive heart failure ([address]    Heart failure ([address]    Acute kidney injury superimposed on chronic kidney disease ([address]    Hypoalbuminemia Chronic atrial fibrillation (HCC)    Proteinuria    Encephalopathy, metabolic    Acute encephalopathy    Hypernatremia  Resolved Problems:    * No resolved hospital problems. *       Assessment & Plan:   1. Acute encephalopathy  --Still very confused but slowly improving. Likely presume this is metabolic  -CT head I is negative. Neurology recommendation appreciated  -Patient will likely need placement.  -Unclear etiology of its metabolic versus other cause  - we will get EEG        Acute on chronic CHF  -Continue per cardiology  - diuretics on hold   -Aute on chronic renal failure stable. Probably has underlying dementia as well.     Pending improvement in symptosm       Diet: DIET CARDIAC;  Code:Full Code  DVT PPX lovenox       Anoop Nelson MD   1/17/2021 8:02 AM

## 2021-01-17 NOTE — PROGRESS NOTES
[facility], [place], operated by [facility]   Daily Progress Note      Admit Date:  1/11/2021    HPI:    Mr. Darline Muñiz is a 68year old male with history of CKD (Dr Narcisa Nieto), HTN, HLD, AF (coumadin)    He is admitted with bilateral leg swelling and swelling in his scrotum. He gained 7-8 pounds. He also was having some increased confusion. Interval hx:  Net loss 648 ml ; wt up 7# / 24 hrs     BUN 50 > 58 > 66; creatinine 2.4 > 2.6    ~oral intake poor; IVF 50 ml/hr      Neuro eval for confusion : multifactorial encephalopathy    ~seroquel > held last pm    ~TSH 2.19; vitamin-B level not available    Subjective:  Patient is being seen for acute on chronic combined s/dHF. There were no acute overnight cardiac events. Per primary RN: mentation essentially unchanged from one day ago yet noted to be worse compared to last week. Received thiamine IV  Has not taken meds today  Family not agreeable to discharge disposition : spouse wants to take home, son prefers SNF     sodium 146 creat 2.6 > dry  Hgb 8.4 wbc 4.6    Objective:   /62   Pulse 74   Temp 95.5 °F (35.3 °C) (Temporal)   Resp 20   Ht 5' 8\" (1.727 m)   Wt 176 lb 9.6 oz (80.1 kg)   SpO2 91%   BMI 26.85 kg/m²       Intake/Output Summary (Last 24 hours) at 1/17/2021 1021  Last data filed at 1/17/2021 0607  Gross per 24 hour   Intake 1027 ml   Output    Net 1027 ml        Telemetry: AF VR 70  Physical Exam:  General:  Resting quietly in bed  Skin:  Warm and dry. Neck:  Supple. No JVD or carotid bruit appreciated  Chest:  Normal effort.   Clear to auscultation, no wheezes/rhonchi/rales  Cardiovascular:  IRRR 80, S1/S2, no murmur/gallop/rub  Abdomen:  Soft, nontender, +bowel sounds  Extremities:  No edema edwardo LE wrapped with kerlix; dried lesions bottom of feet  Neurological: awake     Medications:    LORazepam  1 mg Intravenous Once    QUEtiapine  12.5 mg Oral Nightly    [START ON 1/18/2021] warfarin  1 mg Oral Once per day on Mon  warfarin  2 mg Oral Once per day on Sun Tue Wed Thu Fri Sat    nicotine  1 patch Transdermal Daily    ipratropium-albuterol  1 ampule Inhalation Q4H WA    amiodarone  200 mg Oral Daily    atorvastatin  40 mg Oral Nightly    carvedilol  25 mg Oral BID    sodium chloride flush  10 mL Intravenous 2 times per day      dextrose 50 mL/hr at 01/17/21 0607       Lab Data:  CBC:   Recent Labs     01/15/21  0509 01/16/21  0531 01/17/21  0603   WBC 6.2 6.4 4.6   HGB 8.6* 8.8* 8.4*    126* 119*     BMP:    Recent Labs     01/15/21  0509 01/15/21  1519 01/16/21  0531 01/17/21  0603   * 146* 149* 146*   K 3.9  --  4.1 3.8   CO2 32  --  33* 34*   BUN 50*  --  58* 66*   CREATININE 2.4*  --  2.4* 2.6*     INR:    Recent Labs     01/15/21  0509 01/16/21  0530 01/17/21  0603   INR 2.49* 2.54* 3.30*     BNP:    No results for input(s): PROBNP in the last 72 hours. Diagnostics:  Echo:  1/12/21:   -Normal left ventricular size.   -Concentric left ventricular hypertrophy.   -Decreased left ventricular systolic function with inferoseptal hypokinesis.   -Estimated EF 35-40%.  -E/e'=27.   -Indeterminate diastolic function.   -Mild thickening of anterior leaflet of mitral valve that appears to   prolapse mildly.   -The maximum mitral valve pressure gradient is 14mmHg and the mean pressure   gradient is 5 mmHg.   -Moderately mitral regurgitation with posteriorly directed jet.   -A mechanical aortic valve appears well seated.   -The left atrium is dilated.   -The right atrium is dilated.   -Severe tricuspid regurgitation directed anteriorly.   -PASP 36mmHg.   -Trivial pulmonic regurgitation present    Assessment:    1. Acute on chronic combined systolic and diastolic heart failure  ~no ace/arb/aldosterone antagonist due to ckd  ~carvedilol 25 mg bid   ~diuretics stopped  ~mod MR, severe TR, well seated aortic valve on echo 1/12/21  2.   Acute on chronic kidney disease ~BUN elevated at 58 > 66 ; creatinine 2.7 > 2.5 > 2.4 > 2.6  3. Anemia  ~Hgb  8.8 > 8.4  4. Chronic atrial fibrillation   ~amiodarone 200 mg daily  ~controlled VR  ~INR therapeutic : warfarin managed by pharmacist      Plan:    No new recommendations  ~consider increasing IVF flow rate : will defer to NE     involved with discharge planning: family considering SNF and given 3 facilities for options    Thank you for allowing me to participate in the care of your patient.     Annabelle Coelho, APRN-CNP
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
## 2021-01-17 NOTE — PROGRESS NOTES
Pharmacy to Dose Warfarin    Pharmacy consulted to dose warfarin for Afib, Heart Valve Replacement. INR Goal: 2.5-3.5    INR today: 3.3    Assessment/Plan:  - INR is therapeutic  - Will give 1 mg tonight given abrupt increase in INR overnight.  - Daily INR ordered    Pharmacy will continue to follow.     Thanks,  Brown Mata, PharmD  PGY-1 Pharmacy Resident  [id number]

## 2021-01-17 NOTE — PROGRESS NOTES
MD Marly Saavedra MD Elihu Rams, MD               Office: [phone]                      Fax: [phone]             [facility]                   NEPHROLOGY INPATIENT PROGRESS NOTE:     PATIENT NAME: Kavya Davalos  : 1940  MRN: [id number]     Nephrology treatment plan update. Acute kidney injurymoderate to severewith decreased urine outputsevere. BUN is 58 and a creatinine of 2.4. BUN is 66 and a creatinine of 2.6   Mild worsening     Patient does have a background of chronic kidney disease with a baseline creatinine of 1.9. Borderline hypotension. PS to have hypovolemia. Multiple electrolyte imbalance. Hypernatremia. Most likely related to volume depletion. increase D5W at 75 mL/h   Continue to monitor urine output. Patient has a history of CHF. Avoid fluid overload. Repeat chest x-ray in a.m. Anemia levels need to be monitored. Worsening hemoglobin. 8.4. Was 8.8 yesterday. Medications reviewed. Worsening mentation of concern. Repeat labs in a.m. Not ready for discharge. given worsening renal failure. High complexity. RECOMMENDATIONS:   - stop diuretics w/ AMS + hypernatremia   - decrease p.o. intake   - release fluid restriction   -f/u recheck NA for hypernatremia - mild - dehydration   -h/o EtOH abuse, consider thiamin,     D/C Plans: renally stable,  f/u AMS, neuro eval   -Renally stable as fluid overload / RACIEL better   - then f/u w/ Dr Cricket Lorenzo on     D/W Dr Yoandy Mendoza , nurse.        IMPRESSION:       RACIEL (on proteinuric CKD-3):  Better   - non-Oligouric  - BL Scr- - recent BL worsened from 1.6 -1.7.  --->2.5-2.4 ---> 3.1 on admission -> 2.5  .   : Etiology of RACIEL - presumed CRS w/ developed ATN     - other differentials: unlikely  , r/o obstruction   - UA : small blood, 11-20 hyaline cast = pre-renal component / CKD :     Proteinuric CKD-3 - follows w/ Dr Cricket Lorenzo - next f/u is on 2/16/2021   - CKD likely d/to HTN / CR physiology     Systolic CHF - EF 04-91%. - Off ACEI. - dry weight- 161 pounds      Associated problems:   - Azotemia: pre-renal at BUN 60   - Electrolytes: follow   - Volume status: mild hyper-volemia  : BP: no need for tight control   - Acid-Base: stable   - Anemia: mild - follow      H/O HLD - CK WNL. H/O Microscopic Hematuria-SNOW, complements WNL. ANCA neg. UPCR 360mg. H/O Nephrolithiasis- low Na intake and higher fluid intake. Other problems: Management per primary and other consulting teams. Hospital Problems           Last Modified POA    Essential hypertension, benign (Chronic) 1/12/2021 Yes    Atrial fibrillation with RVR ([address] 1/12/2021 Yes    Stage 3 chronic kidney disease 1/12/2021 Yes    Aortic regurgitation (Chronic) 1/12/2021 Yes    Chronic combined systolic and diastolic CHF (congestive heart failure) ([address] 1/12/2021 Yes    Acute on chronic congestive heart failure ([address] 1/15/2021 Yes    Heart failure ([address] 1/11/2021 Yes    Acute kidney injury superimposed on chronic kidney disease ([address] 1/14/2021 Yes    Hypoalbuminemia 1/13/2021 Yes    Chronic atrial fibrillation ([address] 1/14/2021 Yes    Proteinuria 1/14/2021 Yes    Encephalopathy, metabolic 8/45/0701 Yes    Acute encephalopathy 1/15/2021 Yes    Hypernatremia 1/15/2021 Yes           : other supportive care :   - Check daily renal function panel with electrolytes-phosphorus  - Strict monitoring of I/Os, daily weight  - Renal feeds/diet  - Current medications reviewed. - Nephrotoxic medications have been discontinued. - Dose adjusted and appropriate. - Dose meds for eGFR <15 mL/min/1.73m2 during RACIEL    - Avoid heavy opioids due to renal failure - may use very low dose dilaudid / fentanyl with close monitoring of CNS and respiratory depression. Please refer to the orders. High Complexity. Multiple complex problems.   dextrose 5 % solution, , Intravenous, Continuous, Kal Sosa MD, Last Rate: 50 mL/hr at 01/17/21 0607, New Bag at 01/17/21 0607    QUEtiapine (SEROQUEL) tablet 12.5 mg, 12.5 mg, Oral, Nightly, Bronson De Los Santos MD, Stopped at 01/17/21 2100    [START ON 1/18/2021] warfarin (COUMADIN) tablet 1 mg, 1 mg, Oral, Once per day on Mon, Pito Szymanski MD    warfarin (COUMADIN) tablet 2 mg, 2 mg, Oral, Once per day on Sun Tue Wed Thu Fri Sat, Pito Szymanski MD, 2 mg at 01/15/21 1720    nicotine (Guillermo Turner) 21 MG/24HR 1 patch, 1 patch, Transdermal, Daily, Flaca Bradshaw PA-C, 1 patch at 01/17/21 0931    sodium chloride (OCEAN, BABY AYR) 0.65 % nasal spray 1 spray, 1 spray, Each Nostril, PRN, Flaca Bradshaw PA-C, 1 spray at 01/13/21 2132    diphenhydrAMINE (BENADRYL) tablet 25 mg, 25 mg, Oral, Q6H PRN, Flaca Bradshaw PA-C, 25 mg at 01/16/21 2325    ipratropium-albuterol (DUONEB) nebulizer solution 1 ampule, 1 ampule, Inhalation, Q4H WA, Ferny Santiago MD, 1 ampule at 01/17/21 0844    amiodarone (CORDARONE) tablet 200 mg, 200 mg, Oral, Daily, Yessy Tanner MD, 200 mg at 01/16/21 1016    atorvastatin (LIPITOR) tablet 40 mg, 40 mg, Oral, Nightly, Yessy Tanner MD, 40 mg at 01/16/21 2325    carvedilol (COREG) tablet 25 mg, 25 mg, Oral, BID, Yessy Tanner MD, 25 mg at 01/16/21 2325    sodium chloride flush 0.9 % injection 10 mL, 10 mL, Intravenous, 2 times per day, Chi Reyes MD, 10 mL at 01/16/21 1006    sodium chloride flush 0.9 % injection 10 mL, 10 mL, Intravenous, PRN, Chi Reyes MD, 10 mL at 01/13/21 1845    promethazine (PHENERGAN) tablet 12.5 mg, 12.5 mg, Oral, Q6H PRN **OR** ondansetron (ZOFRAN) injection 4 mg, 4 mg, Intravenous, Q6H PRN, Chi Reyes MD    polyethylene glycol (GLYCOLAX) packet 17 g, 17 g, Oral, Daily PRN, Chi Reyes MD Effort: Pulmonary effort is AB-normal.      Breath sounds: Diminished breath sounds at bases . Abdominal:      General: Bowel sounds are normal. There is distension. Palpations: Abdomen is soft. Musculoskeletal:         General: No deformity. Right lower leg: Edema present. Left lower leg: Edema present. Skin:     General: Skin is warm and dry. Neurological:      Mental Status: Disoriented, sleepy but easily aroused. To loud voice  Psychiatric:         Behavior: Behavior normal.              DATA:  Diagnostic tests reviewed by me for today's visit:    Recent Labs     01/15/21  0509 01/16/21  0531 01/17/21  0603   WBC 6.2 6.4 4.6   HCT 27.4* 27.9* 27.4*    126* 119*     Iron Saturation:  No components found for: PERCENTFE  FERRITIN:    Lab Results   Component Value Date    FERRITIN 67.1 08/04/2020     IRON:    Lab Results   Component Value Date    IRON 30 01/13/2021     TIBC:    Lab Results   Component Value Date    TIBC 268 01/13/2021       Recent Labs     01/15/21  0509 01/15/21  1519 01/16/21  0531 01/17/21  0603   * 146* 149* 146*   K 3.9  --  4.1 3.8     --  106 105   CO2 32  --  33* 34*   BUN 50*  --  58* 66*   CREATININE 2.4*  --  2.4* 2.6*     Recent Labs     01/15/21  0509 01/16/21  0531 01/17/21  0603   CALCIUM 8.8 8.9 9.0   PHOS 3.3 4.3 4.8     No results for input(s): PH, PCO2, PO2 in the last 72 hours.     Invalid input(s): Neelam Cruz    ABG:  No results found for: PH, PCO2, PO2, HCO3, BE, THGB, TCO2, O2SAT  VBG:  No results found for: PHVEN, ETI6QQQ, BEVEN, M9RITRCZ    LDH:  No results found for: LDH  Uric Acid:    Lab Results   Component Value Date    LABURIC 4.7 07/25/2019       PT/INR:    Lab Results   Component Value Date    PROTIME 38.7 01/17/2021    PROTIME 48.3 08/31/2018    INR 3.30 01/17/2021     Warfarin PT/INR:  No components found for: PTPATWAR, PTINRWAR  PTT:    Lab Results   Component Value Date    APTT 46.8 01/11/2021   [APTT} Last 3 Troponin:    Lab Results   Component Value Date    TROPONINI 0.02 01/11/2021    TROPONINI 0.03 07/30/2020    TROPONINI 0.03 07/30/2020       U/A:    Lab Results   Component Value Date    COLORU YELLOW 01/14/2021    PROTEINU 30 01/14/2021    PHUR 6.0 01/14/2021    LABCAST 10-20 Hyaline 04/23/2017    WBCUA 1 01/14/2021    RBCUA 1 01/14/2021    YEAST 0 12/09/2019    BACTERIA Rare 07/30/2020    CLARITYU Clear 01/14/2021    SPECGRAV 1.012 01/14/2021    LEUKOCYTESUR Negative 01/14/2021    UROBILINOGEN 1.0 01/14/2021    BILIRUBINUR Negative 01/14/2021    BLOODU SMALL 01/14/2021    GLUCOSEU Negative 01/14/2021     Microalbumen/Creatinine ratio:  No components found for: RUCREAT  24 Hour Urine for Protein:  No components found for: RAWUPRO, UHRS3, SSYN52YL, UTV3  24 Hour Urine for Creatinine Clearance:  No components found for: CREAT4, UHRS10, UTV10  Urine Toxicology:  No components found for: Dina Heritage, IBENZO, ICOCAINE, IMARTHC, IOPIATES, IPHENCYC    HgBA1c:    Lab Results   Component Value Date    LABA1C 6.3 07/25/2019     RPR:  No results found for: RPR  HIV:  No results found for: HIV  SNOW:    Lab Results   Component Value Date    SNOW Negative 08/20/2020     RF:  No results found for: RF  DSDNA:  No components found for: DNA  AMYLASE:  No results found for: AMYLASE  LIPASE:  No results found for: LIPASE  Fibrinogen Level:  No components found for: FIB       BELOW MENTIONED RADIOLOGY STUDY RESULTS BY ME:    Xr Chest Portable    Result Date: 1/11/2021  EXAMINATION: ONE XRAY VIEW OF THE CHEST 1/11/2021 3:47 pm COMPARISON: 07/30/2020 HISTORY: ORDERING SYSTEM PROVIDED HISTORY: SOB TECHNOLOGIST PROVIDED HISTORY: Reason for exam:->SOB Acuity: Unknown FINDINGS: Status post median sternotomy. Cardiomegaly. Pulmonary vascular congestion. Pulmonary edema. No focal pulmonary consolidation.      Findings suggest congestive heart failure

## 2021-01-17 NOTE — PROGRESS NOTES
Sent following message to Dr. Britney Calhoun:    I was talking to a couple other nurses on the floor, is there any way you would be okay stopping his Seroquel? I saw him last Wednesday and he was A & O x 4 then, now he appears to be hallucinating, combative, non cooperative. I know neuro said hold sedatives and I'm not asking for Ativan to be put back on his MAR. But maybe his current disposition was brought on by the Seroquel? Waiting on reply to see if okay to stop giving Seroquel.

## 2021-01-18 NOTE — PLAN OF CARE
Problem: Falls - Risk of:  Goal: Will remain free from falls  Description: Will remain free from falls  Outcome: Ongoing  Goal: Absence of physical injury  Description: Absence of physical injury  Outcome: Ongoing     Problem: Skin Integrity:  Goal: Will show no infection signs and symptoms  Description: Will show no infection signs and symptoms  Outcome: Ongoing  Goal: Absence of new skin breakdown  Description: Absence of new skin breakdown  Outcome: Ongoing     Problem: Restraint Use - Nonviolent/Non-Self-Destructive Behavior:  Goal: Absence of restraint indications  Description: Absence of restraint indications  Outcome: Ongoing  Goal: Absence of restraint-related injury  Description: Absence of restraint-related injury  Outcome: Ongoing     Problem: Confusion - Acute:  Goal: Absence of continued neurological deterioration signs and symptoms  Description: Absence of continued neurological deterioration signs and symptoms  Outcome: Ongoing  Goal: Mental status will be restored to baseline  Description: Mental status will be restored to baseline  Outcome: Ongoing     Problem: Discharge Planning:  Goal: Ability to perform activities of daily living will improve  Description: Ability to perform activities of daily living will improve  Outcome: Ongoing  Goal: Participates in care planning  Description: Participates in care planning  Outcome: Ongoing     Problem: Injury - Risk of, Physical Injury:  Goal: Will remain free from falls  Description: Will remain free from falls  Outcome: Ongoing  Goal: Absence of physical injury  Description: Absence of physical injury  Outcome: Ongoing     Problem: Mood - Altered:  Goal: Mood stable  Description: Mood stable  Outcome: Ongoing  Goal: Absence of abusive behavior  Description: Absence of abusive behavior  Outcome: Ongoing  Goal: Verbalizations of feeling emotionally comfortable while being cared for will increase Description: Verbalizations of feeling emotionally comfortable while being cared for will increase  Outcome: Ongoing     Problem: Psychomotor Activity - Altered:  Goal: Absence of psychomotor disturbance signs and symptoms  Description: Absence of psychomotor disturbance signs and symptoms  Outcome: Ongoing     Problem: Sensory Perception - Impaired:  Goal: Demonstrations of improved sensory functioning will increase  Description: Demonstrations of improved sensory functioning will increase  Outcome: Ongoing  Goal: Decrease in sensory misperception frequency  Description: Decrease in sensory misperception frequency  Outcome: Ongoing  Goal: Able to refrain from responding to false sensory perceptions  Description: Able to refrain from responding to false sensory perceptions  Outcome: Ongoing  Goal: Demonstrates accurate environmental perceptions  Description: Demonstrates accurate environmental perceptions  Outcome: Ongoing  Goal: Able to distinguish between reality-based and nonreality-based thinking  Description: Able to distinguish between reality-based and nonreality-based thinking  Outcome: Ongoing  Goal: Able to interrupt nonreality-based thinking  Description: Able to interrupt nonreality-based thinking  Outcome: Ongoing     Problem: Sleep Pattern Disturbance:  Goal: Appears well-rested  Description: Appears well-rested  Outcome: Ongoing    VSS, able to follow simple commands, multiple attempts to get out of the bed, reoriented multiple times, no physical violence compared to yesterday. Possibly going to a SNF, wife was agreeable to this plan when she visited. Seroquel held due to possible cause of delirium.

## 2021-01-18 NOTE — PROGRESS NOTES
[facility]    1/18/2021 1:28 PM        Name: Yasemin Ling . Admitted: 1/11/2021  Primary Care Provider: Jonathan Trent MD (Tel: [phone])                        Subjective:  . Patient is more confused today than yesterday. Unclear etiology.   Was started on Seroquel last night unclear if that are causing symptoms      Reviewed interval ancillary notes    Current Medications      [START ON 1/19/2021] warfarin (COUMADIN) tablet 1 mg, Daily      LORazepam (ATIVAN) injection 1 mg, Once      Efferdent Denture Cleanser TBEF 1 each, Daily      dextrose 5 % solution, Continuous      QUEtiapine (SEROQUEL) tablet 12.5 mg, Nightly      nicotine (NICODERM CQ) 21 MG/24HR 1 patch, Daily      sodium chloride (OCEAN, BABY AYR) 0.65 % nasal spray 1 spray, PRN      diphenhydrAMINE (BENADRYL) tablet 25 mg, Q6H PRN      ipratropium-albuterol (DUONEB) nebulizer solution 1 ampule, Q4H WA      amiodarone (CORDARONE) tablet 200 mg, Daily      atorvastatin (LIPITOR) tablet 40 mg, Nightly      carvedilol (COREG) tablet 25 mg, BID      sodium chloride flush 0.9 % injection 10 mL, 2 times per day      sodium chloride flush 0.9 % injection 10 mL, PRN      promethazine (PHENERGAN) tablet 12.5 mg, Q6H PRN    Or      ondansetron (ZOFRAN) injection 4 mg, Q6H PRN      polyethylene glycol (GLYCOLAX) packet 17 g, Daily PRN      acetaminophen (TYLENOL) tablet 650 mg, Q6H PRN    Or      acetaminophen (TYLENOL) suppository 650 mg, Q6H PRN        Objective:  /63   Pulse 76   Temp 96.8 °F (36 °C) (Temporal)   Resp 18   Ht 5' 8\" (1.727 m)   Wt 176 lb 1.6 oz (79.9 kg)   SpO2 97%   BMI 26.78 kg/m²     Intake/Output Summary (Last 24 hours) at 1/18/2021 1328  Last data filed at 1/18/2021 1101  Gross per 24 hour   Intake    Output 825 ml   Net -825 ml Wt Readings from Last 3 Encounters:   01/18/21 176 lb 1.6 oz (79.9 kg)   01/11/21 179 lb (81.2 kg)   12/21/20 169 lb (76.7 kg)       General appearance:  Appears comfortable  Eyes: Sclera clear. Pupils equal.  ENT: Moist oral mucosa. Trachea midline, no adenopathy. Cardiovascular: Regular rhythm, normal S1, S2. No murmur. No edema in lower extremities  Respiratory: Not using accessory muscles. Good inspiratory effort. Clear to auscultation bilaterally, no wheeze or crackles. GI: Abdomen soft, no tenderness, not distended, normal bowel sounds  Musculoskeletal: No cyanosis in digits, neck supple  Neurology: CN 2-12 grossly intact. No speech or motor deficits  Psych: Follows some command but not alert oriented. Skin: Warm, dry, normal turgor    Labs and Tests:  CBC:   Recent Labs     01/16/21  0531 01/17/21  0603 01/18/21  0506   WBC 6.4 4.6 5.1   HGB 8.8* 8.4* 8.3*   * 119* 107*     BMP:    Recent Labs     01/16/21  0531 01/17/21  0603 01/18/21  0506   * 146* 142   K 4.1 3.8 5.1    105 103   CO2 33* 34* 29   BUN 58* 66* 71*   CREATININE 2.4* 2.6* 2.8*   GLUCOSE 136* 137* 167*     Hepatic: No results for input(s): AST, ALT, ALB, BILITOT, ALKPHOS in the last 72 hours.     Discussed care with family and patient             Spent 30  minutes with patient and family at bedside and on unit reviewing medical records and labs, spent greater than 50% time counseling patient and family on diagnosis and plan   Problem List  Active Problems:    Essential hypertension, benign    Atrial fibrillation with RVR ([address]    Stage 3 chronic kidney disease    Aortic regurgitation    Chronic combined systolic and diastolic CHF (congestive heart failure) (HCC)    Acute on chronic congestive heart failure (HCC)    Heart failure ([address]    Acute kidney injury superimposed on chronic kidney disease ([address]    Hypoalbuminemia    Chronic atrial fibrillation (HCC)    Proteinuria    Encephalopathy, metabolic Acute encephalopathy    Hypernatremia  Resolved Problems:    * No resolved hospital problems. *       Assessment & Plan:   1. Acute encephalopathy  --Still very confused but slowly improving. Likely presume this is metabolic  -CT head I is negative. Neurology recommendation appreciated  -Patient still remains confused with no clear etiology.  -We will order EEG      Acute on chronic CHF  -Continue per cardiology  - diuretics on hold   -Aute on chronic renal failure stable. Probably has underlying dementia as well.     Pending improvement in symptosm       Diet: DIET CARDIAC;  Code:Full Code  DVT PPX lovenox       Enzo Dyer MD   1/18/2021 1:28 PM

## 2021-01-18 NOTE — CARE COORDINATION
Chart reviewed for discharge planning. Barrier(s) to discharge-Patient is more confused today than yesterday. Unclear etiology. Was started on Seroquel  Tentative discharge plan-Spoke with patient's daughter, and and granddaughter, Violet Rao, and she requested referrals to [facility], [address] [place] [address], and [facility]. [facility] at [facility] can accept. Awaiting PT/OT   Tentative discharge date- TBD    *Case management will continue to follow progress and update discharge plan as needed.

## 2021-01-18 NOTE — PROGRESS NOTES
MD Esther Woods MD Jaylene Billet, MD               Office: [phone]                      Fax: [phone]             [facility]                   NEPHROLOGY INPATIENT PROGRESS NOTE:     PATIENT NAME: Imtiaz Newby  : 1940  MRN: [id number]         RECOMMENDATIONS:     Keep low rate IVF   H/o HF - monitoring for fluid overload  Recheck Urine lytes to assist w/ volume status  Monitor w/ PVR for bladder retention   Diuretics has been stopped     AMS - not improving, neuro following       D/C Plans: unclear as w/ worsening RACIEL + AMS   -Renally stable as fluid overload / RACIEL better   - then f/u w/ Dr Briseida Patrick on     D/W Dr Mary Barrera , nurse. IMPRESSION:       RACIEL (on proteinuric CKD-3): Worsening again  - non-Oligouric -> incontinence   - BL Scr- - recent BL worsened from 1.6 -1.7.  --->2.5-2.4 ---> 3.1 on admission - had improved to 2.4   : Etiology of RACIEL - presumed CRS w/ developed ATN     - other differentials: unlikely  , r/o obstruction   - UA : small blood, 11-20 hyaline cast = pre-renal component / CKD :     Proteinuric CKD-3 - follows w/ Dr Briseida Patrick   - next f/u is on 2021   - CKD likely d/to HTN / CR physiology     Systolic CHF - EF 04-79%. - Off ACEI. - dry weight- ?161 pounds - higher now       Associated problems:   - Azotemia: pre-renal at BUN 60   - Electrolytes: follow   - Volume status: mild hyper-volemia  : BP: no need for tight control   - Acid-Base: stable   - Anemia: mild - follow      H/O HLD - CK WNL. H/O Microscopic Hematuria-SNOW, complements WNL. ANCA neg. UPCR 360mg. H/O Nephrolithiasis- low Na intake and higher fluid intake. Other problems: Management per primary and other consulting teams.    Hospital Problems           Last Modified POA    Essential hypertension, benign (Chronic) 2021 Yes    Atrial fibrillation with RVR ([address] 2021 Yes    Stage 3 chronic kidney disease 2021 Yes Aortic regurgitation (Chronic) 1/12/2021 Yes    Chronic combined systolic and diastolic CHF (congestive heart failure) ([facility] [address] 1/12/2021 Yes    Acute on chronic congestive heart failure ([facility] [address] 1/15/2021 Yes    Heart failure ([facility] [address] 1/11/2021 Yes    Acute kidney injury superimposed on chronic kidney disease ([facility] [address] 1/14/2021 Yes    Hypoalbuminemia 1/13/2021 Yes    Chronic atrial fibrillation ([facility] [address] 1/14/2021 Yes    Proteinuria 1/14/2021 Yes    Encephalopathy, metabolic 4/93/8712 Yes    Acute encephalopathy 1/15/2021 Yes    Hypernatremia 1/15/2021 Yes           : other supportive care :   - Check daily renal function panel with electrolytes-phosphorus  - Strict monitoring of I/Os, daily weight  - Renal feeds/diet  - Current medications reviewed. - Nephrotoxic medications have been discontinued. - Dose adjusted and appropriate. - Dose meds for eGFR <15 mL/min/1.73m2 during RACIEL    - Avoid heavy opioids due to renal failure - may use very low dose dilaudid / fentanyl with close monitoring of CNS and respiratory depression. Please refer to the orders. High Complexity. Multiple complex problems. Thank you for allowing me to participate in this patient's care. Please do not hesitate to contact me with any questions/concerns. We will follow along with you. Brianne Ricardo MD  [facility] [address] [place]: [phone] or Via [employer]  Fax: [phone]        ========================================================   ========================================================       Subjective:  Seen resting in bed   Remains confused    Saturating well on 2 L nasal cannula    Past medical, Surgical, Social, Family medical history reviewed by me. MEDICATIONS: reviewed by me. Medications Prior to Admission:  No current facility-administered medications on file prior to encounter.       Current Outpatient Medications on File Prior to Encounter   Medication Sig Dispense Refill  atorvastatin (LIPITOR) 40 MG tablet Take 1 tablet by mouth daily 90 tablet 3    carvedilol (COREG) 25 MG tablet Take 1 tablet by mouth 2 times daily 180 tablet 1    potassium chloride (KLOR-CON M) 20 MEQ extended release tablet TAKE 2 TABLETS BY MOUTH DAILY 180 tablet 1    allopurinol (ZYLOPRIM) 100 MG tablet TAKE 1 TABLET BY MOUTH DAILY 90 tablet 3    amiodarone (CORDARONE) 200 MG tablet Take 1 tablet by mouth daily 90 tablet 3    warfarin (COUMADIN) 4 MG tablet Take 1-2 mg by mouth See Admin Instructions 1 mg every Monday; 2 mg all other days. Managed by [facility].       Multiple Vitamins-Minerals (THERAPEUTIC MULTIVITAMIN-MINERALS) tablet Take 1 tablet by mouth daily           Current Facility-Administered Medications:     LORazepam (ATIVAN) injection 1 mg, 1 mg, Intravenous, Once, Laura Ureña PA-C    Efferdent Denture Cleanser TBEF 1 each, 1 tablet, Does not apply, Daily, Albert Raines MD, 1 each at 01/18/21 0025    [START ON 1/19/2021] warfarin (COUMADIN) tablet 2 mg, 2 mg, Oral, Once per day on Sun Tue Wed Thu Fri Sat, Albert Raines MD    dextrose 5 % solution, , Intravenous, Continuous, Patricia Walton MD, Last Rate: 75 mL/hr at 01/18/21 0023, New Bag at 01/18/21 0023    QUEtiapine (SEROQUEL) tablet 12.5 mg, 12.5 mg, Oral, Nightly, Albert Raines MD, Stopped at 01/17/21 2100    warfarin (COUMADIN) tablet 1 mg, 1 mg, Oral, Once per day on Mon, Albert Raines MD    nicotine (NICODERM CQ) 21 MG/24HR 1 patch, 1 patch, Transdermal, Daily, Mae Hill PA-C, 1 patch at 01/18/21 0903    sodium chloride (OCEAN, BABY AYR) 0.65 % nasal spray 1 spray, 1 spray, Each Nostril, PRN, Mae Hill PA-C, 1 spray at 01/13/21 2132    diphenhydrAMINE (BENADRYL) tablet 25 mg, 25 mg, Oral, Q6H PRN, Pool Ureña PA-C, 25 mg at 01/16/21 5787    ipratropium-albuterol (DUONEB) nebulizer solution 1 ampule, 1 ampule, Inhalation, Q4H WA, Smith Johnston MD, 1 ampule at 01/18/21 5235   amiodarone (CORDARONE) tablet 200 mg, 200 mg, Oral, Daily, Yessy Tanner MD, 200 mg at 01/16/21 1016    atorvastatin (LIPITOR) tablet 40 mg, 40 mg, Oral, Nightly, Miladis Diggs MD, 40 mg at 01/17/21 2317    carvedilol (COREG) tablet 25 mg, 25 mg, Oral, BID, Miladis Diggs MD, Stopped at 01/18/21 0900    sodium chloride flush 0.9 % injection 10 mL, 10 mL, Intravenous, 2 times per day, Miladis Diggs MD, 10 mL at 01/18/21 0024    sodium chloride flush 0.9 % injection 10 mL, 10 mL, Intravenous, PRN, Miladis Diggs MD, 10 mL at 01/13/21 1845    promethazine (PHENERGAN) tablet 12.5 mg, 12.5 mg, Oral, Q6H PRN **OR** ondansetron (ZOFRAN) injection 4 mg, 4 mg, Intravenous, Q6H PRN, Miladis Diggs MD    polyethylene glycol (GLYCOLAX) packet 17 g, 17 g, Oral, Daily PRN, Miladis Diggs MD    acetaminophen (TYLENOL) tablet 650 mg, 650 mg, Oral, Q6H PRN **OR** acetaminophen (TYLENOL) suppository 650 mg, 650 mg, Rectal, Q6H PRN, Miladis Diggs MD      REVIEW OF SYSTEMS:  As mentioned in chief complaint and HPI , Subjective     PHYSICAL EXAM:  Recent vital signs and recent I/Os reviewed by me.      Wt Readings from Last 3 Encounters:   01/18/21 176 lb 1.6 oz (79.9 kg)   01/11/21 179 lb (81.2 kg)   12/21/20 169 lb (76.7 kg)     BP Readings from Last 3 Encounters:   01/18/21 102/61   01/11/21 107/61   12/28/20 103/65     Patient Vitals for the past 24 hrs:   BP Temp Temp src Pulse Resp SpO2 Weight   01/18/21 0857      94 %    01/18/21 0851 102/61 96.4 °F (35.8 °C) Temporal 70 18 94 %    01/18/21 0845       176 lb 1.6 oz (79.9 kg)   01/18/21 0330 (!) 100/53 96.7 °F (35.9 °C) Temporal 66 18 97 %    01/17/21 2315 115/73 96.1 °F (35.6 °C) Temporal 71 20 96 %    01/17/21 2023      95 %    01/17/21 2015 97/66 96.2 °F (35.7 °C) Temporal 76 22 (!) 82 %    01/17/21 1530 (!) 135/55 96.6 °F (35.9 °C) Temporal 87 18 95 %    01/17/21 1230 105/65 95.8 °F (35.4 °C) Temporal 70 18 96 %  01/17/21 0925 102/62 95.5 °F (35.3 °C) Temporal 74 20 91 %        Intake/Output Summary (Last 24 hours) at 1/18/2021 0915  Last data filed at 1/17/2021 1230  Gross per 24 hour   Intake 240 ml   Output    Net 240 ml         Physical Exam  Vitals signs reviewed. Constitutional:       General: He is sleeping. He is not in acute distress. Appearance: He is ill-appearing. Comments: Obese body habitus   HENT:      Head: Normocephalic and atraumatic. Right Ear: External ear normal.      Left Ear: External ear normal.      Mouth/Throat:      Mouth: Mucous membranes are not dry. Eyes:      General: No scleral icterus. Conjunctiva/sclera: Conjunctivae normal.   Neck:      Musculoskeletal: Neck supple. Thyroid: No thyroid mass. Vascular: No JVD. Trachea: Trachea normal.   Cardiovascular:      Rate and Rhythm: Normal rate and regular rhythm. Pulmonary:      Effort: Pulmonary effort is AB-normal.      Breath sounds: Diminished breath sounds at bases . Abdominal:      General: Bowel sounds are normal. There is distension. Palpations: Abdomen is soft. Musculoskeletal:         General: No deformity. Right lower leg: Edema present. Left lower leg: Edema present. Skin:     General: Skin is warm and dry. Neurological:      Mental Status: Disoriented, sleepy but easily aroused.   To loud voice  Psychiatric:         Behavior: Behavior normal.              DATA:  Diagnostic tests reviewed by me for today's visit:    Recent Labs     01/16/21  0531 01/17/21  0603 01/18/21  0506   WBC 6.4 4.6 5.1   HCT 27.9* 27.4* 27.3*   * 119* 107*     Iron Saturation:  No components found for: PERCENTFE  FERRITIN:    Lab Results   Component Value Date    FERRITIN 67.1 08/04/2020     IRON:    Lab Results   Component Value Date    IRON 30 01/13/2021     TIBC:    Lab Results   Component Value Date    TIBC 268 01/13/2021       Recent Labs     01/15/21  1519 01/16/21  0531 01/17/21 [facility] 01/18/21  0506   * 149* 146* 142   K  --  4.1 3.8 5.1   CL  --  106 105 103   CO2  --  33* 34* 29   BUN  --  58* 66* 71*   CREATININE  --  2.4* 2.6* 2.8*     Recent Labs     01/16/21  0531 01/17/21  0603 01/18/21  0506   CALCIUM 8.9 9.0 8.7   PHOS 4.3 4.8 5.1*     No results for input(s): PH, PCO2, PO2 in the last 72 hours.     Invalid input(s): Marcus Nick    ABG:  No results found for: PH, PCO2, PO2, HCO3, BE, THGB, TCO2, O2SAT  VBG:  No results found for: PHVEN, CRI6RHK, BEVEN, L5QJWIJF    LDH:  No results found for: LDH  Uric Acid:    Lab Results   Component Value Date    LABURIC 4.7 07/25/2019       PT/INR:    Lab Results   Component Value Date    PROTIME 48.5 01/18/2021    PROTIME 48.3 08/31/2018    INR 4.12 01/18/2021     Warfarin PT/INR:  No components found for: PTPATWAR, PTINRWAR  PTT:    Lab Results   Component Value Date    APTT 46.8 01/11/2021   [APTT}  Last 3 Troponin:    Lab Results   Component Value Date    TROPONINI 0.02 01/11/2021    TROPONINI 0.03 07/30/2020    TROPONINI 0.03 07/30/2020       U/A:    Lab Results   Component Value Date    COLORU YELLOW 01/14/2021    PROTEINU 30 01/14/2021    PHUR 6.0 01/14/2021    LABCAST 10-20 Hyaline 04/23/2017    WBCUA 1 01/14/2021    RBCUA 1 01/14/2021    YEAST 0 12/09/2019    BACTERIA Rare 07/30/2020    CLARITYU Clear 01/14/2021    SPECGRAV 1.012 01/14/2021    LEUKOCYTESUR Negative 01/14/2021    UROBILINOGEN 1.0 01/14/2021    BILIRUBINUR Negative 01/14/2021    BLOODU SMALL 01/14/2021    GLUCOSEU Negative 01/14/2021     Microalbumen/Creatinine ratio:  No components found for: RUCREAT  24 Hour Urine for Protein:  No components found for: RAWUPRO, UHRS3, USBJ82UK, UTV3  24 Hour Urine for Creatinine Clearance:  No components found for: CREAT4, UHRS10, UTV10  Urine Toxicology:  No components found for: Lurlene Ruse, ICOCAINE, IMARTHC, IOPIATES, IPHENCYC    HgBA1c:    Lab Results   Component Value Date    LABA1C 6.3 07/25/2019 RPR:  No results found for: RPR  HIV:  No results found for: HIV  SNOW:    Lab Results   Component Value Date    SNOW Negative 08/20/2020     RF:  No results found for: RF  DSDNA:  No components found for: DNA  AMYLASE:  No results found for: AMYLASE  LIPASE:  No results found for: LIPASE  Fibrinogen Level:  No components found for: FIB       BELOW MENTIONED RADIOLOGY STUDY RESULTS BY ME:    Xr Chest Portable    Result Date: 1/11/2021  EXAMINATION: ONE XRAY VIEW OF THE CHEST 1/11/2021 3:47 pm COMPARISON: 07/30/2020 HISTORY: ORDERING SYSTEM PROVIDED HISTORY: SOB TECHNOLOGIST PROVIDED HISTORY: Reason for exam:->SOB Acuity: Unknown FINDINGS: Status post median sternotomy. Cardiomegaly. Pulmonary vascular congestion. Pulmonary edema. No focal pulmonary consolidation.      Findings suggest congestive heart failure

## 2021-01-18 NOTE — PROGRESS NOTES
MD stated to monitor bleeding to see if bleeding becomes more perfuse. States to place if bladder scan is over 300. Urology consulted. Bladder scanned. 0mL in bladder. Notified nephro.

## 2021-01-18 NOTE — PROGRESS NOTES
[address]  HEART FAILURE  Progress Note      Admit Date 1/11/2021     Reason for Consult:      Reason for Consultation/Chief Complaint: edema    HPI:    Ban Reese is a 68 y.o. male with PMH CKD, HTN, HLD, AF, combined HF admitted with increased edema, orthopnea, PND, and wt gain. He has been confused and renal fx worsening. Subjective:  Patient is being seen for CHF. There were no acute overnight cardiac events. Today Mr. Steven Epperson remains confused, does not answer ROS      Baseline Weight: 155-160   Wt Readings from Last 3 Encounters:   01/18/21 176 lb 1.6 oz (79.9 kg)   01/11/21 179 lb (81.2 kg)   12/21/20 169 lb (76.7 kg)          Objective:   /61   Pulse 70   Temp 96.4 °F (35.8 °C) (Temporal)   Resp 18   Ht 5' 8\" (1.727 m)   Wt 176 lb 1.6 oz (79.9 kg)   SpO2 94%   BMI 26.78 kg/m²       Intake/Output Summary (Last 24 hours) at 1/18/2021 0934  Last data filed at 1/17/2021 1230  Gross per 24 hour   Intake 240 ml   Output    Net 240 ml      Wt Readings from Last 3 Encounters:   01/18/21 176 lb 1.6 oz (79.9 kg)   01/11/21 179 lb (81.2 kg)   12/21/20 169 lb (76.7 kg)      No intake/output data recorded.       Physical Exam:  General Appearance:  Non-obese/Well Nourished  Respiratory:  · Resp Auscultation: wheezes  Cardiovascular:  · Auscultation: Regular rate and rhythm, normal S1S2, no m/g/r/c  · Palpation: Normal    · Pedal Pulses: 2+ and equal   Abdomen:  · Soft, NT, ND, + bs  Extremities:  · No Cyanosis or Clubbing  · Extremities: negative  Neurological/Psychiatric:  · Oriented to time, place, and person  · Non-anxious    MEDICATIONS:   Scheduled Meds:   Scheduled Meds:   LORazepam  1 mg Intravenous Once    Efferdent Denture Cleanser  1 tablet Does not apply Daily    [START ON 1/19/2021] warfarin  2 mg Oral Once per day on Sun Tue Wed Thu Fri Sat    QUEtiapine  12.5 mg Oral Nightly    warfarin  1 mg Oral Once per day on Mon    nicotine  1 patch Transdermal Daily  ipratropium-albuterol  1 ampule Inhalation Q4H WA    amiodarone  200 mg Oral Daily    atorvastatin  40 mg Oral Nightly    carvedilol  25 mg Oral BID    sodium chloride flush  10 mL Intravenous 2 times per day     Continuous Infusions:   dextrose 75 mL/hr at 01/18/21 0023     PRN Meds:.sodium chloride, diphenhydrAMINE, sodium chloride flush, promethazine **OR** ondansetron, polyethylene glycol, acetaminophen **OR** acetaminophen  Continuous Infusions:   dextrose 75 mL/hr at 01/18/21 0023       Intake/Output Summary (Last 24 hours) at 1/18/2021 0934  Last data filed at 1/17/2021 1230  Gross per 24 hour   Intake 240 ml   Output    Net 240 ml       Lab Data:  CBC:   Lab Results   Component Value Date    WBC 5.1 01/18/2021    HGB 8.3 01/18/2021     01/18/2021     BMP:  Lab Results   Component Value Date     01/18/2021    K 5.1 01/18/2021    K 3.9 08/05/2020     01/18/2021    CO2 29 01/18/2021    BUN 71 01/18/2021    CREATININE 2.8 01/18/2021    GLUCOSE 167 01/18/2021    GLUCOSE 98 03/09/2020     INR:   Lab Results   Component Value Date    INR 4.12 01/18/2021    INR 3.30 01/17/2021    INR 2.54 01/16/2021        CARDIAC LABS  ENZYMES:No results for input(s): CKMB, CKMBINDEX, TROPONINI in the last 72 hours.     Invalid input(s): CKTOTAL;3  FASTING LIPID PANEL:  Lab Results   Component Value Date    HDL 45 07/25/2019    HDL 40 12/28/2011    LDLCALC 29 07/25/2019    TRIG 84 07/25/2019    TSH 1.86 12/03/2014     LIVER PROFILE:  Lab Results   Component Value Date    AST 33 01/11/2021    AST 33 08/02/2020    ALT 22 01/11/2021    ALT 31 08/02/2020     BNP:   Lab Results   Component Value Date    PROBNP 4,978 01/13/2021    PROBNP 6,739 01/11/2021    PROBNP 4,049 08/20/2020     Iron Studies:    Lab Results   Component Value Date    FERRITIN 67.1 08/04/2020     Lab Results   Component Value Date    IRON 30 (L) 01/13/2021    TIBC 268 01/13/2021    FERRITIN 67.1 08/04/2020

## 2021-01-18 NOTE — PROGRESS NOTES
Parvez Later  Neurology Follow-up  [facility] Neurology    Date of Service: 1/18/2021    Subjective:   CC: Follow up today regarding: Acute encephalopathy    Events noted. Chart and lab reviewed. The patient is about the same. He is still waxing and waning but awake. Cannot follow simple direction. Restless. No active psychosis. Long discussion with his son. Blood test reviewed today and showed sodium 142 with creatinine of 2.8. Still anemic. Other ROS was limited from the patient. ROS : A 10-12 system review could not be obtained due to poor cooperation and confusion. family history includes Heart Disease in his paternal grandfather; Hypertension in an other family member.     Past Medical History:   Diagnosis Date    Acute combined systolic and diastolic (congestive) hrt fail ([address]     Acute kidney injury ([address]     Arrhythmia     Atrial fibrillation (HCC)     Carotid artery stenosis     CKD (chronic kidney disease)     Dilated cardiomyopathy (HCC)     Hyperlipidemia     Hypertension     IGT (impaired glucose tolerance)     Meningioma (HCC)     Valvular disease      Current Facility-Administered Medications   Medication Dose Route Frequency Provider Last Rate Last Admin    LORazepam (ATIVAN) injection 1 mg  1 mg Intravenous Once Laura Ureña PA-C        Efferdent Denture Cleanser TBEF 1 each  1 tablet Does not apply Daily Reinaldo Gleason MD   1 each at 01/18/21 0025    [START ON 1/19/2021] warfarin (COUMADIN) tablet 2 mg  2 mg Oral Once per day on Sun Tue Wed Thu Fri Sat Reinaldo Gleason MD        dextrose 5 % solution   Intravenous Continuous Kamari Anthony MD 75 mL/hr at 01/18/21 0023 New Bag at 01/18/21 0023    QUEtiapine (SEROQUEL) tablet 12.5 mg  12.5 mg Oral Nightly Reinaldo Gleason MD   Stopped at 01/17/21 2100    warfarin (COUMADIN) tablet 1 mg  1 mg Oral Once per day on Jean Jaquez MD  nicotine (NICODERM CQ) 21 MG/24HR 1 patch  1 patch Transdermal Daily Diaz Brar PA-C   1 patch at 01/18/21 8827    sodium chloride (OCEAN, BABY AYR) 0.65 % nasal spray 1 spray  1 spray Each Nostril PRN Diaz Brar PA-C   1 spray at 01/13/21 2132    diphenhydrAMINE (BENADRYL) tablet 25 mg  25 mg Oral Q6H PRN Diaz Brar PA-C   25 mg at 01/16/21 2325    ipratropium-albuterol (DUONEB) nebulizer solution 1 ampule  1 ampule Inhalation Q4H WA Rodo Eddy MD   1 ampule at 01/18/21 0857    amiodarone (CORDARONE) tablet 200 mg  200 mg Oral Daily Sujit Miller MD   200 mg at 01/16/21 1016    atorvastatin (LIPITOR) tablet 40 mg  40 mg Oral Nightly Sujit Miller MD   40 mg at 01/17/21 2317    carvedilol (COREG) tablet 25 mg  25 mg Oral BID Sujit Miller MD   Stopped at 01/18/21 0900    sodium chloride flush 0.9 % injection 10 mL  10 mL Intravenous 2 times per day Sujit Miller MD   10 mL at 01/18/21 0024    sodium chloride flush 0.9 % injection 10 mL  10 mL Intravenous PRN Sujit Miller MD   10 mL at 01/13/21 1845    promethazine (PHENERGAN) tablet 12.5 mg  12.5 mg Oral Q6H PRN Sujit Miller MD        Or    ondansetron (ZOFRAN) injection 4 mg  4 mg Intravenous Q6H PRN Sujit Miller MD        polyethylene glycol (GLYCOLAX) packet 17 g  17 g Oral Daily PRN Sujit Miller MD        acetaminophen (TYLENOL) tablet 650 mg  650 mg Oral Q6H PRN Sujit Miller MD        Or   [employer] acetaminophen (TYLENOL) suppository 650 mg  650 mg Rectal Q6H PRN Sujit Miller MD         Allergies   Allergen Reactions    Ceclor [Cefaclor] Other (See Comments)     CNS side effects, slurred speech. Lips and tounge swell. reports that he quit smoking about 28 years ago. His smoking use included cigarettes. He has a 25.00 pack-year smoking history. His smokeless tobacco use includes chew. He reports that he does not drink alcohol or use drugs.          Objective:  Constitutional:   Vitals: 01/18/21 0845 01/18/21 0851 01/18/21 0857 01/18/21 0945   BP:  102/61  (!) 143/103   Pulse:  70  95   Resp:  18  18   Temp:  96.4 °F (35.8 °C)  97.9 °F (36.6 °C)   TempSrc:  Temporal  Oral   SpO2:  94% 94% 96%   Weight: 176 lb 1.6 oz (79.9 kg)      Height:           General appearance: Confused   Eye: No icterus. PRRR  Neck: supple  Cardiovascular:  No lower leg edema with good pulsation. Mental Status:   AAO times one. Attention and concentration: poor, waxing and waning. Language:soft but not following directions. Recent and remote memory:  Can not assess recent and remote memory due to confusion  Poor fund of knowledge  Cranial Nerves:   II: Pupils: equal, round, reactive to light  III,IV,VI: No gaze preference. No nystagmus  V: Facial sensation: Grossly unremarkable. VII: Facial strength and movements: intact and symmetric  XII: Tongue movements : midline  Musculoskeletal:  The patient can move all 4 extremities. No apparent focal weakness. Tone: Normal tone. No rigidity. Reflexes: symmetric 1+ in both arms and legs  Planters: flexor bilaterally.   Coordination, sensation and gait cannot be examined due to confusion  No change in exam today     Data:  LABS:   Lab Results   Component Value Date     01/18/2021    K 5.1 01/18/2021    K 3.9 08/05/2020     01/18/2021    CO2 29 01/18/2021    BUN 71 01/18/2021    CREATININE 2.8 01/18/2021    GFRAA 27 01/18/2021    GFRAA >60 12/28/2011    LABGLOM 22 01/18/2021    LABGLOM 61 12/29/2018    GLUCOSE 167 01/18/2021    GLUCOSE 98 03/09/2020    PHOS 5.1 01/18/2021    MG 2.80 01/11/2021    CALCIUM 8.7 01/18/2021     Lab Results   Component Value Date    WBC 5.1 01/18/2021    RBC 2.90 01/18/2021    HGB 8.3 01/18/2021    HCT 27.3 01/18/2021    MCV 94.0 01/18/2021    RDW 21.4 01/18/2021     01/18/2021     Lab Results   Component Value Date    INR 4.12 (H) 01/18/2021    PROTIME 48.5 (H) 01/18/2021 Neuroimaging and labs were independently reviewed by me and DW his son  Reviewed notes from different physicians. Impression: no change  Acute encephalopathy, severe. Likely multifactorial acute metabolic encephalopathy in addition to hospital-acquired delirium. The patient continues to have persistent encephalopathy with delirium. No change compared to few days ago. Likely multifactorial.  Discussed with his son. Acute on chronic kidney disease  Hospital-acquired delirium and less likely new ischemic stroke or breakthrough seizure at this point. Acute on chronic kidney disease  Acute CHF exacerbation  Hypertension with hypotension  Hypernatremia  Anemia  Recommendation    Continue current supportive care  We will order EEG  I think MRI would be technically difficult without sedation. I will hold off on MRI at this point since it can worsen his delirium and it would not change our management since he is on warfarin. We will only use Seroquel as needed if he is agitated. Continue current blood pressure medications  Hydration  Telemetry  DVT and GI prophylaxis  Continue monitor CBC and CMP  Check TSH  Prognosis guarded given prolonged hospital course and severe hospital-acquired delirium which will prolong his recovery. Discussed with his son  He voiced understanding. Dean De La Cruz MD   [phone]      This dictation was generated by voice recognition computer software. Although all attempts are made to edit the dictation for accuracy, there may be errors in the transcription that are not intended.

## 2021-01-19 NOTE — PROGRESS NOTES
MD Yolanda Medeiros MD Ervin Canard, MD               Office: [phone]                      Fax: [phone]             [facility]                   NEPHROLOGY INPATIENT PROGRESS NOTE:     PATIENT NAME: Brian Sosa  : 1940  MRN: [id number]         RECOMMENDATIONS:     Keep low rate IVF   H/o HF - monitoring for fluid overload  Rechecked Urine lytes to assist w/ volume status  - on  : +PVR , bladder retention - russo needed     BP lower - give low dose NS bolus (w/ recent fluid overload - cautious IVFs)   - add holding parameter for Coreg for HR < 60 or BP < 100.   - Diuretics has been stopped   - keep IVF    - change from D5 to  D5 + 0.2 Saline    - monitor closely for fluid overload     AMS - not improving, neuro following       D/C Plans: unclear as w/ worsening RACIEL + AMS   -Renally stable as fluid overload / RACIEL better   - then f/u w/ Dr Arnulfo Harris on     D/W Dr Juan Pablo Menendez:       RACIEL (on proteinuric CKD-3): Worsening again  - non-Oligouric -> incontinence   - BL Scr- - recent BL worsened from 1.6 -1.7.  --->2.5-2.4 ---> 3.1 on admission - had improved to 2.4   : Etiology of RACIEL - presumed CRS w/ developed ATN     - other differentials: unlikely  , r/o obstruction   - UA : small blood, 11-20 hyaline cast = pre-renal component / CKD :     Proteinuric CKD-3 - follows w/ Dr Arnulfo Harris   - next f/u is on 2021   - CKD likely d/to HTN / CR physiology     Systolic CHF - EF 38-74%. - Off ACEI. - dry weight- ?161 pounds - higher now       Associated problems:   - Azotemia: pre-renal at BUN 60   - Electrolytes: follow   - Volume status: mild hyper-volemia  : BP: no need for tight control   - Acid-Base: stable   - Anemia: mild - follow      H/O HLD - CK WNL. H/O Microscopic Hematuria-SNOW, complements WNL. ANCA neg. UPCR 360mg. H/O Nephrolithiasis- low Na intake and higher fluid intake. Other problems: Management per primary and other consulting teams. Hospital Problems           Last Modified POA    Essential hypertension, benign (Chronic) 1/12/2021 Yes    Atrial fibrillation with RVR ([address] 1/12/2021 Yes    Stage 3 chronic kidney disease 1/12/2021 Yes    Aortic regurgitation (Chronic) 1/12/2021 Yes    Chronic combined systolic and diastolic CHF (congestive heart failure) ([address] 1/12/2021 Yes    Acute on chronic congestive heart failure ([address] 1/15/2021 Yes    Heart failure ([address] 1/11/2021 Yes    Acute kidney injury superimposed on chronic kidney disease ([address] 1/14/2021 Yes    Hypoalbuminemia 1/13/2021 Yes    Chronic atrial fibrillation ([address] 1/14/2021 Yes    Proteinuria 1/14/2021 Yes    Encephalopathy, metabolic 0/30/3441 Yes    Acute encephalopathy 1/15/2021 Yes    Hypernatremia 1/15/2021 Yes           : other supportive care :   - Check daily renal function panel with electrolytes-phosphorus  - Strict monitoring of I/Os, daily weight  - Renal feeds/diet  - Current medications reviewed. - Nephrotoxic medications have been discontinued. - Dose adjusted and appropriate. - Dose meds for eGFR <15 mL/min/1.73m2 during RACIEL    - Avoid heavy opioids due to renal failure - may use very low dose dilaudid / fentanyl with close monitoring of CNS and respiratory depression. Please refer to the orders. High Complexity. Multiple complex problems. Thank you for allowing me to participate in this patient's care. Please do not hesitate to contact me with any questions/concerns. We will follow along with you.      Afshan Kaminski MD  [facility] [address] [place]: [phone] or Via [employer]  Fax: [phone]        ========================================================   ========================================================       Subjective:  Still Remains confused    Saturating well on 2 L nasal cannula  BP lower Past medical, Surgical, Social, Family medical history reviewed by me. MEDICATIONS: reviewed by me. Medications Prior to Admission:  No current facility-administered medications on file prior to encounter. Current Outpatient Medications on File Prior to Encounter   Medication Sig Dispense Refill    atorvastatin (LIPITOR) 40 MG tablet Take 1 tablet by mouth daily 90 tablet 3    carvedilol (COREG) 25 MG tablet Take 1 tablet by mouth 2 times daily 180 tablet 1    potassium chloride (KLOR-CON M) 20 MEQ extended release tablet TAKE 2 TABLETS BY MOUTH DAILY 180 tablet 1    allopurinol (ZYLOPRIM) 100 MG tablet TAKE 1 TABLET BY MOUTH DAILY 90 tablet 3    amiodarone (CORDARONE) 200 MG tablet Take 1 tablet by mouth daily 90 tablet 3    warfarin (COUMADIN) 4 MG tablet Take 1-2 mg by mouth See Admin Instructions 1 mg every Monday; 2 mg all other days. Managed by [facility].       Multiple Vitamins-Minerals (THERAPEUTIC MULTIVITAMIN-MINERALS) tablet Take 1 tablet by mouth daily           Current Facility-Administered Medications:     warfarin (COUMADIN) daily dosing (placeholder), , Other, RX Placeholder, Claudia Garcia MD    LORazepam (ATIVAN) injection 1 mg, 1 mg, Intravenous, Once, Peter Stringer PA-C    Efferdent Denture Cleanser TBEF 1 each, 1 tablet, Does not apply, Daily, Claudia Garcia MD, 1 each at 01/18/21 2233    dextrose 5 % solution, , Intravenous, Continuous, Kal Sosa MD, Last Rate: 75 mL/hr at 01/19/21 0938, New Bag at 01/19/21 0938    QUEtiapine (SEROQUEL) tablet 12.5 mg, 12.5 mg, Oral, Nightly, Claudia Garcia MD, Stopped at 01/17/21 2100    nicotine (NICODERM CQ) 21 MG/24HR 1 patch, 1 patch, Transdermal, Daily, Peter Stringer PA-C, 1 patch at 01/19/21 0909    sodium chloride (OCEAN, BABY AYR) 0.65 % nasal spray 1 spray, 1 spray, Each Nostril, PRN, Peter Stringer PA-C, 1 spray at 01/13/21 2132   diphenhydrAMINE (BENADRYL) tablet 25 mg, 25 mg, Oral, Q6H PRN, Mary Mcghee PA-C, 25 mg at 01/16/21 2325    ipratropium-albuterol (DUONEB) nebulizer solution 1 ampule, 1 ampule, Inhalation, Q4H WA, Lety Mckinnon MD, 1 ampule at 01/18/21 0857    amiodarone (CORDARONE) tablet 200 mg, 200 mg, Oral, Daily, Arlinda Frankel, MD, 200 mg at 01/19/21 0908    atorvastatin (LIPITOR) tablet 40 mg, 40 mg, Oral, Nightly, Arlinda Frankel, MD, 40 mg at 01/17/21 2317    carvedilol (COREG) tablet 25 mg, 25 mg, Oral, BID, Arlinda Frankel, MD, Stopped at 01/18/21 0900    sodium chloride flush 0.9 % injection 10 mL, 10 mL, Intravenous, 2 times per day, Arlinda Frankel, MD, 10 mL at 01/18/21 2130    sodium chloride flush 0.9 % injection 10 mL, 10 mL, Intravenous, PRN, Arlinda Frankel, MD, 10 mL at 01/13/21 1845    promethazine (PHENERGAN) tablet 12.5 mg, 12.5 mg, Oral, Q6H PRN **OR** ondansetron (ZOFRAN) injection 4 mg, 4 mg, Intravenous, Q6H PRN, Arlinda Frankel, MD    polyethylene glycol (GLYCOLAX) packet 17 g, 17 g, Oral, Daily PRN, Arlinda Frankel, MD    acetaminophen (TYLENOL) tablet 650 mg, 650 mg, Oral, Q6H PRN **OR** acetaminophen (TYLENOL) suppository 650 mg, 650 mg, Rectal, Q6H PRN, Arlinda Frankel, MD      REVIEW OF SYSTEMS:  As mentioned in chief complaint and HPI , Subjective     PHYSICAL EXAM:  Recent vital signs and recent I/Os reviewed by me.      Wt Readings from Last 3 Encounters:   01/19/21 180 lb 14.4 oz (82.1 kg)   01/11/21 179 lb (81.2 kg)   12/21/20 169 lb (76.7 kg)     BP Readings from Last 3 Encounters:   01/19/21 107/83   01/11/21 107/61   12/28/20 103/65     Patient Vitals for the past 24 hrs:   BP Temp Temp src Pulse Resp SpO2 Weight   01/19/21 0845 107/83 (!) 86.6 °F (30.3 °C) Temporal 58 16 98 % 180 lb 14.4 oz (82.1 kg)   01/19/21 0535 122/61   61 18 97 %    01/19/21 0100 105/62 96.2 °F (35.7 °C) Temporal 58 16 96 %    01/18/21 2130 (!) 97/58 95.9 °F (35.5 °C) Temporal 59 16 98 %  01/18/21 1600 94/61 96.4 °F (35.8 °C) Temporal 56 18 97 %    01/18/21 1108 105/63 96.8 °F (36 °C) Temporal 76 18 97 %        Intake/Output Summary (Last 24 hours) at 1/19/2021 1032  Last data filed at 1/19/2021 0535  Gross per 24 hour   Intake 1711 ml   Output 825 ml   Net 886 ml         Physical Exam  Vitals signs reviewed. Constitutional:       General: He is sleeping. He is not in acute distress. Appearance: He is ill-appearing. Comments: Obese body habitus   HENT:      Head: Normocephalic and atraumatic. Right Ear: External ear normal.      Left Ear: External ear normal.      Mouth/Throat:      Mouth: Mucous membranes are not dry. Eyes:      General: No scleral icterus. Conjunctiva/sclera: Conjunctivae normal.   Neck:      Musculoskeletal: Neck supple. Thyroid: No thyroid mass. Vascular: No JVD. Trachea: Trachea normal.   Cardiovascular:      Rate and Rhythm: Normal rate and regular rhythm. Pulmonary:      Effort: Pulmonary effort is AB-normal.      Breath sounds: Diminished breath sounds at bases . Abdominal:      General: Bowel sounds are normal. There is distension. Palpations: Abdomen is soft. Musculoskeletal:         General: No deformity. Right lower leg: Edema present. Left lower leg: Edema present. Skin:     General: Skin is warm and dry. Neurological:      Mental Status: Disoriented, sleepy but easily aroused.   To loud voice  Psychiatric:         Behavior: Behavior normal.              DATA:  Diagnostic tests reviewed by me for today's visit:    Recent Labs     01/17/21  0603 01/18/21  0506 01/18/21  1844 01/19/21  0509   WBC 4.6 5.1  --  4.0   HCT 27.4* 27.3* 25.9* 26.3*   * 107*  --  91*     Iron Saturation:  No components found for: PERCENTFE  FERRITIN:    Lab Results   Component Value Date    FERRITIN 67.1 08/04/2020     IRON:    Lab Results   Component Value Date    IRON 30 01/13/2021     TIBC:    Lab Results Component Value Date    TIBC 268 01/13/2021       Recent Labs     01/17/21  0603 01/18/21  0506 01/19/21  0509   * 142 142   K 3.8 5.1 4.5    103 102   CO2 34* 29 33*   BUN 66* 71* 71*   CREATININE 2.6* 2.8* 3.3*     Recent Labs     01/17/21  0603 01/18/21  0506 01/19/21  0509   CALCIUM 9.0 8.7 8.8   PHOS 4.8 5.1* 5.4*     No results for input(s): PH, PCO2, PO2 in the last 72 hours.     Invalid input(s): York Brink    ABG:  No results found for: PH, PCO2, PO2, HCO3, BE, THGB, TCO2, O2SAT  VBG:  No results found for: PHVEN, SMW6LLX, BEVEN, X3VGTNNC    LDH:  No results found for: LDH  Uric Acid:    Lab Results   Component Value Date    LABURIC 4.7 07/25/2019       PT/INR:    Lab Results   Component Value Date    PROTIME 59.5 01/19/2021    PROTIME 48.3 08/31/2018    INR 5.05 01/19/2021     Warfarin PT/INR:  No components found for: PTPATWAR, PTINRWAR  PTT:    Lab Results   Component Value Date    APTT 46.8 01/11/2021   [APTT}  Last 3 Troponin:    Lab Results   Component Value Date    TROPONINI 0.02 01/11/2021    TROPONINI 0.03 07/30/2020    TROPONINI 0.03 07/30/2020       U/A:    Lab Results   Component Value Date    COLORU YELLOW 01/14/2021    PROTEINU 30 01/14/2021    PHUR 6.0 01/14/2021    LABCAST 10-20 Hyaline 04/23/2017    WBCUA 1 01/14/2021    RBCUA 1 01/14/2021    YEAST 0 12/09/2019    BACTERIA Rare 07/30/2020    CLARITYU Clear 01/14/2021    SPECGRAV 1.012 01/14/2021    LEUKOCYTESUR Negative 01/14/2021    UROBILINOGEN 1.0 01/14/2021    BILIRUBINUR Negative 01/14/2021    BLOODU SMALL 01/14/2021    GLUCOSEU Negative 01/14/2021     Microalbumen/Creatinine ratio:  No components found for: RUCREAT  24 Hour Urine for Protein:  No components found for: RAWUPRO, UHRS3, UKMA36GL, UTV3  24 Hour Urine for Creatinine Clearance:  No components found for: CREAT4, UHRS10, UTV10  Urine Toxicology:  No components found for: IAMMENTA, IBARBIT, IBENZO, ICOCAINE, IMARTHC, IOPIATES, IPHENCYC    HgBA1c: Lab Results   Component Value Date    LABA1C 6.3 07/25/2019     RPR:  No results found for: RPR  HIV:  No results found for: HIV  SNOW:    Lab Results   Component Value Date    SNOW Negative 08/20/2020     RF:  No results found for: RF  DSDNA:  No components found for: DNA  AMYLASE:  No results found for: AMYLASE  LIPASE:  No results found for: LIPASE  Fibrinogen Level:  No components found for: FIB       BELOW MENTIONED RADIOLOGY STUDY RESULTS BY ME:    Xr Chest Portable    Result Date: 1/11/2021  EXAMINATION: ONE XRAY VIEW OF THE CHEST 1/11/2021 3:47 pm COMPARISON: 07/30/2020 HISTORY: ORDERING SYSTEM PROVIDED HISTORY: SOB TECHNOLOGIST PROVIDED HISTORY: Reason for exam:->SOB Acuity: Unknown FINDINGS: Status post median sternotomy. Cardiomegaly. Pulmonary vascular congestion. Pulmonary edema. No focal pulmonary consolidation.      Findings suggest congestive heart failure

## 2021-01-19 NOTE — PROGRESS NOTES
[facility] PROGRESS NOTE    1/19/2021 10:00 AM        Name: Kavya Hooks Admitted: 1/11/2021  Primary Care Provider: Isac Olsen MD (Tel: [phone])                        Subjective:  . Patient is more confused today than yesterday. Unclear etiology.   Was started on Seroquel last night unclear if that are causing symptoms      Reviewed interval ancillary notes    Current Medications      warfarin (COUMADIN) daily dosing (placeholder), RX Placeholder      LORazepam (ATIVAN) injection 1 mg, Once      Efferdent Denture Cleanser TBEF 1 each, Daily      dextrose 5 % solution, Continuous      QUEtiapine (SEROQUEL) tablet 12.5 mg, Nightly      nicotine (NICODERM CQ) 21 MG/24HR 1 patch, Daily      sodium chloride (OCEAN, BABY AYR) 0.65 % nasal spray 1 spray, PRN      diphenhydrAMINE (BENADRYL) tablet 25 mg, Q6H PRN      ipratropium-albuterol (DUONEB) nebulizer solution 1 ampule, Q4H WA      amiodarone (CORDARONE) tablet 200 mg, Daily      atorvastatin (LIPITOR) tablet 40 mg, Nightly      carvedilol (COREG) tablet 25 mg, BID      sodium chloride flush 0.9 % injection 10 mL, 2 times per day      sodium chloride flush 0.9 % injection 10 mL, PRN      promethazine (PHENERGAN) tablet 12.5 mg, Q6H PRN    Or      ondansetron (ZOFRAN) injection 4 mg, Q6H PRN      polyethylene glycol (GLYCOLAX) packet 17 g, Daily PRN      acetaminophen (TYLENOL) tablet 650 mg, Q6H PRN    Or      acetaminophen (TYLENOL) suppository 650 mg, Q6H PRN        Objective:  /83   Pulse 58   Temp (!) 86.6 °F (30.3 °C) (Temporal)   Resp 16   Ht 5' 8\" (1.727 m)   Wt 180 lb 14.4 oz (82.1 kg)   SpO2 98%   BMI 27.51 kg/m²     Intake/Output Summary (Last 24 hours) at 1/19/2021 1000  Last data filed at 1/19/2021 0535  Gross per 24 hour   Intake 1711 ml   Output 825 ml Encephalopathy, metabolic    Acute encephalopathy    Hypernatremia  Resolved Problems:    * No resolved hospital problems. *       Assessment & Plan:   1. Acute encephalopathy  --Still very confused but slowly improving. Likely presume this is metabolic  -CT head I is negative. Neurology recommendation appreciated  -Patient still remains confused with no clear etiology. -EEG orderd  -Seeing no improvement most likely needs palliative care area  -Her prognosis is guarded      Acute on chronic CHF  -Continue per cardiology  - diuretics on hold   -Aute on chronic renal failure stable. Probably has underlying dementia as well.     Pending improvement in symptosm       Diet: DIET CARDIAC;  Code:Full Code  DVT PPX shabbir Ramos MD   1/19/2021 10:00 AM

## 2021-01-19 NOTE — PROGRESS NOTES
[address]  HEART FAILURE  Progress Note      Admit Date 1/11/2021     Reason for Consult:      Reason for Consultation/Chief Complaint: edema    HPI:    Brian Sosa is a 68 y.o. male with PMH CKD, HTN, HLD, AF, combined HF admitted with increased edema, orthopnea, PND, and wt gain. He has been confused and renal fx worsening. TSH elevated, INR 5 today, check liver enzymes. Subjective:  Patient is being seen for CHF. There were no acute overnight cardiac events. Today Mr. Zoë Cruz remains confused, does not answer ROS but seem a little more alert today. his son is at bedside .       Baseline Weight: 155-160   Wt Readings from Last 3 Encounters:   01/19/21 180 lb 14.4 oz (82.1 kg)   01/11/21 179 lb (81.2 kg)   12/21/20 169 lb (76.7 kg)          Objective:   /83   Pulse 58   Temp (!) 86.6 °F (30.3 °C) (Temporal)   Resp 16   Ht 5' 8\" (1.727 m)   Wt 180 lb 14.4 oz (82.1 kg)   SpO2 98%   BMI 27.51 kg/m²       Intake/Output Summary (Last 24 hours) at 1/19/2021 0957  Last data filed at 1/19/2021 0535  Gross per 24 hour   Intake 1711 ml   Output 825 ml   Net 886 ml      Wt Readings from Last 3 Encounters:   01/19/21 180 lb 14.4 oz (82.1 kg)   01/11/21 179 lb (81.2 kg)   12/21/20 169 lb (76.7 kg)      In: 1711 [I.V.:1711]  Out: -       Physical Exam:  General Appearance:  Non-obese/Well Nourished  Respiratory:  · Resp Auscultation: wheezes  Cardiovascular:  · Auscultation: Regular rate and rhythm, normal S1S2, no m/g/r/c  · Palpation: Normal    · Pedal Pulses: 2+ and equal   Abdomen:  · Soft, NT, ND, + bs  Extremities:  · No Cyanosis or Clubbing  · Extremities: negative  Neurological/Psychiatric:  · Oriented to time, place, and person  · Non-anxious    MEDICATIONS:   Scheduled Meds:   Scheduled Meds:   warfarin (COUMADIN) daily dosing (placeholder)   Other RX Placeholder    LORazepam  1 mg Intravenous Once    Efferdent Denture Cleanser  1 tablet Does not apply Daily  QUEtiapine  12.5 mg Oral Nightly    nicotine  1 patch Transdermal Daily    ipratropium-albuterol  1 ampule Inhalation Q4H WA    amiodarone  200 mg Oral Daily    atorvastatin  40 mg Oral Nightly    carvedilol  25 mg Oral BID    sodium chloride flush  10 mL Intravenous 2 times per day     Continuous Infusions:   dextrose 75 mL/hr at 01/19/21 0938     PRN Meds:.sodium chloride, diphenhydrAMINE, sodium chloride flush, promethazine **OR** ondansetron, polyethylene glycol, acetaminophen **OR** acetaminophen  Continuous Infusions:   dextrose 75 mL/hr at 01/19/21 0938       Intake/Output Summary (Last 24 hours) at 1/19/2021 0957  Last data filed at 1/19/2021 0535  Gross per 24 hour   Intake 1711 ml   Output 825 ml   Net 886 ml       Lab Data:  CBC:   Lab Results   Component Value Date    WBC 4.0 01/19/2021    HGB 8.1 01/19/2021    PLT 91 01/19/2021     BMP:  Lab Results   Component Value Date     01/19/2021    K 4.5 01/19/2021    K 3.9 08/05/2020     01/19/2021    CO2 33 01/19/2021    BUN 71 01/19/2021    CREATININE 3.3 01/19/2021    GLUCOSE 137 01/19/2021    GLUCOSE 98 03/09/2020     INR:   Lab Results   Component Value Date    INR 5.05 01/19/2021    INR 4.12 01/18/2021    INR 3.30 01/17/2021        CARDIAC LABS  ENZYMES:No results for input(s): CKMB, CKMBINDEX, TROPONINI in the last 72 hours.     Invalid input(s): CKTOTAL;3  FASTING LIPID PANEL:  Lab Results   Component Value Date    HDL 45 07/25/2019    HDL 40 12/28/2011    LDLCALC 29 07/25/2019    TRIG 84 07/25/2019    TSH 1.86 12/03/2014     LIVER PROFILE:  Lab Results   Component Value Date    AST 33 01/11/2021    AST 33 08/02/2020    ALT 22 01/11/2021    ALT 31 08/02/2020     BNP:   Lab Results   Component Value Date    PROBNP 4,978 01/13/2021    PROBNP 6,739 01/11/2021    PROBNP 4,049 08/20/2020     Iron Studies:    Lab Results   Component Value Date    FERRITIN 67.1 08/04/2020     Lab Results   Component Value Date    IRON 30 (L) 01/13/2021 TIBC 268 01/13/2021    FERRITIN 67.1 08/04/2020          1. WEIGHT: Admit Weight: 160 lb (72.6 kg)      Today  Weight: 180 lb 14.4 oz (82.1 kg)   2. I/O     Intake/Output Summary (Last 24 hours) at 1/19/2021 0957  Last data filed at 1/19/2021 0535  Gross per 24 hour   Intake 1711 ml   Output 825 ml   Net 886 ml       Cardiac Testing:   Echo:  1/12/21:   -Normal left ventricular size.   -Concentric left ventricular hypertrophy.   -Decreased left ventricular systolic function with inferoseptal hypokinesis.   -Estimated EF 35-40%.  -E/e'=27.   -Indeterminate diastolic function.   -Mild thickening of anterior leaflet of mitral valve that appears to   prolapse mildly.   -The maximum mitral valve pressure gradient is 14mmHg and the mean pressure   gradient is 5 mmHg.   -Moderately mitral regurgitation with posteriorly directed jet.   -A mechanical aortic valve appears well seated.   -The left atrium is dilated.   -The right atrium is dilated.   -Severe tricuspid regurgitation directed anteriorly.   -PASP 36mmHg.   -Trivial pulmonic regurgitation present.     I/O negative 500 cc      Assessment/Plan:     1. AHF - diuretics on hold per nephrology, fluid given  2. CMP- on coreg, no ACE/ARB for CKD  3. Anemia- Hgb stable   4.  AF-stop Amio given TSH, bradycardia, and MS changes, continue coreg, AC per pharm, INR elevated today        I appreciate the opportunity of cooperating in the care of this individual.    Marvin Diana, ACNP, [address] 1/19/2021, 9:57 AM  Heart Failure  The [address], [address]  Ph: [phone]      Core Measures:   · Discharge instructions:   · LVEF documented:   · ACEI for LV dysfunction:   · Smoking Cessation:

## 2021-01-19 NOTE — PLAN OF CARE
Nutrition Problem #1: Inadequate oral intake  Intervention: Food and/or Nutrient Delivery: Continue Current Diet, Start Oral Nutrition Supplement  Nutritional Goals: po intake at least 50% of meals & supplements

## 2021-01-19 NOTE — PROCEDURES
Patient: Zhen Nguyen    MR Number: [id number]  YOB: 1940  Date of Visit: 1/19/2021    Clinical History:  The patient is a 68y.o. years old male with persistent encephalopathy. Rule out new onset seizures. Medications reviewed. Method: The EEG was performed utilizing the international 10/20 of electrode placements of both referential and bipolar montages. The patient was awake and drowsy through out the recording. Photic stimulation was performed. Findings: The background EEG was compromised by muscle and motion artifact throughout the recording. Did limit quality of EEG. The background showed generalized diffuse slowing with mixture of theta and delta with average amplitude. No spike or sharp waves or EEG seizures. Impression: This EEG is of poor quality. Extensive motion artifact secondary to patient's poor cooperation. There is evidence of moderate diffuse encephalopathy. No focal or epileptiform discharges.      Reynaldo Seymour MD      Board certified in clinical neurophysiology

## 2021-01-19 NOTE — CONSULTS
Urology Consult Note  [facility]     Patient: Tiki Hollingsworth MRN: [id number]  Room/Bed: [address] [place], [place] [id number]-   YOB: 1940  Age/Sex: 68 y.o.male  Admission Date: 1/11/2021     Date of Service:  1/19/2021    Consulting Provider: SARAH Valderrama-CNP  Admitting/Requesting Physician: Jose Stern MD  Primary Care Physician: Crystal Lopez MD    Reason for Consult: Gross Hematuria    ASSESSMENT/PLAN     68 y.o. male presented to [facility] for bilat lower extremity swelling. Has hx of CKD and CHF. On Coumadin for A-fib. Admitted for metabolic encephalopathy 2/2 to RACI[facility]. Cre was 3.1 on arrival to hospital, this morning 3.3. Baseline is 1.6. Consulted today for gross hematuria. Sounds like patient had pulled his russo at some point during admission, d/t confusion. INR is 5.05 this morning. Multiple UA's have been negative for infection. On exam patient with kennedy blood and clot from urethra. Confused. Combative. Bladder does not feel distended. Recommendations:  -Likely injury to prostatic urethra from traumatic russo removal combined with elevated INR  -CT a/p without to assess clot burden  -Bladder scan/straight cath TID for PVR >400cc  -Hold AC  -Would likely benefit from 3-way russo but confused/combative. All patient questions were answered. He understands the plan as listed above. HISTORY     Chief Complaint:   Chief Complaint   Patient presents with    Foot Swelling     pt sent by doctor for bilat leg/foot swelling. pt states it's been going off and on for several years. pt with greg boot wrap on bilat lower extremities, states if he takes them off legs start blistering from the fluid.  Ceclor [Cefaclor] Other (See Comments)     CNS side effects, slurred speech. Lips and tounge swell. Medications:  Scheduled Meds:   warfarin (COUMADIN) daily dosing (placeholder)   Other RX Placeholder    LORazepam  1 mg Intravenous Once    Efferdent Denture Cleanser  1 tablet Does not apply Daily    QUEtiapine  12.5 mg Oral Nightly    nicotine  1 patch Transdermal Daily    ipratropium-albuterol  1 ampule Inhalation Q4H WA    amiodarone  200 mg Oral Daily    atorvastatin  40 mg Oral Nightly    carvedilol  25 mg Oral BID    sodium chloride flush  10 mL Intravenous 2 times per day     Continuous Infusions:   dextrose 75 mL/hr at 01/19/21 0938     PRN Meds:sodium chloride, diphenhydrAMINE, sodium chloride flush, promethazine **OR** ondansetron, polyethylene glycol, acetaminophen **OR** acetaminophen    Review of Systems:  Pertinent positives/negatives reviewed in HPI. All other systems reviewed and negative, unless noted below. Constitutional: Negative  Genitourinary: see HPI  HEENT: Negative   Cardiovascular: Negative   Respiratory: Negative   Gastrointestinal: Negative   Musculoskeletal: Negative   Neurological: Negative   Psychiatric: Negative   Integumentary: Negative     PHYSICAL EXAM     Vitals:    01/19/21 0845   BP: 107/83   Pulse: 58   Resp: 16   Temp: (!) 86.6 °F (30.3 °C)   SpO2: 98%     CONSTITUTIONAL: The patient is well nourished/developed, Combative  NEUROLOGICAL/PSYCHIATRIC: Disoriented to person, place and time, combative affect noted. NECK: The neck is symmetrical and supple, with no masses noted. CARDIOVASCULAR: Regular rate and rhythm, no evidence of swelling noted. RESPIRATORY: Normal respiratory effort with no wheezing noted. ABDOMEN: Abdomen soft, non-tender, non-distended. No enlarged liver or spleen. No hernias noted. Stool occult blood not indicated. SKIN: Skin appears normal, Legs aced wrapped  LYMPHATICS: No adenopathy noted. CVA: No CVA tenderness bilaterally. GENITOURINARY: Uncircumcised, No phimosis, The penis is without rash or lesions and meatus with expected size and location. The scrotum appears normal. Bilateral testicles appears to be of normal size and location. No masses or tenderness noted of testicles or epididymis. Ins/Outs:    Intake/Output Summary (Last 24 hours) at 1/19/2021 1033  Last data filed at 1/19/2021 0535  Gross per 24 hour   Intake 1711 ml   Output 825 ml   Net 886 ml       LABS     CBC   Lab Results   Component Value Date    WBC 4.0 01/19/2021    RBC 2.84 01/19/2021    HGB 8.1 01/19/2021    HCT 26.3 01/19/2021    MCV 92.7 01/19/2021    MCH 28.5 01/19/2021    MCHC 30.7 01/19/2021    RDW 21.3 01/19/2021    PLT 91 01/19/2021    MPV 8.6 01/19/2021     BMP   Lab Results   Component Value Date     01/19/2021    K 4.5 01/19/2021    K 3.9 08/05/2020     01/19/2021    CO2 33 01/19/2021    BUN 71 01/19/2021    CREATININE 3.3 01/19/2021    GLUCOSE 137 01/19/2021    GLUCOSE 98 03/09/2020    CALCIUM 8.8 01/19/2021     Urinalysis:   Lab Results   Component Value Date    COLORU YELLOW 01/14/2021    GLUCOSEU Negative 01/14/2021    BLOODU SMALL 01/14/2021    NITRU Negative 01/14/2021    LEUKOCYTESUR Negative 01/14/2021     Urine culture: No results for input(s): [place] in the last 72 hours.   PSA: No results found for: PSA      IMAGING     Echo Complete    Result Date: 1/12/2021 Normal left ventricular size. Concentric left ventricular hypertrophy. Decreased left ventricular systolic function with inferoseptal hypokinesis. Estimated EF 35%. E/e'=27. Indeterminate diastolic function. A-fib. Mitral Valve  Mild thickening of anterior leaflet of mitral valve that appears to prolapse  mildly. The maximum mitral valve pressure gradient is 14mmHg and the mean pressure  gradient is 5 mmHg. Moderately mitral regurgitation with posteriorly directed jet. Left Atrium  The left atrium is dilated. Aortic Valve  A mechanical aortic valve appears well seated with a maximum gradient of 21  mmHg and a mean gradient of 14 mmHg. Trivial aortic regurgitation is present. Aorta  The aortic root is normal in size. Right Ventricle  Normal right ventricular size and mildly decreased in function. Tricuspid Valve  Tricuspid valve is structurally normal.  Severe tricuspid regurgitation directed anteriorly. PASP 36mmHg. Right Atrium  The right atrium is dilated. Pulmonic Valve  The pulmonic valve is not well visualized. Trivial pulmonic regurgitation present. Pericardial Effusion  No evidence of any pericardial effusion. Pleural Effusion  There is no pleural effusion.   M-Mode/2D Measurements (cm)   LV Diastolic Dimension: 7.56 cm LV Systolic Dimension: 4.06 cm  LV Septum Diastolic: 4.20 cm  LV PW Diastolic: 1.32 cm        AO Root Dimension: 3.1 cm                                  LA Dimension: 5.6 cm                                  LA Area: 28.7 cm2  LVOT: 1.7 cm                    LA volume/Index: 99.4 ml /52 ml/m2  Doppler Measurements   AV Peak Velocity: 230 cm/s     MV Peak E-Wave: 155 cm/s  AV Peak Gradient: 21.16 mmHg  AV Mean Gradient: 14 mmHg      MV P1/2t: 86 msec  LVOT Peak Velocity: 131 cm/s   MV Mean Gradient: 5 mmHg  AV Area (Continuity):1.21 cm2  MV Max P mmHg                                 MV Vmax:186 cm/s  TR Velocity:288 cm/s           MV VTI:26.8 cm/s  TR Gradient:33.18 mmHg  Estimated RAP:3 mmHg           MV Area (continuity): 2.42 cm2  Estimated RVSP: 36 mmHg        MV Area (PHT): 2.56 cm2  E' Septal Velocity: 5.11 cm/s  E' Lateral Velocity: 6.53 cm/s  E/E' ratio: 27   Aortic Valve   Peak Velocity: 230 cm/s     Mean Velocity: 189 cm/s  Peak Gradient: 21.16 mmHg   Mean Gradient: 14 mmHg  Area (continuity): 1.21 cm2  AV VTI: 48 cm  Aorta   Aortic Root: 3.1 cm  LVOT Diameter: 1.7 cm      Ct Head Wo Contrast    Result Date: 1/14/2021  EXAMINATION: CT OF THE HEAD WITHOUT CONTRAST  1/14/2021 12:58 pm TECHNIQUE: CT of the head was performed without the administration of intravenous contrast. Dose modulation, iterative reconstruction, and/or weight based adjustment of the mA/kV was utilized to reduce the radiation dose to as low as reasonably achievable. COMPARISON: April 23, 2027 HISTORY: ORDERING SYSTEM PROVIDED HISTORY: acute encep TECHNOLOGIST PROVIDED HISTORY: Reason for exam:->acute encep Has a \"code stroke\" or \"stroke alert\" been called? ->No Reason for Exam: acute encep Acuity: Unknown Type of Exam: Unknown FINDINGS: BRAIN/VENTRICLES: There is no acute intracranial hemorrhage, mass effect or midline shift. No abnormal extra-axial fluid collection. The gray-white differentiation is maintained without evidence of an acute infarct. There is no evidence of hydrocephalus. ORBITS: The visualized portion of the orbits demonstrate no acute abnormality. SINUSES: The visualized paranasal sinuses and mastoid air cells demonstrate no acute abnormality. SOFT TISSUES/SKULL:  No acute abnormality of the visualized skull or soft tissues. No acute intracranial abnormality.  Stable compared to prior study     Us Renal Complete    Result Date: 1/12/2021 EXAMINATION: RETROPERITONEAL ULTRASOUND OF THE KIDNEYS AND URINARY BLADDER 1/12/2021 COMPARISON: None HISTORY: ORDERING SYSTEM PROVIDED HISTORY: RACIEL, r/o obstruction TECHNOLOGIST PROVIDED HISTORY: Reason for exam:->RACIEL, r/o obstruction Acuity: Acute Type of Exam: Initial Additional signs and symptoms: na Relevant Medical/Surgical History: na FINDINGS: Kidneys: The right kidney measures 11 x 6 x 5 cm. The left kidney measures 11 x 6 x 5 cm. No hydronephrosis or renal stones are seen. There are multiple cysts bilaterally with the largest on the right seen inferiorly measuring 3.4 x 2.7 cm with a thin septation internally. The largest on the left is seen inferior laterally measuring 3.7 x 3.6 cm. There multiple smaller cysts inferiorly with no hydronephrosis or renal stones. The bladder is unremarkable. There is mild ascites along the lower pelvis. The bladder is not well distended. Bladder: Not well visualized. The prostate gland appears enlarged measuring 5.7 x 5 cm     Normal-sized kidneys with no hydronephrosis or renal stones. 3.4 cm minimally complex Bosniak 2 cyst on the right. Multiple small benign cysts scattered in both kidneys. Mild ascites. Moderate prostatic enlargement. Recommend clinical follow-up. Xr Chest Portable    Result Date: 1/11/2021  EXAMINATION: ONE XRAY VIEW OF THE CHEST 1/11/2021 3:47 pm COMPARISON: 07/30/2020 HISTORY: ORDERING SYSTEM PROVIDED HISTORY: SOB TECHNOLOGIST PROVIDED HISTORY: Reason for exam:->SOB Acuity: Unknown FINDINGS: Status post median sternotomy. Cardiomegaly. Pulmonary vascular congestion. Pulmonary edema. No focal pulmonary consolidation.      Findings suggest congestive heart failure            Electronically signed by: Kesha MORALES CNP 1/19/2021   [facility]  Office Contact: [phone]

## 2021-01-19 NOTE — PROGRESS NOTES
Alejandra Channel  Neurology Follow-up  [facility] Neurology    Date of Service: 1/19/2021    Subjective:   CC: Follow up today regarding: Acute encephalopathy    Events noted. Chart and lab reviewed. The patient is about the same. Still waxing and waning. Unable to follow direction but awake. No major events overnight. Blood test reviewed today which showed sodium 132 and creatinine 3.3. Still anemic. LFT showed mild elevated AST and bilirubin. TSH was elevated at 17. Other review of system was unremarkable. ROS : A 10-12 system review could not be obtained due to poor cooperation and confusion. family history includes Heart Disease in his paternal grandfather; Hypertension in an other family member.     Past Medical History:   Diagnosis Date    Acute combined systolic and diastolic (congestive) hrt fail ([address]     Acute kidney injury ([address]     Arrhythmia     Atrial fibrillation (HCC)     Carotid artery stenosis     CKD (chronic kidney disease)     Dilated cardiomyopathy (HCC)     Hyperlipidemia     Hypertension     IGT (impaired glucose tolerance)     Meningioma (HCC)     Valvular disease      Current Facility-Administered Medications   Medication Dose Route Frequency Provider Last Rate Last Admin    warfarin (COUMADIN) daily dosing (placeholder)   Other RX Placeholder Bronson De Los Santos MD        LORazepam (ATIVAN) injection 1 mg  1 mg Intravenous Once Laura Ureña PA-C        Efferdent Denture Cleanser TBEF 1 each  1 tablet Does not apply Daily Dagoberto Browning MD   1 each at 01/18/21 2233    dextrose 5 % solution   Intravenous Continuous Manuel MD Janneth 75 mL/hr at 01/18/21 0023 New Bag at 01/18/21 0023    QUEtiapine (SEROQUEL) tablet 12.5 mg  12.5 mg Oral Nightly Bronson Yates MD   Stopped at 01/17/21 2100    nicotine (NICODERM CQ) 21 MG/24HR 1 patch  1 patch Transdermal Daily Madelyn Ye PA-C   1 patch at 01/18/21 3067 Temp: 96.4 °F (35.8 °C) 95.9 °F (35.5 °C) 96.2 °F (35.7 °C)    TempSrc: Temporal Temporal Temporal    SpO2: 97% 98% 96% 97%   Weight:       Height:           General appearance: Confused   Eye: No icterus. PRRR  Neck: supple  Cardiovascular:  No lower leg edema with good pulsation. Mental Status:   AAO times one. Attention and concentration: poor, waxing and waning. Language:soft but not following directions. Recent and remote memory:  Can not assess recent and remote memory due to confusion  Poor fund of knowledge  Cranial Nerves:   II: Pupils: equal, round, reactive to light  III,IV,VI: No gaze preference. No nystagmus  V: Facial sensation: Grossly unremarkable. VII: Facial strength and movements: intact and symmetric  XII: Tongue movements : midline  Musculoskeletal:  The patient can move all 4 extremities. No apparent focal weakness. Tone: Normal tone. No rigidity. Reflexes: symmetric 1+ in both arms and legs  Planters: flexor bilaterally. Coordination, sensation and gait cannot be examined due to confusion  Exam unchanged. Data:  LABS:   Lab Results   Component Value Date     01/19/2021    K 4.5 01/19/2021    K 3.9 08/05/2020     01/19/2021    CO2 33 01/19/2021    BUN 71 01/19/2021    CREATININE 3.3 01/19/2021    GFRAA 22 01/19/2021    GFRAA >60 12/28/2011    LABGLOM 18 01/19/2021    LABGLOM 61 12/29/2018    GLUCOSE 137 01/19/2021    GLUCOSE 98 03/09/2020    PHOS 5.4 01/19/2021    MG 2.80 01/11/2021    CALCIUM 8.8 01/19/2021     Lab Results   Component Value Date    WBC 4.0 01/19/2021    RBC 2.84 01/19/2021    HGB 8.1 01/19/2021    HCT 26.3 01/19/2021    MCV 92.7 01/19/2021    RDW 21.3 01/19/2021    PLT 91 01/19/2021     Lab Results   Component Value Date    INR 5.05 (HH) 01/19/2021    PROTIME 59.5 (H) 01/19/2021       Neuroimaging and labs were independently reviewed by me and DW his son  Reviewed notes from different physicians. Impression: The same today. Acute encephalopathy, severe. Likely multifactorial acute metabolic encephalopathy in addition to hospital-acquired delirium. The patient continues to have persistent encephalopathy with delirium. No change compared to few days ago. Likely multifactorial.    Acute on chronic kidney disease  Hospital-acquired delirium and less likely new ischemic stroke or breakthrough seizure at this point. Acute on chronic kidney disease  Acute CHF exacerbation  Hypertension with hypotension  Hypernatremia  Anemia  Hypothyroid, not controlled    Recommendation    EEG today since he could not tolerate EEG yesterday  Start Synthroid and follow TFT  Continue current supportive care  Follow CBC  He is on Coumadin  Monitor INR  Avoid sedatives  Hydration  Nutritional support  Insulin sliding scale  Neurochecks  Blood sugar monitor  Continue current blood pressure medication  Statin  Nicotine patch  Patient continues have persistent delirium which is severe. Not improving. Prognosis guarded and would expect long hospital course. Less likely CNS meningoencephalitis and hospital-acquired setting  Would consider MRI of the brain after discussion with his son as it would require sedation. We will follow  MDM: . Ami Potter MD   [phone]      This dictation was generated by voice recognition computer software. Although all attempts are made to edit the dictation for accuracy, there may be errors in the transcription that are not intended.

## 2021-01-19 NOTE — CONSULTS
Palliative Care:     a 68 y.o. male with h/o combined CHF, CAD, cardiomyopathy, Afib on coumadin, AVR, CKD presents with c/o b/l leg swelling, weight gain dyspnea , He was sent from cardiologist  Office Reports weight gain of 7- 8 pounds. Chest xray showed congestive heart failure. probnp > 6.7K. He has been started on IV lasix. Admitted 1/11/21 and Palliative consult ordered 1/18/21. Past Medical History:   has a past medical history of Acute combined systolic and diastolic (congestive) hrt fail ([facility] [address] Acute kidney injury ([facility] [address] Arrhythmia, Atrial fibrillation ([facility] [address] Carotid artery stenosis, CKD (chronic kidney disease), Dilated cardiomyopathy ([facility] [address] Hyperlipidemia, Hypertension, IGT (impaired glucose tolerance), Meningioma ([facility] [address] and Valvular disease. Past Surgical History:   has a past surgical history that includes Aortic valve replacement; Carpal tunnel release (Left); skin biopsy; and Colonoscopy (2010). Advance Directives: Full Code         Problem Severity: Pain/Other Symptoms:  Fluid overload related to disease process. Increased weakness. Was ambulatory prior to admit. Bed Mobility/Toileting/Transfer:   One assist OOB. High fall risk. Seeking SNIF for PT/OT potential. Wife unable to manage at this physical state. Performance Status:        Palliative Performance Scale:  100% []Full Normal activity & work No evidence of disease  90%   [] Full Normal activity & work Some evidence of disease  80%   [] Full Normal activity with Effort Some evidence of disease  70%   [] Reduced Unable Normal Job/Work Significant disease Full Normal or reduced  60%   [] Ambulation reduced; Significant disease; Can't do hobbies/housework; intake normal   or reduced; occasional assist; LOC full/confusion  50%   [] Mainly sit/lie;  Extensive disease; Can't do any work; Considerable assist; intake normal  Or reduced; LOC full/confusion 40%   [] Mainly in bed; Extensive disease; Mainly assist; intake normal or reduced; occasional assist; LOC full/confusion  30%   [x] Bed Bound; Extensive disease; Total care; intake reduced; LOC full/confusion  20%   [] Bed Bound; Extensive disease; Total care; intake minimal; Drowsy/coma  10%   [] Bed Bound; Extensive disease; Total care; Mouth care only; Drowsy/coma    PPS 30%  Symptom Assessment: Appetite/Nausea/Bowels/Fatigue:     lbs. BMI 26.78      Social History:   reports that he quit smoking about 28 years ago. His smoking use included cigarettes. He has a 25.00 pack-year smoking history. His smokeless tobacco use includes chew. He reports that he does not drink alcohol or use drugs. Family History:  family history includes Heart Disease in his paternal grandfather; Hypertension in an other family member. Psychological/Spiritual:    . One son and one daughter. Rastafarian Zoroastrianism.         Family Discussion:    Patient alert. Able to give eye contact and have brief yes/no dialogue. Does not appear agitated and denies pain. All fall measures in place. Call to wife of patient. Discussed ACP/Code status. Agrees with Full code status. Requested for me to call and give this information also to her daughter Héctor Valenzuela. Call sent. Information provided. Wife does state son Mk Anguiano is coming in today to see his father. In agreement for me to provide brochure of code status information for further family decisions. No ACP in place. Wife understands her role as DPOA at this time. Discussed Rastafarian. Not active with any [facility] at this time. Decline in offer for . Discussed DC plan. Goal is SNIF for PT/OT and to return to home setting. Will continue to follow and offer any further education/emotional support as needed. Nurse Richard Hard updated of our discussions.

## 2021-01-19 NOTE — PROGRESS NOTES
Pt restless and awake throughout this shift. Pt remains on low bed with alarm set and fall mats on each side of bed. Camera in room. Pt in view from nurses station. Pt unable to verbalize understanding of fall risk POC. Pt impulsive at times throughout this shift and attempting to get out of bed. Pt remains with audible expiratory wheezes. Pt refusing duonebs per [facility] for RT. 2L NC in place. No acute distress at time of assessment. Previous bladder scan showed 58mL. Small amount of bleeding and clots visible with incontinence care.

## 2021-01-19 NOTE — SIGNIFICANT EVENT
Rapid response called with significant hypotension and worsening respiratory status and mentation. BP was in the 50s with bradycardia oxygen saturation was unable to be detectable. Patient worsening mentation. ABG obtained noted significant acidosis with hypotension unable to get blood pressure reading even with manual at times. Patient with multiple comorbid condition with no improvement in mentation. Worsening INR. With multiorgan failure  Urgent call was made to wife Casi Cervantes and daughter Sharon Corbett we discussed case in detail with his worsening symptom and multiorgan failure and no improvement in neurological status for first few days discussed what patient's wishes would have been given poor prognosis with heart and renal failure.   At this time wife and daughter both agrees to keep patient comfortable with no significant intervention and excluding intubation CPR   Based on the conversation and patient wishes will make patient DNR comfort care with ultimate goal is patient's comfort comfort medication will be ordered monitor closely

## 2021-01-19 NOTE — PROGRESS NOTES
Pt returned from abdominal CT Scan. Just prior to performing bladder scan noticed that IV to left forearm infiltrated, fluids were stopped. Then noticed bp was running critically low as well as shallow breathing. Rapid Response called. New IV placed then increased fluids to bolus infusion. Vitals closely monitored. During infusion bp was still low. ABG's obtained and PO2 59.4, non-rebreather donned. Pt family notified by MD and pt now [facility].

## 2021-01-19 NOTE — PROGRESS NOTES
Comprehensive Nutrition Assessment    Type and Reason for Visit:  Initial(LOS)    Nutrition Recommendations/Plan:   1. Boost pudding TID  2. Monitor for change in care plan    Nutrition Assessment:  Pt is nutritionally compromised AEB poor po intake throughout 7 days of admission. Pt has gained wt per EMR; currently receives D5 IV fluids @ 100 ml/hr;hx CHF. Pt has increased confusion, likely contributing to poor po intake. RN reports pt must be fed, but closes mouth after taking only a few bites. Will offer Boost pudding to help increase oral intake. If intake remains less than 25% of intake, consider nutrition support if aggressive tx desired. Malnutrition Assessment:  Malnutrition Status: At risk for malnutrition (Comment)    Context:  Acute Illness     Findings of the 6 clinical characteristics of malnutrition:  Energy Intake:  1 - 75% or less of estimated energy requirements for 7 or more days  Weight Loss:  No significant weight loss     Body Fat Loss:  Unable to assess     Muscle Mass Loss:  Unable to assess    Fluid Accumulation:  7 - Moderate to Severe Extremities   Strength:  Not Performed    Estimated Daily Nutrient Needs:  Energy (kcal):  8114- 9656(06-69 kcal/82kg); Weight Used for Energy Requirements:  Current     Protein (g):  91- 140 g(1.3-2.0g/70kg); Weight Used for Protein Requirements:  Ideal        Fluid (ml/day):   ; Method Used for Fluid Requirements:  1 ml/kcal      Nutrition Related Findings:  Labs: elevated BUN 71, creat 2.8, phos 5.1.  +2 pitting edema BLE, active BS      Wounds:  Venous Stasis       Current Nutrition Therapies:    DIET CARDIAC;     Anthropometric Measures:  · Height: 5' 8\" (172.7 cm)  · Current Body Weight: 180 lb (81.6 kg)   · Admission Body Weight: 175 lb (79.4 kg)    · Usual Body Weight:       · Ideal Body Weight: 154 lbs; % Ideal Body Weight 116.9 %   · BMI: 27.4  · Adjusted Body Weight:  ; No Adjustment   · Adjusted BMI: · BMI Categories: Overweight (BMI 25.0-29. 9)       Nutrition Diagnosis:   · Inadequate oral intake related to cognitive or neurological impairment, inadequate protein-energy intake as evidenced by intake 0-25%, intake 26-50%      Nutrition Interventions:   Food and/or Nutrient Delivery:  Continue Current Diet, Start Oral Nutrition Supplement  Nutrition Education/Counseling:  Education not indicated   Coordination of Nutrition Care:  Continue to monitor while inpatient    Goals:  po intake at least 50% of meals & supplements       Nutrition Monitoring and Evaluation:   Behavioral-Environmental Outcomes:  None Identified   Food/Nutrient Intake Outcomes:  Food and Nutrient Intake, Supplement Intake, IVF Intake  Physical Signs/Symptoms Outcomes:  Biochemical Data, Weight, Fluid Status or Edema     Discharge Planning:     Too soon to determine     Electronically signed by Alvarado Tobar RD, LD on 1/19/21 at 2:12 PM EST    Contact: [phone]

## 2021-01-20 NOTE — PROGRESS NOTES
RN applied 02 finger probe on patient so correct 02 saturation can be obtained. Patient's current 02 sat 94 on non rebreather mask. Forehead 02 sensor not picking up accurately. Will continue to monitor.

## 2021-01-20 NOTE — TELEPHONE ENCOUNTER
Harriet Luu from Progressive care unit @ [facility] called to inform Dr Yane Reese patient has passed away today.       Please advise

## 2021-01-20 NOTE — PROGRESS NOTES
Patient's wrist watch and dentures found in the room by house keeping. Called daughter Emeterio Loza at [phone] to inform about patient's belongings. Daughter wanting to pick patient's belongings tomorrow.

## 2021-01-20 NOTE — PROGRESS NOTES
RN completed 2000 vitals at this time. Patient very quiet and calm when RN entered room. Upon completing vitals, patient became very restless and fidigety. 02 sensor only picking up a reading every now and again. At the time, 02 reading anywhere between 77 and 90%. Patient remains on non rebreather. Patient's body is very cool. Current temporal temp 84.9F. Unable to get automatic BP reading. Pulse 49. Ativan administered per orders. Night time dose of seroquel and coreg held due to patient being comfort care and inability to take oral meds. Will continue to monitor.

## 2021-01-20 NOTE — PROGRESS NOTES
RN spoke to patient's daughter Yesenia Richardson. Daughter and two other family members to visit soon.

## 2021-01-20 NOTE — PROGRESS NOTES
RN attempted to contact hospitalist again at this time to pronounce death but he is seeing ICU admission.

## 2021-01-20 NOTE — PROGRESS NOTES
RN notified by MT at this time that patient no longer had heart rate registering. RN assessed patient and verified patient did not have a pulse and was not breathing. Patient passed away at this time. RN sent message to hospitalist requesting to visit patient to pronounce death. Patient's daughter and great grand daughter present at bedside. RN notified patient's spouse of patient's death at this time. Spouse and daughter verified that patient should be transported to [place] [id number] [facility] [address]'\"   [id number] [place] [address],[address] or Facility: [facility] From: Moira Johnson RE: Anai Loss RM: [id number] patient just passed away . can you come pronounce death please?  thanks Chaim Gan RN Need Callback: NO CALLBACK REQ 3T NEW ADMISSION  Read 5:32 AM

## 2021-01-20 NOTE — DISCHARGE SUMMARY
Discharge summary      Name: Edy Rome . YOB: 1940 . Medical Records Number: [id number]             Date of admission: 1/11/2021  Date of death: 1/20/2021     Edy Rome is a 68 y.o. male  who was admitted on 1/11/2021 with acute on chronic CHF exacerbation with underlying renal failure. Patient was instructed with IV diuretics transition to p.o. diuretics patient during the course developed acute encephalopathy with unclear etiology work-up was negative thought it could be metabolic. Patient continued to deteriorate with no improvement in mentation rapid response was called. Patient noted to have significantly acidosis with not following commands with severe hypotension Case was discussed with family. Patient was made DNR comfort care. Patient passed on 1/20/2021 a at 5:20 AM in the morning    Examination:  Patient found pulseless and without spontaneous respirations. Pupils fixed and dilated. Auscultation significant for absence of any heart or breath sounds. See death note.     Carlitos Thapa MD   1/20/2021 9:38 AM

## 2021-01-20 NOTE — PROGRESS NOTES
Patient's condition declining. RN obtained manual BP of 50/40. HR 30s-40s. Patient maintaned 02 saturation in the 90s all evening but is now currently at 74%. Patient restless. 1mg ativan adminsitered. RN notified patient's spouse via phone and patient's son. RN verbally spoke to spouse and she stated she is unable to visit. Daughter unable to visit due to recovering from Covid. Voicemail left for son.

## 2021-01-20 NOTE — FLOWSHEET NOTE
Rapid Response Quick Summary    Room: 7CX-6066/4820-50    Assessment of concern / patient:RR called for hypotension. Pt restless, with decreased mentation, agonal respirations. Unable to get saturations. Physician involved:  Dr Nirav Hurtado  Interventions: ABG, attempt to get manual BPs due to automatic was not able to get his pressure. Call placed by  to pt's wife and code status changed to [facility]. Disposition:  Pt to remain on 3T.

## 2021-01-20 NOTE — DEATH NOTES
Death Pronouncement Note  Patient's Name: Michelle Atkinson   Patient's YOB: 1940  MRN Number: [id number]    Admitting Provider: Andi Hagen MD  Attending Provider: Svetlana Garcia MD    Patient was examined and the following were absent: Pulses, Blood Pressure and Respiratory effort.  No corneal reflex     I declared the patient dead on  at 1/20/2021 @ 0520 am     Preliminary Cause of Death: Congestive heart failure    Electronically signed by Svetlana Garcia MD on 1/20/21 at 8:16 AM EST

## 2021-09-14 RX ORDER — FUROSEMIDE 40 MG/1
TABLET ORAL
Qty: 180 TABLET | Refills: 3 | OUTPATIENT
Start: 2021-09-14

## 2024-09-21 NOTE — PROGRESS NOTES
Patient ID: Victorina Argueta  MRN: [id number]  : 1967    Chief Complaint   Patient presents with    Office Visit     5 Months follow up Dm        HPI    Patient is 57 years old female who is here today to follow-up on diabetes, hypertension, hyperlipidemia and hypothyroidism    Since last visit 5 months ago patient changed her diet drastically.  Lost 15 pounds.  A1c went down from 7.6 last time to 6.4 today.  Patient tells me that she feels great  She walks 5 times a day probably 15 to 20 minutes.    Last time liver enzymes were slightly elevated, likely due to weight gain.    No chest pains, no neuropathy  Had surgery for retinal detachment left eye.  Still has some blurry vision.  Plan is to follow-up with ophthalmology and likely do cataract surgery        Past Medical History:   Diagnosis Date    DM (diabetes mellitus)  (CMD)     Diet controlled    Dry eye syndrome     Essential (primary) hypertension     Keratoconjunctivitis sicca  (CMD)     Ocular hypertension     Retinal detachment     Left eye    Thyroid disease     Hypothyroidism    Verrucous keratosis        Family History   Problem Relation Age of Onset    Diabetes Mother     Diabetes Father     Glaucoma Father        Past Surgical History:   Procedure Laterality Date    Colonoscopy diagnostic      Hysterectomy         Social History     Socioeconomic History    Marital status: /Civil Union     Spouse name: Not on file    Number of children: Not on file    Years of education: Not on file    Highest education level: Not on file   Occupational History    Not on file   Tobacco Use    Smoking status: Never    Smokeless tobacco: Never   Vaping Use    Vaping status: never used   Substance and Sexual Activity    Alcohol use: Not Currently     Comment: social     Drug use: Never    Sexual activity: Yes     Partners: Male   Other Topics Concern    Not on file   Social History Narrative    Not on file     Social Determinants of Health     Financial  Patient picked up by [phone] directly from patient's room. Resource Strain: Low Risk  (9/16/2024)    Financial Resource Strain     Unable to Get: None   Food Insecurity: Low Risk  (9/16/2024)    Food Insecurity     Worried about Food: Never true     Food is Gone: Never true   Transportation Needs: Not At Risk (9/16/2024)    Transportation Needs     Lack of Reliable Transportation: No   Physical Activity: Medium Risk (9/16/2024)    Exercise Vital Sign     Days of Exercise per Week: 7 days     Minutes of Exercise per Session: 20 min   Stress: Low Risk  (9/16/2024)    Stress     How Stressed: Not at all   Social Connections: High Risk (9/16/2024)    Social Connections     Social Connectivity: Less than once a week   Interpersonal Safety: Not on file       Current Outpatient Medications   Medication Sig Dispense Refill    losartan-hydrochlorothiazide (HYZAAR) 50-12.5 MG per tablet TAKE 1 TABLET BY MOUTH DAILY 90 tablet 1    levothyroxine 88 MCG tablet TAKE 1 TABLET BY MOUTH EVERY DAY 90 tablet 1     No current facility-administered medications for this visit.       ALLERGIES:   Allergen Reactions    Aspirin RASH    Brompheniramine Other (See Comments)         ROS  Review of Systems   Eyes:  Positive for visual disturbance.   All other systems reviewed and are negative.      Physical:  Visit Vitals  /84 (BP Location: LUE - Left upper extremity, Patient Position: Sitting, Cuff Size: Regular)   Pulse 83   Temp 97.8 °F (36.6 °C) (Tympanic)   Resp 20   Ht 4' 11\" (1.499 m)   Wt 68.5 kg (151 lb)   LMP  (LMP Unknown) Comment: 2017   SpO2 99%   BMI 30.50 kg/m²       Physical Exam  Vitals reviewed.   Constitutional:       Appearance: Normal appearance.   HENT:      Head: Normocephalic and atraumatic.   Cardiovascular:      Rate and Rhythm: Normal rate and regular rhythm.      Pulses:           Carotid pulses are 0 on the right side and 0 on the left side.       Posterior tibial pulses are 2+ on the right side and 2+ on the left side.      Heart sounds: Normal heart sounds.    Pulmonary:      Effort: Pulmonary effort is normal. No respiratory distress.      Breath sounds: Normal breath sounds.   Abdominal:      General: Bowel sounds are normal. There is no distension.      Palpations: Abdomen is soft.   Musculoskeletal:      Right lower leg: No edema.      Left lower leg: No edema.   Skin:     General: Skin is warm.   Neurological:      General: No focal deficit present.      Mental Status: She is alert and oriented to person, place, and time.   Psychiatric:         Mood and Affect: Mood normal.         Behavior: Behavior normal.       Office Visit on 09/21/2024   Component Date Value Ref Range Status    Hemoglobin A1C, POC 09/21/2024 6.4 (A)  4.5 - 5.6 % Final         ASSESSMENT AND PLAN:  Problem List Items Addressed This Visit          Cardiac and Vasculature    Essential hypertension    Mixed hyperlipidemia    Relevant Orders    Lipid Panel With Reflex       Endocrine and Metabolic    Hypothyroidism    Relevant Orders    Free T4    T3, Total    Thyroid Stimulating Hormone    Type 2 diabetes mellitus without complication, without long-term current use of insulin  (CMD) - Primary    Relevant Orders    POCT Glycohemoglobin Analyzer (Completed)    Comprehensive Metabolic Panel     Other Visit Diagnoses       Mass of upper outer quadrant of left breast        Relevant Orders    US BREAST DIAGNOSTIC ALL 4 QUADRANTS LEFT    MAMMO DIAGNOSTIC LEFT W NICOLASA    Screening, ischemic heart disease        Relevant Orders    CT HEART CALCIUM SCORING    Left retinal detachment        Cataract of left eye, unspecified cataract type              Diabetes, controlled, uncomplicated  A1c is great, at the goal  Patient will continue current diet  Continue current level of exercising  She declines statin  Will check fasting lipids  Will screen for ischemic heart disease with calcium score test     The 10-year ASCVD risk score (Braxton BRANHAM, et al., 2019) is: 5.9%    Values used to calculate the score:       Age: 57 years      Sex: Female      Is Non- : No      Diabetic: Yes      Tobacco smoker: No      Systolic Blood Pressure: 139 mmHg      Is BP treated: Yes      HDL Cholesterol: 52 mg/dL      Total Cholesterol: 188 mg/dL     Hypertension is upper normal  Recommended to monitor blood pressure at home and report to the clinic if blood pressures above 140/80 to adjust blood pressure medications    Blood work requested follow-up on hypothyroidism    Nonalcoholic steatohepatitis: Definitely think that liver enzymes improved with 15 pounds weight loss  Liver enzymes will be rechecked    Provided referral for left breast mammogram and ultrasound 6 months follow-up  Flu shot was given today    Follow-up 5 months      Trista Moreau MD

## 2025-01-21 NOTE — TELEPHONE ENCOUNTER
1/21/2025    Romie Joseph MD    Chief Complaint   Patient presents with    Office Visit    Pre-Op Exam       Problem List Items Addressed This Visit       Parotid mass    Relevant Orders    Electrocardiogram 12-Lead    Comprehensive Metabolic Panel    SERVICE TO CARDIOLOGY     Other Visit Diagnoses       Preop examination    -  Primary    Relevant Orders    Electrocardiogram 12-Lead    Comprehensive Metabolic Panel    SERVICE TO CARDIOLOGY    Overweight (BMI 25.0-29.9)        Relevant Orders    Electrocardiogram 12-Lead    Comprehensive Metabolic Panel    Prehypertension        Relevant Orders    Electrocardiogram 12-Lead    Comprehensive Metabolic Panel    SERVICE TO CARDIOLOGY    Light cigarette smoker (1-9 cigs/day)        Relevant Orders    Electrocardiogram 12-Lead    Comprehensive Metabolic Panel    SERVICE TO CARDIOLOGY    Abnormal EKG        Relevant Orders    SERVICE TO CARDIOLOGY    Elevated PSA        Relevant Orders    SERVICE TO UROLOGY    Electrocardiogram 12-Lead    Comprehensive Metabolic Panel    Family history of prostate cancer        Relevant Orders    SERVICE TO UROLOGY    Electrocardiogram 12-Lead    Comprehensive Metabolic Panel                HPI:    History of Present Illness  The patient is here for a preoperative examination. He has a parotid mass and is scheduled for a left parotidectomy under general anesthesia by Dr. Lopez at [facility] on 02/14/2025. Requested tests include an EKG and blood work, including CBC and CMP.    He is overweight and is actively working on improving his diet and losing weight.    He also has prehypertension, with blood pressure readings on the higher side, but showing improvement. He is implementing lifestyle changes to further lower his blood pressure. He reports no chest pain or shortness of breath.    He has a long-standing history of smoking 1 to 9 cigarettes per day but is considering cessation in the near future. At present, he  Pts wife left v/m stating pt forgot CC appt today, asked if we could call back to r/s . R/S on 4/24. declines any interventions.    He has an elevated PSA level identified during routine blood work. Given his family history of prostate cancer in his father, this finding is of particular concern.    SOCIAL HISTORY  The patient has been smoking 1 to 9 cigarettes per day for many years.    FAMILY HISTORY  The patient's father had prostate cancer.          Review of Systems    Constitutional symptoms - No fever, no loss of appetite.   All systems were reviewed and are negative except as in HPI.      Current Medications:   Current Medications    No medications on file       Relevant Past Medical History:  History reviewed. No pertinent past medical history.    Social History     Socioeconomic History    Marital status:      Spouse name: Not on file    Number of children: Not on file    Years of education: Not on file    Highest education level: Not on file   Occupational History    Not on file   Tobacco Use    Smoking status: Every Day     Types: Cigarettes    Smokeless tobacco: Never   Substance and Sexual Activity    Alcohol use: Yes     Comment: Barely    Drug use: Yes     Types: Marijuana    Sexual activity: Not Currently   Other Topics Concern    Not on file   Social History Narrative    Not on file     Social Determinants of Health     Financial Resource Strain: Not on file   Food Insecurity: Not on file   Transportation Needs: Not on file   Physical Activity: Not on file   Stress: Not on file   Social Connections: Not on file   Interpersonal Safety: Not on file       Past Surgical History:   Procedure Laterality Date    Arthrotomy/explore/treat knee joint Left        ALLERGIES:   Allergen Reactions    Shellfish Allergy   (Food Or Med) Nausea & Vomiting        Family History   Problem Relation Age of Onset    Cancer Mother     Dementia/Alzheimers Father     Chronic Bronchitis Son        Examination:   Blood pressure 135/72, pulse 81, temperature 98.6 °F (37 °C), temperature source Oral, resp. rate 18,  height 5' 6\" (1.676 m), weight 81.3 kg (179 lb 3.7 oz), SpO2 98%.  Weight    01/21/25 1609   Weight: 81.3 kg (179 lb 3.7 oz)       Physical examination    Physical Exam    GENERAL APPEARANCE: Well developed, well nourished, alert and cooperative, and appears to be in no acute distress.  HEAD: normocephalic  EYES: PERRL, EOMI.  vision is grossly intact, no icterus, pallor or cyanosis   EARS: External auditory canals and tympanic membranes clear, no local tenderness, hearing grossly intact.   NOSE:  No nasal discharge.   THROAT:  Oral cavity and pharynx normal. No inflammation, swelling, exudate, or lesions.    CARDIAC: Normal S1 and S2. No S3, S4 or murmurs. pulse is regular and good volume.   LUNGS: Clear to auscultation without rales, rhonchi, wheezing or diminished breath sounds.  No accessory muscle usage, no local tenderness  GI/abdomen: soft, non tender, no hepatosplenomegaly, bowel sounds normal,  MUSKULOSKELETAL: no joint swelling or redness.  NECK: Neck supple, non-tender without cervical or supraclavicular lymphadenopathy, or masses  EXTREMITIES: No edema  NEUROLOGICAL  CN II-XII intact. Good coordination, good memory and speech, no paralysis, no tremorDenies paresthesias.   Normal sensory, reflexes, and symmetrical extremity strength and dexterity. Normal speech.  SKIN: Skin normal color and temperature, no abnormal moles or lesions.  PSYCHIATRIC: oriented to person, place, and time.   No suicidal or homicidal ideation  Physical Exam  A left parotid mass is present in the head and neck area.    Results  Laboratory Studies  Elevated PSA on blood work.    Testing  EKG shows normal sinus rhythm, but nonspecific T wave changes. Heart rate of 60/min.      Any history of the following:  []  YES    [x]  NO    Myocardial Infarction  []  YES    [x]  NO    Hypertension  []  YES    [x]  NO    Heart Failure   []  YES    [x]  NO    Arrythmia  []  YES    [x]  NO    Deep Vein Thrombosis  []  YES    [x]  NO     Hypercoagulable    []  YES    [x]  NO    Pulmonary Embolism  []  YES    [x]  NO    Renal Disease  []  YES    [x]  NO    Stroke  []  YES    [x]  NO    Asthma              []  YES    [x]  NO    Chronic Bronchitis              []  YES    [x]  NO    Sleep Apnea              []  YES    [x]  NO    Diabetes               []  YES    [x]  NO    Mental Conditions              []  YES    [x]  NO    Cancer                []  YES    [x]  NO    Anesthesia Complications (personal or family history)               [x]  YES    []  NO    Other Chronic Conditions:       [place] activity index  [email or web address]           Sleep Apnea Pre-operative Risk Assessment:  Stop:   Do you snore loudly?: Yes   Do you often feel tired?: No   Has anyone observed you stop breathing during your sleep?: No   Do you have High Blood Pressure?: No    Bang:   BMI more than 35?: No   Over 50?: Yes   Neck Circumference over 16\" ?: No   Male?: Yes                    Problem List Items Addressed This Visit       Parotid mass    Relevant Orders    Electrocardiogram 12-Lead    Comprehensive Metabolic Panel    SERVICE TO CARDIOLOGY     Other Visit Diagnoses       Preop examination    -  Primary    Relevant Orders    Electrocardiogram 12-Lead    Comprehensive Metabolic Panel    SERVICE TO CARDIOLOGY    Overweight (BMI 25.0-29.9)        Relevant Orders    Electrocardiogram 12-Lead    Comprehensive Metabolic Panel    Prehypertension        Relevant Orders    Electrocardiogram 12-Lead    Comprehensive Metabolic Panel    SERVICE TO CARDIOLOGY    Light cigarette smoker (1-9 cigs/day)        Relevant Orders    Electrocardiogram 12-Lead    Comprehensive Metabolic Panel    SERVICE TO CARDIOLOGY    Abnormal EKG        Relevant Orders    SERVICE TO CARDIOLOGY    Elevated PSA        Relevant Orders    SERVICE TO UROLOGY    Electrocardiogram 12-Lead    Comprehensive Metabolic Panel    Family history of prostate cancer         Relevant Orders    SERVICE TO UROLOGY    Electrocardiogram 12-Lead    Comprehensive Metabolic Panel            Assessment & Plan  1. Preoperative examination for parotid mass.  He is medically optimized for the upcoming procedure. A copy of the examination will be sent to the surgeon and surgical coordinator. He is advised to avoid blood thinners 5 days prior to the procedure. Given the abnormal EKG, clearance from a cardiologist is recommended.   Sleep apnea precautions recommended    Post-discharge, he should schedule a follow-up appointment with his primary care physician.    2. Sleep apnea.  Precautions are recommended during the procedure. Pt deferred for sleep test for now    3. Overweight.  He is encouraged to implement small, frequent lifestyle changes.    4. Prehypertension.  A low-salt diet and lifestyle modifications are recommended.    5. Light cigarette smoker.  The long-term risks of heart disease, stroke, cancer, and COPD have been discussed. He is advised to work on smoking cessation.    6. Abnormal EKG.  Given his other comorbid conditions and smoking habit, cardiology clearance is advised.    7. Elevated PSA.  Considering his family history of prostate cancer, a follow-up with a urologist to rule out prostate cancer is recommended.      Christiano Magana MD    Side effects of above medications discussed,   Any referrals issued, advised patient to call his/her insurance to get the list of providers who are in network and covered  all questions answered,   patient agrees with plan,   Advised to call back to get test results.  Risks of delay discussed  Will go to ER if any urgent symptoms    After a thorough discussion with the patient and employing a patient-centered approach, we have developed the above plan:     Patient Privacy Notice      The 21st Century Cures Act makes medical notes like these available to patients in the interest of transparency.   Please be advised that this is a medical  document.   Medical documents are intended to carry relevant information and the clinical opinion of the practitioner.   The medical note is intended as medical provider to provider communication, and may appear blunt or direct.   It is written in medical language, and may contain abbreviations or verbiage that are unfamiliar.

## 2025-02-25 NOTE — PROGRESS NOTES
Christine Topete  Progress Note and Procedure Note      Umm Pascal  AGE: 68 y.o. GENDER: male  : 1940  TODAY'S DATE:  2020    Subjective:     Chief Complaint   Patient presents with    Wound Check     bilateral legs F/U         HISTORY of PRESENT ILLNESS HPI     Umm Pascal is a 68 y.o. male who presents today for wound evaluation. History of Wound: History of congestive heart failure, chronic kidney disease, and venous stasis. His daughter brings him from [place]. No infectious symptoms.  Failed two different types of compression. States compression socks caused him to itch to b/l legs. Saw vascular and no plans for intervention at this time.    Wound Pain:  intermittent  Severity:  2 / 10   Wound Type:  venous  Modifying Factors:  edema and venous stasis  Associated Signs/Symptoms:  edema and drainage        PAST MEDICAL HISTORY        Diagnosis Date    Acute combined systolic and diastolic (congestive) hrt fail (HCC)     Acute kidney [facility] ([facility] [address]     Arrhythmia     Atrial fibrillation (HCC)     Carotid artery stenosis     CKD (chronic kidney disease)     Dilated cardiomyopathy (HCC)     Hyperlipidemia     Hypertension     IGT (impaired glucose tolerance)     Meningioma (HCC)     Valvular disease        PAST SURGICAL HISTORY    Past Surgical History:   Procedure Laterality Date    AORTIC VALVE REPLACEMENT      CARPAL TUNNEL RELEASE Left     COLONOSCOPY  2010    SKIN BIOPSY         FAMILY HISTORY    Family History   Problem Relation Age of Onset    Heart Disease Paternal Grandfather     Hypertension Other     High Cholesterol Neg Hx     High Blood Pressure Neg Hx        SOCIAL HISTORY    Social History     Tobacco Use    Smoking status: Former Smoker     Packs/day: 1.00     Years: 25.00     Pack years: 25.00     Types: Cigarettes     Quit date: 1993     Years since quittin.9    Smokeless tobacco: Current User     Types: Chew Vascular: Vascular status Impaired  palpable pedal pulses, right DP1/4 and PT1/4, left DP1/4 and PT1/4.  CFT 3 seconds digits 1 to 5 bilateral.  Hair growthAbsent  both lower extremities and feet.  Skin temperature is warm to warm from pretibial area to distal digits bilateral.  Exam is negative for rubor, pallor, cyanosis or signs of acute vascular compromise bilaterally.  Exam is positive for edema bilateral lower extremity.  Varicosities Present bilateral lower extremity. Neuro: Neurologic status normal bilateral with epicritic Present , proprioceptive Present, vibratory sensationPresent and protopathicPresent.  DTRs Present bilateral Achilles. There were no reproducible neuritic symptoms on exam bilateral feet/ankles. Derm: Ulceration to bilateral legs granular today.  Ecchymosis Absent  bilateral feet/foot.    Musculoskeletal: Pain with debridement of wounds 5/5 muscle strength in/eversion and dorsi/plantarflexion bilateral feet.  No gross instability noted. Assessment:     Patient Active Problem List   Diagnosis    Essential hypertension, benign    Atrial fibrillation with RVR ([facility] [address]    Heart valve replaced by other means    Hyperlipidemia other unspecified.      Aortic regurgitation    Stage 3 chronic kidney disease    Hypertrophy of prostate without urinary obstruction and other lower urinary tract symptoms (LUTS)    Peptic ulcer    Gout    Generalized osteoarthrosis, involving multiple sites    IGT (impaired glucose tolerance)    Lung nodule    Chronic combined systolic and diastolic CHF (congestive heart failure) (HCC)    Meningioma (HCC)    Bilateral carotid artery stenosis    Atherosclerosis of abdominal aorta (HCC)    Labyrinthine vertigo    RACIEL (acute kidney injury) ([address]    S/P AVR    Cardiomyopathy (HCC)    SOB (shortness of breath)    Localized edema    Acute combined systolic and diastolic congestive heart failure ([address]  Venous stasis ulcer of right ankle with fat layer exposed without varicose veins (HCC)    Venous stasis ulcer of left calf with fat layer exposed without varicose veins (HCC)    Hypokalemia         Post  Debridement Measurements:  Wound 12/21/20 Leg Lower; Left #1 (Active)   Wound Image   12/21/20 1349   Wound Etiology Venous 12/21/20 1345   Wound Cleansed Cleansed with saline 12/21/20 1345   Wound Length (cm) 0.5 cm 12/21/20 1345   Wound Width (cm) 1.5 cm 12/21/20 1345   Wound Depth (cm) 0.1 cm 12/21/20 1345   Wound Surface Area (cm^2) 0.75 cm^2 12/21/20 1345   Wound Volume (cm^3) 0.08 cm^3 12/21/20 1345   Post-Procedure Length (cm) 2 cm 12/21/20 1403   Post-Procedure Width (cm) 9 cm 12/21/20 1403   Post-Procedure Depth (cm) 0.1 cm 12/21/20 1403   Post-Procedure Surface Area (cm^2) 18 cm^2 12/21/20 1403   Post-Procedure Volume (cm^3) 1.8 cm^3 12/21/20 1403   Wound Assessment Pale granulation tissue 12/21/20 1403   Drainage Amount Moderate 12/21/20 1403   Drainage Description Clear 12/21/20 1403   Odor None 12/21/20 1345   Patsy-wound Assessment Blisters;Edematous 12/21/20 1345   Margins Defined edges 12/21/20 1345   Number of days: 0       Wound 12/21/20 Leg Right; Anterior #2 (Active)   Wound Image   12/21/20 1349   Wound Etiology Venous 12/21/20 1349   Wound Cleansed Cleansed with saline 12/21/20 1349   Wound Length (cm) 3.5 cm 12/21/20 1349   Wound Width (cm) 6 cm 12/21/20 1349   Wound Depth (cm) 0.1 cm 12/21/20 1349   Wound Surface Area (cm^2) 21 cm^2 12/21/20 1349   Wound Volume (cm^3) 2.1 cm^3 12/21/20 1349   Wound Assessment Granulation tissue 12/21/20 1349   Drainage Amount Large 12/21/20 1349   Drainage Description Serous 12/21/20 1349   Odor None 12/21/20 1349   Patsy-wound Assessment Edematous 12/21/20 1349   Number of days: 0                 Plan: The nature of the patient's condition was explained in depth. The patient was informed that their compliance to the treatment plan is paramount to successful healing and prevention of further ulceration and/or infection     Discharge Treatment       With each dressing change, rinse wounds with 0.9% Saline. (May use wound wash or soft contact solution. Both can be purchased at a local drug store). If unable to obtain saline, may use a gentle soap and water. Dressing care:  Adaptic and drawtex, apply to both legs, adaptic, dry dressing, bilateral unna boots     Written Patient Discharge Instructions Given            Electronically signed by Tayla Tomas DPM on 12/21/2020 at 2:07 PM Detail Level: Detailed Hide Additional Notes?: No